# Patient Record
Sex: FEMALE | Race: WHITE | NOT HISPANIC OR LATINO | Employment: OTHER | URBAN - METROPOLITAN AREA
[De-identification: names, ages, dates, MRNs, and addresses within clinical notes are randomized per-mention and may not be internally consistent; named-entity substitution may affect disease eponyms.]

---

## 2017-01-02 ENCOUNTER — HOSPITAL ENCOUNTER (OUTPATIENT)
Dept: PHYSICAL THERAPY | Facility: CLINIC | Age: 72
Setting detail: THERAPIES SERIES
End: 2017-01-02
Attending: ORTHOPAEDIC SURGERY
Payer: MEDICARE

## 2017-01-02 PROCEDURE — 40000718 ZZHC STATISTIC PT DEPARTMENT ORTHO VISIT: Performed by: PHYSICAL THERAPIST

## 2017-01-02 PROCEDURE — 97110 THERAPEUTIC EXERCISES: CPT | Mod: GP | Performed by: PHYSICAL THERAPIST

## 2017-01-06 ENCOUNTER — HOSPITAL ENCOUNTER (OUTPATIENT)
Dept: PHYSICAL THERAPY | Facility: CLINIC | Age: 72
Setting detail: THERAPIES SERIES
End: 2017-01-06
Attending: ORTHOPAEDIC SURGERY
Payer: MEDICARE

## 2017-01-06 PROCEDURE — 40000718 ZZHC STATISTIC PT DEPARTMENT ORTHO VISIT: Performed by: PHYSICAL THERAPIST

## 2017-01-06 PROCEDURE — 97110 THERAPEUTIC EXERCISES: CPT | Mod: GP | Performed by: PHYSICAL THERAPIST

## 2017-01-06 PROCEDURE — 97112 NEUROMUSCULAR REEDUCATION: CPT | Mod: GP | Performed by: PHYSICAL THERAPIST

## 2017-01-10 ENCOUNTER — HOSPITAL ENCOUNTER (OUTPATIENT)
Dept: PHYSICAL THERAPY | Facility: CLINIC | Age: 72
Setting detail: THERAPIES SERIES
End: 2017-01-10
Attending: ORTHOPAEDIC SURGERY
Payer: MEDICARE

## 2017-01-10 PROCEDURE — 97110 THERAPEUTIC EXERCISES: CPT | Mod: GP | Performed by: PHYSICAL THERAPIST

## 2017-01-10 PROCEDURE — 97140 MANUAL THERAPY 1/> REGIONS: CPT | Mod: GP | Performed by: PHYSICAL THERAPIST

## 2017-01-10 PROCEDURE — 40000718 ZZHC STATISTIC PT DEPARTMENT ORTHO VISIT: Performed by: PHYSICAL THERAPIST

## 2017-01-12 ENCOUNTER — TRANSFERRED RECORDS (OUTPATIENT)
Dept: HEALTH INFORMATION MANAGEMENT | Facility: CLINIC | Age: 72
End: 2017-01-12

## 2017-02-01 ENCOUNTER — TELEPHONE (OUTPATIENT)
Dept: FAMILY MEDICINE | Facility: CLINIC | Age: 72
End: 2017-02-01

## 2017-02-01 DIAGNOSIS — I10 HYPERTENSION GOAL BP (BLOOD PRESSURE) < 140/90: Chronic | ICD-10-CM

## 2017-02-01 DIAGNOSIS — E11.21 TYPE 2 DIABETES MELLITUS WITH DIABETIC NEPHROPATHY (H): Chronic | ICD-10-CM

## 2017-02-01 DIAGNOSIS — I10 HYPERTENSION GOAL BP (BLOOD PRESSURE) < 140/90: Primary | Chronic | ICD-10-CM

## 2017-02-01 DIAGNOSIS — N18.30 CKD (CHRONIC KIDNEY DISEASE) STAGE 3, GFR 30-59 ML/MIN (H): ICD-10-CM

## 2017-02-01 LAB
ANION GAP SERPL CALCULATED.3IONS-SCNC: 9 MMOL/L (ref 3–14)
BUN SERPL-MCNC: 22 MG/DL (ref 7–30)
CALCIUM SERPL-MCNC: 8.9 MG/DL (ref 8.5–10.1)
CHLORIDE SERPL-SCNC: 103 MMOL/L (ref 94–109)
CO2 SERPL-SCNC: 24 MMOL/L (ref 20–32)
CREAT SERPL-MCNC: 1.06 MG/DL (ref 0.52–1.04)
GFR SERPL CREATININE-BSD FRML MDRD: 51 ML/MIN/1.7M2
GLUCOSE SERPL-MCNC: 180 MG/DL (ref 70–99)
HBA1C MFR BLD: 7.6 % (ref 4.3–6)
POTASSIUM SERPL-SCNC: 4.3 MMOL/L (ref 3.4–5.3)
SODIUM SERPL-SCNC: 136 MMOL/L (ref 133–144)
TSH SERPL DL<=0.005 MIU/L-ACNC: 1.39 MU/L (ref 0.4–4)

## 2017-02-01 PROCEDURE — 84443 ASSAY THYROID STIM HORMONE: CPT | Performed by: FAMILY MEDICINE

## 2017-02-01 PROCEDURE — 80048 BASIC METABOLIC PNL TOTAL CA: CPT | Performed by: FAMILY MEDICINE

## 2017-02-01 PROCEDURE — 83036 HEMOGLOBIN GLYCOSYLATED A1C: CPT | Performed by: FAMILY MEDICINE

## 2017-02-01 PROCEDURE — 36415 COLL VENOUS BLD VENIPUNCTURE: CPT | Performed by: FAMILY MEDICINE

## 2017-02-06 ENCOUNTER — DOCUMENTATION ONLY (OUTPATIENT)
Dept: OTHER | Facility: CLINIC | Age: 72
End: 2017-02-06

## 2017-02-06 DIAGNOSIS — Z71.89 ACP (ADVANCE CARE PLANNING): Primary | Chronic | ICD-10-CM

## 2017-02-09 ENCOUNTER — TELEPHONE (OUTPATIENT)
Dept: FAMILY MEDICINE | Facility: CLINIC | Age: 72
End: 2017-02-09

## 2017-02-09 ENCOUNTER — OFFICE VISIT (OUTPATIENT)
Dept: FAMILY MEDICINE | Facility: CLINIC | Age: 72
End: 2017-02-09
Payer: MEDICARE

## 2017-02-09 VITALS
HEIGHT: 64 IN | WEIGHT: 184 LBS | TEMPERATURE: 98 F | BODY MASS INDEX: 31.41 KG/M2 | DIASTOLIC BLOOD PRESSURE: 74 MMHG | HEART RATE: 90 BPM | SYSTOLIC BLOOD PRESSURE: 139 MMHG

## 2017-02-09 DIAGNOSIS — E11.21 TYPE 2 DIABETES MELLITUS WITH DIABETIC NEPHROPATHY, WITHOUT LONG-TERM CURRENT USE OF INSULIN (H): Primary | ICD-10-CM

## 2017-02-09 DIAGNOSIS — I48.0 PAROXYSMAL ATRIAL FIBRILLATION (H): Chronic | ICD-10-CM

## 2017-02-09 DIAGNOSIS — N18.30 CKD (CHRONIC KIDNEY DISEASE) STAGE 3, GFR 30-59 ML/MIN (H): ICD-10-CM

## 2017-02-09 DIAGNOSIS — E78.5 HYPERLIPIDEMIA LDL GOAL <100: ICD-10-CM

## 2017-02-09 DIAGNOSIS — Z96.652 STATUS POST TOTAL LEFT KNEE REPLACEMENT: ICD-10-CM

## 2017-02-09 DIAGNOSIS — I10 ESSENTIAL HYPERTENSION: ICD-10-CM

## 2017-02-09 PROCEDURE — 99214 OFFICE O/P EST MOD 30 MIN: CPT | Performed by: FAMILY MEDICINE

## 2017-02-09 RX ORDER — GLYCOPYRROLATE 1 MG/1
1 TABLET ORAL PRN
COMMUNITY
Start: 2017-02-09 | End: 2017-02-20

## 2017-02-09 RX ORDER — ATORVASTATIN CALCIUM 10 MG/1
10 TABLET, FILM COATED ORAL DAILY
Qty: 90 TABLET | Refills: 3 | Status: SHIPPED | OUTPATIENT
Start: 2017-02-09 | End: 2017-02-20

## 2017-02-09 RX ORDER — LISINOPRIL 40 MG/1
40 TABLET ORAL DAILY
Qty: 90 TABLET | Refills: 3 | Status: SHIPPED | OUTPATIENT
Start: 2017-02-09 | End: 2017-02-20

## 2017-02-09 RX ORDER — CHLORTHALIDONE 25 MG/1
12.5 TABLET ORAL DAILY
Qty: 45 TABLET | Refills: 3 | Status: SHIPPED | OUTPATIENT
Start: 2017-02-09 | End: 2017-02-20

## 2017-02-09 NOTE — NURSING NOTE
"Chief Complaint   Patient presents with     Diabetes     recheck       Initial /74 mmHg  Pulse 90  Temp(Src) 98  F (36.7  C) (Tympanic)  Ht 5' 3.5\" (1.613 m)  Wt 184 lb (83.462 kg)  BMI 32.08 kg/m2 Estimated body mass index is 32.08 kg/(m^2) as calculated from the following:    Height as of this encounter: 5' 3.5\" (1.613 m).    Weight as of this encounter: 184 lb (83.462 kg).  Medication Reconciliation: complete   Maya Pineda LPN    "

## 2017-02-09 NOTE — MR AVS SNAPSHOT
"              After Visit Summary   2/9/2017    Suzette Britton    MRN: [id number]           Patient Information     Date Of Birth          1945        Visit Information        Provider Department      2/9/2017 1:30 PM Judy Verde MD [facility]        Today's Diagnoses     Visit for screening mammogram    -  1     Type 2 diabetes mellitus with diabetic nephropathy, without long-term current use of insulin (H)         CKD (chronic kidney disease) stage 3, GFR 30-59 ml/min         Essential hypertension         Hyperlipidemia LDL goal <100         Paroxysmal atrial fibrillation (H)            Follow-ups after your visit        Who to contact     Normal or non-critical lab and imaging results will be communicated to you by Bookmycabhart, letter or phone within 4 business days after the clinic has received the results. If you do not hear from us within 7 days, please contact the clinic through Oracle Youtht or phone. If you have a critical or abnormal lab result, we will notify you by phone as soon as possible.  Submit refill requests through Klutch or call your pharmacy and they will forward the refill request to us. Please allow 3 business days for your refill to be completed.          If you need to speak with a  for additional information , please call: [phone]             Additional Information About Your Visit        MyCharexpressor software Information     Klutch lets you send messages to your doctor, view your test results, renew your prescriptions, schedule appointments and more. To sign up, go to [email or web address] . Click on \"Log in\" on the left side of the screen, which will take you to the Welcome page. Then click on \"Sign up Now\" on the right side of the page.     You will be asked to enter the access code listed below, as well as some personal information. Please follow the directions to create your username and password.     Your access code is: [email or web address]  Expires: 5/10/2017  " "2:12 PM     Your access code will  in 90 days. If you need help or a new code, please call your [place] clinic or [phone].        Care EveryWhere ID     This is your Care EveryWhere ID. This could be used by other organizations to access your [place] medical records  [id number]        Your Vitals Were     Pulse Temperature Height BMI (Body Mass Index)          90 98  F (36.7  C) (Tympanic) 5' 3.5\" (1.613 m) 32.08 kg/m2         Blood Pressure from Last 3 Encounters:   17 139/74   16 146/70   16 126/70    Weight from Last 3 Encounters:   17 184 lb (83.462 kg)   16 193 lb 1.6 oz (87.59 kg)   16 193 lb 1.6 oz (87.59 kg)              Today, you had the following     No orders found for display         Today's Medication Changes          These changes are accurate as of: 17  2:12 PM.  If you have any questions, ask your nurse or doctor.               These medicines have changed or have updated prescriptions.        Dose/Directions    blood glucose monitoring test strip   Commonly known as:  no brand specified   This may have changed:  additional instructions   Used for:  CKD (chronic kidney disease) stage 3, GFR 30-59 ml/min   Changed by:  Judy Verde MD        Use to test blood sugars one time daily or as directed  One touch meter - one touch strips   Quantity:  3 Box   Refills:  3       metFORMIN 500 MG tablet   Commonly known as:  GLUCOPHAGE   This may have changed:    - how much to take  - how to take this  - when to take this  - additional instructions   Used for:  Type 2 diabetes mellitus with diabetic nephropathy, without long-term current use of insulin (H)   Changed by:  Judy Verde MD        Dose:  1000 mg   Take 2 tablets (1,000 mg) by mouth 2 times daily (with meals)   Quantity:  120 tablet   Refills:  3         Stop taking these medicines if you haven't already. Please contact your care team if you have questions.     hydrOXYzine 25 " MG tablet   Commonly known as:  ATARAX   Stopped by:  Judy Verde MD           oxyCODONE 5 MG IR tablet   Commonly known as:  ROXICODONE   Stopped by:  Judy Verde MD           senna-docusate 8.6-50 MG per tablet   Commonly known as:  SENOKOT-S;PERICOLACE   Stopped by:  Judy Verde MD                Where to get your medicines      These medications were sent to [facility]. - [place], [place] - [address]  [address] [address]     Phone:  [phone]    - atorvastatin 10 MG tablet  - blood glucose monitoring test strip  - chlorthalidone 25 MG tablet  - lisinopril 40 MG tablet  - metFORMIN 500 MG tablet             Primary Care Provider Office Phone # Fax #    Judy Verde [phone]       [facility] [address] [facility] [address]        Thank you!     Thank you for choosing [facility]  for your care. Our goal is always to provide you with excellent care. Hearing back from our patients is one way we can continue to improve our services. Please take a few minutes to complete the written survey that you may receive in the mail after your visit with us. Thank you!             Your Updated Medication List - Protect others around you: Learn how to safely use, store and throw away your medicines at www.disposemymeds.org.          This list is accurate as of: 2/9/17  2:12 PM.  Always use your most recent med list.                   Brand Name Dispense Instructions for use    aspirin 325 MG EC tablet     40 tablet    Take 1 tablet (325 mg) by mouth daily       atorvastatin 10 MG tablet    LIPITOR    90 tablet    Take 1 tablet (10 mg) by mouth daily       blood glucose monitoring test strip    no brand specified    3 Box    Use to test blood sugars one time daily or as directed  One touch meter - one touch strips       calcium-vitamin D-vitamin K 500-500-40 MG-UNT-MCG Chew    VIACTIV     Take 1 tablet  by mouth daily       chlorthalidone 25 MG tablet    HYGROTON    45 tablet    Take 0.5 tablets (12.5 mg) by mouth daily       GLUCOMETER KIT     1 Kit    for testing bid, One Touch or please fill with formulary glucometer       lisinopril 40 MG tablet    PRINIVIL/ZESTRIL    90 tablet    Take 1 tablet (40 mg) by mouth daily       metFORMIN 500 MG tablet    GLUCOPHAGE    120 tablet    Take 2 tablets (1,000 mg) by mouth 2 times daily (with meals)       order for DME     1 Units    Equipment being ordered: CPM: Advance as tolerated. Range 0-90 degree flexion Treatment Diagnosis: Left TKA       ROBINUL 1 MG tablet   Generic drug:  glycopyrrolate      Take 1 tablet (1 mg) by mouth as needed       VITAMIN D PO      Take 1,000 Units by mouth

## 2017-02-09 NOTE — PROGRESS NOTES
SUBJECTIVE:                                                    Suzette Britton is a 71 year old female who presents to clinic today for the following health issues:        Diabetes Follow-up    Patient is checking blood sugars: once daily.  Results are as follows:         am - average 200    Diabetic concerns: None     Symptoms of hypoglycemia (low blood sugar): none     Paresthesias (numbness or burning in feet) or sores: Yes redness      Date of last diabetic eye exam: march 2016     Brings in data - running high - always checks before breakfast - 180-220s   Taking metformin 1000 in am and 500 in pm     Hyperlipidemia Follow-Up      Rate your low fat/cholesterol diet?: Eats fairly healthy     Taking statin?  No    Other lipid medications/supplements?:  None    CHOL      142   9/13/2016  HDL       60   9/13/2016  LDL       53   9/13/2016  TRIG      144   9/13/2016  CHOLHDLRATIO      3.4   8/25/2015       Hypertension Follow-up      Outpatient blood pressures once in a while     Low Salt Diet: no added salt     Left Knee surgery 12/1/16 - back on baby asa/day  Feeling good.  Planning to do right knee next    A-fib history - one occurrence - sent to ER and converted there. Hasn't recurred as far as she knows  Did holter and it was negative           Amount of exercise or physical activity: None    Problems taking medications regularly: No    Medication side effects: none    Diet: regular (no restrictions)      Review of systems:  No weight loss/night sweats  Normal appetite  No n/v   No d/c   No foot ulcers/wounds     Problem list and histories reviewed & adjusted, as indicated.  Additional history: as documented    Patient Active Problem List   Diagnosis     Osteoarthrosis, hand     Hyperhidrosis     Hyperlipidemia LDL goal <100     Atrial fibrillation (H)     CKD (chronic kidney disease) stage 3, GFR 30-59 ml/min     Preventative health care     Type 2 diabetes mellitus with diabetic nephropathy (H)      "Hypertension goal BP (blood pressure) < 140/90     Health Care Home     Cataract     Obesity (BMI 30.0-34.9)     S/P total knee arthroplasty     ACP (advance care planning)     Current Outpatient Prescriptions   Medication     aspirin  MG EC tablet     order for DME     calcium-vitamin D-vitamin K (VIACTIV) 500-500-40 MG-UNT-MCG CHEW     blood glucose monitoring (NO BRAND SPECIFIED) test strip     metFORMIN (GLUCOPHAGE) 500 MG tablet     chlorthalidone (HYGROTON) 25 MG tablet     lisinopril (PRINIVIL,ZESTRIL) 40 MG tablet     atorvastatin (LIPITOR) 10 MG tablet     glycopyrrolate (ROBINUL) 1 MG tablet     Cholecalciferol (VITAMIN D PO)     GLUCOMETER KIT     hydrOXYzine (ATARAX) 25 MG tablet     oxyCODONE (ROXICODONE) 5 MG IR tablet     senna-docusate (SENOKOT-S;PERICOLACE) 8.6-50 MG per tablet     No current facility-administered medications for this visit.        Allergies   Allergen Reactions     Nka [No Known Allergies]      /74 mmHg  Pulse 90  Temp(Src) 98  F (36.7  C) (Tympanic)  Ht 5' 3.5\" (1.613 m)  Wt 184 lb (83.462 kg)  BMI 32.08 kg/m2  GENERAL - Pt is alert and oriented in no acute distress.  Affect is appropriate. Good eye contact.  NECK - Neck is supple w/o LA or thyromegaly  RESPIRATORY - Clear to auscultation bilaterally.  No wheezing noted  CV - RRR, no murmurs, rubs, gallops.   EXTREM - No edema.  Feet - no wounds. Normal capillary refill. Warm to touch. Intact microfilament testing. Normal DP and PT pulses      Assessment/Plan -    (E11.21) Type 2 diabetes mellitus with diabetic nephropathy, without long-term current use of insulin (H)  (primary encounter diagnosis)  Comment: A1c is up a bit. BG running high in the a.m..  Increase metformin to 1000 po bid. Check BG at bedtime and at 2am for a few nights. A1c in 3 months. The patient indicates understanding of these issues and agrees with the plan.   Plan: metFORMIN (GLUCOPHAGE) 500 MG tablet,         atorvastatin (LIPITOR) 10 MG " tablet            (N18.3) CKD (chronic kidney disease) stage 3, GFR 30-59 ml/min  Comment:   Plan: blood glucose monitoring (NO BRAND SPECIFIED)         test strip, lisinopril (PRINIVIL/ZESTRIL) 40 MG        tablet            (I10) Essential hypertension  Comment:   Plan: chlorthalidone (HYGROTON) 25 MG tablet            (E78.5) Hyperlipidemia LDL goal <100  Comment:   Plan: atorvastatin (LIPITOR) 10 MG tablet            (I48.0) Paroxysmal atrial fibrillation (H)  Comment: reviewed with her. Continue on asa daily   Plan:     (Z96.652) Status post total left knee replacement  Comment:   Plan:       Declines mammo today     CTara Verde MD

## 2017-02-09 NOTE — TELEPHONE ENCOUNTER
Suzette calling to state that the script for Metformin is wrong and it was send to wrong pharmacy.    Metformin should be for 90 days and it should have 360 tablets.  She wants script to go to [facility] Mail order pharmacy.      Please review and re-order if appropriate.  Thank you..Jeni Echevarria

## 2017-02-20 ENCOUNTER — TELEPHONE (OUTPATIENT)
Dept: FAMILY MEDICINE | Facility: CLINIC | Age: 72
End: 2017-02-20

## 2017-02-20 DIAGNOSIS — E11.21 TYPE 2 DIABETES MELLITUS WITH DIABETIC NEPHROPATHY, WITHOUT LONG-TERM CURRENT USE OF INSULIN (H): ICD-10-CM

## 2017-02-20 DIAGNOSIS — N18.30 CKD (CHRONIC KIDNEY DISEASE) STAGE 3, GFR 30-59 ML/MIN (H): ICD-10-CM

## 2017-02-20 DIAGNOSIS — E78.5 HYPERLIPIDEMIA LDL GOAL <100: ICD-10-CM

## 2017-02-20 DIAGNOSIS — I10 ESSENTIAL HYPERTENSION: ICD-10-CM

## 2017-02-20 RX ORDER — LISINOPRIL 40 MG/1
40 TABLET ORAL DAILY
Qty: 90 TABLET | Refills: 3 | Status: ON HOLD | OUTPATIENT
Start: 2017-02-20 | End: 2017-05-10

## 2017-02-20 RX ORDER — CHLORTHALIDONE 25 MG/1
12.5 TABLET ORAL DAILY
Qty: 45 TABLET | Refills: 3 | Status: SHIPPED | OUTPATIENT
Start: 2017-02-20 | End: 2017-08-25

## 2017-02-20 RX ORDER — GLYCOPYRROLATE 1 MG/1
1 TABLET ORAL PRN
Qty: 90 TABLET | Refills: 3 | Status: SHIPPED | OUTPATIENT
Start: 2017-02-20 | End: 2018-09-05

## 2017-02-20 RX ORDER — ATORVASTATIN CALCIUM 10 MG/1
10 TABLET, FILM COATED ORAL DAILY
Qty: 90 TABLET | Refills: 3 | Status: ON HOLD | OUTPATIENT
Start: 2017-02-20 | End: 2017-05-18

## 2017-02-20 NOTE — TELEPHONE ENCOUNTER
Chlorthalidone, lisinopril atorvastatin , glycopyrrolate ordered to [facility] per request of patient.  Robert Rodgers, RN

## 2017-03-21 ENCOUNTER — TRANSFERRED RECORDS (OUTPATIENT)
Dept: HEALTH INFORMATION MANAGEMENT | Facility: CLINIC | Age: 72
End: 2017-03-21

## 2017-04-13 ENCOUNTER — OFFICE VISIT (OUTPATIENT)
Dept: FAMILY MEDICINE | Facility: CLINIC | Age: 72
End: 2017-04-13
Payer: MEDICARE

## 2017-04-13 VITALS
SYSTOLIC BLOOD PRESSURE: 128 MMHG | HEIGHT: 64 IN | TEMPERATURE: 98.1 F | DIASTOLIC BLOOD PRESSURE: 75 MMHG | WEIGHT: 183.5 LBS | HEART RATE: 89 BPM | BODY MASS INDEX: 31.33 KG/M2

## 2017-04-13 DIAGNOSIS — E11.21 TYPE 2 DIABETES MELLITUS WITH DIABETIC NEPHROPATHY, WITHOUT LONG-TERM CURRENT USE OF INSULIN (H): Chronic | ICD-10-CM

## 2017-04-13 DIAGNOSIS — E78.5 HYPERLIPIDEMIA LDL GOAL <100: Chronic | ICD-10-CM

## 2017-04-13 DIAGNOSIS — Z01.818 PRE-OPERATIVE EXAMINATION: Primary | ICD-10-CM

## 2017-04-13 DIAGNOSIS — N18.30 CKD (CHRONIC KIDNEY DISEASE) STAGE 3, GFR 30-59 ML/MIN (H): Chronic | ICD-10-CM

## 2017-04-13 DIAGNOSIS — I10 ESSENTIAL HYPERTENSION: ICD-10-CM

## 2017-04-13 DIAGNOSIS — E66.811 OBESITY (BMI 30.0-34.9): ICD-10-CM

## 2017-04-13 DIAGNOSIS — M17.11 ARTHRITIS OF RIGHT KNEE: ICD-10-CM

## 2017-04-13 DIAGNOSIS — I48.0 PAROXYSMAL ATRIAL FIBRILLATION (H): Chronic | ICD-10-CM

## 2017-04-13 DIAGNOSIS — Z01.818 PREOP GENERAL PHYSICAL EXAM: ICD-10-CM

## 2017-04-13 LAB
ANION GAP SERPL CALCULATED.3IONS-SCNC: 9 MMOL/L (ref 3–14)
BASOPHILS # BLD AUTO: 0 10E9/L (ref 0–0.2)
BASOPHILS NFR BLD AUTO: 0.6 %
BUN SERPL-MCNC: 20 MG/DL (ref 7–30)
CALCIUM SERPL-MCNC: 9.9 MG/DL (ref 8.5–10.1)
CHLORIDE SERPL-SCNC: 103 MMOL/L (ref 94–109)
CO2 SERPL-SCNC: 26 MMOL/L (ref 20–32)
CREAT SERPL-MCNC: 1.04 MG/DL (ref 0.52–1.04)
DIFFERENTIAL METHOD BLD: NORMAL
EOSINOPHIL # BLD AUTO: 0.2 10E9/L (ref 0–0.7)
EOSINOPHIL NFR BLD AUTO: 3.3 %
ERYTHROCYTE [DISTWIDTH] IN BLOOD BY AUTOMATED COUNT: 13.9 % (ref 10–15)
GFR SERPL CREATININE-BSD FRML MDRD: 52 ML/MIN/1.7M2
GLUCOSE SERPL-MCNC: 129 MG/DL (ref 70–99)
HBA1C MFR BLD: 6.9 % (ref 4.3–6)
HCT VFR BLD AUTO: 42.4 % (ref 35–47)
HGB BLD-MCNC: 13.9 G/DL (ref 11.7–15.7)
LYMPHOCYTES # BLD AUTO: 2.5 10E9/L (ref 0.8–5.3)
LYMPHOCYTES NFR BLD AUTO: 36 %
MCH RBC QN AUTO: 28.8 PG (ref 26.5–33)
MCHC RBC AUTO-ENTMCNC: 32.8 G/DL (ref 31.5–36.5)
MCV RBC AUTO: 88 FL (ref 78–100)
MONOCYTES # BLD AUTO: 0.5 10E9/L (ref 0–1.3)
MONOCYTES NFR BLD AUTO: 7.5 %
NEUTROPHILS # BLD AUTO: 3.7 10E9/L (ref 1.6–8.3)
NEUTROPHILS NFR BLD AUTO: 52.6 %
PLATELET # BLD AUTO: 220 10E9/L (ref 150–450)
POTASSIUM SERPL-SCNC: 4.4 MMOL/L (ref 3.4–5.3)
RBC # BLD AUTO: 4.82 10E12/L (ref 3.8–5.2)
SODIUM SERPL-SCNC: 138 MMOL/L (ref 133–144)
WBC # BLD AUTO: 6.9 10E9/L (ref 4–11)

## 2017-04-13 PROCEDURE — 83036 HEMOGLOBIN GLYCOSYLATED A1C: CPT | Performed by: FAMILY MEDICINE

## 2017-04-13 PROCEDURE — 80048 BASIC METABOLIC PNL TOTAL CA: CPT | Performed by: FAMILY MEDICINE

## 2017-04-13 PROCEDURE — 85025 COMPLETE CBC W/AUTO DIFF WBC: CPT | Performed by: FAMILY MEDICINE

## 2017-04-13 PROCEDURE — 93000 ELECTROCARDIOGRAM COMPLETE: CPT | Performed by: FAMILY MEDICINE

## 2017-04-13 PROCEDURE — 99214 OFFICE O/P EST MOD 30 MIN: CPT | Performed by: FAMILY MEDICINE

## 2017-04-13 PROCEDURE — 36415 COLL VENOUS BLD VENIPUNCTURE: CPT | Performed by: FAMILY MEDICINE

## 2017-04-13 NOTE — NURSING NOTE
"Chief Complaint   Patient presents with     Pre-Op Exam       Initial /75 (BP Location: Right arm, Patient Position: Chair, Cuff Size: Adult Regular)  Pulse 89  Temp 98.1  F (36.7  C) (Tympanic)  Ht 5' 3.5\" (1.613 m)  Wt 183 lb 8 oz (83.2 kg)  BMI 32 kg/m2 Estimated body mass index is 32 kg/(m^2) as calculated from the following:    Height as of this encounter: 5' 3.5\" (1.613 m).    Weight as of this encounter: 183 lb 8 oz (83.2 kg).  Medication Reconciliation: complete   Maya Pineda LPN    "

## 2017-04-13 NOTE — MR AVS SNAPSHOT
After Visit Summary   4/13/2017    Suzette Britton    MRN: [id number]           Patient Information     Date Of Birth          1945        Visit Information        Provider Department      4/13/2017 10:30 AM Judy Verde MD [facility]        Today's Diagnoses     Pre-operative examination    -  1    Arthritis of right knee        Type 2 diabetes mellitus with diabetic nephropathy, without long-term current use of insulin (H)        CKD (chronic kidney disease) stage 3, GFR 30-59 ml/min        Paroxysmal atrial fibrillation (H)        Hyperlipidemia LDL goal <100        Obesity (BMI 30.0-34.9)        Essential hypertension        Preop general physical exam          Care Instructions      Before Your Surgery      Call your surgeon if there is any change in your health. This includes signs of a cold or flu (such as a sore throat, runny nose, cough, rash or fever).    Do not smoke, drink alcohol or take over the counter medicine (unless your surgeon or primary care doctor tells you to) for the 24 hours before and after surgery.    If you take prescribed drugs: Follow your doctor s orders about which medicines to take and which to stop until after surgery.    Eating and drinking prior to surgery: follow the instructions from your surgeon    Take a shower or bath the night before surgery. Use the soap your surgeon gave you to gently clean your skin. If you do not have soap from your surgeon, use your regular soap. Do not shave or scrub the surgery site.  Wear clean pajamas and have clean sheets on your bed.         Follow-ups after your visit        Your next 10 appointments already scheduled     May 04, 2017   Procedure with Zachary Arteaga MD   [facility] (--)    [facility] [facility] [address] [phone]           The medical center is located at [address] (between [address] and [address] in [place], [place] miles north of [place]).     "          Who to contact     Normal or non-critical lab and imaging results will be communicated to you by MyChart, letter or phone within 4 business days after the clinic has received the results. If you do not hear from us within 7 days, please contact the clinic through Indi-e Publishinghart or phone. If you have a critical or abnormal lab result, we will notify you by phone as soon as possible.  Submit refill requests through Sova or call your pharmacy and they will forward the refill request to us. Please allow 3 business days for your refill to be completed.          If you need to speak with a  for additional information , please call: [phone]             Additional Information About Your Visit        Indi-e PublishingharMOGL Information     Sova lets you send messages to your doctor, view your test results, renew your prescriptions, schedule appointments and more. To sign up, go to [email or web address] . Click on \"Log in\" on the left side of the screen, which will take you to the Welcome page. Then click on \"Sign up Now\" on the right side of the page.     You will be asked to enter the access code listed below, as well as some personal information. Please follow the directions to create your username and password.     Your access code is: [email or web address]  Expires: 5/10/2017  3:12 PM     Your access code will  in 90 days. If you need help or a new code, please call your [place] clinic or [phone].        Care EveryWhere ID     This is your Care EveryWhere ID. This could be used by other organizations to access your [place] medical records  [id number]        Your Vitals Were     Pulse Temperature Height BMI (Body Mass Index)          89 98.1  F (36.7  C) (Tympanic) 5' 3.5\" (1.613 m) 32 kg/m2         Blood Pressure from Last 3 Encounters:   17 128/75   17 139/74   16 146/70    Weight from Last 3 Encounters:   17 183 lb 8 oz (83.2 kg)   17 184 lb (83.5 kg)   16 193 " lb 1.6 oz (87.6 kg)              We Performed the Following     Basic metabolic panel     CBC with platelets differential     EKG 12-lead complete w/read - Clinics     Hemoglobin A1c        Primary Care Provider Office Phone # Fax #    Judy Verde [phone]       [facility] [address] [address] [facility] [facility] [address]        Thank you!     Thank you for choosing [facility]  for your care. Our goal is always to provide you with excellent care. Hearing back from our patients is one way we can continue to improve our services. Please take a few minutes to complete the written survey that you may receive in the mail after your visit with us. Thank you!             Your Updated Medication List - Protect others around you: Learn how to safely use, store and throw away your medicines at www.disposemymeds.org.          This list is accurate as of: 4/13/17 11:35 AM.  Always use your most recent med list.                   Brand Name Dispense Instructions for use    aspirin 325 MG EC tablet     40 tablet    Take 1 tablet (325 mg) by mouth daily       atorvastatin 10 MG tablet    LIPITOR    90 tablet    Take 1 tablet (10 mg) by mouth daily       blood glucose monitoring test strip    no brand specified    3 Box    Use to test blood sugars one time daily or as directed  One touch meter - one touch strips       calcium-vitamin D-vitamin K 500-500-40 MG-UNT-MCG Chew    VIACTIV     Take 1 tablet by mouth daily       chlorthalidone 25 MG tablet    HYGROTON    45 tablet    Take 0.5 tablets (12.5 mg) by mouth daily       GLUCOMETER KIT     1 Kit    for testing bid, One Touch or please fill with formulary glucometer       glycopyrrolate 1 MG tablet    ROBINUL    90 tablet    Take 1 tablet (1 mg) by mouth as needed       lisinopril 40 MG tablet    PRINIVIL/ZESTRIL    90 tablet    Take 1 tablet (40 mg) by mouth daily       metFORMIN 500 MG tablet    GLUCOPHAGE    360 tablet    Take 2 tablets  (1,000 mg) by mouth 2 times daily (with meals)       order for DME     1 Units    Equipment being ordered: CPM: Advance as tolerated. Range 0-90 degree flexion Treatment Diagnosis: Left TKA       VITAMIN D PO      Take 1,000 Units by mouth

## 2017-04-13 NOTE — PROGRESS NOTES
[facility]  [address] [address]  [phone]  Dept: [phone]    PRE-OP EVALUATION:  Today's date: 2017    Suzette Britton (: 1945) presents for pre-operative evaluation assessment as requested by Dr. Zachary Arteaga.  She requires evaluation and anesthesia risk assessment prior to undergoing surgery/procedure for treatment of right knee arthritis.  Proposed procedure: right knee replacement     Date of Surgery/ Procedure: 2017  Time of Surgery/ Procedure: To be determined   Hospital/Surgical Facility: [facility] . Surgery done at [facility].   Primary Physician: Judy Verde  Type of Anesthesia Anticipated: Spinal    Patient has a Health Care Directive or Living Will:  YES , Patient said there is a copy in her chart.     1. NO - Do you have a history of heart attack, stroke, stent, bypass or surgery on an artery in the head, neck, heart or legs?  2. NO - Do you ever have any pain or discomfort in your chest?  3. NO - Do you have a history of  Heart Failure?  4. NO - Are you troubled by shortness of breath when: walking on the level, up a slight hill or at night?  5. NO - Do you currently have a cold, bronchitis or other respiratory infection?  6. NO - Do you have a cough, shortness of breath or wheezing?  7. NO - Do you sometimes get pains in the calves of your legs when you walk?  8. NO - Do you or anyone in your family have previous history of blood clots?  9. NO - Do you or does anyone in your family have a serious bleeding problem such as prolonged bleeding following surgeries or cuts?  10. NO - Have you ever had problems with anemia or been told to take iron pills?  11. NO - Have you had any abnormal blood loss such as black, tarry or bloody stools, or abnormal vaginal bleeding?  12. NO - Have you ever had a blood transfusion?  13. NO - Have you or any of your relatives ever had problems with anesthesia?  14. NO - Do you  have sleep apnea, excessive snoring or daytime drowsiness?  15. NO - Do you have any prosthetic heart valves?  16. Yes? - Do you have prosthetic joints? Left TKA 12/2016  17. NO - Is there any chance that you may be pregnant?      HPI:                                                      Brief HPI related to upcoming procedure: pain in the right knee. Severe arthritis       See problem list for active medical problems.  Problems all longstanding and stable, except as noted/documented.  See ROS for pertinent symptoms related to these conditions.                                                                                                  .    MEDICAL HISTORY:                                                      Patient Active Problem List    Diagnosis Date Noted     Type 2 diabetes mellitus with diabetic nephropathy, without long-term current use of insulin (H) 04/13/2017     Priority: Medium     Essential hypertension 02/09/2017     Priority: Medium     ACP (advance care planning) 02/06/2017     Priority: Medium     Advance Care Planning 2/6/2017: Receipt of ACP document:  Received: Health Care Directive which was witnessed or notarized on 10-16-14.  Document previously scanned on 12-5-16.  Validation form completed and sent to be scanned.  Code Status reflects choices in most recent ACP document.  Confirmed/documented designated decision maker(s).  Added by Sasha Cameron RN Advance Care Planning Liaison with [employer]         S/P total knee arthroplasty 12/01/2016     Priority: Medium     Obesity (BMI 30.0-34.9) 10/23/2015     Priority: Medium     Cataract 05/30/2014     Priority: Medium     CKD (chronic kidney disease) stage 3, GFR 30-59 ml/min 03/19/2013     Priority: Medium     Preventative health care 03/19/2013     Priority: Medium     Declines further mammograms 3/19/13       Hyperlipidemia LDL goal <100 10/20/2011     Priority: Medium     Recent Labs   Lab Test 9/28/10 0731 8/10/09 0713     CHOL  196 183     HDL 52 52     LDL 98 83     TRIG 228* 242*     CHOLHDLRATIO 4.0 4.0            Atrial fibrillation (H) 10/20/2011     Priority: Medium     One bout in 2010 - converted in ER with iv diltizem  Negative holter.no recurrence as far as she knows        Hyperhidrosis 06/27/2010     Priority: Medium     Osteoarthrosis, hand 10/06/2005     Priority: Medium     Problem list name updated by automated process. Provider to review       Health Care Home 08/05/2013     Priority: Low     *See Letters for [facility] Care Plan: My Access Plan            Past Medical History:   Diagnosis Date     Osteoarthrosis, unspecified whether generalized or localized, hand      Type II or unspecified type diabetes mellitus without mention of complication, not stated as uncontrolled      Unspecified essential hypertension      Past Surgical History:   Procedure Laterality Date     ARTHROPLASTY KNEE Left 12/1/2016    Procedure: ARTHROPLASTY KNEE;  Surgeon: Zachary Arteaga MD;  Location: [facility] OR     Colonoscopy, normal  09/13/2002     PHACOEMULSIFICATION WITH STANDARD INTRAOCULAR LENS IMPLANT  6/26/2014    Procedure: PHACOEMULSIFICATION WITH STANDARD INTRAOCULAR LENS IMPLANT;  Surgeon: Dario Mcghee MD;  Location: [facility] OR     PHACOEMULSIFICATION WITH STANDARD INTRAOCULAR LENS IMPLANT Left 9/4/2014    Procedure: PHACOEMULSIFICATION WITH STANDARD INTRAOCULAR LENS IMPLANT;  Surgeon: Dario Mcghee MD;  Location: [facility] OR     Current Outpatient Prescriptions   Medication Sig Dispense Refill     chlorthalidone (HYGROTON) 25 MG tablet Take 0.5 tablets (12.5 mg) by mouth daily 45 tablet 3     lisinopril (PRINIVIL/ZESTRIL) 40 MG tablet Take 1 tablet (40 mg) by mouth daily 90 tablet 3     atorvastatin (LIPITOR) 10 MG tablet Take 1 tablet (10 mg) by mouth daily 90 tablet 3     glycopyrrolate (ROBINUL) 1 MG tablet Take 1 tablet (1 mg) by mouth as needed 90 tablet 3     metFORMIN (GLUCOPHAGE) 500 MG tablet Take 2 tablets (1,000 mg) by mouth 2  "times daily (with meals) 360 tablet 1     blood glucose monitoring (NO BRAND SPECIFIED) test strip Use to test blood sugars one time daily or as directed  One touch meter - one touch strips 3 Box 3     aspirin  MG EC tablet Take 1 tablet (325 mg) by mouth daily 40 tablet 0     order for DME Equipment being ordered: CPM: Advance as tolerated. Range 0-90 degree flexion  Treatment Diagnosis: Left TKA 1 Units 1     calcium-vitamin D-vitamin K (VIACTIV) 500-500-40 MG-UNT-MCG CHEW Take 1 tablet by mouth daily       Cholecalciferol (VITAMIN D PO) Take 1,000 Units by mouth        GLUCOMETER KIT for testing bid, One Touch or please fill with formulary glucometer 1 Kit 0     OTC products: None, except as noted above   No use of herbal medications or other supplements    Allergies   Allergen Reactions     Nka [No Known Allergies]       Latex Allergy: NO    Social History   Substance Use Topics     Smoking status: Never Smoker     Smokeless tobacco: Never Used     Alcohol use No     History   Drug Use No       REVIEW OF SYSTEMS:                                                    Constitutional, neuro, ENT, endocrine, pulmonary, cardiac, gastrointestinal, genitourinary, musculoskeletal, integument and psychiatric systems are negative, except as otherwise noted.    EXAM:                                                    /75 (BP Location: Right arm, Patient Position: Chair, Cuff Size: Adult Regular)  Pulse 89  Temp 98.1  F (36.7  C) (Tympanic)  Ht 5' 3.5\" (1.613 m)  Wt 183 lb 8 oz (83.2 kg)  BMI 32 kg/m2    GENERAL APPEARANCE: healthy, alert and no distress     EYES: EOMI, PERRL     HENT: ear canals and TM's normal and nose and mouth without ulcers or lesions     NECK: no adenopathy, no asymmetry, masses, or scars and thyroid normal to palpation     RESP: lungs clear to auscultation - no rales, rhonchi or wheezes     CV: regular rates and rhythm, normal S1 S2, no S3 or S4 and no murmur, click or rub     ABDOMEN:  " soft, nontender, no HSM or masses and bowel sounds normal     MS: extremities normal- no gross deformities noted, no evidence of inflammation in joints, FROM in all extremities.     NEURO: Normal strength and tone, sensory exam grossly normal, mentation intact and speech normal     PSYCH: mentation appears normal. and affect normal/bright    DIAGNOSTICS:                                                    EKG: appears normal, NSR, Normal Sinus Rhythm, normal axis, normal intervals, no acute ST/T changes c/w ischemia, no LVH by voltage criteria, unchanged from previous tracings    Results for orders placed or performed in visit on 04/13/17   Hemoglobin A1c   Result Value Ref Range    Hemoglobin A1C 6.9 (H) 4.3 - 6.0 %   Basic metabolic panel   Result Value Ref Range    Sodium 138 133 - 144 mmol/L    Potassium 4.4 3.4 - 5.3 mmol/L    Chloride 103 94 - 109 mmol/L    Carbon Dioxide 26 20 - 32 mmol/L    Anion Gap 9 3 - 14 mmol/L    Glucose 129 (H) 70 - 99 mg/dL    Urea Nitrogen 20 7 - 30 mg/dL    Creatinine 1.04 0.52 - 1.04 mg/dL    GFR Estimate 52 (L) >60 mL/min/1.7m2    GFR Estimate If Black 63 >60 mL/min/1.7m2    Calcium 9.9 8.5 - 10.1 mg/dL   CBC with platelets differential   Result Value Ref Range    WBC 6.9 4.0 - 11.0 10e9/L    RBC Count 4.82 3.8 - 5.2 10e12/L    Hemoglobin 13.9 11.7 - 15.7 g/dL    Hematocrit 42.4 35.0 - 47.0 %    MCV 88 78 - 100 fl    MCH 28.8 26.5 - 33.0 pg    MCHC 32.8 31.5 - 36.5 g/dL    RDW 13.9 10.0 - 15.0 %    Platelet Count 220 150 - 450 10e9/L    Diff Method Automated Method     % Neutrophils 52.6 %    % Lymphocytes 36.0 %    % Monocytes 7.5 %    % Eosinophils 3.3 %    % Basophils 0.6 %    Absolute Neutrophil 3.7 1.6 - 8.3 10e9/L    Absolute Lymphocytes 2.5 0.8 - 5.3 10e9/L    Absolute Monocytes 0.5 0.0 - 1.3 10e9/L    Absolute Eosinophils 0.2 0.0 - 0.7 10e9/L    Absolute Basophils 0.0 0.0 - 0.2 10e9/L         Recent Labs   Lab Test  02/01/17   0818  12/03/16   0615  12/02/16   0640   11/08/16   1031   07/23/10   1750   HGB   --   11.1*  10.9*  13.1   < >  14.0   PLT   --    --    --   187   --   334   INR   --    --    --    --    --   0.94   NA  136  133  138  138   < >  138   POTASSIUM  4.3  4.0  3.8  4.2   < >  4.7   CR  1.06*  0.97  1.12*  1.12*   < >  2.09*   A1C  7.6*   --    --   6.9*   < >   --     < > = values in this interval not displayed.       IMPRESSION:                                                      Reason for surgery/procedure:  Left total Knee Arthroplasty      Diagnosis/reason for consult:   Pre-op consultation  Paroxsymal afib  Hypertension   Hyperlipidemia  DM 2, med controlled  Rash on abdomen    The proposed surgical procedure is considered INTERMEDIATE risk.     REVISED CARDIAC RISK INDEX  The patient has the following serious cardiovascular risks for perioperative complications such as (MI, PE, VFib and 3  AV Block):  No serious cardiac risks  INTERPRETATION: 0 risks: Class I (very low risk - 0.4% complication rate)     The patient has the following additional risks for perioperative complications:  No identified additional risks        RECOMMENDATIONS:      --Approval given to proceed with proposed procedure, without further diagnostic evaluation     Diabetes Medication Use  -----Hold metformin while NPO.      HYPERTENSION   - continue on usual meds for hypertension      Hold asa and nsaids for 5-7 days prior to surgery.      Signed Electronically by: Judy Verde MD     Copy of this evaluation report is provided to requesting physician.

## 2017-05-01 ENCOUNTER — ANESTHESIA EVENT (OUTPATIENT)
Dept: SURGERY | Facility: CLINIC | Age: 72
DRG: 470 | End: 2017-05-01
Payer: MEDICARE

## 2017-05-04 ENCOUNTER — ANESTHESIA (OUTPATIENT)
Dept: SURGERY | Facility: CLINIC | Age: 72
DRG: 470 | End: 2017-05-04
Payer: MEDICARE

## 2017-05-04 ENCOUNTER — APPOINTMENT (OUTPATIENT)
Dept: GENERAL RADIOLOGY | Facility: CLINIC | Age: 72
DRG: 470 | End: 2017-05-04
Attending: ORTHOPAEDIC SURGERY
Payer: MEDICARE

## 2017-05-04 ENCOUNTER — HOSPITAL ENCOUNTER (INPATIENT)
Facility: CLINIC | Age: 72
LOS: 2 days | Discharge: HOME | DRG: 470 | End: 2017-05-06
Attending: ORTHOPAEDIC SURGERY | Admitting: ORTHOPAEDIC SURGERY
Payer: MEDICARE

## 2017-05-04 DIAGNOSIS — Z96.651 STATUS POST TOTAL RIGHT KNEE REPLACEMENT: Primary | ICD-10-CM

## 2017-05-04 LAB
GLUCOSE BLDC GLUCOMTR-MCNC: 107 MG/DL (ref 70–99)
GLUCOSE BLDC GLUCOMTR-MCNC: 151 MG/DL (ref 70–99)
GLUCOSE BLDC GLUCOMTR-MCNC: 207 MG/DL (ref 70–99)

## 2017-05-04 PROCEDURE — 25000128 H RX IP 250 OP 636: Performed by: NURSE ANESTHETIST, CERTIFIED REGISTERED

## 2017-05-04 PROCEDURE — 25000132 ZZH RX MED GY IP 250 OP 250 PS 637: Mod: GY | Performed by: INTERNAL MEDICINE

## 2017-05-04 PROCEDURE — 40000306 ZZH STATISTIC PRE PROC ASSESS II: Performed by: ORTHOPAEDIC SURGERY

## 2017-05-04 PROCEDURE — 25800025 ZZH RX 258: Performed by: NURSE ANESTHETIST, CERTIFIED REGISTERED

## 2017-05-04 PROCEDURE — 00000146 ZZHCL STATISTIC GLUCOSE BY METER IP

## 2017-05-04 PROCEDURE — 25000128 H RX IP 250 OP 636: Performed by: PHYSICIAN ASSISTANT

## 2017-05-04 PROCEDURE — 25000125 ZZHC RX 250: Performed by: PHYSICIAN ASSISTANT

## 2017-05-04 PROCEDURE — 25000128 H RX IP 250 OP 636: Performed by: ORTHOPAEDIC SURGERY

## 2017-05-04 PROCEDURE — A9270 NON-COVERED ITEM OR SERVICE: HCPCS | Mod: GY | Performed by: PHYSICIAN ASSISTANT

## 2017-05-04 PROCEDURE — 40000986 XR KNEE PORT RT 1/2 VW: Mod: RT

## 2017-05-04 PROCEDURE — 25000125 ZZHC RX 250: Performed by: NURSE ANESTHETIST, CERTIFIED REGISTERED

## 2017-05-04 PROCEDURE — C1776 JOINT DEVICE (IMPLANTABLE): HCPCS | Performed by: ORTHOPAEDIC SURGERY

## 2017-05-04 PROCEDURE — 71000012 ZZH RECOVERY PHASE 1 LEVEL 1 FIRST HR: Performed by: ORTHOPAEDIC SURGERY

## 2017-05-04 PROCEDURE — 0SRC0J9 REPLACEMENT OF RIGHT KNEE JOINT WITH SYNTHETIC SUBSTITUTE, CEMENTED, OPEN APPROACH: ICD-10-PCS | Performed by: ORTHOPAEDIC SURGERY

## 2017-05-04 PROCEDURE — 71000013 ZZH RECOVERY PHASE 1 LEVEL 1 EA ADDTL HR: Performed by: ORTHOPAEDIC SURGERY

## 2017-05-04 PROCEDURE — 37000009 ZZH ANESTHESIA TECHNICAL FEE, EACH ADDTL 15 MIN: Performed by: ORTHOPAEDIC SURGERY

## 2017-05-04 PROCEDURE — 12000000 ZZH R&B MED SURG/OB

## 2017-05-04 PROCEDURE — 27810169 ZZH OR IMPLANT GENERAL: Performed by: ORTHOPAEDIC SURGERY

## 2017-05-04 PROCEDURE — A9270 NON-COVERED ITEM OR SERVICE: HCPCS | Mod: GY | Performed by: INTERNAL MEDICINE

## 2017-05-04 PROCEDURE — 25000132 ZZH RX MED GY IP 250 OP 250 PS 637: Mod: GY | Performed by: ORTHOPAEDIC SURGERY

## 2017-05-04 PROCEDURE — 25000131 ZZH RX MED GY IP 250 OP 636 PS 637: Mod: GY | Performed by: PHYSICIAN ASSISTANT

## 2017-05-04 PROCEDURE — 36000063 ZZH SURGERY LEVEL 4 EA 15 ADDTL MIN: Performed by: ORTHOPAEDIC SURGERY

## 2017-05-04 PROCEDURE — 36000093 ZZH SURGERY LEVEL 4 1ST 30 MIN: Performed by: ORTHOPAEDIC SURGERY

## 2017-05-04 PROCEDURE — 25000132 ZZH RX MED GY IP 250 OP 250 PS 637: Mod: GY | Performed by: PHYSICIAN ASSISTANT

## 2017-05-04 PROCEDURE — 37000008 ZZH ANESTHESIA TECHNICAL FEE, 1ST 30 MIN: Performed by: ORTHOPAEDIC SURGERY

## 2017-05-04 PROCEDURE — A9270 NON-COVERED ITEM OR SERVICE: HCPCS | Mod: GY | Performed by: ORTHOPAEDIC SURGERY

## 2017-05-04 PROCEDURE — 27210794 ZZH OR GENERAL SUPPLY STERILE: Performed by: ORTHOPAEDIC SURGERY

## 2017-05-04 DEVICE — IMPLANTABLE DEVICE: Type: IMPLANTABLE DEVICE | Site: KNEE | Status: FUNCTIONAL

## 2017-05-04 DEVICE — IMP TIB BASE JJ ATTUNE RP SZ5 CEM 150610005: Type: IMPLANTABLE DEVICE | Site: KNEE | Status: FUNCTIONAL

## 2017-05-04 DEVICE — BONE CEMENT PALACOS 00-1112-140-01: Type: IMPLANTABLE DEVICE | Site: KNEE | Status: FUNCTIONAL

## 2017-05-04 RX ORDER — FENTANYL CITRATE 50 UG/ML
INJECTION, SOLUTION INTRAMUSCULAR; INTRAVENOUS PRN
Status: DISCONTINUED | OUTPATIENT
Start: 2017-05-04 | End: 2017-05-04

## 2017-05-04 RX ORDER — CYCLOBENZAPRINE HCL 5 MG
5 TABLET ORAL 3 TIMES DAILY PRN
Status: DISCONTINUED | OUTPATIENT
Start: 2017-05-04 | End: 2017-05-06 | Stop reason: HOSPADM

## 2017-05-04 RX ORDER — CEFAZOLIN SODIUM 2 G/100ML
2 INJECTION, SOLUTION INTRAVENOUS
Status: COMPLETED | OUTPATIENT
Start: 2017-05-04 | End: 2017-05-04

## 2017-05-04 RX ORDER — ROPIVACAINE HYDROCHLORIDE 7.5 MG/ML
INJECTION, SOLUTION EPIDURAL; PERINEURAL PRN
Status: DISCONTINUED | OUTPATIENT
Start: 2017-05-04 | End: 2017-05-04

## 2017-05-04 RX ORDER — ACETAMINOPHEN 325 MG/1
650 TABLET ORAL EVERY 4 HOURS PRN
Status: DISCONTINUED | OUTPATIENT
Start: 2017-05-07 | End: 2017-05-06 | Stop reason: HOSPADM

## 2017-05-04 RX ORDER — CEFAZOLIN SODIUM 1 G/3ML
1 INJECTION, POWDER, FOR SOLUTION INTRAMUSCULAR; INTRAVENOUS SEE ADMIN INSTRUCTIONS
Status: DISCONTINUED | OUTPATIENT
Start: 2017-05-04 | End: 2017-05-04 | Stop reason: HOSPADM

## 2017-05-04 RX ORDER — ONDANSETRON 2 MG/ML
INJECTION INTRAMUSCULAR; INTRAVENOUS PRN
Status: DISCONTINUED | OUTPATIENT
Start: 2017-05-04 | End: 2017-05-04

## 2017-05-04 RX ORDER — LIDOCAINE HYDROCHLORIDE 10 MG/ML
INJECTION, SOLUTION INFILTRATION; PERINEURAL PRN
Status: DISCONTINUED | OUTPATIENT
Start: 2017-05-04 | End: 2017-05-04

## 2017-05-04 RX ORDER — NALOXONE HYDROCHLORIDE 0.4 MG/ML
.1-.4 INJECTION, SOLUTION INTRAMUSCULAR; INTRAVENOUS; SUBCUTANEOUS
Status: DISCONTINUED | OUTPATIENT
Start: 2017-05-04 | End: 2017-05-06 | Stop reason: HOSPADM

## 2017-05-04 RX ORDER — ATORVASTATIN CALCIUM 10 MG/1
10 TABLET, FILM COATED ORAL
Status: DISCONTINUED | OUTPATIENT
Start: 2017-05-04 | End: 2017-05-06 | Stop reason: HOSPADM

## 2017-05-04 RX ORDER — NALOXONE HYDROCHLORIDE 0.4 MG/ML
.1-.4 INJECTION, SOLUTION INTRAMUSCULAR; INTRAVENOUS; SUBCUTANEOUS
Status: DISCONTINUED | OUTPATIENT
Start: 2017-05-04 | End: 2017-05-04 | Stop reason: CLARIF

## 2017-05-04 RX ORDER — HYDROMORPHONE HYDROCHLORIDE 1 MG/ML
.3-.6 INJECTION, SOLUTION INTRAMUSCULAR; INTRAVENOUS; SUBCUTANEOUS
Status: DISCONTINUED | OUTPATIENT
Start: 2017-05-04 | End: 2017-05-06 | Stop reason: HOSPADM

## 2017-05-04 RX ORDER — ALBUTEROL SULFATE 0.83 MG/ML
2.5 SOLUTION RESPIRATORY (INHALATION) EVERY 4 HOURS PRN
Status: DISCONTINUED | OUTPATIENT
Start: 2017-05-04 | End: 2017-05-04 | Stop reason: HOSPADM

## 2017-05-04 RX ORDER — LIDOCAINE 40 MG/G
CREAM TOPICAL
Status: DISCONTINUED | OUTPATIENT
Start: 2017-05-04 | End: 2017-05-04 | Stop reason: HOSPADM

## 2017-05-04 RX ORDER — EPHEDRINE SULFATE 50 MG/ML
INJECTION, SOLUTION INTRAVENOUS PRN
Status: DISCONTINUED | OUTPATIENT
Start: 2017-05-04 | End: 2017-05-04

## 2017-05-04 RX ORDER — LISINOPRIL 40 MG/1
40 TABLET ORAL DAILY
Status: DISCONTINUED | OUTPATIENT
Start: 2017-05-05 | End: 2017-05-06 | Stop reason: HOSPADM

## 2017-05-04 RX ORDER — PROPOFOL 10 MG/ML
INJECTION, EMULSION INTRAVENOUS CONTINUOUS PRN
Status: DISCONTINUED | OUTPATIENT
Start: 2017-05-04 | End: 2017-05-04

## 2017-05-04 RX ORDER — SODIUM CHLORIDE, SODIUM LACTATE, POTASSIUM CHLORIDE, CALCIUM CHLORIDE 600; 310; 30; 20 MG/100ML; MG/100ML; MG/100ML; MG/100ML
INJECTION, SOLUTION INTRAVENOUS CONTINUOUS
Status: DISCONTINUED | OUTPATIENT
Start: 2017-05-04 | End: 2017-05-04 | Stop reason: HOSPADM

## 2017-05-04 RX ORDER — METOPROLOL TARTRATE 1 MG/ML
1-2 INJECTION, SOLUTION INTRAVENOUS EVERY 5 MIN PRN
Status: DISCONTINUED | OUTPATIENT
Start: 2017-05-04 | End: 2017-05-04 | Stop reason: HOSPADM

## 2017-05-04 RX ORDER — FENTANYL CITRATE 50 UG/ML
25-50 INJECTION, SOLUTION INTRAMUSCULAR; INTRAVENOUS
Status: DISCONTINUED | OUTPATIENT
Start: 2017-05-04 | End: 2017-05-04 | Stop reason: HOSPADM

## 2017-05-04 RX ORDER — AMOXICILLIN 250 MG
1-2 CAPSULE ORAL 2 TIMES DAILY
Status: DISCONTINUED | OUTPATIENT
Start: 2017-05-04 | End: 2017-05-06 | Stop reason: HOSPADM

## 2017-05-04 RX ORDER — NICOTINE POLACRILEX 4 MG
15-30 LOZENGE BUCCAL
Status: DISCONTINUED | OUTPATIENT
Start: 2017-05-04 | End: 2017-05-05

## 2017-05-04 RX ORDER — ONDANSETRON 2 MG/ML
4 INJECTION INTRAMUSCULAR; INTRAVENOUS EVERY 6 HOURS PRN
Status: DISCONTINUED | OUTPATIENT
Start: 2017-05-04 | End: 2017-05-06 | Stop reason: HOSPADM

## 2017-05-04 RX ORDER — SODIUM CHLORIDE 9 MG/ML
INJECTION, SOLUTION INTRAVENOUS CONTINUOUS
Status: DISCONTINUED | OUTPATIENT
Start: 2017-05-04 | End: 2017-05-05 | Stop reason: CLARIF

## 2017-05-04 RX ORDER — ONDANSETRON 2 MG/ML
4 INJECTION INTRAMUSCULAR; INTRAVENOUS EVERY 30 MIN PRN
Status: DISCONTINUED | OUTPATIENT
Start: 2017-05-04 | End: 2017-05-04 | Stop reason: HOSPADM

## 2017-05-04 RX ORDER — BUPIVACAINE HYDROCHLORIDE 7.5 MG/ML
INJECTION, SOLUTION INTRASPINAL PRN
Status: DISCONTINUED | OUTPATIENT
Start: 2017-05-04 | End: 2017-05-04

## 2017-05-04 RX ORDER — DEXTROSE MONOHYDRATE 25 G/50ML
25-50 INJECTION, SOLUTION INTRAVENOUS
Status: DISCONTINUED | OUTPATIENT
Start: 2017-05-04 | End: 2017-05-05

## 2017-05-04 RX ORDER — LIDOCAINE HCL/EPINEPHRINE/PF 2%-1:200K
VIAL (ML) INJECTION PRN
Status: DISCONTINUED | OUTPATIENT
Start: 2017-05-04 | End: 2017-05-04

## 2017-05-04 RX ORDER — OXYCODONE HYDROCHLORIDE 5 MG/1
5-10 TABLET ORAL
Status: DISCONTINUED | OUTPATIENT
Start: 2017-05-04 | End: 2017-05-04

## 2017-05-04 RX ORDER — HYDROXYZINE HYDROCHLORIDE 25 MG/1
25 TABLET, FILM COATED ORAL EVERY 6 HOURS PRN
Status: DISCONTINUED | OUTPATIENT
Start: 2017-05-04 | End: 2017-05-04 | Stop reason: HOSPADM

## 2017-05-04 RX ORDER — LIDOCAINE 40 MG/G
CREAM TOPICAL
Status: DISCONTINUED | OUTPATIENT
Start: 2017-05-04 | End: 2017-05-06 | Stop reason: HOSPADM

## 2017-05-04 RX ORDER — HYDROMORPHONE HYDROCHLORIDE 1 MG/ML
.3-.6 INJECTION, SOLUTION INTRAMUSCULAR; INTRAVENOUS; SUBCUTANEOUS
Status: DISCONTINUED | OUTPATIENT
Start: 2017-05-04 | End: 2017-05-04

## 2017-05-04 RX ORDER — ONDANSETRON 4 MG/1
4 TABLET, ORALLY DISINTEGRATING ORAL EVERY 6 HOURS PRN
Status: DISCONTINUED | OUTPATIENT
Start: 2017-05-04 | End: 2017-05-06 | Stop reason: HOSPADM

## 2017-05-04 RX ORDER — CEFAZOLIN SODIUM 2 G/100ML
2 INJECTION, SOLUTION INTRAVENOUS EVERY 8 HOURS
Status: COMPLETED | OUTPATIENT
Start: 2017-05-04 | End: 2017-05-05

## 2017-05-04 RX ORDER — HYDROXYZINE HYDROCHLORIDE 25 MG/1
25 TABLET, FILM COATED ORAL EVERY 6 HOURS PRN
Status: DISCONTINUED | OUTPATIENT
Start: 2017-05-04 | End: 2017-05-06 | Stop reason: HOSPADM

## 2017-05-04 RX ORDER — NICOTINE POLACRILEX 4 MG
15-30 LOZENGE BUCCAL
Status: DISCONTINUED | OUTPATIENT
Start: 2017-05-04 | End: 2017-05-06 | Stop reason: HOSPADM

## 2017-05-04 RX ORDER — ACETAMINOPHEN 325 MG/1
975 TABLET ORAL EVERY 8 HOURS
Status: DISCONTINUED | OUTPATIENT
Start: 2017-05-04 | End: 2017-05-06 | Stop reason: HOSPADM

## 2017-05-04 RX ORDER — HYDROMORPHONE HYDROCHLORIDE 1 MG/ML
.3-.5 INJECTION, SOLUTION INTRAMUSCULAR; INTRAVENOUS; SUBCUTANEOUS EVERY 5 MIN PRN
Status: DISCONTINUED | OUTPATIENT
Start: 2017-05-04 | End: 2017-05-04 | Stop reason: HOSPADM

## 2017-05-04 RX ORDER — OXYCODONE HYDROCHLORIDE 5 MG/1
5-10 TABLET ORAL
Status: DISCONTINUED | OUTPATIENT
Start: 2017-05-04 | End: 2017-05-06 | Stop reason: HOSPADM

## 2017-05-04 RX ORDER — DEXTROSE MONOHYDRATE 25 G/50ML
25-50 INJECTION, SOLUTION INTRAVENOUS
Status: DISCONTINUED | OUTPATIENT
Start: 2017-05-04 | End: 2017-05-06 | Stop reason: HOSPADM

## 2017-05-04 RX ORDER — ONDANSETRON 4 MG/1
4 TABLET, ORALLY DISINTEGRATING ORAL EVERY 30 MIN PRN
Status: DISCONTINUED | OUTPATIENT
Start: 2017-05-04 | End: 2017-05-04 | Stop reason: HOSPADM

## 2017-05-04 RX ADMIN — PROPOFOL 50 MCG/KG/MIN: 10 INJECTION, EMULSION INTRAVENOUS at 14:15

## 2017-05-04 RX ADMIN — MIDAZOLAM HYDROCHLORIDE 2 MG: 1 INJECTION, SOLUTION INTRAMUSCULAR; INTRAVENOUS at 13:53

## 2017-05-04 RX ADMIN — MIDAZOLAM HYDROCHLORIDE 1 MG: 1 INJECTION, SOLUTION INTRAMUSCULAR; INTRAVENOUS at 13:59

## 2017-05-04 RX ADMIN — MIDAZOLAM HYDROCHLORIDE 1 MG: 1 INJECTION, SOLUTION INTRAMUSCULAR; INTRAVENOUS at 13:56

## 2017-05-04 RX ADMIN — EPHEDRINE SULFATE 10 MG: 50 INJECTION, SOLUTION INTRAMUSCULAR; INTRAVENOUS; SUBCUTANEOUS at 14:35

## 2017-05-04 RX ADMIN — ROPIVACAINE HYDROCHLORIDE 20 ML: 7.5 INJECTION, SOLUTION EPIDURAL; PERINEURAL at 16:09

## 2017-05-04 RX ADMIN — INSULIN GLARGINE 5 UNITS: 100 INJECTION, SOLUTION SUBCUTANEOUS at 22:10

## 2017-05-04 RX ADMIN — LIDOCAINE HYDROCHLORIDE 3 ML: 10 INJECTION, SOLUTION INFILTRATION; PERINEURAL at 14:00

## 2017-05-04 RX ADMIN — CEFAZOLIN SODIUM 2 G: 2 INJECTION, SOLUTION INTRAVENOUS at 22:13

## 2017-05-04 RX ADMIN — SENNOSIDES AND DOCUSATE SODIUM 2 TABLET: 8.6; 5 TABLET ORAL at 19:22

## 2017-05-04 RX ADMIN — FENTANYL CITRATE 100 MCG: 50 INJECTION, SOLUTION INTRAMUSCULAR; INTRAVENOUS at 13:56

## 2017-05-04 RX ADMIN — EPHEDRINE SULFATE 10 MG: 50 INJECTION, SOLUTION INTRAMUSCULAR; INTRAVENOUS; SUBCUTANEOUS at 14:50

## 2017-05-04 RX ADMIN — ACETAMINOPHEN 975 MG: 325 TABLET, FILM COATED ORAL at 17:51

## 2017-05-04 RX ADMIN — BUPIVACAINE HYDROCHLORIDE IN DEXTROSE 1.6 ML: 7.5 INJECTION, SOLUTION SUBARACHNOID at 14:02

## 2017-05-04 RX ADMIN — CEFAZOLIN SODIUM 2 G: 2 INJECTION, SOLUTION INTRAVENOUS at 13:53

## 2017-05-04 RX ADMIN — EPINEPHRINE 0.2 MG: 1 INJECTION, SOLUTION INTRAMUSCULAR; SUBCUTANEOUS at 14:02

## 2017-05-04 RX ADMIN — LIDOCAINE HYDROCHLORIDE 1 ML: 10 INJECTION, SOLUTION INFILTRATION; PERINEURAL at 11:32

## 2017-05-04 RX ADMIN — TRANEXAMIC ACID 1 G: 100 INJECTION, SOLUTION INTRAVENOUS at 15:22

## 2017-05-04 RX ADMIN — MIDAZOLAM HYDROCHLORIDE 1 MG: 1 INJECTION, SOLUTION INTRAMUSCULAR; INTRAVENOUS at 15:10

## 2017-05-04 RX ADMIN — SODIUM CHLORIDE: 9 INJECTION, SOLUTION INTRAVENOUS at 17:50

## 2017-05-04 RX ADMIN — LIDOCAINE HYDROCHLORIDE,EPINEPHRINE BITARTRATE 5 ML: 20; .005 INJECTION, SOLUTION EPIDURAL; INFILTRATION; INTRACAUDAL; PERINEURAL at 16:07

## 2017-05-04 RX ADMIN — OXYCODONE HYDROCHLORIDE 10 MG: 5 TABLET ORAL at 22:11

## 2017-05-04 RX ADMIN — SODIUM CHLORIDE, POTASSIUM CHLORIDE, SODIUM LACTATE AND CALCIUM CHLORIDE: 600; 310; 30; 20 INJECTION, SOLUTION INTRAVENOUS at 11:32

## 2017-05-04 RX ADMIN — ATORVASTATIN CALCIUM 10 MG: 10 TABLET, FILM COATED ORAL at 19:22

## 2017-05-04 RX ADMIN — TRANEXAMIC ACID 1 G: 100 INJECTION, SOLUTION INTRAVENOUS at 14:10

## 2017-05-04 RX ADMIN — OXYCODONE HYDROCHLORIDE 10 MG: 5 TABLET ORAL at 17:51

## 2017-05-04 RX ADMIN — ONDANSETRON 4 MG: 2 INJECTION INTRAMUSCULAR; INTRAVENOUS at 15:21

## 2017-05-04 RX ADMIN — SODIUM CHLORIDE, POTASSIUM CHLORIDE, SODIUM LACTATE AND CALCIUM CHLORIDE: 600; 310; 30; 20 INJECTION, SOLUTION INTRAVENOUS at 14:50

## 2017-05-04 RX ADMIN — METFORMIN HYDROCHLORIDE 1000 MG: 500 TABLET, FILM COATED ORAL at 18:42

## 2017-05-04 NOTE — PROGRESS NOTES
Report given to Nidia GRIFFIN on med-surg and pt discharged from PACU in stable condition. Alert and oriented x3, VSS

## 2017-05-04 NOTE — IP AVS SNAPSHOT
[facility]    [address] [address]    Phone:  [phone]    Fax:  [phone]                                       After Visit Summary   5/4/2017    Suzette Britton    MRN: [id number]           After Visit Summary Signature Page     I have received my discharge instructions, and my questions have been answered. I have discussed any challenges I see with this plan with the nurse or doctor.    ..........................................................................................................................................  Patient/Patient Representative Signature      ..........................................................................................................................................  Patient Representative Print Name and Relationship to Patient    ..................................................               ................................................  Date                                            Time    ..........................................................................................................................................  Reviewed by Signature/Title    ...................................................              ..............................................  Date                                                            Time

## 2017-05-04 NOTE — PROGRESS NOTES
"[facility] ADMISSION NOTE    Patient admitted to room 2300 at approximately 1710 via cart from surgery. Patient was accompanied by transport tech.     Verbal SBAR report received from Manju prior to patient arrival.     Patient trasferred to bed via air sandra. Patient alert and oriented X 3. The patient is not having any pain.  . Admission vital signs: Blood pressure 143/73, pulse 79, temperature 97.4  F (36.3  C), temperature source Oral, resp. rate 16, height 1.613 m (5' 3.5\"), weight 83 kg (183 lb), SpO2 98 %. Patient was oriented to plan of care, call light, bed controls, tv, telephone, bathroom and visiting hours.     The following safety risks were identified during admission: none. Yellow risk band applied: MAYRA Quintero RN    "

## 2017-05-04 NOTE — LETTER
Transition Communication Hand-off for Care Transitions to Next Level of Care Provider    Name: Suzette Britton  MRN #: [id number]  Primary Care Provider: Judy Verde  Primary Care MD Name: Judy Verde MD  [facility] [address] [address]  Primary Care Clinic Name: [facility]  Reason for Hospitalization:  right knee arthritis  S/P total knee arthroplasty  Admit Date/Time: 5/4/2017 10:45 AM  Discharge Date: 5/6/17  Payor Source: Payor: MEDICARE / Plan: MEDICARE / Product Type: Medicare /     Readmission Assessment Measure (ASHER) Risk Score/category: Low    Reason for Communication Hand-off Referral: Other Home Care    Discharge Plan:       Concern for non-adherence with plan of care:   Y/N No  Discharge Needs Assessment:  Needs       Most Recent Value    Equipment Currently Used at Home grab bar, cane, quad, walker, rolling, raised toilet    Transportation Available family or friend will provide    # of Referrals Placed by [facility]          Already enrolled in Tele-monitoring program and name of program:  N/A  Follow-up plan:  Future Appointments  Date Time Provider Department Center   5/22/2017 1:30 PM Alva Weaver, PT [facility] JASON Little RN, Care Coordinator    AVS/Discharge Summary is the source of truth; this is a helpful guide for improved communication of patient story

## 2017-05-04 NOTE — IP AVS SNAPSHOT
MRN:[id number]                      After Visit Summary   5/4/2017    Suzette Britton    MRN: [id number]           Thank you!     Thank you for choosing [facility] for your care. Our goal is always to provide you with excellent care. Hearing back from our patients is one way we can continue to improve our services. Please take a few minutes to complete the written survey that you may receive in the mail after you visit with us. Thank you!        Patient Information     Date Of Birth          1945        Designated Caregiver       Most Recent Value    Caregiver    Will someone help with your care after discharge? no      About your hospital stay     You were admitted on:  May 4, 2017 You last received care in the:  [facility]    You were discharged on:  May 6, 2017        Reason for your hospital stay       Right total knee replacement                  Who to Call     For medical emergencies, please call 911.  For non-urgent questions about your medical care, please call your primary care provider or clinic, [phone]  For questions related to your surgery, please call your surgery clinic        Attending Provider     Provider Specialty    Zachary Arteaga MD Orthopedics       Primary Care Provider Office Phone # Fax #    Judy Verde [phone]       [facility] [address] [facility] [address]        After Care Instructions     Activity       Your activity upon discharge: activity as tolerated            Diet       Follow this diet upon discharge: Orders Placed This Encounter      Moderate Consistent CHO Diet            Discharge Instructions                 Follow-up Appointments     Follow-up and recommended labs and tests        Follow up with Dr. Arteaga , at (location with clinic name or city) ortho clinic, within 14  to evaluate after surgery. No follow up labs or test are needed.                  Your next 10  appointments already scheduled     May 22, 2017  1:30 PM CDT   CRIS Extremity with Alva Weaver, RADHA   [facility] ([facility] [place])    [address]  [facility] [address] [phone]              Additional Services     Home Care Referral       Your provider has referred you to: [facility] ([phone] Fax: [phone])      Extended Emergency Contact Information  Primary Emergency Contact: Ramana Britton  Address: [address] [place] [address] [facility]  Home Phone: [phone]  Work Phone: none  Mobile Phone: [phone]  Relation: Spouse  Secondary Emergency Contact: Edyta Anderson   [facility]  Home Phone: [phone]  Work Phone: none  Mobile Phone: [phone]  Relation: Daughter    Patient Anticipated Discharge Date: 5/6/17   RN, PT, HHA to begin 24 - 48 hours after discharge.  PLEASE EVALUATE AND TREAT (Evaluation timeline is 24 - 48 hrs. Please call if there is need for a variance to this timeline).    REASON FOR REFERRAL: Assessment & Treatment: RN, PT    ADDITIONAL SERVICES NEEDED: n/a    OTHER PERTINENT INFORMATION: Patient was last seen by provider on 5/5/17 for S/P total knee arthroplasty  .    No current outpatient prescriptions on file.    Patient Active Problem List:     Osteoarthrosis, hand     Hyperhidrosis     Hyperlipidemia LDL goal <100     Atrial fibrillation (H)     CKD (chronic kidney disease) stage 3, GFR 30-59 ml/min     [facility]     Health Care Home     Cataract     Obesity (BMI 30.0-34.9)     S/P total knee arthroplasty     ACP (advance care planning)     Essential hypertension     Type 2 diabetes mellitus with diabetic nephropathy, without long-term current use of insulin (H)      Documentation of Face to Face and Certification for Home Health Services    I certify that patient, Suzette Britton is under my care and that I, or a Nurse Practitioner or Physician's Assistant working with me, had a  face-to-face encounter that meets the physician face-to-face encounter requirements with this patient on: 5/5/2017.    This encounter with the patient was in whole, or in part, for the following medical condition, which is the primary reason for Home Health Care: S/P total knee arthroplasty  .    I certify that, based on my findings, the following services are medically necessary Home Health Services: Nursing and Physical Therapy    My clinical findings support the need for the above services because: Nurse is needed: To assess S/P total knee arthroplasty   after changes in medications or other medical regimen.. and Physical Therapy Services are needed to assess and treat the following functional impairments: S/P total knee arthroplasty  .    Further, I certify that my clinical findings support that this patient is homebound (i.e. absences from home require considerable and taxing effort and are for medical reasons or [employer] services or infrequently or of short duration when for other reasons) because: Requires assistance of another person or specialized equipment to access medical services because patient: Requires supervision of another for safe transfer..    Based on the above findings, I certify that this patient is confined to the home and needs intermittent skilled nursing care, physical therapy and/or speech therapy.  The patient is under my care, and I have initiated the establishment of the plan of care.  This patient will be followed by a physician who will periodically review the plan of care.    Physician/Provider to provide follow up care: Judy Verde certified Physician at time of discharge: Zachary Arteaga MD    Please be aware that coverage of these services is subject to the terms and limitations of your health insurance plan.  Call member services at your health plan with any benefit or coverage questions.                  Pending Results     No orders found from  "2017 to 2017.            Statement of Approval     Ordered          17 0916  I have reviewed and agree with all the recommendations and orders detailed in this document.  EFFECTIVE NOW     Approved and electronically signed by:  Ishan Villareal MD             Admission Information     Date & Time Provider Department Dept. Phone    2017 Zachary Arteaga MD [facility] [phone]      Your Vitals Were     Blood Pressure Pulse Temperature Respirations Height Weight    119/65 (BP Location: Left arm) 85 98.9  F (37.2  C) (Oral) 18 1.613 m (5' 3.5\") 83 kg (183 lb)    Pulse Oximetry BMI (Body Mass Index)                95% 31.91 kg/m2          MyChart Information     [facility] lets you send messages to your doctor, view your test results, renew your prescriptions, schedule appointments and more. To sign up, go to [email or web address] . Click on \"Log in\" on the left side of the screen, which will take you to the Welcome page. Then click on \"Sign up Now\" on the right side of the page.     You will be asked to enter the access code listed below, as well as some personal information. Please follow the directions to create your username and password.     Your access code is: [email or web address]  Expires: 5/10/2017  3:12 PM     Your access code will  in 90 days. If you need help or a new code, please call your [place] clinic or [phone].        Care EveryWhere ID     This is your Care EveryWhere ID. This could be used by other organizations to access your [place] medical records  [id number]           Review of your medicines      START taking        Dose / Directions    oxyCODONE 5 MG IR tablet   Commonly known as:  ROXICODONE        Dose:  5-10 mg   Take 1-2 tablets (5-10 mg) by mouth every 4 hours as needed for moderate to severe pain   Quantity:  60 tablet   Refills:  0         CONTINUE these medicines which have NOT CHANGED        Dose / Directions    aspirin 325 MG " EC tablet        Dose:  325 mg   Take 1 tablet (325 mg) by mouth daily   Quantity:  40 tablet   Refills:  0       atorvastatin 10 MG tablet   Commonly known as:  LIPITOR   Used for:  Type 2 diabetes mellitus with diabetic nephropathy, without long-term current use of insulin (H), Hyperlipidemia LDL goal <100        Dose:  10 mg   Take 1 tablet (10 mg) by mouth daily   Quantity:  90 tablet   Refills:  3       blood glucose monitoring test strip   Commonly known as:  no brand specified   Used for:  CKD (chronic kidney disease) stage 3, GFR 30-59 ml/min        Use to test blood sugars one time daily or as directed  One touch meter - one touch strips   Quantity:  3 Box   Refills:  3       calcium-vitamin D-vitamin K 500-500-40 MG-UNT-MCG Chew   Commonly known as:  VIACTIV        Dose:  1 tablet   Take 1 tablet by mouth daily   Refills:  0       chlorthalidone 25 MG tablet   Commonly known as:  HYGROTON   Used for:  Essential hypertension        Dose:  12.5 mg   Take 0.5 tablets (12.5 mg) by mouth daily   Quantity:  45 tablet   Refills:  3       GLUCOMETER KIT   Used for:  Type II or unspecified type diabetes mellitus without mention of complication, not stated as uncontrolled        for testing bid, One Touch or please fill with formulary glucometer   Quantity:  1 Kit   Refills:  0       lisinopril 40 MG tablet   Commonly known as:  PRINIVIL/ZESTRIL   Used for:  CKD (chronic kidney disease) stage 3, GFR 30-59 ml/min        Dose:  40 mg   Take 1 tablet (40 mg) by mouth daily   Quantity:  90 tablet   Refills:  3       metFORMIN 500 MG tablet   Commonly known as:  GLUCOPHAGE   Used for:  Type 2 diabetes mellitus with diabetic nephropathy, without long-term current use of insulin (H)        Dose:  1000 mg   Take 2 tablets (1,000 mg) by mouth 2 times daily (with meals)   Quantity:  360 tablet   Refills:  1       VITAMIN D PO        Dose:  1000 Units   Take 1,000 Units by mouth every morning   Refills:  0         STOP taking      order for DME                Where to get your medicines      Some of these will need a paper prescription and others can be bought over the counter. Ask your nurse if you have questions.     Bring a paper prescription for each of these medications     oxyCODONE 5 MG IR tablet                Protect others around you: Learn how to safely use, store and throw away your medicines at www.disposemymeds.org.             Medication List: This is a list of all your medications and when to take them. Check marks below indicate your daily home schedule. Keep this list as a reference.      Medications           Morning Afternoon Evening Bedtime As Needed    aspirin 325 MG EC tablet   Take 1 tablet (325 mg) by mouth daily   Last time this was given:  325 mg on 5/6/2017  8:22 AM                                   atorvastatin 10 MG tablet   Commonly known as:  LIPITOR   Take 1 tablet (10 mg) by mouth daily   Last time this was given:  10 mg on 5/5/2017  9:16 PM                                   blood glucose monitoring test strip   Commonly known as:  no brand specified   Use to test blood sugars one time daily or as directed  One touch meter - one touch strips                                calcium-vitamin D-vitamin K 500-500-40 MG-UNT-MCG Chew   Commonly known as:  VIACTIV   Take 1 tablet by mouth daily                                   chlorthalidone 25 MG tablet   Commonly known as:  HYGROTON   Take 0.5 tablets (12.5 mg) by mouth daily   Last time this was given:  12.5 mg on 5/6/2017  8:22 AM                                   GLUCOMETER KIT   for testing bid, One Touch or please fill with formulary glucometer                                lisinopril 40 MG tablet   Commonly known as:  PRINIVIL/ZESTRIL   Take 1 tablet (40 mg) by mouth daily   Last time this was given:  40 mg on 5/6/2017  8:22 AM                                   metFORMIN 500 MG tablet   Commonly known as:  GLUCOPHAGE   Take 2 tablets (1,000 mg) by mouth 2  times daily (with meals)   Last time this was given:  1,000 mg on 5/6/2017  8:22 AM                                   oxyCODONE 5 MG IR tablet   Commonly known as:  ROXICODONE   Take 1-2 tablets (5-10 mg) by mouth every 4 hours as needed for moderate to severe pain   Last time this was given:  10 mg on 5/6/2017 11:50 AM                                   VITAMIN D PO   Take 1,000 Units by mouth every morning

## 2017-05-04 NOTE — ANESTHESIA PROCEDURE NOTES
Peripheral nerve/Neuraxial procedure note : Adductor canal  Pre-Procedure  Performed by  DORETHA REYES   Location: post-op    Procedure Times:5/4/2017 3:59 PM and 5/4/2017 4:11 PM  Pre-Anesthestic Checklist: patient identified, IV checked, site marked, risks and benefits discussed, informed consent, monitors and equipment checked, pre-op evaluation, at physician/surgeon's request and post-op pain management    Timeout  Correct Patient: Yes   Correct Procedure: Yes   Correct Site: Yes   Correct Laterality: Yes   Correct Position: Yes   Site Marked: Yes   .   Procedure Documentation    Diagnosis:POST OP PAIN CONTROL.    Procedure:    Adductor canal.  Local skin infiltrated with mL of 1% lidocaine.     Ultrasound used to identify targeted nerve, plexus, or vascular marker and placed a needle adjacent to it., Ultrasound was used to visualize the spread of the anesthetic in close proximity to the above stated nerve. A permanent image is entered into the patient's record.  Patient Prep;mask, sterile gloves, chlorhexidine gluconate and isopropyl alcohol, patient draped.  .  Needle: insulated, short bevel (20 G. 4 in). .  Spinal Needle: . . Insertion Method: Single Shot.     Assessment/Narrative  Paresthesias: No.  Injection made incrementally with aspirations every 5 mL..  The placement was negative for: blood aspirated, painful injection and site bleeding.  Bolus given via needle. No blood aspirated via catheter.   Secured via.   Complications: none. Test dose of mL lidocaine 2% w/ 1:200,000 epinephrine at 05:00. Test dose negative for signs of intravascular, subdural or intrathecal injection.

## 2017-05-04 NOTE — PROCEDURES
05/04/17    PREOPERATIVE DIAGNOSES: Primary right knee degenerative joint disease.   POSTOPERATIVE DIAGNOSIS: Primary right knee degenerative joint disease.   PROCEDURE: Right total knee arthroplasty.   SURGEON: Zachary Arteaga MD   ASSISTANT: Lamar Borjas PA-C The presence of the PA was necessary for safe progression of the case.   ANESTHESIA: Spinal with MAC.   ESTIMATED BLOOD LOSS: 50 mL.   TOURNIQUET TIME: 61 minutes at 250 mmHg.   COMPLICATIONS: None apparent.   DISPOSITION: Stable to PACU.    IMPLANTS USED: DePuy Attune size 6 posterior stabilized femoral component with a size 5 tibial RP component, size 10 by 6mm posterior stabilized RP polyethylene and 35 mm patella.     INDICATIONS: Suzette is a 72 year old female with a history of progressive bilateral knee arthritis.  She underwent a left TKA a few months ago and recovered well. Unfortunately, she had ongoing, worsening pain on her right side.  Sh failed conservative management including therapy and injections. After discussing risks, benefits and surgery, she elected to proceed with a right total knee replacement understanding the risks of infection, damage to vessels and nerves, blood clots, stiffness, ongoing pain, need for revision surgery, need for transfusion.     PROCEDURE: The patient was brought to the preoperative holding area where the right knee was marked. Consent was reviewed. She then was transferred to the operating theater. After induction of successful spinal anesthesia, she was placed supine with a bump under the right hip.  A timeout was performed, verifying correct patient, surgery, location. She received preoperative antibiotics as well as transexemic acid. Her right lower extremity was then prepped and draped in standard sterile fashion.     We exsanguinated the leg and inflated the tourniquet. We then made a longitudinal incision over the anterior aspect of the knee. Dissection was carried down through skin and subcutaneous tissue. A  medial parapatellar arthrotomy was made, exposing end stage medial and patellofemoral arthritis.  Synovial tissue from the suprapatellar pouch excised. The anterior horn of the medial meniscus was released and minimal medial release was performed as her MCL was noted to be somewhat lax.  Then, the remainder of the fat pad was excised. We turned our attention to the patella. Using a free hand technique, this was resected down to 12 mm and sized to a 35mm, then punched and a metal protector plate was placed. We then turned our attention to the femur. Preoperatively, there was a 15 degree flexion contracture.  Therefore, using an intramedullary alignment guide, 11 mm of distal femur was resected in 5 degrees of valgus.     We then turned our attention to the tibia.  An extramedullary alignment cut was used to resect 3mm off the low medial side.  We then turned our attention back to the distal femur and sized it to a size 6.  The epicondylar axis was established and we placed our 4-in-1 cutting block perpendicular to it, in 3 degrees of external rotation. With this in place, our anterior, posterior and chamfer distal femur cuts were made.  We then used a laminar  to open the joint in order to remove medial and lateral meniscus remnants as well as posterior osteophytes.  The anteriomedial and anterolateral capsule were injected with a pain cocktail.  The jig was utilized to cut the distal femoral box for the PS component.  A variety of polyethylene were trialed, and we felt a 10mm was best, with range of motion from full extension to 120 of flexion, stability to varus stressand and slight laxity to valgus stress as prior, normal POLO test without anterior instability in flexion, and the patella tracked well.  After this, we drilled and punched our tibial component, lining it with the medial 1/3 of the tibial tubercle. The knee was thoroughly irrigated using pulse lavage. The final components were then cemented in  place. All excess cement was removed and the knee was placed into full extension while the cement was curing. Also, the wound was instilled with dilute Betadine solution. Once the cement had cured, we retrialed our components with a 10mm poly with findings as above. We then placed the final poly.  The tourniquet was deflated with hemostasis achieved.  The capsular layer was closed with #1 Stratafix, at which point there was 115 degrees of flexion with gravity.  Once her capsule was closed, her medial laxity improved and she remained stable with anterior drawer. Subcutaneous tissues with 2-0 Vicryl, skin with a 3-0 Monocryl. A sterile dressing was applied and the patient was awoken from anesthesia and transferred to PACU in stable condition.     POSTOPERATIVE PLAN:   1. Weightbearing as tolerated right lower extremity with PT for mobilization.   2. Deep venous thrombosis prophylaxis: Aspirin 325mg daily x 6 weeks.   3. Perioperative antibiotics.   4. Follow up in 2 weeks for wound check.     JESSICA HUSTON MD

## 2017-05-04 NOTE — ANESTHESIA PROCEDURE NOTES
Peripheral nerve/Neuraxial procedure note : intrathecal  Pre-Procedure  Performed by  ANURAG MOORE   Location: OR    Procedure Times:5/4/2017 1:59 PM and 5/4/2017 2:02 PM  Pre-Anesthestic Checklist: patient identified, IV checked, site marked, risks and benefits discussed, informed consent, monitors and equipment checked, pre-op evaluation, at physician/surgeon's request and post-op pain management    Timeout  Correct Patient: Yes   Correct Procedure: Yes   Correct Site: Yes   Correct Laterality: N/A   Correct Position: Yes   Site Marked: N/A   .   Procedure Documentation  ASA 2  Diagnosis:DJD.    Procedure:    Intrathecal.  Insertion Site:L3-4  (midline approach)      Patient Prep;povidone-iodine 7.5% surgical scrub.  .  Needle: (). # of attempts: 1. # of redirects: 1. Spinal Needle: 27 G. 3.5 in.  Introducer used. . .     Assessment/Narrative  Paresthesias: No.  .  .  clear CSF fluid removed . Time Injected: 14:02

## 2017-05-04 NOTE — ANESTHESIA POSTPROCEDURE EVALUATION
Patient: Suzette Britton    Procedure(s):  Right Total Knee Arthroplasty - Wound Class: I-Clean    Diagnosis:right knee arthritis  Diagnosis Additional Information: No value filed.    Anesthesia Type:  General, Spinal, Periph. Nerve Block for postop pain    Note:  Anesthesia Post Evaluation    Patient location during evaluation: Phase 2  Patient participation: Able to fully participate in evaluation  Level of consciousness: awake and alert  Pain management: adequate  Airway patency: patent  Cardiovascular status: acceptable and hemodynamically stable  Respiratory status: acceptable, spontaneous ventilation and nasal cannula  Hydration status: acceptable  PONV: none     Anesthetic complications: None          Last vitals:  Vitals:    05/04/17 1059 05/04/17 1555   BP: 148/77 112/59   Pulse:  90   Resp: 18 16   Temp: 36.6  C (97.8  F) 36.4  C (97.6  F)   SpO2: 98% 97%         Electronically Signed By: SRAVAN Dunbar CRNA  May 4, 2017  4:22 PM

## 2017-05-05 ENCOUNTER — APPOINTMENT (OUTPATIENT)
Dept: PHYSICAL THERAPY | Facility: CLINIC | Age: 72
DRG: 470 | End: 2017-05-05
Attending: ORTHOPAEDIC SURGERY
Payer: MEDICARE

## 2017-05-05 ENCOUNTER — APPOINTMENT (OUTPATIENT)
Dept: OCCUPATIONAL THERAPY | Facility: CLINIC | Age: 72
DRG: 470 | End: 2017-05-05
Attending: ORTHOPAEDIC SURGERY
Payer: MEDICARE

## 2017-05-05 LAB
ANION GAP SERPL CALCULATED.3IONS-SCNC: 11 MMOL/L (ref 3–14)
BUN SERPL-MCNC: 16 MG/DL (ref 7–30)
CALCIUM SERPL-MCNC: 8.4 MG/DL (ref 8.5–10.1)
CHLORIDE SERPL-SCNC: 101 MMOL/L (ref 94–109)
CO2 SERPL-SCNC: 24 MMOL/L (ref 20–32)
CREAT SERPL-MCNC: 0.89 MG/DL (ref 0.52–1.04)
GFR SERPL CREATININE-BSD FRML MDRD: 62 ML/MIN/1.7M2
GLUCOSE BLDC GLUCOMTR-MCNC: 177 MG/DL (ref 70–99)
GLUCOSE BLDC GLUCOMTR-MCNC: 190 MG/DL (ref 70–99)
GLUCOSE BLDC GLUCOMTR-MCNC: 236 MG/DL (ref 70–99)
GLUCOSE BLDC GLUCOMTR-MCNC: 237 MG/DL (ref 70–99)
GLUCOSE BLDC GLUCOMTR-MCNC: 241 MG/DL (ref 70–99)
GLUCOSE SERPL-MCNC: 217 MG/DL (ref 70–99)
HGB BLD-MCNC: 11.5 G/DL (ref 11.7–15.7)
POTASSIUM SERPL-SCNC: 4.2 MMOL/L (ref 3.4–5.3)
SODIUM SERPL-SCNC: 136 MMOL/L (ref 133–144)

## 2017-05-05 PROCEDURE — 97116 GAIT TRAINING THERAPY: CPT | Mod: GP

## 2017-05-05 PROCEDURE — A9270 NON-COVERED ITEM OR SERVICE: HCPCS | Mod: GY | Performed by: ORTHOPAEDIC SURGERY

## 2017-05-05 PROCEDURE — 00000146 ZZHCL STATISTIC GLUCOSE BY METER IP

## 2017-05-05 PROCEDURE — 40000193 ZZH STATISTIC PT WARD VISIT

## 2017-05-05 PROCEDURE — 25000132 ZZH RX MED GY IP 250 OP 250 PS 637: Mod: GY | Performed by: ORTHOPAEDIC SURGERY

## 2017-05-05 PROCEDURE — 36415 COLL VENOUS BLD VENIPUNCTURE: CPT | Performed by: ORTHOPAEDIC SURGERY

## 2017-05-05 PROCEDURE — 12000000 ZZH R&B MED SURG/OB

## 2017-05-05 PROCEDURE — A9270 NON-COVERED ITEM OR SERVICE: HCPCS | Mod: GY | Performed by: PHYSICIAN ASSISTANT

## 2017-05-05 PROCEDURE — 25000132 ZZH RX MED GY IP 250 OP 250 PS 637: Mod: GY | Performed by: INTERNAL MEDICINE

## 2017-05-05 PROCEDURE — 99232 SBSQ HOSP IP/OBS MODERATE 35: CPT | Performed by: PHYSICIAN ASSISTANT

## 2017-05-05 PROCEDURE — 97110 THERAPEUTIC EXERCISES: CPT | Mod: GP

## 2017-05-05 PROCEDURE — A9270 NON-COVERED ITEM OR SERVICE: HCPCS | Mod: GY | Performed by: INTERNAL MEDICINE

## 2017-05-05 PROCEDURE — 40000133 ZZH STATISTIC OT WARD VISIT

## 2017-05-05 PROCEDURE — 97165 OT EVAL LOW COMPLEX 30 MIN: CPT | Mod: GO

## 2017-05-05 PROCEDURE — 25000131 ZZH RX MED GY IP 250 OP 636 PS 637: Mod: GY | Performed by: PHYSICIAN ASSISTANT

## 2017-05-05 PROCEDURE — 97161 PT EVAL LOW COMPLEX 20 MIN: CPT | Mod: GP

## 2017-05-05 PROCEDURE — 25000128 H RX IP 250 OP 636: Performed by: ORTHOPAEDIC SURGERY

## 2017-05-05 PROCEDURE — 80048 BASIC METABOLIC PNL TOTAL CA: CPT | Performed by: ORTHOPAEDIC SURGERY

## 2017-05-05 PROCEDURE — 25000132 ZZH RX MED GY IP 250 OP 250 PS 637: Mod: GY | Performed by: PHYSICIAN ASSISTANT

## 2017-05-05 PROCEDURE — 85018 HEMOGLOBIN: CPT | Performed by: ORTHOPAEDIC SURGERY

## 2017-05-05 PROCEDURE — 97535 SELF CARE MNGMENT TRAINING: CPT | Mod: GO

## 2017-05-05 RX ADMIN — SENNOSIDES AND DOCUSATE SODIUM 2 TABLET: 8.6; 5 TABLET ORAL at 08:46

## 2017-05-05 RX ADMIN — OXYCODONE HYDROCHLORIDE 10 MG: 5 TABLET ORAL at 11:46

## 2017-05-05 RX ADMIN — ACETAMINOPHEN 975 MG: 325 TABLET, FILM COATED ORAL at 17:22

## 2017-05-05 RX ADMIN — OXYCODONE HYDROCHLORIDE 10 MG: 5 TABLET ORAL at 15:28

## 2017-05-05 RX ADMIN — CEFAZOLIN SODIUM 2 G: 2 INJECTION, SOLUTION INTRAVENOUS at 06:45

## 2017-05-05 RX ADMIN — OXYCODONE HYDROCHLORIDE 10 MG: 5 TABLET ORAL at 08:46

## 2017-05-05 RX ADMIN — INSULIN GLARGINE 5 UNITS: 100 INJECTION, SOLUTION SUBCUTANEOUS at 21:21

## 2017-05-05 RX ADMIN — METFORMIN HYDROCHLORIDE 1000 MG: 500 TABLET, FILM COATED ORAL at 08:45

## 2017-05-05 RX ADMIN — ASPIRIN 325 MG: 325 TABLET, COATED ORAL at 08:45

## 2017-05-05 RX ADMIN — ATORVASTATIN CALCIUM 10 MG: 10 TABLET, FILM COATED ORAL at 21:16

## 2017-05-05 RX ADMIN — SODIUM CHLORIDE: 9 INJECTION, SOLUTION INTRAVENOUS at 03:48

## 2017-05-05 RX ADMIN — ACETAMINOPHEN 975 MG: 325 TABLET, FILM COATED ORAL at 01:25

## 2017-05-05 RX ADMIN — ACETAMINOPHEN 975 MG: 325 TABLET, FILM COATED ORAL at 08:46

## 2017-05-05 RX ADMIN — HYDROXYZINE HYDROCHLORIDE 25 MG: 25 TABLET ORAL at 21:21

## 2017-05-05 RX ADMIN — INSULIN GLARGINE 5 UNITS: 100 INJECTION, SOLUTION SUBCUTANEOUS at 09:01

## 2017-05-05 RX ADMIN — LISINOPRIL 40 MG: 40 TABLET ORAL at 08:45

## 2017-05-05 RX ADMIN — CHLORTHALIDONE 12.5 MG: 25 TABLET ORAL at 08:46

## 2017-05-05 RX ADMIN — OXYCODONE HYDROCHLORIDE 10 MG: 5 TABLET ORAL at 04:53

## 2017-05-05 RX ADMIN — SENNOSIDES AND DOCUSATE SODIUM 1 TABLET: 8.6; 5 TABLET ORAL at 21:16

## 2017-05-05 RX ADMIN — OXYCODONE HYDROCHLORIDE 10 MG: 5 TABLET ORAL at 21:16

## 2017-05-05 RX ADMIN — OXYCODONE HYDROCHLORIDE 10 MG: 5 TABLET ORAL at 01:24

## 2017-05-05 RX ADMIN — ONDANSETRON 4 MG: 2 INJECTION INTRAMUSCULAR; INTRAVENOUS at 20:05

## 2017-05-05 RX ADMIN — METFORMIN HYDROCHLORIDE 1000 MG: 500 TABLET, FILM COATED ORAL at 17:22

## 2017-05-05 ASSESSMENT — ACTIVITIES OF DAILY LIVING (ADL): IADL_COMMENTS: SHARES IADLS WITH SPOUSE

## 2017-05-05 NOTE — PLAN OF CARE
Problem: Knee Replacement, Total (Adult)  Goal: Signs and Symptoms of Listed Potential Problems Will be Absent or Manageable (Knee Replacement, Total)  Signs and symptoms of listed potential problems will be absent or manageable by discharge/transition of care (reference Knee Replacement, Total (Adult) CPG).   Outcome: Improving  Patients pain is comfortably managed with oxycodone 10 mg PRN, scheduled tylenol and ice to knee.     Patient is voiding and passing gas.       Eating and drinking well.  No nausea.

## 2017-05-05 NOTE — PROGRESS NOTES
"[facility] Progress Note      Post-operative Day: 1 Day Post-Op    Procedure(s):  Right Total Knee Arthroplasty - Wound Class: I-Clean      Subjective:    Pain: minimal.  Doing well.  Straight cathed overnight.  Chest pain, SOB:  No      Objective:  Blood pressure 150/75, pulse 91, temperature 98.8  F (37.1  C), temperature source Oral, resp. rate 16, height 1.613 m (5' 3.5\"), weight 83 kg (183 lb), SpO2 93 %.    Patient Vitals for the past 24 hrs:   BP Temp Temp src Pulse Heart Rate Resp SpO2 Height Weight   05/05/17 0338 150/75 98.8  F (37.1  C) Oral 91 - 16 93 % - -   05/04/17 2342 148/72 98.6  F (37  C) Oral 92 - 16 96 % - -   05/04/17 1850 141/65 - - - 91 - - - -   05/04/17 1820 113/62 - - - 96 - - - -   05/04/17 1805 138/60 - - - 88 - - - -   05/04/17 1747 143/73 - - - 86 16 98 % - -   05/04/17 1729 136/66 97.4  F (36.3  C) Oral - 72 16 99 % - -   05/04/17 1645 148/78 - - 79 - 16 100 % - -   05/04/17 1630 120/66 - - 71 - 16 98 % - -   05/04/17 1615 113/67 - - 82 - 16 97 % - -   05/04/17 1555 112/59 97.6  F (36.4  C) Oral 90 - 16 97 % - -   05/04/17 1059 148/77 97.8  F (36.6  C) - - 91 18 98 % 1.613 m (5' 3.5\") 83 kg (183 lb)       Wt Readings from Last 4 Encounters:   05/04/17 83 kg (183 lb)   04/13/17 83.2 kg (183 lb 8 oz)   02/09/17 83.5 kg (184 lb)   12/01/16 87.6 kg (193 lb 1.6 oz)         Motor function, sensation, and circulation intact   Yes  Wound status: incisions are clean dry and intact. Yes  Calf tenderness: Bilateral  No    Pertinent Labs   Lab Results: personally reviewed.     Recent Labs   Lab Test  04/13/17   1142  02/01/17   0818  12/03/16   0615  12/02/16   0640  11/08/16   1031   07/23/10   1750   INR   --    --    --    --    --    --   0.94   HGB  13.9   --   11.1*  10.9*  13.1   < >  14.0   HCT  42.4   --    --    --   39.3   --   42.1   MCV  88   --    --    --   88   --   85   PLT  220   --    --    --   187   --   334   NA  138  136  133  138  138   < >  138    < > = " values in this interval not displayed.       Plan: Anticoagulation protocol:  mg daily  x 42  days            Pain medications:  Oxycodone and tylenol            Weight bearing status:  WBAT            Disposition:  Home in 1-2 days pending progress with PT, pain control             Continue cares and rehabilitation     Report completed by:  Zachary Arteaga MD  Date: 5/5/2017  Time: 7:07 AM

## 2017-05-05 NOTE — PROGRESS NOTES
Occupational Therapy Eval     05/05/17 1200   Quick Adds   Type of Visit Initial Occupational Therapy Evaluation   Living Environment   Lives With spouse   Living Arrangements house   Home Accessibility stairs to enter home;stairs within home   Number of Stairs to Enter Home 2   Number of Stairs Within Home 7   Stair Railings at Home inside, present on right side;outside, present at both sides   Transportation Available family or friend will provide   Living Environment Comment walk in shower in basement, tub/shower upstairs with shower doors   Self-Care   Dominant Hand right   Equipment Currently Used at Home grab bar;cane, quad;walker, rolling;raised toilet   Functional Level Prior   Prior Functional Level Comment Pt reports she was prev ind with ADLs/IADLs   General Information   Onset of Illness/Injury or Date of Surgery - Date 05/04/17   Referring Physician Dr. Arteaga   Patient/Family Goals Statement Pt wanting to dc home when medically ready   Additional Occupational Profile Info/Pertinent History of Current Problem Pt presents with pain and post surgical restrictions impacting her ability to complete ADLs safely and independently. Pt was prev ind with ADLs/IADLs, currently requiring SBA for all mobility and ADLs.    Precautions/Limitations fall precautions   Weight-Bearing Status - RLE weight-bearing as tolerated   General Observations pleasant and cooperative   General Info Comments Pt familiar with rehab/recovery process d/t prev L DANAY   Cognitive Status Examination   Orientation orientation to person, place and time   Level of Consciousness alert   Able to Follow Commands WNL/WFL   Personal Safety (Cognitive) WNL/WFL   Pain Assessment   Patient Currently in Pain Yes, see Vital Sign flowsheet   Range of Motion (ROM)   ROM Comment BUE WFL   Strength   Strength Comments BUE WFL   Mobility   Bed Mobility Comments ind supine to EOB and vice versa, flat bed, no bed rails   Transfer Skills   Transfer Comments  "SBA for all mobility with 2ww   Transfer Skill: Sit to Stand   Level of [place]: Sit/Stand stand-by assist   Physical Assist/Nonphysical Assist: Sit/Stand set-up required   Transfer Skill: Sit to Stand weight-bearing as tolerated   Assistive Device for Transfer: Sit/Stand rolling walker   Transfer Skill: Toilet Transfer   Level of [place]: Toilet stand-by assist   Physical Assist/Nonphysical Assist: Toilet supervision   Weight-Bearing Restrictions: Toilet weight-bearing as tolerated   Assistive Device rolling walker;grab bars   Toilet Transfer Skill Comments SBA, simulated home set up   Tub/Shower Transfer   Tub/Shower Transfer Comments Reviewed walk in shower transfer, Pt reporting she is familiar with transfer technique d/t prior DANAY   Lower Body Dressing   Level of [place]: Dress Lower Body stand-by assist   Instrumental Activities of Daily Living (IADL)   IADL Comments shares IADLs with spouse   Activities of Daily Living Analysis   Impairments Contributing to Impaired Activities of Daily Living pain   Clinical Impression   Criteria for Skilled Therapeutic Interventions Met evaluation only;no problems identified which require skilled intervention   OT Diagnosis decreased functional ind s/p TKA   Assessment of Occupational Performance 1-3 Performance Deficits   Identified Performance Deficits IADLs   Clinical Decision Making (Complexity) Low complexity   Anticipated Discharge Disposition Home   Risks and Benefits of Treatment have been explained. Yes   Patient, Family & other staff in agreement with plan of care Yes   Clinical Impression Comments Pt does not appear to require further skilled intervention, will dc OT   [facility] AM-PAC TM \"6 Clicks\"   2016, Trustees of [facility], under license to [employer].  All rights reserved.   6 Clicks Short Forms Daily Activity Inpatient Short Form   [facility] AM-PAC  \"6 Clicks\" Daily Activity Inpatient Short Form   1. Putting on and " taking off regular lower body clothing? 3 - A Little   2. Bathing (including washing, rinsing, drying)? 4 - None   3. Toileting, which includes using toilet, bedpan or urinal? 4 - None   4. Putting on and taking off regular upper body clothing? 4 - None   5. Taking care of personal grooming such as brushing teeth? 4 - None   6. Eating meals? 4 - None   Daily Activity Raw Score (Score out of 24.Lower scores equate to lower levels of function) 23   Total Evaluation Time   Total Evaluation Time (Minutes) 15     See care plan for goals

## 2017-05-05 NOTE — PLAN OF CARE
"Problem: Goal Outcome Summary  Goal: Goal Outcome Summary  Outcome: Improving  A/Ox4. VSS. Up with standby assist to bathroom.  Spontaneous void of 500 ml.  Stated adequate pain control with PRN Oxycodone and scheduled Tylenol.  CMS intact.  Dressing C/D/I. /75 (BP Location: Right arm)  Pulse 91  Temp 98.8  F (37.1  C) (Oral)  Resp 16  Ht 1.613 m (5' 3.5\")  Wt 83 kg (183 lb)  SpO2 93%  BMI 31.91 kg/m2          "

## 2017-05-05 NOTE — PLAN OF CARE
Problem: Goal Outcome Summary  Goal: Goal Outcome Summary  Occupational Therapy Discharge Summary     Reason for therapy discharge:    All goals and outcomes met, no further needs identified.     Progress towards therapy goal(s). See goals on Care Plan in [facility] electronic health record for goal details.  Goals met      Pt seen for OT eval and AE training.   Pt demonstrates ind with bed mobility. Completes toilet tx with SBA and use of grab bars (simulated home set up).   Educated on use of AE for LE dressing. Pt completes tasks with SBA.   Pt is familiar with rehab/recovery process from prev L TKA.   Pt does not appear to require further intervention in acute setting.      Therapy recommendation(s):    No further therapy is recommended.

## 2017-05-05 NOTE — CONSULTS
Care Transition Initial Assessment - RN  Reason For Consult: discharge planning   Met with: Patient.    DATA  Active Problems:    Hyperlipidemia LDL goal <100    Atrial fibrillation (H)    CKD (chronic kidney disease) stage 3, GFR 30-59 ml/min    S/P total knee arthroplasty    Essential hypertension    Type 2 diabetes mellitus with diabetic nephropathy, without long-term current use of insulin (H)       [facility]  Primary Care MD Name: Judy Verde MD  Contact information and PCP information verified: Yes      ASSESSMENT  Cognitive Status: awake, alert and oriented.             Lives With: spouse  Living Arrangements: house                 Insurance Concerns: No Insurance issues identified          This writer met with pt , introduced self and role.       PLAN    Home with [facility] ([phone] Fax: [phone])  Patient states her  will transport.       Josselin Hogue -[phone]

## 2017-05-05 NOTE — PROGRESS NOTES
Goal Outcome Summary    Pt transfers with SBA. Amb with 260' RW with CGA. Performed LE exs x 10.    REC: Home with OP PT    Physical Therapy Evaluation     05/05/17 1000   Quick Adds   Type of Visit Initial PT Evaluation   Living Environment   Lives With spouse   Living Arrangements house  (split level)   Home Accessibility stairs to enter home;stairs within home   Number of Stairs to Enter Home 2   Number of Stairs Within Home 7   Stair Railings at Home inside, present on right side;outside, present at both sides   Transportation Available family or friend will provide   Living Environment Comment walk in shower in basement, tub/shower upstairs with shower doors   Self-Care   Dominant Hand right   Equipment Currently Used at Home grab bar;cane, quad;walker, rolling;raised toilet  (reacher)   Functional Level Prior   Ambulation 0-->independent   Transferring 0-->independent   Toileting 0-->independent   Bathing 0-->independent   Dressing 0-->independent   Eating 0-->independent   Communication 0-->understands/communicates without difficulty   Swallowing 0-->swallows foods/liquids without difficulty   Cognition 0 - no cognition issues reported   Fall history within last six months no   Which of the above functional risks had a recent onset or change? ambulation;transferring   General Information   Onset of Illness/Injury or Date of Surgery - Date 05/04/17   Referring Physician Dr Arteaga   Patient/Family Goals Statement wants to return home to independent living   Pertinent History of Current Problem (include personal factors and/or comorbidities that impact the POC) Pt admitted for R TKA   Weight-Bearing Status - RLE weight-bearing as tolerated   Cognitive Status Examination   Orientation orientation to person, place and time   Level of Consciousness alert   Follows Commands and Answers Questions 100% of the time   Personal Safety and Judgment intact   Pain Assessment   Patient Currently in Pain Yes, see Vital Sign  "flowsheet  (R knee pain rated at 3/10)   Range of Motion (ROM)   ROM Quick Adds Knee, Right   Right Knee Extension/Flexion ROM   Right Knee Extension/Flexion PROM - degrees -18-95*   Strength   Strength Comments WFL for age except R LE less than antigravity   Bed Mobility   Bed Mobility Comments sit <> supine with SBA and verbal cues   Transfer Skills   Transfer Comments sit <> stand with CGA   Gait   Gait Gait Skill   Gait Skills   Level of [place]: Gait contact guard   Physical Assist/Nonphysical Assist: Gait 1 person assist   Weight-Bearing Restrictions: Gait weight-bearing as tolerated   Assistive Device for Transfer: Gait rolling walker   Gait Distance 200 feet   Balance   Balance Comments good with RW   General Therapy Interventions   Planned Therapy Interventions gait training;ROM;strengthening   Clinical Impression   Criteria for Skilled Therapeutic Intervention yes, treatment indicated   PT Diagnosis R TKA   Influenced by the following impairments pain and weakness   Functional limitations due to impairments mobility and gait   Clinical Presentation Stable/Uncomplicated   Clinical Presentation Rationale clinical judgement   Clinical Decision Making (Complexity) Low complexity   Therapy Frequency` 2 times/day   Predicted Duration of Therapy Intervention (days/wks) 3 days   Anticipated Discharge Disposition Home with Outpatient Therapy   Risk & Benefits of therapy have been explained Yes   Patient, Family & other staff in agreement with plan of care Yes   [facility] [employer] TM \"6 Clicks\"   2016, Trustees of [facility], under license to [employer].  All rights reserved.   6 Clicks Short Forms Basic Mobility Inpatient Short Form   [facility] [employer]  \"6 Clicks\" V.2 Basic Mobility Inpatient Short Form   1. Turning from your back to your side while in a flat bed without using bedrails? 3 - A Little   2. Moving from lying on your back to sitting on the side of a flat bed without using " bedrails? 3 - A Little   3. Moving to and from a bed to a chair (including a wheelchair)? 3 - A Little   4. Standing up from a chair using your arms (e.g., wheelchair, or bedside chair)? 3 - A Little   5. To walk in hospital room? 3 - A Little   6. Climbing 3-5 steps with a railing? 2 - A Lot   Basic Mobility Raw Score (Score out of 24.Lower scores equate to lower levels of function) 17   Total Evaluation Time   Total Evaluation Time (Minutes) 15     See Care Plan for Goals.    Rosetta Cameron PT

## 2017-05-05 NOTE — PROGRESS NOTES
[facility] Medicine Progress Note  Date of Service: 05/05/2017    Assessment & Plan   Suzette Britton is a 72 year old female who presented on 5/4/2017 for scheduled Procedure(s):  ARTHROPLASTY KNEE by Zachayr Arteaga MD and is being followed by the hospital medicine service for co-management of acute and/or chronic perioperative medical problems.    S/p Procedure(s):  ARTHROPLASTY KNEE  - POD #1  - pain control, wound cares, physical therapy, occupational therapy and DVT prophylaxis per orthopedic surgery service    Essential hypertension  - continue PTA chlorthalidone, lisinopril      Type 2 diabetes mellitus with diabetic nephropathy, without long-term current use of insulin (H)  BG reviewed, mildly elevate this admission  - home metformin resumed yesterday evening  - high SSI      Hyperlipidemia LDL goal <100  - continue PTA atorvastatin      Atrial fibrillation (H)  Historical.  Noted 2010 and converted in ER with diltiazem.  No other occurrences  - continue PTA full dose aspirin      CKD (chronic kidney disease) stage 3, GFR 30-59 ml/min  Creatinine reviewed, stable.      DVT Prophylaxis: as per orthopedic surgery service - Defer to primary service  Code Status: Full Code    Lines: PIV, without edema/erythema   Rosenberg catheter: not indicated    Discussion: Medically, the patient appears stable.    Disposition: Anticipate discharge in 1-2 days.  She plans to go home with homecare.     Attestation:  I have reviewed today's vital signs, notes, medications, labs and imaging.    Siri Mcmahan PA-C      Interval History   Pain controlled.  Denies nausea, CP, SOB, cough, abdominal pain, dysuria, lower extremity pain/swelling.  Passing flatus.  Voiding freely.  Tolerating both PT and OT.    Physical Exam   Temp:  [97.4  F (36.3  C)-98.8  F (37.1  C)] 97.8  F (36.6  C)  Pulse:  [71-92] 90  Heart Rate:  [72-96] 81  Resp:  [16-18] 18  BP: (112-150)/(59-78) 136/67  SpO2:  [93  %-100 %] 95 %    Weights:   Vitals:    05/04/17 1059   Weight: 83 kg (183 lb)    Body mass index is 31.91 kg/(m^2).    General: alert and oriented x4, in no acute distress  CV: Regular rate/rhythm, no appreciable murmur, rub, or gallop  Respiratory: CTA bilaterally, equal chest expansion  GI: Soft, nontender, normoactive BS  Skin: Warm and dry  Musculoskeletal: Negative homans bilaterally.  Non-tender to palpation of posterior calves. No edema to lower extremities.  Neuro: equal  strength    Data     Recent Labs  Lab 05/05/17  0720   HGB 11.5*      POTASSIUM 4.2   CHLORIDE 101   CO2 24   BUN 16   CR 0.89   ANIONGAP 11   PROSPER 8.4*   *         Recent Labs  Lab 05/05/17  1142 05/05/17  0720 05/05/17  0631 05/05/17  0203 05/04/17  2206 05/04/17  1721 05/04/17  1113   GLC  --  217*  --   --   --   --   --    *  --  190* 237* 207* 107* 151*        Unresulted Labs Ordered in the Past 30 Days of this Admission     No orders found for last 61 day(s).           Imaging  Recent Results (from the past 24 hour(s))   XR Knee Port Right 1/2 Views    Narrative    PORTABLE TWO VIEWS OF THE RIGHT KNEE  5/4/2017 4:26 PM     HISTORY: Postoperative evaluation of the right knee.    COMPARISON: 10/19/2016    FINDINGS: There are interval surgical changes of a right total knee  arthroplasty. The hardware is intact with no fracture or other  complication seen. No other abnormality is demonstrated.      Impression    IMPRESSION: Unremarkable postoperative appearance of the right knee.    JOSELITO WILKINS MD        I reviewed all new labs and imaging results over the last 24 hours. I personally reviewed no images or EKG's today.    Medications     NaCl Stopped (05/05/17 1140)       atorvastatin  10 mg Oral Daily at 8 pm     chlorthalidone  12.5 mg Oral Daily     lisinopril  40 mg Oral Daily     insulin glargine  5 Units Subcutaneous BID     sodium chloride (PF)  3 mL Intracatheter Q8H     acetaminophen  975 mg Oral  Q8H     senna-docusate  1-2 tablet Oral BID     aspirin EC  325 mg Oral Daily     metFORMIN  1,000 mg Oral BID w/meals     insulin aspart  1-7 Units Subcutaneous TID AC     insulin aspart  1-5 Units Subcutaneous At Bedtime       Siri Mcmahan PA-C

## 2017-05-05 NOTE — PLAN OF CARE
Problem: Knee Replacement, Total (Adult)  Goal: Signs and Symptoms of Listed Potential Problems Will be Absent or Manageable (Knee Replacement, Total)  Signs and symptoms of listed potential problems will be absent or manageable by discharge/transition of care (reference Knee Replacement, Total (Adult) CPG).  Outcome: No Change  Pt up to bedside commode with assist 1 and walker. Pt unable to void. Bladder scan show 916cc. Straight cath done, with 950cc clear yellow urine in return. Pt tolerated well. Pt still declined need to void. Tolerating PO food & fluid well. A & O. CMS +. Dressing CDI. LS clear. Using IS. Pt c/o of tolerable pain. Admin 10 mg PO Oxycodone, will continue to monitor. VSS, Afebrile. Christie Quintero RN

## 2017-05-06 ENCOUNTER — APPOINTMENT (OUTPATIENT)
Dept: PHYSICAL THERAPY | Facility: CLINIC | Age: 72
DRG: 470 | End: 2017-05-06
Attending: ORTHOPAEDIC SURGERY
Payer: MEDICARE

## 2017-05-06 VITALS
HEIGHT: 64 IN | WEIGHT: 183 LBS | SYSTOLIC BLOOD PRESSURE: 119 MMHG | TEMPERATURE: 98.9 F | BODY MASS INDEX: 31.24 KG/M2 | OXYGEN SATURATION: 95 % | HEART RATE: 85 BPM | DIASTOLIC BLOOD PRESSURE: 65 MMHG | RESPIRATION RATE: 18 BRPM

## 2017-05-06 LAB
GLUCOSE BLDC GLUCOMTR-MCNC: 148 MG/DL (ref 70–99)
GLUCOSE BLDC GLUCOMTR-MCNC: 153 MG/DL (ref 70–99)
GLUCOSE BLDC GLUCOMTR-MCNC: 163 MG/DL (ref 70–99)
HGB BLD-MCNC: 11 G/DL (ref 11.7–15.7)

## 2017-05-06 PROCEDURE — 99231 SBSQ HOSP IP/OBS SF/LOW 25: CPT | Performed by: FAMILY MEDICINE

## 2017-05-06 PROCEDURE — 25000132 ZZH RX MED GY IP 250 OP 250 PS 637: Mod: GY | Performed by: ORTHOPAEDIC SURGERY

## 2017-05-06 PROCEDURE — 25000132 ZZH RX MED GY IP 250 OP 250 PS 637: Mod: GY | Performed by: INTERNAL MEDICINE

## 2017-05-06 PROCEDURE — 97110 THERAPEUTIC EXERCISES: CPT | Mod: GP | Performed by: PHYSICAL THERAPIST

## 2017-05-06 PROCEDURE — A9270 NON-COVERED ITEM OR SERVICE: HCPCS | Mod: GY | Performed by: INTERNAL MEDICINE

## 2017-05-06 PROCEDURE — 25000131 ZZH RX MED GY IP 250 OP 636 PS 637: Mod: GY | Performed by: PHYSICIAN ASSISTANT

## 2017-05-06 PROCEDURE — 25000132 ZZH RX MED GY IP 250 OP 250 PS 637: Mod: GY | Performed by: PHYSICIAN ASSISTANT

## 2017-05-06 PROCEDURE — A9270 NON-COVERED ITEM OR SERVICE: HCPCS | Mod: GY | Performed by: ORTHOPAEDIC SURGERY

## 2017-05-06 PROCEDURE — 36415 COLL VENOUS BLD VENIPUNCTURE: CPT | Performed by: ORTHOPAEDIC SURGERY

## 2017-05-06 PROCEDURE — 40000193 ZZH STATISTIC PT WARD VISIT: Performed by: PHYSICAL THERAPIST

## 2017-05-06 PROCEDURE — 85018 HEMOGLOBIN: CPT | Performed by: ORTHOPAEDIC SURGERY

## 2017-05-06 PROCEDURE — 00000146 ZZHCL STATISTIC GLUCOSE BY METER IP

## 2017-05-06 PROCEDURE — A9270 NON-COVERED ITEM OR SERVICE: HCPCS | Mod: GY | Performed by: PHYSICIAN ASSISTANT

## 2017-05-06 PROCEDURE — 97116 GAIT TRAINING THERAPY: CPT | Mod: GP | Performed by: PHYSICAL THERAPIST

## 2017-05-06 RX ORDER — OXYCODONE HYDROCHLORIDE 5 MG/1
5-10 TABLET ORAL EVERY 4 HOURS PRN
Qty: 60 TABLET | Refills: 0 | Status: SHIPPED | OUTPATIENT
Start: 2017-05-06 | End: 2017-05-16

## 2017-05-06 RX ADMIN — INSULIN GLARGINE 5 UNITS: 100 INJECTION, SOLUTION SUBCUTANEOUS at 08:22

## 2017-05-06 RX ADMIN — ACETAMINOPHEN 975 MG: 325 TABLET, FILM COATED ORAL at 08:31

## 2017-05-06 RX ADMIN — OXYCODONE HYDROCHLORIDE 10 MG: 5 TABLET ORAL at 06:58

## 2017-05-06 RX ADMIN — SENNOSIDES AND DOCUSATE SODIUM 1 TABLET: 8.6; 5 TABLET ORAL at 08:22

## 2017-05-06 RX ADMIN — OXYCODONE HYDROCHLORIDE 10 MG: 5 TABLET ORAL at 11:50

## 2017-05-06 RX ADMIN — ASPIRIN 325 MG: 325 TABLET, COATED ORAL at 08:22

## 2017-05-06 RX ADMIN — OXYCODONE HYDROCHLORIDE 10 MG: 5 TABLET ORAL at 00:30

## 2017-05-06 RX ADMIN — CHLORTHALIDONE 12.5 MG: 25 TABLET ORAL at 08:22

## 2017-05-06 RX ADMIN — LISINOPRIL 40 MG: 40 TABLET ORAL at 08:22

## 2017-05-06 RX ADMIN — OXYCODONE HYDROCHLORIDE 10 MG: 5 TABLET ORAL at 03:55

## 2017-05-06 RX ADMIN — METFORMIN HYDROCHLORIDE 1000 MG: 500 TABLET, FILM COATED ORAL at 08:22

## 2017-05-06 NOTE — PLAN OF CARE
Problem: Goal Outcome Summary  Goal: Goal Outcome Summary  Physical Therapy Discharge Summary     Reason for therapy discharge:    Discharged to home with home therapy.     Progress towards therapy goal(s). See goals on Care Plan in [facility] electronic health record for goal details.  Goals partially met.  Barriers to achieving goals:   discharge from facility and R knee flex limited to 90 degs.     Therapy recommendation(s):    Continued therapy is recommended.  Rationale/Recommendations:  To improve strength and ROM.

## 2017-05-06 NOTE — PLAN OF CARE
Problem: Goal Outcome Summary  Goal: Goal Outcome Summary  [place] NSG DISCHARGE NOTE     Patient discharged to home at 1:10 PM via wheel chair. Accompanied by spouse and daughter and staff. Discharge instructions reviewed with patient, spouse and daughter, opportunity offered to ask questions. Prescriptions sent with patient to fill . All belongings sent with patient.     Brittni Mayo

## 2017-05-06 NOTE — CONSULTS
Transition Communication Hand-off for Care Transitions to Next Level of Care Provider    Name: Suzette Britton  MRN #: [id number]  Primary Care Provider: Judy Verde  Primary Care MD Name: Judy Verde MD  Primary Clinic: [facility] [address] [facility] [address]  Primary Care Clinic Name: [facility]  Reason for Hospitalization:  right knee arthritis  S/P total knee arthroplasty  Admit Date/Time: 5/4/2017 10:45 AM  Discharge Date: 5/6/17  Payor Source: Payor: MEDICARE / Plan: MEDICARE / Product Type: Medicare /     Readmission Assessment Measure (ASHER) Risk Score/category: Low    Reason for Communication Hand-off Referral: Other Home Care    Discharge Plan:       Concern for non-adherence with plan of care:   Y/N No  Discharge Needs Assessment:  Needs       Most Recent Value    Equipment Currently Used at Home grab bar, cane, quad, walker, rolling, raised toilet    Transportation Available family or friend will provide    # of Referrals Placed by [facility]          Already enrolled in Tele-monitoring program and name of program:  N/A  Follow-up plan:  Future Appointments  Date Time Provider Department Center   5/22/2017 1:30 PM Alva Weaver, PT [facility] Little RN, Care Coordinator

## 2017-05-06 NOTE — PROGRESS NOTES
[facility] Hospitalist Progress Note           Assessment and Plan:     Suzette Britton is a 72 year old female who presented on 5/4/2017 for scheduled Procedure(s):  ARTHROPLASTY KNEE by Zachary Arteaga MD and is being followed by the hospital medicine service for co-management of acute and/or chronic perioperative medical problems.     S/p Procedure(s):  ARTHROPLASTY KNEE  - POD #2  - pain control, wound cares, physical therapy, occupational therapy and DVT prophylaxis per orthopedic surgery service     Essential hypertension  Stable - continuing PTA chlorthalidone, lisinopril      Type 2 diabetes mellitus with diabetic nephropathy, without long-term current use of insulin (H)  5/5/17 -- BG reviewed, mildly elevate this admission  - home metformin resumed yesterday evening  - high SSI  5/6/2017 -- ok to discharge on prior to admission regimen.      Hyperlipidemia LDL goal <100  - continued PTA atorvastatin      Atrial fibrillation (H)  Historical. Noted 2010 and converted in ER with diltiazem. No other occurrences  - continue full dose aspirin x 42 days, then patient says she takes just a baby aspirin at baseline.        CKD (chronic kidney disease) stage 3, GFR 30-59 ml/min  Creatinine reviewed, stable.        DVT Prophylaxis: as per orthopedic surgery service - Defer to primary service    Code Status: Full Code    Disposition  Home per ortho, stable from medical standpoint.             Interval History:   Doing well, pain well-controlled, breathing comfortably, no fever or chills, eating normally, feels ready for discharge home today.            Review of Systems:    ROS: 10 point ROS neg other than the symptoms noted above in the HPI.             Medications:   Current active medications and PTA medications reviewed, see medication list for details.            Physical Exam:   Vitals were reviewed  Patient Vitals for the past 24 hrs:   BP Temp Temp src Pulse Heart Rate Resp SpO2   05/06/17 0748 119/65  98.9  F (37.2  C) Oral 85 - 18 95 %   17 0141 133/67 99.3  F (37.4  C) Oral - 86 18 98 %   17 1503 140/74 96.6  F (35.9  C) Oral 86 - 16 96 %       Temperatures:  Current - Temp: 98.9  F (37.2  C); Max - Temp  Av.3  F (36.8  C)  Min: 96.6  F (35.9  C)  Max: 99.3  F (37.4  C)  Respiration range: Resp  Av.3  Min: 16  Max: 18  Pulse range: Pulse  Av.5  Min: 85  Max: 86  Blood pressure range: Systolic (24hrs), Av , Min:119 , Max:140   ; Diastolic (24hrs), Av, Min:65, Max:74    Pulse oximetry range: SpO2  Av.3 %  Min: 95 %  Max: 98 %  I/O last 3 completed shifts:  In: 1671 [P.O.:1160; I.V.:511]  Out: 1450 [Urine:1050; Emesis/NG output:400]    Intake/Output Summary (Last 24 hours) at 17 1149  Last data filed at 17 0832   Gross per 24 hour   Intake             1246 ml   Output             1150 ml   Net               96 ml     EXAM:  General: awake and alert, NAD, oriented x 3  Head: normocephalic  Neck: unremarkable, no lymphadenopathy   HEENT: oropharynx pink and moist    Heart: Regular rate and rhythm, no murmurs, rubs, or gallops  Lungs: clear to auscultation bilaterally with good air movement throughout  Abdomen: soft, non-tender, no masses or organomegaly  Extremities: no edema in lower extremities   Skin unremarkable.               Data:     Results for orders placed or performed during the hospital encounter of 17 (from the past 24 hour(s))   Care Transition RN/SW IP Consult    Narrative    Josselin Hogue RN     2017  2:27 PM  Care Transition Initial Assessment - RN  Reason For Consult: discharge planning   Met with: Patient.    DATA  Active Problems:    Hyperlipidemia LDL goal <100    Atrial fibrillation (H)    CKD (chronic kidney disease) stage 3, GFR 30-59 ml/min    S/P total knee arthroplasty    Essential hypertension    Type 2 diabetes mellitus with diabetic nephropathy, without   long-term current use of insulin (H)       Primary Care Clinic Name:  [facility]  Primary Care MD Name: Judy Verde MD  Contact information and PCP information verified: Yes      ASSESSMENT  Cognitive Status: awake, alert and oriented.             Lives With: spouse  Living Arrangements: house                 Insurance Concerns: No Insurance issues identified          This writer met with pt , introduced self and role.       PLAN    Home with [facility] ([phone] Fax:   [phone])  Patient states her  will transport.       Josselin Hogue -[phone]     Glucose by meter   Result Value Ref Range    Glucose 241 (H) 70 - 99 mg/dL   Glucose by meter   Result Value Ref Range    Glucose 177 (H) 70 - 99 mg/dL   Glucose by meter   Result Value Ref Range    Glucose 163 (H) 70 - 99 mg/dL   Glucose by meter   Result Value Ref Range    Glucose 153 (H) 70 - 99 mg/dL   Hemoglobin   Result Value Ref Range    Hemoglobin 11.0 (L) 11.7 - 15.7 g/dL           Attestation:  I have reviewed today's vital signs, notes, medications, labs and imaging.  Amount of time performed on this daily note: 15 minutes.     Ebenezer Simms MD, MD

## 2017-05-06 NOTE — PROGRESS NOTES
"[facility] Progress Note      Post-operative Day: 2 Days Post-Op    Procedure(s):  Right Total Knee Arthroplasty - Wound Class: I-Clean      Subjective:  Pt feeling well and ready for home as planned today.  Prior left TKA and pt is familiar with rehab.    Pain: moderate  Chest pain, SOB:  No      Objective:  Blood pressure 119/65, pulse 85, temperature 98.9  F (37.2  C), temperature source Oral, resp. rate 18, height 1.613 m (5' 3.5\"), weight 83 kg (183 lb), SpO2 95 %.    Patient Vitals for the past 24 hrs:   BP Temp Temp src Pulse Heart Rate Resp SpO2   05/06/17 0748 119/65 98.9  F (37.2  C) Oral 85 - 18 95 %   05/06/17 0141 133/67 99.3  F (37.4  C) Oral - 86 18 98 %   05/05/17 1503 140/74 96.6  F (35.9  C) Oral 86 - 16 96 %   05/05/17 1136 136/67 97.8  F (36.6  C) Oral - 81 18 95 %       Wt Readings from Last 4 Encounters:   05/04/17 83 kg (183 lb)   04/13/17 83.2 kg (183 lb 8 oz)   02/09/17 83.5 kg (184 lb)   12/01/16 87.6 kg (193 lb 1.6 oz)         Motor function, sensation, and circulation intact   Yes  Wound status: incisions are clean dry and intact. Yes  Calf tenderness: Bilateral  No    Pertinent Labs   Lab Results: personally reviewed.     Recent Labs   Lab Test  05/06/17   0653  05/05/17   0720  04/13/17   1142  02/01/17   0818   11/08/16   1031   07/23/10   1750   INR   --    --    --    --    --    --    --   0.94   HGB  11.0*  11.5*  13.9   --    < >  13.1   < >  14.0   HCT   --    --   42.4   --    --   39.3   --   42.1   MCV   --    --   88   --    --   88   --   85   PLT   --    --   220   --    --   187   --   334   NA   --   136  138  136   < >  138   < >  138    < > = values in this interval not displayed.       Plan: Anticoagulation protocol:  mg daily  x 42  days            Pain medications:  oxycodone            Weight bearing status:  FWB            Disposition:  Home today             Continue cares and rehabilitation     Report completed by:  Ishan Villareal, " MD  Date: 5/6/2017  Time: 9:11 AM

## 2017-05-06 NOTE — DISCHARGE SUMMARY
[facility] Discharge Summary                                  [facility]     CHRISTI BROWN [id number]   Age: 72 year old  PCP: Judy Verde, [phone] 1945     Date of Admission:  5/4/2017  Date of Discharge::  5/6/2017  Discharge Physician:  Ishan Villareal    Code status:  Prior    Admission Information:  Admission Diagnosis:  right knee arthritis  S/P total knee arthroplasty    Post-Operative Day: 2 Days Post-Op     Reason for admission:  The patient was admitted for the following:Procedure(s) (LRB):  ARTHROPLASTY KNEE (Right)    Active Problems:    Hyperlipidemia LDL goal <100    Atrial fibrillation (H)    CKD (chronic kidney disease) stage 3, GFR 30-59 ml/min    S/P total knee arthroplasty    Essential hypertension    Type 2 diabetes mellitus with diabetic nephropathy, without long-term current use of insulin (H)      Allergies:  Nka [no known allergies]    Following the procedure noted above the patient was transferred to the post-op floor and started on:    Therapy:  physical therapy and occupational therapy  Anticoagulation Plan:  mg daily  for 42 days  Pain Management: oxycodone  Weight bearing status: Weight bearing as tolerated     The patient was followed and co-managed by the hospitalist service during the inpatient treatment course  Complications:  None  Consultations:  None     Pertinent Labs   Lab Results: personally reviewed.     Recent Labs   Lab Test  05/06/17   0653  05/05/17   0720  04/13/17   1142  02/01/17   0818   11/08/16   1031   07/23/10   1750   INR   --    --    --    --    --    --    --   0.94   HGB  11.0*  11.5*  13.9   --    < >  13.1   < >  14.0   HCT   --    --   42.4   --    --   39.3   --   42.1   MCV   --    --   88   --    --   88   --   85   PLT   --    --   220   --    --   187   --   334   NA   --   136  138  136   < >  138   < >  138    < > = values in this interval not displayed.          Discharge  Information:  Condition at discharge: Stable  Discharge destination:  Discharged to home     Medications at discharge:  Current Discharge Medication List      START taking these medications    Details   oxyCODONE (ROXICODONE) 5 MG IR tablet Take 1-2 tablets (5-10 mg) by mouth every 4 hours as needed for moderate to severe pain  Qty: 60 tablet, Refills: 0    Associated Diagnoses: Status post total right knee replacement         CONTINUE these medications which have NOT CHANGED    Details   chlorthalidone (HYGROTON) 25 MG tablet Take 0.5 tablets (12.5 mg) by mouth daily  Qty: 45 tablet, Refills: 3    Associated Diagnoses: Essential hypertension      lisinopril (PRINIVIL/ZESTRIL) 40 MG tablet Take 1 tablet (40 mg) by mouth daily  Qty: 90 tablet, Refills: 3    Associated Diagnoses: CKD (chronic kidney disease) stage 3, GFR 30-59 ml/min      atorvastatin (LIPITOR) 10 MG tablet Take 1 tablet (10 mg) by mouth daily  Qty: 90 tablet, Refills: 3    Associated Diagnoses: Type 2 diabetes mellitus with diabetic nephropathy, without long-term current use of insulin (H); Hyperlipidemia LDL goal <100      metFORMIN (GLUCOPHAGE) 500 MG tablet Take 2 tablets (1,000 mg) by mouth 2 times daily (with meals)  Qty: 360 tablet, Refills: 1    Associated Diagnoses: Type 2 diabetes mellitus with diabetic nephropathy, without long-term current use of insulin (H)      aspirin  MG EC tablet Take 1 tablet (325 mg) by mouth daily  Qty: 40 tablet, Refills: 0    Associated Diagnoses: Status post total left knee replacement      calcium-vitamin D-vitamin K (VIACTIV) 500-500-40 MG-UNT-MCG CHEW Take 1 tablet by mouth daily      Cholecalciferol (VITAMIN D PO) Take 1,000 Units by mouth       blood glucose monitoring (NO BRAND SPECIFIED) test strip Use to test blood sugars one time daily or as directed  One touch meter - one touch strips  Qty: 3 Box, Refills: 3    Associated Diagnoses: CKD (chronic kidney disease) stage 3, GFR 30-59 ml/min       order for DME Equipment being ordered: CPM: Advance as tolerated. Range 0-90 degree flexion  Treatment Diagnosis: Left TKA  Qty: 1 Units, Refills: 1    Associated Diagnoses: Status post total left knee replacement      GLUCOMETER KIT for testing bid, One Touch or please fill with formulary glucometer  Qty: 1 Kit, Refills: 0    Associated Diagnoses: Type II or unspecified type diabetes mellitus without mention of complication, not stated as uncontrolled                        Follow-Up Care:  Patient should be seen in the office in 14 days by the Orthopedic Surgeon/Physician Assistant.  Call [phone] for appointment or questions.    Ishan Villareal

## 2017-05-06 NOTE — PHARMACY - DISCHARGE MEDICATION RECONCILIATION
Discharge medication review for this patient is complete. Pharmacist assisted with medication reconciliation of discharge medications with prior to admission medications.     The following changes were made to the discharge medication list based on pharmacist review:  Added:  oxycodone  Discontinued: NA  Changed: NA      Patient's Discharge Medication List  - medications as listed on After Visit Summary (AVS)     Review of your medicines      START taking       Dose / Directions    oxyCODONE 5 MG IR tablet   Commonly known as:  ROXICODONE        Dose:  5-10 mg   Take 1-2 tablets (5-10 mg) by mouth every 4 hours as needed for moderate to severe pain   Quantity:  60 tablet   Refills:  0         CONTINUE these medicines which have NOT CHANGED       Dose / Directions    aspirin 325 MG EC tablet        Dose:  325 mg   Take 1 tablet (325 mg) by mouth daily   Quantity:  40 tablet   Refills:  0       atorvastatin 10 MG tablet   Commonly known as:  LIPITOR   Used for:  Type 2 diabetes mellitus with diabetic nephropathy, without long-term current use of insulin (H), Hyperlipidemia LDL goal <100        Dose:  10 mg   Take 1 tablet (10 mg) by mouth daily   Quantity:  90 tablet   Refills:  3       blood glucose monitoring test strip   Commonly known as:  no brand specified   Used for:  CKD (chronic kidney disease) stage 3, GFR 30-59 ml/min        Use to test blood sugars one time daily or as directed  One touch meter - one touch strips   Quantity:  3 Box   Refills:  3       calcium-vitamin D-vitamin K 500-500-40 MG-UNT-MCG Chew   Commonly known as:  VIACTIV        Dose:  1 tablet   Take 1 tablet by mouth daily   Refills:  0       chlorthalidone 25 MG tablet   Commonly known as:  HYGROTON   Used for:  Essential hypertension        Dose:  12.5 mg   Take 0.5 tablets (12.5 mg) by mouth daily   Quantity:  45 tablet   Refills:  3       GLUCOMETER KIT   Used for:  Type II or unspecified type diabetes mellitus without mention of  complication, not stated as uncontrolled        for testing bid, One Touch or please fill with formulary glucometer   Quantity:  1 Kit   Refills:  0       lisinopril 40 MG tablet   Commonly known as:  PRINIVIL/ZESTRIL   Used for:  CKD (chronic kidney disease) stage 3, GFR 30-59 ml/min        Dose:  40 mg   Take 1 tablet (40 mg) by mouth daily   Quantity:  90 tablet   Refills:  3       metFORMIN 500 MG tablet   Commonly known as:  GLUCOPHAGE   Used for:  Type 2 diabetes mellitus with diabetic nephropathy, without long-term current use of insulin (H)        Dose:  1000 mg   Take 2 tablets (1,000 mg) by mouth 2 times daily (with meals)   Quantity:  360 tablet   Refills:  1       VITAMIN D PO        Dose:  1000 Units   Take 1,000 Units by mouth every morning   Refills:  0         STOP taking          order for DME                Where to get your medicines      Some of these will need a paper prescription and others can be bought over the counter. Ask your nurse if you have questions.     Bring a paper prescription for each of these medications      oxyCODONE 5 MG IR tablet

## 2017-05-06 NOTE — PLAN OF CARE
"Problem: Knee Replacement, Total (Adult)  Goal: Signs and Symptoms of Listed Potential Problems Will be Absent or Manageable (Knee Replacement, Total)  Signs and symptoms of listed potential problems will be absent or manageable by discharge/transition of care (reference Knee Replacement, Total (Adult) CPG).   Outcome: Improving  Patient is alert, oriented, assist of 1 with walker. Prn oxycodone given for pain. Prn zofran given for nausea, emesis, effective. Dressing is CDI, CMS positive. Denies SOB. /67 (BP Location: Left arm)  Pulse 86  Temp 99.3  F (37.4  C) (Oral)  Resp 18  Ht 1.613 m (5' 3.5\")  Wt 83 kg (183 lb)  SpO2 98%  BMI 31.91 kg/m2          "

## 2017-05-07 ENCOUNTER — HOSPITAL ENCOUNTER (INPATIENT)
Facility: CLINIC | Age: 72
LOS: 3 days | Discharge: HOME HEALTH | DRG: 308 | End: 2017-05-10
Attending: STUDENT IN AN ORGANIZED HEALTH CARE EDUCATION/TRAINING PROGRAM | Admitting: FAMILY MEDICINE
Payer: MEDICARE

## 2017-05-07 ENCOUNTER — APPOINTMENT (OUTPATIENT)
Dept: CT IMAGING | Facility: CLINIC | Age: 72
DRG: 308 | End: 2017-05-07
Attending: STUDENT IN AN ORGANIZED HEALTH CARE EDUCATION/TRAINING PROGRAM
Payer: MEDICARE

## 2017-05-07 ENCOUNTER — APPOINTMENT (OUTPATIENT)
Dept: GENERAL RADIOLOGY | Facility: CLINIC | Age: 72
DRG: 308 | End: 2017-05-07
Attending: STUDENT IN AN ORGANIZED HEALTH CARE EDUCATION/TRAINING PROGRAM
Payer: MEDICARE

## 2017-05-07 DIAGNOSIS — N17.9 ACUTE RENAL FAILURE, UNSPECIFIED ACUTE RENAL FAILURE TYPE (H): ICD-10-CM

## 2017-05-07 DIAGNOSIS — I48.92 ATRIAL FLUTTER WITH RAPID VENTRICULAR RESPONSE (H): ICD-10-CM

## 2017-05-07 DIAGNOSIS — R79.89 ELEVATED TROPONIN: ICD-10-CM

## 2017-05-07 DIAGNOSIS — Z96.651 STATUS POST TOTAL RIGHT KNEE REPLACEMENT: Primary | ICD-10-CM

## 2017-05-07 PROBLEM — I48.91 ATRIAL FIBRILLATION WITH RVR (H): Status: ACTIVE | Noted: 2017-05-07

## 2017-05-07 LAB
ALBUMIN SERPL-MCNC: 2.9 G/DL (ref 3.4–5)
ALBUMIN UR-MCNC: NEGATIVE MG/DL
ALP SERPL-CCNC: 160 U/L (ref 40–150)
ALT SERPL W P-5'-P-CCNC: 99 U/L (ref 0–50)
ANION GAP SERPL CALCULATED.3IONS-SCNC: 12 MMOL/L (ref 3–14)
APPEARANCE UR: CLEAR
AST SERPL W P-5'-P-CCNC: 113 U/L (ref 0–45)
BASOPHILS # BLD AUTO: 0 10E9/L (ref 0–0.2)
BASOPHILS NFR BLD AUTO: 0.5 %
BILIRUB SERPL-MCNC: 0.5 MG/DL (ref 0.2–1.3)
BILIRUB UR QL STRIP: NEGATIVE
BUN SERPL-MCNC: 39 MG/DL (ref 7–30)
CALCIUM SERPL-MCNC: 9.1 MG/DL (ref 8.5–10.1)
CHLORIDE SERPL-SCNC: 93 MMOL/L (ref 94–109)
CO2 SERPL-SCNC: 28 MMOL/L (ref 20–32)
COLOR UR AUTO: YELLOW
CREAT SERPL-MCNC: 1.56 MG/DL (ref 0.52–1.04)
CRP SERPL-MCNC: 178 MG/L (ref 0–8)
DIFFERENTIAL METHOD BLD: ABNORMAL
EOSINOPHIL # BLD AUTO: 0.3 10E9/L (ref 0–0.7)
EOSINOPHIL NFR BLD AUTO: 3.4 %
ERYTHROCYTE [DISTWIDTH] IN BLOOD BY AUTOMATED COUNT: 13 % (ref 10–15)
ERYTHROCYTE [SEDIMENTATION RATE] IN BLOOD BY WESTERGREN METHOD: 48 MM/H (ref 0–30)
FLUAV+FLUBV AG SPEC QL: NEGATIVE
FLUAV+FLUBV AG SPEC QL: NORMAL
GFR SERPL CREATININE-BSD FRML MDRD: 32 ML/MIN/1.7M2
GLUCOSE BLDC GLUCOMTR-MCNC: 156 MG/DL (ref 70–99)
GLUCOSE SERPL-MCNC: 219 MG/DL (ref 70–99)
GLUCOSE UR STRIP-MCNC: NEGATIVE MG/DL
HCT VFR BLD AUTO: 34.3 % (ref 35–47)
HGB BLD-MCNC: 11.1 G/DL (ref 11.7–15.7)
HGB UR QL STRIP: NEGATIVE
IMM GRANULOCYTES # BLD: 0 10E9/L (ref 0–0.4)
IMM GRANULOCYTES NFR BLD: 0.5 %
KETONES UR STRIP-MCNC: NEGATIVE MG/DL
LACTATE BLD-SCNC: 1.6 MMOL/L (ref 0.7–2.1)
LACTATE BLD-SCNC: 4 MMOL/L (ref 0.7–2.1)
LEUKOCYTE ESTERASE UR QL STRIP: NEGATIVE
LYMPHOCYTES # BLD AUTO: 1.5 10E9/L (ref 0.8–5.3)
LYMPHOCYTES NFR BLD AUTO: 18.7 %
MAGNESIUM SERPL-MCNC: 1.6 MG/DL (ref 1.6–2.3)
MAGNESIUM SERPL-MCNC: 1.7 MG/DL (ref 1.6–2.3)
MCH RBC QN AUTO: 28.5 PG (ref 26.5–33)
MCHC RBC AUTO-ENTMCNC: 32.4 G/DL (ref 31.5–36.5)
MCV RBC AUTO: 88 FL (ref 78–100)
MONOCYTES # BLD AUTO: 0.8 10E9/L (ref 0–1.3)
MONOCYTES NFR BLD AUTO: 9.4 %
NEUTROPHILS # BLD AUTO: 5.4 10E9/L (ref 1.6–8.3)
NEUTROPHILS NFR BLD AUTO: 67.5 %
NITRATE UR QL: NEGATIVE
PH UR STRIP: 5 PH (ref 5–7)
PHOSPHATE SERPL-MCNC: 2.3 MG/DL (ref 2.5–4.5)
PLATELET # BLD AUTO: 206 10E9/L (ref 150–450)
POTASSIUM SERPL-SCNC: 4.1 MMOL/L (ref 3.4–5.3)
PROT SERPL-MCNC: 6.8 G/DL (ref 6.8–8.8)
RBC # BLD AUTO: 3.9 10E12/L (ref 3.8–5.2)
SODIUM SERPL-SCNC: 133 MMOL/L (ref 133–144)
SP GR UR STRIP: 1.01 (ref 1–1.03)
SPECIMEN SOURCE: NORMAL
TROPONIN I SERPL-MCNC: 0.07 UG/L (ref 0–0.04)
TROPONIN I SERPL-MCNC: 0.07 UG/L (ref 0–0.04)
TSH SERPL DL<=0.005 MIU/L-ACNC: 1.62 MU/L (ref 0.4–4)
URN SPEC COLLECT METH UR: NORMAL
UROBILINOGEN UR STRIP-MCNC: NORMAL MG/DL (ref 0–2)
WBC # BLD AUTO: 8 10E9/L (ref 4–11)

## 2017-05-07 PROCEDURE — 36415 COLL VENOUS BLD VENIPUNCTURE: CPT | Performed by: PHYSICIAN ASSISTANT

## 2017-05-07 PROCEDURE — 25000128 H RX IP 250 OP 636: Performed by: STUDENT IN AN ORGANIZED HEALTH CARE EDUCATION/TRAINING PROGRAM

## 2017-05-07 PROCEDURE — 25000128 H RX IP 250 OP 636: Performed by: PHYSICIAN ASSISTANT

## 2017-05-07 PROCEDURE — 84443 ASSAY THYROID STIM HORMONE: CPT | Performed by: STUDENT IN AN ORGANIZED HEALTH CARE EDUCATION/TRAINING PROGRAM

## 2017-05-07 PROCEDURE — 25000132 ZZH RX MED GY IP 250 OP 250 PS 637: Mod: GY | Performed by: PHYSICIAN ASSISTANT

## 2017-05-07 PROCEDURE — 83735 ASSAY OF MAGNESIUM: CPT | Performed by: PHYSICIAN ASSISTANT

## 2017-05-07 PROCEDURE — 96375 TX/PRO/DX INJ NEW DRUG ADDON: CPT

## 2017-05-07 PROCEDURE — 84484 ASSAY OF TROPONIN QUANT: CPT | Performed by: STUDENT IN AN ORGANIZED HEALTH CARE EDUCATION/TRAINING PROGRAM

## 2017-05-07 PROCEDURE — 84484 ASSAY OF TROPONIN QUANT: CPT | Performed by: PHYSICIAN ASSISTANT

## 2017-05-07 PROCEDURE — 93005 ELECTROCARDIOGRAM TRACING: CPT

## 2017-05-07 PROCEDURE — 96376 TX/PRO/DX INJ SAME DRUG ADON: CPT

## 2017-05-07 PROCEDURE — 85652 RBC SED RATE AUTOMATED: CPT | Performed by: STUDENT IN AN ORGANIZED HEALTH CARE EDUCATION/TRAINING PROGRAM

## 2017-05-07 PROCEDURE — 81003 URINALYSIS AUTO W/O SCOPE: CPT | Performed by: STUDENT IN AN ORGANIZED HEALTH CARE EDUCATION/TRAINING PROGRAM

## 2017-05-07 PROCEDURE — 80053 COMPREHEN METABOLIC PANEL: CPT | Performed by: STUDENT IN AN ORGANIZED HEALTH CARE EDUCATION/TRAINING PROGRAM

## 2017-05-07 PROCEDURE — 71020 XR CHEST 2 VW: CPT

## 2017-05-07 PROCEDURE — 96365 THER/PROPH/DIAG IV INF INIT: CPT

## 2017-05-07 PROCEDURE — 87640 STAPH A DNA AMP PROBE: CPT | Performed by: FAMILY MEDICINE

## 2017-05-07 PROCEDURE — 83605 ASSAY OF LACTIC ACID: CPT | Performed by: PHYSICIAN ASSISTANT

## 2017-05-07 PROCEDURE — 25000131 ZZH RX MED GY IP 250 OP 636 PS 637: Mod: GY | Performed by: PHYSICIAN ASSISTANT

## 2017-05-07 PROCEDURE — A9270 NON-COVERED ITEM OR SERVICE: HCPCS | Mod: GY | Performed by: PHYSICIAN ASSISTANT

## 2017-05-07 PROCEDURE — 83735 ASSAY OF MAGNESIUM: CPT | Performed by: STUDENT IN AN ORGANIZED HEALTH CARE EDUCATION/TRAINING PROGRAM

## 2017-05-07 PROCEDURE — 93010 ELECTROCARDIOGRAM REPORT: CPT | Performed by: STUDENT IN AN ORGANIZED HEALTH CARE EDUCATION/TRAINING PROGRAM

## 2017-05-07 PROCEDURE — 00000146 ZZHCL STATISTIC GLUCOSE BY METER IP

## 2017-05-07 PROCEDURE — 25000125 ZZHC RX 250: Performed by: STUDENT IN AN ORGANIZED HEALTH CARE EDUCATION/TRAINING PROGRAM

## 2017-05-07 PROCEDURE — 84100 ASSAY OF PHOSPHORUS: CPT | Performed by: PHYSICIAN ASSISTANT

## 2017-05-07 PROCEDURE — 99223 1ST HOSP IP/OBS HIGH 75: CPT | Mod: AI | Performed by: PHYSICIAN ASSISTANT

## 2017-05-07 PROCEDURE — 87641 MR-STAPH DNA AMP PROBE: CPT | Performed by: FAMILY MEDICINE

## 2017-05-07 PROCEDURE — 87804 INFLUENZA ASSAY W/OPTIC: CPT | Performed by: STUDENT IN AN ORGANIZED HEALTH CARE EDUCATION/TRAINING PROGRAM

## 2017-05-07 PROCEDURE — 25500064 ZZH RX 255 OP 636: Performed by: STUDENT IN AN ORGANIZED HEALTH CARE EDUCATION/TRAINING PROGRAM

## 2017-05-07 PROCEDURE — 85025 COMPLETE CBC W/AUTO DIFF WBC: CPT | Performed by: STUDENT IN AN ORGANIZED HEALTH CARE EDUCATION/TRAINING PROGRAM

## 2017-05-07 PROCEDURE — 87040 BLOOD CULTURE FOR BACTERIA: CPT | Performed by: STUDENT IN AN ORGANIZED HEALTH CARE EDUCATION/TRAINING PROGRAM

## 2017-05-07 PROCEDURE — 86140 C-REACTIVE PROTEIN: CPT | Performed by: STUDENT IN AN ORGANIZED HEALTH CARE EDUCATION/TRAINING PROGRAM

## 2017-05-07 PROCEDURE — 96361 HYDRATE IV INFUSION ADD-ON: CPT

## 2017-05-07 PROCEDURE — 71260 CT THORAX DX C+: CPT

## 2017-05-07 PROCEDURE — 20000003 ZZH R&B ICU

## 2017-05-07 PROCEDURE — 83605 ASSAY OF LACTIC ACID: CPT | Performed by: STUDENT IN AN ORGANIZED HEALTH CARE EDUCATION/TRAINING PROGRAM

## 2017-05-07 PROCEDURE — 99285 EMERGENCY DEPT VISIT HI MDM: CPT | Mod: 25

## 2017-05-07 PROCEDURE — 99285 EMERGENCY DEPT VISIT HI MDM: CPT | Mod: 25 | Performed by: STUDENT IN AN ORGANIZED HEALTH CARE EDUCATION/TRAINING PROGRAM

## 2017-05-07 RX ORDER — ADENOSINE 3 MG/ML
6 INJECTION, SOLUTION INTRAVENOUS ONCE
Status: COMPLETED | OUTPATIENT
Start: 2017-05-07 | End: 2017-05-07

## 2017-05-07 RX ORDER — HYDROMORPHONE HYDROCHLORIDE 1 MG/ML
.3-.5 INJECTION, SOLUTION INTRAMUSCULAR; INTRAVENOUS; SUBCUTANEOUS
Status: CANCELLED | OUTPATIENT
Start: 2017-05-07

## 2017-05-07 RX ORDER — PROCHLORPERAZINE 25 MG
12.5 SUPPOSITORY, RECTAL RECTAL EVERY 12 HOURS PRN
Status: DISCONTINUED | OUTPATIENT
Start: 2017-05-07 | End: 2017-05-10 | Stop reason: HOSPADM

## 2017-05-07 RX ORDER — SENNOSIDES 8.6 MG
1 TABLET ORAL 2 TIMES DAILY
Status: DISCONTINUED | OUTPATIENT
Start: 2017-05-07 | End: 2017-05-10 | Stop reason: HOSPADM

## 2017-05-07 RX ORDER — ONDANSETRON 4 MG/1
4 TABLET, ORALLY DISINTEGRATING ORAL EVERY 6 HOURS PRN
Status: DISCONTINUED | OUTPATIENT
Start: 2017-05-07 | End: 2017-05-10 | Stop reason: HOSPADM

## 2017-05-07 RX ORDER — NICOTINE POLACRILEX 4 MG
15-30 LOZENGE BUCCAL
Status: DISCONTINUED | OUTPATIENT
Start: 2017-05-07 | End: 2017-05-10 | Stop reason: HOSPADM

## 2017-05-07 RX ORDER — NALOXONE HYDROCHLORIDE 0.4 MG/ML
.1-.4 INJECTION, SOLUTION INTRAMUSCULAR; INTRAVENOUS; SUBCUTANEOUS
Status: DISCONTINUED | OUTPATIENT
Start: 2017-05-07 | End: 2017-05-10 | Stop reason: HOSPADM

## 2017-05-07 RX ORDER — LIDOCAINE 40 MG/G
CREAM TOPICAL
Status: DISCONTINUED | OUTPATIENT
Start: 2017-05-07 | End: 2017-05-10 | Stop reason: HOSPADM

## 2017-05-07 RX ORDER — OXYCODONE HYDROCHLORIDE 5 MG/1
5-10 TABLET ORAL EVERY 4 HOURS PRN
Status: DISCONTINUED | OUTPATIENT
Start: 2017-05-07 | End: 2017-05-10 | Stop reason: HOSPADM

## 2017-05-07 RX ORDER — LIDOCAINE 40 MG/G
CREAM TOPICAL
Status: DISCONTINUED | OUTPATIENT
Start: 2017-05-07 | End: 2017-05-07

## 2017-05-07 RX ORDER — NALOXONE HYDROCHLORIDE 0.4 MG/ML
.1-.4 INJECTION, SOLUTION INTRAMUSCULAR; INTRAVENOUS; SUBCUTANEOUS
Status: CANCELLED | OUTPATIENT
Start: 2017-05-07

## 2017-05-07 RX ORDER — DILTIAZEM HYDROCHLORIDE 5 MG/ML
15 INJECTION INTRAVENOUS ONCE
Status: COMPLETED | OUTPATIENT
Start: 2017-05-07 | End: 2017-05-07

## 2017-05-07 RX ORDER — SENNOSIDES 8.6 MG
1 TABLET ORAL 2 TIMES DAILY
COMMUNITY
End: 2017-05-16

## 2017-05-07 RX ORDER — IOPAMIDOL 755 MG/ML
79 INJECTION, SOLUTION INTRAVASCULAR ONCE
Status: COMPLETED | OUTPATIENT
Start: 2017-05-07 | End: 2017-05-07

## 2017-05-07 RX ORDER — HYDROXYZINE HYDROCHLORIDE 25 MG/1
25 TABLET, FILM COATED ORAL EVERY 6 HOURS PRN
Status: DISCONTINUED | OUTPATIENT
Start: 2017-05-07 | End: 2017-05-10 | Stop reason: HOSPADM

## 2017-05-07 RX ORDER — ONDANSETRON 2 MG/ML
4 INJECTION INTRAMUSCULAR; INTRAVENOUS EVERY 6 HOURS PRN
Status: DISCONTINUED | OUTPATIENT
Start: 2017-05-07 | End: 2017-05-10 | Stop reason: HOSPADM

## 2017-05-07 RX ORDER — DILTIAZEM HYDROCHLORIDE 5 MG/ML
INJECTION INTRAVENOUS
Status: DISCONTINUED
Start: 2017-05-07 | End: 2017-05-07 | Stop reason: HOSPADM

## 2017-05-07 RX ORDER — PROCHLORPERAZINE MALEATE 5 MG
5 TABLET ORAL EVERY 6 HOURS PRN
Status: DISCONTINUED | OUTPATIENT
Start: 2017-05-07 | End: 2017-05-10 | Stop reason: HOSPADM

## 2017-05-07 RX ORDER — ASPIRIN 81 MG/1
162 TABLET, CHEWABLE ORAL ONCE
Status: DISCONTINUED | OUTPATIENT
Start: 2017-05-07 | End: 2017-05-07

## 2017-05-07 RX ORDER — DILTIAZEM HYDROCHLORIDE 5 MG/ML
10 INJECTION INTRAVENOUS ONCE
Status: DISCONTINUED | OUTPATIENT
Start: 2017-05-07 | End: 2017-05-08

## 2017-05-07 RX ORDER — SODIUM CHLORIDE 9 MG/ML
INJECTION, SOLUTION INTRAVENOUS CONTINUOUS
Status: ACTIVE | OUTPATIENT
Start: 2017-05-07 | End: 2017-05-08

## 2017-05-07 RX ORDER — OXYCODONE HYDROCHLORIDE 5 MG/1
5 TABLET ORAL
Status: CANCELLED | OUTPATIENT
Start: 2017-05-07

## 2017-05-07 RX ORDER — DEXTROSE MONOHYDRATE 25 G/50ML
25-50 INJECTION, SOLUTION INTRAVENOUS
Status: DISCONTINUED | OUTPATIENT
Start: 2017-05-07 | End: 2017-05-10 | Stop reason: HOSPADM

## 2017-05-07 RX ADMIN — SENNOSIDES 1 TABLET: 8.6 TABLET, FILM COATED ORAL at 21:12

## 2017-05-07 RX ADMIN — ADENOSINE 6 MG: 3 INJECTION, SOLUTION INTRAVENOUS at 18:55

## 2017-05-07 RX ADMIN — SODIUM CHLORIDE 103 ML: 9 INJECTION, SOLUTION INTRAVENOUS at 18:27

## 2017-05-07 RX ADMIN — DILTIAZEM HYDROCHLORIDE 5 MG/HR: 5 INJECTION INTRAVENOUS at 19:24

## 2017-05-07 RX ADMIN — SODIUM CHLORIDE 1000 ML: 9 INJECTION, SOLUTION INTRAVENOUS at 16:28

## 2017-05-07 RX ADMIN — SODIUM CHLORIDE 1000 ML: 9 INJECTION, SOLUTION INTRAVENOUS at 17:03

## 2017-05-07 RX ADMIN — INSULIN GLARGINE 5 UNITS: 100 INJECTION, SOLUTION SUBCUTANEOUS at 21:11

## 2017-05-07 RX ADMIN — ENOXAPARIN SODIUM 30 MG: 30 INJECTION SUBCUTANEOUS at 21:11

## 2017-05-07 RX ADMIN — IOPAMIDOL 79 ML: 755 INJECTION, SOLUTION INTRAVENOUS at 18:27

## 2017-05-07 RX ADMIN — SODIUM CHLORIDE: 9 INJECTION, SOLUTION INTRAVENOUS at 21:12

## 2017-05-07 RX ADMIN — DILTIAZEM HYDROCHLORIDE 15 MG: 5 INJECTION INTRAVENOUS at 18:58

## 2017-05-07 ASSESSMENT — ACTIVITIES OF DAILY LIVING (ADL)
RETIRED_EATING: 0-->INDEPENDENT
SWALLOWING: 0-->SWALLOWS FOODS/LIQUIDS WITHOUT DIFFICULTY
TOILETING: 1-->ASSISTIVE EQUIPMENT
COGNITION: 0 - NO COGNITION ISSUES REPORTED
RETIRED_COMMUNICATION: 0-->UNDERSTANDS/COMMUNICATES WITHOUT DIFFICULTY
BATHING: 1-->ASSISTIVE EQUIPMENT
AMBULATION: 1-->ASSISTIVE EQUIPMENT
DRESS: 1-->ASSISTIVE EQUIPMENT
TRANSFERRING: 1-->ASSISTIVE EQUIPMENT
FALL_HISTORY_WITHIN_LAST_SIX_MONTHS: NO

## 2017-05-07 NOTE — ED NOTES
Pt presents to ED with complaint of tachycardia. Pt denies any sx. Does not have palpitations, headache, dizziness, SOA, urinary sx, abd pain. Post knee replacement on Thursday. Pt had home health nurse visit today and nurse noted HR and advised to have her seen in ED. Surgical site is covered, clean dry and intact. Just reports feeling more tired than usual. MD is at bedside for assessment

## 2017-05-07 NOTE — IP AVS SNAPSHOT
"    [facility] INTENSIVE CARE: [phone]                                              INTERAGENCY TRANSFER FORM - PHYSICIAN ORDERS   2017                    Hospital Admission Date: 2017  CHRISTI BROWN   : 1945  Sex: Female        Attending Provider: (none)    Allergies:  Nka [No Known Allergies]    Infection:  None   Service:  HOSPITALIST    Ht:  5' 3\" (1.6 m)   Wt:  183 lb 13.8 oz (83.4 kg)   Admission Wt:  181 lb 14.1 oz (82.5 kg)    BMI:  32.57 kg/m 2   BSA:  1.93 m 2            Patient PCP Information     Provider PCP Type    Judy Verde MD General      ED Clinical Impression     Diagnosis Description Comment Added By Time Added    Atrial flutter with rapid ventricular response (H) [I48.92] Atrial flutter with rapid ventricular response (H) [I48.92]  Sanford Cox DO 2017  7:29 PM    Elevated troponin [R79.89] Elevated troponin [R79.89]  Sanford Cox DO 2017  7:29 PM    Acute renal failure, unspecified acute renal failure type (H) [N17.9] Acute renal failure, unspecified acute renal failure type (H) [N17.9]  Sanford Cox DO 2017  7:29 PM      Hospital Problems as of 5/10/2017              Priority Class Noted POA    Hyperlipidemia LDL goal <100   10/20/2011 Yes    * (Principal)Atrial fibrillation (H)   10/20/2011 Yes    CKD (chronic kidney disease) stage 3, GFR 30-59 ml/min   3/19/2013 Yes    S/P total knee arthroplasty Medium  2016 Yes    Essential hypertension Medium  2017 Yes    Type 2 diabetes mellitus with diabetic nephropathy, without long-term current use of insulin (H) Medium  2017 Yes    NICK (acute kidney injury) (H) Medium  2017 Yes    Elevated lactic acid level Medium  2017 Yes    Elevated LFTs Medium  2017 Yes    Atrial fibrillation with RVR (H) Medium  2017 Yes      Non-Hospital Problems as of 5/10/2017              Priority Class Noted    Osteoarthrosis, hand   10/6/2005    Hyperhidrosis   2010    " Preventative health care   3/19/2013    Health Care Home Low  8/5/2013    Cataract   5/30/2014    Obesity (BMI 30.0-34.9) Medium  10/23/2015    ACP (advance care planning)   2/6/2017      Code Status History     Date Active Date Inactive Code Status Order ID Comments User Context    5/7/2017  8:58 PM 5/10/2017  1:20 PM Full Code [id number]  Siri Mcmahan PA-C Inpatient    5/6/2017  9:15 AM 5/7/2017  8:58 PM Full Code [id number]  Ishan Villareal MD Outpatient    5/4/2017  5:18 PM 5/6/2017  9:15 AM Full Code [id number]  Zachary Arteaga MD Inpatient    12/3/2016 10:08 AM 5/4/2017  5:18 PM Full Code [id number]  Lizbeth Horne MD Outpatient    12/1/2016  9:03 PM 12/3/2016 10:08 AM Full Code [id number]  Zachary Arteaga MD Inpatient         Medication Review      START taking        Dose / Directions Comments    apixaban ANTICOAGULANT 5 MG tablet   Commonly known as:  ELIQUIS   Used for:  Atrial flutter with rapid ventricular response (H)        Dose:  5 mg   Take 1 tablet (5 mg) by mouth 2 times daily   Quantity:  60 tablet   Refills:  0        diltiazem 180 MG 24 hr capsule   Used for:  Atrial flutter with rapid ventricular response (H)        Dose:  180 mg   Take 1 capsule (180 mg) by mouth daily   Quantity:  30 capsule   Refills:  0        metoprolol 200 MG 24 hr tablet   Commonly known as:  TOPROL-XL   Used for:  Atrial flutter with rapid ventricular response (H)        Dose:  200 mg   Take 1 tablet (200 mg) by mouth daily   Quantity:  30 tablet   Refills:  0          CONTINUE these medications which have NOT CHANGED        Dose / Directions Comments    atorvastatin 10 MG tablet   Commonly known as:  LIPITOR   Used for:  Type 2 diabetes mellitus with diabetic nephropathy, without long-term current use of insulin (H), Hyperlipidemia LDL goal <100        Dose:  10 mg   Take 1 tablet (10 mg) by mouth daily   Quantity:  90 tablet   Refills:  3        blood glucose monitoring test strip    Commonly known as:  no brand specified   Used for:  CKD (chronic kidney disease) stage 3, GFR 30-59 ml/min        Use to test blood sugars one time daily or as directed  One touch meter - one touch strips   Quantity:  3 Box   Refills:  3        calcium-vitamin D-vitamin K 500-500-40 MG-UNT-MCG Chew   Commonly known as:  VIACTIV        Dose:  1 tablet   Take 1 tablet by mouth daily   Refills:  0        chlorthalidone 25 MG tablet   Commonly known as:  HYGROTON   Used for:  Essential hypertension        Dose:  12.5 mg   Take 0.5 tablets (12.5 mg) by mouth daily   Quantity:  45 tablet   Refills:  3        GLUCOMETER KIT   Used for:  Type II or unspecified type diabetes mellitus without mention of complication, not stated as uncontrolled        for testing bid, One Touch or please fill with formulary glucometer   Quantity:  1 Kit   Refills:  0        HYDROXYZINE HCL PO        Dose:  25 mg   Take 25 mg by mouth every 6 hours as needed for itching   Refills:  0        metFORMIN 500 MG tablet   Commonly known as:  GLUCOPHAGE   Used for:  Type 2 diabetes mellitus with diabetic nephropathy, without long-term current use of insulin (H)        Dose:  1000 mg   Take 2 tablets (1,000 mg) by mouth 2 times daily (with meals)   Quantity:  360 tablet   Refills:  1        order for DME        KNEE CPM   Quantity:  1 Device   Refills:  0        oxyCODONE 5 MG IR tablet   Commonly known as:  ROXICODONE   Used for:  Status post total right knee replacement        Dose:  5-10 mg   Take 1-2 tablets (5-10 mg) by mouth every 4 hours as needed for moderate to severe pain   Quantity:  60 tablet   Refills:  0        sennosides 8.6 MG tablet   Commonly known as:  SENOKOT        Dose:  1 tablet   Take 1 tablet by mouth 2 times daily   Refills:  0        TYLENOL PO        Dose:  1000 mg   Take 1,000 mg by mouth every 6 hours as needed for mild pain or fever   Refills:  0        VITAMIN D PO        Dose:  1000 Units   Take 1,000 Units by mouth  every morning   Refills:  0          STOP taking     aspirin 325 MG EC tablet           lisinopril 40 MG tablet   Commonly known as:  PRINIVIL/ZESTRIL                     Further instructions from your care team       See cardiology here in about one week.  Follow up with your primary doctor within one week.  Go to the ER if problems.  Stop lisinopril--the new meds reduce blood pressure--so you pressure may be too low on the lisinopril.  Stop aspirin (now that you are on Eliquis).  You had a questionable thickened area on the lower esophagus- it is unclear if this has any significance. Talk to your primary doctor about it.    Referrals     CARDIOLOGY EVAL ADULT REFERRAL       Your provider has referred you to:  [facility]: [facility] [phone]   [email or web address]    Please be aware that coverage of these services is subject to the terms and limitations of your health insurance plan.  Call member services at your health plan with any benefit or coverage questions.      Type of Referral:  New Cardiology Consult    Timeframe requested:  1 Week    Please bring the following to your appointment:  >>   Any x-rays, CTs or MRIs which have been performed.  Contact the facility where they were done to arrange for  prior to your scheduled appointment.    >>   List of current medications  >>   This referral request   >>   Any documents/labs given to you for this referral       Home Care Referral       Your provider has referred you to: [facility]: [facility] [phone]   [email or web address]    Extended Emergency Contact Information  Primary Emergency Contact: Ramana Britton  Address: [address] [place] [address] [facility]  Home Phone: [phone]  Work Phone: none  Mobile Phone: [phone]  Relation: Spouse  Secondary  Emergency Contact: Edyta Anderson   [facility]  Home Phone: [phone]  Work Phone: none  Mobile Phone: [phone]  Relation: Daughter    Patient Anticipated Discharge Date: 5/10/17   RN, PT, HHA to begin 24 - 48 hours after discharge.  PLEASE EVALUATE AND TREAT (Evaluation timeline is 24 - 48 hrs. Please call if there is need for a variance to this timeline).    REASON FOR REFERRAL: Resume services, Assessment & Treatment: PT and RN    ADDITIONAL SERVICES NEEDED: OT    OTHER PERTINENT INFORMATION: Patient was last seen by provider on 5/8/17 for  Atrial fibrillation with RVR, s/p total knee arthroplasty    No current outpatient prescriptions on file.    Patient Active Problem List:     Osteoarthrosis, hand     Hyperhidrosis     Hyperlipidemia LDL goal <100     Atrial fibrillation (H)     CKD (chronic kidney disease) stage 3, GFR 30-59 ml/min     [facility]     Cataract     Obesity (BMI 30.0-34.9)     S/P total knee arthroplasty     ACP (advance care planning)     Essential hypertension     Type 2 diabetes mellitus with diabetic nephropathy, without long-term current use of insulin (H)     NICK (acute kidney injury) (H)     Elevated lactic acid level     Elevated LFTs     Atrial fibrillation with RVR (H)      Documentation of Face to Face and Certification for Home Health Services    I certify that patient, Suzette Britton is under my care and that I, or a Nurse Practitioner or Physician's Assistant working with me, had a face-to-face encounter that meets the physician face-to-face encounter requirements with this patient on: 5/8/2017.    This encounter with the patient was in whole, or in part, for the following medical condition, which is the primary reason for Home Health Care: Atrial fibrillation with RVR, s/p total knee arthroplasty.    I certify that, based on my findings, the following services are medically necessary Home Health Services: Nursing    My clinical findings  support the need for the above services because: Nurse is needed: To assess Atrial fibrillation with RVR after changes in medications or other medical regimen..    Further, I certify that my clinical findings support that this patient is homebound (i.e. absences from home require considerable and taxing effort and are for medical reasons or [employer] services or infrequently or of short duration when for other reasons) because: Requires assistance of another person or specialized equipment to access medical services because patient: Requires supervision of another for safe transfer..    Based on the above findings, I certify that this patient is confined to the home and needs intermittent skilled nursing care, physical therapy and/or speech therapy.  The patient is under my care, and I have initiated the establishment of the plan of care.  This patient will be followed by a physician who will periodically review the plan of care.    Physician/Provider to provide follow up care: Judy Verde certified Physician at time of discharge: Davian Scott MD    Please be aware that coverage of these services is subject to the terms and limitations of your health insurance plan.  Call member services at your health plan with any benefit or coverage questions.             Your next 10 appointments already scheduled     May 16, 2017 11:00 AM CDT   EP NEW with Jarrett Raya MD   [facility] PHYSIC[facility] AT [facility] ([facility])    [address] [address] [phone]            May 16, 2017  2:00 PM CDT   [facility]    [address] [facility] [id number] [phone]           Bring a current list of meds and any records pertaining to this visit.  For Physicals, please bring immunization records and any forms needing to be filled out.  Please arrive 10 minutes early  to complete paperwork.            May 22, 2017  1:30 PM CDT   CRIS Extremity with Alva Weaver PT   [facility] ([facility])    [address] [facility] [address]   [phone]              Statement of Approval     Ordered          05/10/17 0725  I have reviewed and agree with all the recommendations and orders detailed in this document.  EFFECTIVE NOW     Approved and electronically signed by:  Davian Scott MD

## 2017-05-07 NOTE — IP AVS SNAPSHOT
[facility]ensive [facility]    [address] [facility] [address]    Phone:  [phone]    Fax:  [phone]                                       After Visit Summary   5/7/2017    Suzette Britton    MRN: [id number]           After Visit Summary Signature Page     I have received my discharge instructions, and my questions have been answered. I have discussed any challenges I see with this plan with the nurse or doctor.    ..........................................................................................................................................  Patient/Patient Representative Signature      ..........................................................................................................................................  Patient Representative Print Name and Relationship to Patient    ..................................................               ................................................  Date                                            Time    ..........................................................................................................................................  Reviewed by Signature/Title    ...................................................              ..............................................  Date                                                            Time

## 2017-05-07 NOTE — LETTER
Transition Communication Hand-off for Care Transitions to Next Level of Care Provider    Name: Suzette Britton  MRN #: [id number]  Primary Care Provider: Judy Verde  Primary Care MD Name: Judy Verde  [facility] [address] [facility] [address]  Primary Care Clinic Name: [facility]  Reason for Hospitalization:  Elevated troponin [R79.89]  Atrial flutter with rapid ventricular response (H) [I48.92]  Acute renal failure, unspecified acute renal failure type (H) [N17.9]  Admit Date/Time: 5/7/2017  3:48 PM  Discharge Date: 5/10/17  Payor Source: Payor: MEDICARE / Plan: MEDICARE / Product Type: Medicare /     Readmission Assessment Measure (ASHER) Risk Score/category: Elevatedf    Plan of Care Goals/Milestone Events:          Reason for Communication Hand-off Referral: Fragility    Discharge Plan:[facility] ([phone] Fax: [phone])       Concern for non-adherence with plan of care:   Y/N n  Discharge Needs Assessment:  Needs       Most Recent Value    Transportation Available family or friend will provide    # of Referrals Placed by [facility]          Already enrolled in Tele-monitoring program and name of program:  no  Follow-up specialty is recommended: Yes    Follow-up plan:  Future Appointments  Date Time Provider Department Center   5/16/2017 11:00 AM Jarrett Tate MD [facility] CLIN   5/16/2017 2:00 PM Judy Verde MD [facility]   5/22/2017 1:30 PM Alva Weaver, PT [facility]       Any outstanding tests or procedures:            Key Recommendations:  Pt discharged home with [facility] ([phone] Fax: [phone]) this am.     Emy Chanel MSW, LICSW, [phone]      AVS/Discharge Summary is the source of truth; this is a helpful guide for improved communication of patient story

## 2017-05-07 NOTE — IP AVS SNAPSHOT
MRN:[id number]                      After Visit Summary   5/7/2017    Suzette Britton    MRN: [id number]           Thank you!     Thank you for choosing [facility] for your care. Our goal is always to provide you with excellent care. Hearing back from our patients is one way we can continue to improve our services. Please take a few minutes to complete the written survey that you may receive in the mail after you visit with us. Thank you!        Patient Information     Date Of Birth          1945        About your hospital stay     You were admitted on:  May 7, 2017 You last received care in the:  [facility]    You were discharged on:  May 10, 2017       Who to Call     For medical emergencies, please call 911.  For non-urgent questions about your medical care, please call your primary care provider or clinic, [phone]          Attending Provider     Provider Specialty    Sanford Cox, DO Emergency Medicine    Luciano Obando MD [facility], Davian Moore MD [facility]       Primary Care Provider Office Phone # Fax #    Judy Verde [phone]       [facility] [address] [facility] [address]        Your next 10 appointments already scheduled     May 16, 2017 11:00 AM CDT   New Visit with Jarrett Raya MD   [facility] PHYSICIAN HEART AT [facility] ([facility])    [address] [place] [address] [phone]            May 16, 2017  2:00 PM CDT   Office Visit with Judy Verde MD   [facility] ([facility])    [address] [place] [address] [phone]           Bring a current list of meds and any records pertaining to this visit.  For Physicals, please bring immunization records and any forms needing to be filled out.  Please arrive 10 minutes early to complete paperwork.            May 22, 2017  1:30 PM CDT   CRIS Grimes  with Alva Weaver, PT   [facility] ([facility])    [address] [facility] [address] [phone]              Additional Services     CARDIOLOGY EVAL ADULT REFERRAL       Your provider has referred you to:  [facility]: [facility] [phone]   [email or web address]    Please be aware that coverage of these services is subject to the terms and limitations of your health insurance plan.  Call member services at your health plan with any benefit or coverage questions.      Type of Referral:  New Cardiology Consult    Timeframe requested:  1 Week    Please bring the following to your appointment:  >>   Any x-rays, CTs or MRIs which have been performed.  Contact the facility where they were done to arrange for  prior to your scheduled appointment.    >>   List of current medications  >>   This referral request   >>   Any documents/labs given to you for this referral            Home Care Referral       Your provider has referred you to: [facility]: [facility] [phone]   [email or web address]    Extended Emergency Contact Information  Primary Emergency Contact: RebaRamana  Address: [address] [place] [address] [facility]  Home Phone: [phone]  Work Phone: none  Mobile Phone: [phone]  Relation: Spouse  Secondary Emergency Contact: Edyta Anderson   [facility]  Home Phone: [phone]  Work Phone: none  Mobile Phone: [phone]  Relation: Daughter    Patient Anticipated Discharge Date: 5/10/17   RN, PT, HHA to begin 24 - 48 hours after discharge.  PLEASE EVALUATE AND TREAT (Evaluation timeline is 24 - 48 hrs. Please call if there is need for a variance to this timeline).    REASON FOR REFERRAL: Resume services, Assessment & Treatment: PT and RN    ADDITIONAL SERVICES NEEDED:  OT    OTHER PERTINENT INFORMATION: Patient was last seen by provider on 5/8/17 for  Atrial fibrillation with RVR, s/p total knee arthroplasty    No current outpatient prescriptions on file.    Patient Active Problem List:     Osteoarthrosis, hand     Hyperhidrosis     Hyperlipidemia LDL goal <100     Atrial fibrillation (H)     CKD (chronic kidney disease) stage 3, GFR 30-59 ml/min     [facility] Home     Cataract     Obesity (BMI 30.0-34.9)     S/P total knee arthroplasty     ACP (advance care planning)     Essential hypertension     Type 2 diabetes mellitus with diabetic nephropathy, without long-term current use of insulin (H)     NICK (acute kidney injury) (H)     Elevated lactic acid level     Elevated LFTs     Atrial fibrillation with RVR (H)      Documentation of Face to Face and Certification for Home Health Services    I certify that patient, Suzette Britton is under my care and that I, or a Nurse Practitioner or Physician's Assistant working with me, had a face-to-face encounter that meets the physician face-to-face encounter requirements with this patient on: 5/8/2017.    This encounter with the patient was in whole, or in part, for the following medical condition, which is the primary reason for Home Health Care: Atrial fibrillation with RVR, s/p total knee arthroplasty.    I certify that, based on my findings, the following services are medically necessary Home Health Services: Nursing    My clinical findings support the need for the above services because: Nurse is needed: To assess Atrial fibrillation with RVR after changes in medications or other medical regimen..    Further, I certify that my clinical findings support that this patient is homebound (i.e. absences from home require considerable and taxing effort and are for medical reasons or Mu-ism services or infrequently or of short duration when for other reasons) because: Requires assistance of another person or  specialized equipment to access medical services because patient: Requires supervision of another for safe transfer..    Based on the above findings, I certify that this patient is confined to the home and needs intermittent skilled nursing care, physical therapy and/or speech therapy.  The patient is under my care, and I have initiated the establishment of the plan of care.  This patient will be followed by a physician who will periodically review the plan of care.    Physician/Provider to provide follow up care: Judy Verde    [facility] certified Physician at time of discharge: Davian Scott MD    Please be aware that coverage of these services is subject to the terms and limitations of your health insurance plan.  Call member services at your health plan with any benefit or coverage questions.                  Further instructions from your care team       See cardiology here in about one week.  Follow up with your primary doctor within one week.  Go to the ER if problems.  Stop lisinopril--the new meds reduce blood pressure--so you pressure may be too low on the lisinopril.  Stop aspirin (now that you are on Eliquis).  You had a questionable thickened area on the lower esophagus- it is unclear if this has any significance. Talk to your primary doctor about it.    Pending Results     Date and Time Order Name Status Description    5/7/2017 1618 Blood culture Preliminary     5/7/2017 1618 Blood culture Preliminary             Statement of Approval     Ordered          05/10/17 0725  I have reviewed and agree with all the recommendations and orders detailed in this document.  EFFECTIVE NOW     Approved and electronically signed by:  Davian Scott MD             Admission Information     Date & Time Provider Department Dept. Phone    5/7/2017 Davian Scott MD [facility] [phone]      Your Vitals Were     Blood Pressure Pulse Temperature Respirations Height Weight  "   131/91 (BP Location: Right arm) 89 98.7  F (37.1  C) (Oral) 18 1.6 m (5' 3\") 83.4 kg (183 lb 13.8 oz)    Pulse Oximetry BMI (Body Mass Index)                97% 32.57 kg/m2          [employer] Information     [employer]al lets you send messages to your doctor, view your test results, renew your prescriptions, schedule appointments and more. To sign up, go to [email or web address] . Click on \"Log in\" on the left side of the screen, which will take you to the Welcome page. Then click on \"Sign up Now\" on the right side of the page.     You will be asked to enter the access code listed below, as well as some personal information. Please follow the directions to create your username and password.     Your access code is: [email or web address]  Expires: 5/10/2017  3:12 PM     Your access code will  in 90 days. If you need help or a new code, please call your [place] clinic or [phone].        Care EveryWhere ID     This is your Care EveryWhere ID. This could be used by other organizations to access your [place] medical records  [id number]           Review of your medicines      START taking        Dose / Directions    apixaban ANTICOAGULANT 5 MG tablet   Commonly known as:  ELIQUIS   Used for:  Atrial flutter with rapid ventricular response (H)        Dose:  5 mg   Take 1 tablet (5 mg) by mouth 2 times daily   Quantity:  60 tablet   Refills:  0       diltiazem 180 MG 24 hr capsule   Used for:  Atrial flutter with rapid ventricular response (H)        Dose:  180 mg   Take 1 capsule (180 mg) by mouth daily   Quantity:  30 capsule   Refills:  0       metoprolol 200 MG 24 hr tablet   Commonly known as:  TOPROL-XL   Used for:  Atrial flutter with rapid ventricular response (H)        Dose:  200 mg   Take 1 tablet (200 mg) by mouth daily   Quantity:  30 tablet   Refills:  0         CONTINUE these medicines which have NOT CHANGED        Dose / Directions    atorvastatin 10 MG tablet   Commonly known as:  LIPITOR   Used for:  " Type 2 diabetes mellitus with diabetic nephropathy, without long-term current use of insulin (H), Hyperlipidemia LDL goal <100        Dose:  10 mg   Take 1 tablet (10 mg) by mouth daily   Quantity:  90 tablet   Refills:  3       blood glucose monitoring test strip   Commonly known as:  no brand specified   Used for:  CKD (chronic kidney disease) stage 3, GFR 30-59 ml/min        Use to test blood sugars one time daily or as directed  One touch meter - one touch strips   Quantity:  3 Box   Refills:  3       calcium-vitamin D-vitamin K 500-500-40 MG-UNT-MCG Chew   Commonly known as:  VIACTIV        Dose:  1 tablet   Take 1 tablet by mouth daily   Refills:  0       chlorthalidone 25 MG tablet   Commonly known as:  HYGROTON   Used for:  Essential hypertension        Dose:  12.5 mg   Take 0.5 tablets (12.5 mg) by mouth daily   Quantity:  45 tablet   Refills:  3       GLUCOMETER KIT   Used for:  Type II or unspecified type diabetes mellitus without mention of complication, not stated as uncontrolled        for testing bid, One Touch or please fill with formulary glucometer   Quantity:  1 Kit   Refills:  0       HYDROXYZINE HCL PO        Dose:  25 mg   Take 25 mg by mouth every 6 hours as needed for itching   Refills:  0       metFORMIN 500 MG tablet   Commonly known as:  GLUCOPHAGE   Used for:  Type 2 diabetes mellitus with diabetic nephropathy, without long-term current use of insulin (H)        Dose:  1000 mg   Take 2 tablets (1,000 mg) by mouth 2 times daily (with meals)   Quantity:  360 tablet   Refills:  1       order for DME        KNEE CPM   Quantity:  1 Device   Refills:  0       oxyCODONE 5 MG IR tablet   Commonly known as:  ROXICODONE        Dose:  5-10 mg   Take 1-2 tablets (5-10 mg) by mouth every 4 hours as needed for moderate to severe pain   Quantity:  60 tablet   Refills:  0       sennosides 8.6 MG tablet   Commonly known as:  SENOKOT        Dose:  1 tablet   Take 1 tablet by mouth 2 times daily   Refills:   0       TYLENOL PO        Dose:  1000 mg   Take 1,000 mg by mouth every 6 hours as needed for mild pain or fever   Refills:  0       VITAMIN D PO        Dose:  1000 Units   Take 1,000 Units by mouth every morning   Refills:  0         STOP taking     aspirin 325 MG EC tablet           lisinopril 40 MG tablet   Commonly known as:  PRINIVIL/ZESTRIL                Where to get your medicines      These medications were sent to [facility] [address] [place] [address] - [address]   [address] [facility] [address]     Phone:  [phone]     apixaban ANTICOAGULANT 5 MG tablet    diltiazem 180 MG 24 hr capsule    metoprolol 200 MG 24 hr tablet                Protect others around you: Learn how to safely use, store and throw away your medicines at www.disposemymeds.org.             Medication List: This is a list of all your medications and when to take them. Check marks below indicate your daily home schedule. Keep this list as a reference.      Medications           Morning Afternoon Evening Bedtime As Needed    apixaban ANTICOAGULANT 5 MG tablet   Commonly known as:  ELIQUIS   Take 1 tablet (5 mg) by mouth 2 times daily   Last time this was given:  5 mg on 5/10/2017  7:38 AM                       Next dose due tomorrow               atorvastatin 10 MG tablet   Commonly known as:  LIPITOR   Take 1 tablet (10 mg) by mouth daily            Next dose due tomorrow                       blood glucose monitoring test strip   Commonly known as:  no brand specified   Use to test blood sugars one time daily or as directed  One touch meter - one touch strips                                calcium-vitamin D-vitamin K 500-500-40 MG-UNT-MCG Chew   Commonly known as:  VIACTIV   Take 1 tablet by mouth daily            Next dose due tomorrow                         chlorthalidone 25 MG tablet   Commonly known as:  HYGROTON   Take 0.5 tablets (12.5 mg) by mouth daily            Next dose due tomorrow                        diltiazem 180 MG 24 hr capsule   Take 1 capsule (180 mg) by mouth daily   Last time this was given:  180 mg on 5/10/2017  7:38 AM            Next dose due tomorrow                       GLUCOMETER KIT   for testing bid, One Touch or please fill with formulary glucometer                                HYDROXYZINE HCL PO   Take 25 mg by mouth every 6 hours as needed for itching                                   metFORMIN 500 MG tablet   Commonly known as:  GLUCOPHAGE   Take 2 tablets (1,000 mg) by mouth 2 times daily (with meals)                       Next dose due this evening                 metoprolol 200 MG 24 hr tablet   Commonly known as:  TOPROL-XL   Take 1 tablet (200 mg) by mouth daily   Last time this was given:  200 mg on 5/10/2017  7:38 AM            Next dose due tomorrow                       order for DME   KNEE CPM                                oxyCODONE 5 MG IR tablet   Commonly known as:  ROXICODONE   Take 1-2 tablets (5-10 mg) by mouth every 4 hours as needed for moderate to severe pain   Last time this was given:  10 mg on 5/8/2017  5:18 AM                                   sennosides 8.6 MG tablet   Commonly known as:  SENOKOT   Take 1 tablet by mouth 2 times daily   Last time this was given:  1 tablet on 5/10/2017  7:38 AM                       Next dose due tonight                 TYLENOL PO   Take 1,000 mg by mouth every 6 hours as needed for mild pain or fever                                   VITAMIN D PO   Take 1,000 Units by mouth every morning            Next dose due tomorrow

## 2017-05-07 NOTE — ED NOTES
This RN spoke with Dr. Perez after CT technician sent the patient back without doing the PE study ordered due to the Gfr of 32. The radiololgist approved the PE CT scan. Radiology updated.

## 2017-05-07 NOTE — IP AVS SNAPSHOT
MRN:[id number]                      After Visit Summary   5/7/2017    Suzette Britton    MRN: [id number]           Visit Information        Department      5/7/2017  3:48 PM [facility]          Review of your medicines      START taking        Dose / Directions    apixaban ANTICOAGULANT 5 MG tablet   Commonly known as:  ELIQUIS   Used for:  Atrial flutter with rapid ventricular response (H)        Dose:  5 mg   Take 1 tablet (5 mg) by mouth 2 times daily   Quantity:  60 tablet   Refills:  0       diltiazem 180 MG 24 hr capsule   Used for:  Atrial flutter with rapid ventricular response (H)        Dose:  180 mg   Take 1 capsule (180 mg) by mouth daily   Quantity:  30 capsule   Refills:  0       metoprolol 200 MG 24 hr tablet   Commonly known as:  TOPROL-XL   Used for:  Atrial flutter with rapid ventricular response (H)        Dose:  200 mg   Take 1 tablet (200 mg) by mouth daily   Quantity:  30 tablet   Refills:  0         CONTINUE these medicines which have NOT CHANGED        Dose / Directions    atorvastatin 10 MG tablet   Commonly known as:  LIPITOR   Used for:  Type 2 diabetes mellitus with diabetic nephropathy, without long-term current use of insulin (H), Hyperlipidemia LDL goal <100        Dose:  10 mg   Take 1 tablet (10 mg) by mouth daily   Quantity:  90 tablet   Refills:  3       blood glucose monitoring test strip   Commonly known as:  no brand specified   Used for:  CKD (chronic kidney disease) stage 3, GFR 30-59 ml/min        Use to test blood sugars one time daily or as directed  One touch meter - one touch strips   Quantity:  3 Box   Refills:  3       calcium-vitamin D-vitamin K 500-500-40 MG-UNT-MCG Chew   Commonly known as:  VIACTIV        Dose:  1 tablet   Take 1 tablet by mouth daily   Refills:  0       chlorthalidone 25 MG tablet   Commonly known as:  HYGROTON   Used for:  Essential hypertension        Dose:  12.5 mg   Take 0.5 tablets (12.5 mg) by mouth daily    Quantity:  45 tablet   Refills:  3       GLUCOMETER KIT   Used for:  Type II or unspecified type diabetes mellitus without mention of complication, not stated as uncontrolled        for testing bid, One Touch or please fill with formulary glucometer   Quantity:  1 Kit   Refills:  0       HYDROXYZINE HCL PO        Dose:  25 mg   Take 25 mg by mouth every 6 hours as needed for itching   Refills:  0       metFORMIN 500 MG tablet   Commonly known as:  GLUCOPHAGE   Used for:  Type 2 diabetes mellitus with diabetic nephropathy, without long-term current use of insulin (H)        Dose:  1000 mg   Take 2 tablets (1,000 mg) by mouth 2 times daily (with meals)   Quantity:  360 tablet   Refills:  1       order for DME        KNEE CPM   Quantity:  1 Device   Refills:  0       oxyCODONE 5 MG IR tablet   Commonly known as:  ROXICODONE   Used for:  Status post total right knee replacement        Dose:  5-10 mg   Take 1-2 tablets (5-10 mg) by mouth every 4 hours as needed for moderate to severe pain   Quantity:  60 tablet   Refills:  0       sennosides 8.6 MG tablet   Commonly known as:  SENOKOT        Dose:  1 tablet   Take 1 tablet by mouth 2 times daily   Refills:  0       TYLENOL PO        Dose:  1000 mg   Take 1,000 mg by mouth every 6 hours as needed for mild pain or fever   Refills:  0       VITAMIN D PO        Dose:  1000 Units   Take 1,000 Units by mouth every morning   Refills:  0         STOP taking     aspirin 325 MG EC tablet           lisinopril 40 MG tablet   Commonly known as:  PRINIVIL/ZESTRIL                Where to get your medicines      These medications were sent to [facility] [address] [place] [address] [address] [place] [address]  [address] [facility] [address]     Phone:  [phone]     apixaban ANTICOAGULANT 5 MG tablet    diltiazem 180 MG 24 hr capsule    metoprolol 200 MG 24 hr tablet               Prescriptions were sent or printed at these locations (3 Prescriptions)                   [id number] - [place], [place] - [address]   [address] [facility] [address]    Telephone:  [phone]   Fax:  [phone]   Hours:                  E-Prescribed (3 of 3)         apixaban ANTICOAGULANT (ELIQUIS) 5 MG tablet               diltiazem 180 MG 24 hr capsule               metoprolol (TOPROL-XL) 200 MG 24 hr tablet                 Protect others around you: Learn how to safely use, store and throw away your medicines at www.disposemymeds.org.         Follow-ups after your visit        Additional Services     CARDIOLOGY EVAL ADULT REFERRAL       Your provider has referred you to:  [facility]: [facility] [phone]   [email or web address]    Please be aware that coverage of these services is subject to the terms and limitations of your health insurance plan.  Call member services at your health plan with any benefit or coverage questions.      Type of Referral:  New Cardiology Consult    Timeframe requested:  1 Week    Please bring the following to your appointment:  >>   Any x-rays, CTs or MRIs which have been performed.  Contact the facility where they were done to arrange for  prior to your scheduled appointment.    >>   List of current medications  >>   This referral request   >>   Any documents/labs given to you for this referral            Home Care Referral       Your provider has referred you to: [facility]: [facility] [phone]   [email or web address]    Extended Emergency Contact Information  Primary Emergency Contact: Ramana Britton  Address: [address] [place] [address] [facility]  Home Phone: [phone]  Work Phone: none  Mobile Phone: [phone]  Relation: Spouse  Secondary Emergency Contact: Edyta Anderson   [facility]  Home Phone: [phone]  Work Phone: none  Mobile  Phone: [phone]  Relation: Daughter    Patient Anticipated Discharge Date: 5/10/17   RN, PT, HHA to begin 24 - 48 hours after discharge.  PLEASE EVALUATE AND TREAT (Evaluation timeline is 24 - 48 hrs. Please call if there is need for a variance to this timeline).    REASON FOR REFERRAL: Resume services, Assessment & Treatment: PT and RN    ADDITIONAL SERVICES NEEDED: OT    OTHER PERTINENT INFORMATION: Patient was last seen by provider on 5/8/17 for  Atrial fibrillation with RVR, s/p total knee arthroplasty    No current outpatient prescriptions on file.    Patient Active Problem List:     Osteoarthrosis, hand     Hyperhidrosis     Hyperlipidemia LDL goal <100     Atrial fibrillation (H)     CKD (chronic kidney disease) stage 3, GFR 30-59 ml/min     [facility]     Cataract     Obesity (BMI 30.0-34.9)     S/P total knee arthroplasty     ACP (advance care planning)     Essential hypertension     Type 2 diabetes mellitus with diabetic nephropathy, without long-term current use of insulin (H)     NICK (acute kidney injury) (H)     Elevated lactic acid level     Elevated LFTs     Atrial fibrillation with RVR (H)      Documentation of Face to Face and Certification for Home Health Services    I certify that patient, Suzette Britton is under my care and that I, or a Nurse Practitioner or Physician's Assistant working with me, had a face-to-face encounter that meets the physician face-to-face encounter requirements with this patient on: 5/8/2017.    This encounter with the patient was in whole, or in part, for the following medical condition, which is the primary reason for Home Health Care: Atrial fibrillation with RVR, s/p total knee arthroplasty.    I certify that, based on my findings, the following services are medically necessary Home Health Services: Nursing    My clinical findings support the need for the above services because: Nurse is needed: To assess Atrial fibrillation with  RVR after changes in medications or other medical regimen..    Further, I certify that my clinical findings support that this patient is homebound (i.e. absences from home require considerable and taxing effort and are for medical reasons or [facility] services or infrequently or of short duration when for other reasons) because: Requires assistance of another person or specialized equipment to access medical services because patient: Requires supervision of another for safe transfer..    Based on the above findings, I certify that this patient is confined to the home and needs intermittent skilled nursing care, physical therapy and/or speech therapy.  The patient is under my care, and I have initiated the establishment of the plan of care.  This patient will be followed by a physician who will periodically review the plan of care.    Physician/Provider to provide follow up care: Judy Verde [place] certified Physician at time of discharge: Davian Scott MD    Please be aware that coverage of these services is subject to the terms and limitations of your health insurance plan.  Call member services at your health plan with any benefit or coverage questions.                  Your next 10 appointments already scheduled     May 16, 2017 11:00 AM CDT   EP NEW with Jarrett Raya MD   [facility] ([facility])    [address] [facility] [address] [phone]            May 16, 2017  2:00 PM CDT   Office Visit with Judy Verde MD   [facility]    [address] [facility] [id number] [phone]           Bring a current list of meds and any records pertaining to this visit.  For Physicals, please bring immunization records and any forms needing to be filled out.  Please arrive 10 minutes early to complete paperwork.            May 22, 2017  1:30 PM CDT   CRIS Extremity with Alva Weaver,  "[facility] ([facility])    [address]  [facility] [address] [phone]               Care Instructions        Further instructions from your care team       See cardiology here in about one week.  Follow up with your primary doctor within one week.  Go to the ER if problems.  Stop lisinopril--the new meds reduce blood pressure--so you pressure may be too low on the lisinopril.  Stop aspirin (now that you are on Eliquis).  You had a questionable thickened area on the lower esophagus- it is unclear if this has any significance. Talk to your primary doctor about it.    Statement of Approval     Ordered          05/10/17 0725  I have reviewed and agree with all the recommendations and orders detailed in this document.  EFFECTIVE NOW     Approved and electronically signed by:  Davian Scott MD              Additional Information About Your Visit        MyChart Information     [employer] lets you send messages to your doctor, view your test results, renew your prescriptions, schedule appointments and more. To sign up, go to [email or web address] . Click on \"Log in\" on the left side of the screen, which will take you to the Welcome page. Then click on \"Sign up Now\" on the right side of the page.     You will be asked to enter the access code listed below, as well as some personal information. Please follow the directions to create your username and password.     Your access code is: [email or web address]  Expires: 5/10/2017  3:12 PM     Your access code will  in 90 days. If you need help or a new code, please call your [facility] clinic or [phone].        Care EveryWhere ID     This is your Care EveryWhere ID. This could be used by other organizations to access your [facility] medical records  [id number]        Your Vitals Were     Blood Pressure Pulse Temperature Respirations Height Weight    131/91 (BP Location: Right arm) 89 98.7  F (37.1  C) (Oral) 18 1.6 m (5' 3\") 83.4 kg " (183 lb 13.8 oz)    Pulse Oximetry BMI (Body Mass Index)                97% 32.57 kg/m2           Primary Care Provider Office Phone # Fax #    Judy Verde [phone]      Thank you!     Thank you for choosing [facility] for your care. Our goal is always to provide you with excellent care. Hearing back from our patients is one way we can continue to improve our services. Please take a few minutes to complete the written survey that you may receive in the mail after you visit with us. Thank you!             Medication List: This is a list of all your medications and when to take them. Check marks below indicate your daily home schedule. Keep this list as a reference.      Medications           Morning Afternoon Evening Bedtime As Needed    apixaban ANTICOAGULANT 5 MG tablet   Commonly known as:  ELIQUIS   Take 1 tablet (5 mg) by mouth 2 times daily   Last time this was given:  5 mg on 5/10/2017  7:38 AM                       Next dose due tonight                 atorvastatin 10 MG tablet   Commonly known as:  LIPITOR   Take 1 tablet (10 mg) by mouth daily            Next dose due tomorrow                       blood glucose monitoring test strip   Commonly known as:  no brand specified   Use to test blood sugars one time daily or as directed  One touch meter - one touch strips                                calcium-vitamin D-vitamin K 500-500-40 MG-UNT-MCG Chew   Commonly known as:  VIACTIV   Take 1 tablet by mouth daily            Next dose due tomorrow                         chlorthalidone 25 MG tablet   Commonly known as:  HYGROTON   Take 0.5 tablets (12.5 mg) by mouth daily            Next dose due tomorrow                       diltiazem 180 MG 24 hr capsule   Take 1 capsule (180 mg) by mouth daily   Last time this was given:  180 mg on 5/10/2017  7:38 AM            Next dose due tomorrow                       GLUCOMETER KIT   for testing bid, One Touch or please fill with formulary  glucometer                                HYDROXYZINE HCL PO   Take 25 mg by mouth every 6 hours as needed for itching                                   metFORMIN 500 MG tablet   Commonly known as:  GLUCOPHAGE   Take 2 tablets (1,000 mg) by mouth 2 times daily (with meals)                       Next dose due this evening                 metoprolol 200 MG 24 hr tablet   Commonly known as:  TOPROL-XL   Take 1 tablet (200 mg) by mouth daily   Last time this was given:  200 mg on 5/10/2017  7:38 AM            Next dose due tomorrow                       order for DME   KNEE CPM                                oxyCODONE 5 MG IR tablet   Commonly known as:  ROXICODONE   Take 1-2 tablets (5-10 mg) by mouth every 4 hours as needed for moderate to severe pain   Last time this was given:  10 mg on 5/8/2017  5:18 AM                                   sennosides 8.6 MG tablet   Commonly known as:  SENOKOT   Take 1 tablet by mouth 2 times daily   Last time this was given:  1 tablet on 5/10/2017  7:38 AM                       Next dose due tonight                 TYLENOL PO   Take 1,000 mg by mouth every 6 hours as needed for mild pain or fever                                   VITAMIN D PO   Take 1,000 Units by mouth every morning            Next dose due tomorrow

## 2017-05-07 NOTE — ED PROVIDER NOTES
History     Chief Complaint   Patient presents with     Tachycardia     sent in [facility] [employer] [facility] [employer] home health nurse post TKA.     HPI  Suzette Britton is a 72 year old female with documented past medical history which includes essential hypertension, type II diabetes mellitus, obesity, chronic kidney disease, and recent right knee arthroplasty performed on 5/4/17 presents from home for evaluation of rapid heart rate. Patient explains that she was discharged yesterday and has been feeling relatively well, tolerating by mouth, and no cough, dysuria, or infectious symptoms. She has yet to have a bowel movement but is passing flatus without nausea or vomiting. While resting at home visiting home nurse noticed her elevated heart rate while taking her vital signs. She does have a remote history of a single episode of atrial flutter in 2010 which converted with IV Cardizem, per report. Patient specifically denies fever or chills, chest pain, cough, hemoptysis, abdominal pain, dysuria, or other concerning symptoms. She believes that her right knee arthroplasty is healing as expected when compared to the left knee arthroplasty she had performed 6 months prior.    I have reviewed the Medications, Allergies, Past Medical and Surgical History, and Social History in the Epic system.    Patient Active Problem List   Diagnosis     Osteoarthrosis, hand     Hyperhidrosis     Hyperlipidemia LDL goal <100     Atrial fibrillation (H)     CKD (chronic kidney disease) stage 3, GFR 30-59 ml/min     [facility] health care     Health Care Home     Cataract     Obesity (BMI 30.0-34.9)     S/P total knee arthroplasty     ACP (advance care planning)     Essential hypertension     Type 2 diabetes mellitus with diabetic nephropathy, without long-term current use of insulin (H)     NICK (acute kidney injury) (H)     Elevated lactic acid level     Elevated LFTs       Past Surgical History:   Procedure Laterality Date     ARTHROPLASTY KNEE Left 12/1/2016     Procedure: ARTHROPLASTY KNEE;  Surgeon: Zachary Arteaga MD;  Location: [facility] OR     ARTHROPLASTY KNEE Right 5/4/2017    Procedure: ARTHROPLASTY KNEE;  Right Total Knee Arthroplasty;  Surgeon: Zachary Arteaga MD;  Location: [facility] OR     Colonoscopy, normal  09/13/2002     PHACOEMULSIFICATION WITH STANDARD INTRAOCULAR LENS IMPLANT  6/26/2014    Procedure: PHACOEMULSIFICATION WITH STANDARD INTRAOCULAR LENS IMPLANT;  Surgeon: Dario Mcghee MD;  Location: [facility] OR     PHACOEMULSIFICATION WITH STANDARD INTRAOCULAR LENS IMPLANT Left 9/4/2014    Procedure: PHACOEMULSIFICATION WITH STANDARD INTRAOCULAR LENS IMPLANT;  Surgeon: Dario Mcghee MD;  Location: [facility] OR       Social History     Social History     Marital status:      Spouse name: Ramana Britton     Number of children: 4     Years of education: 14+     Occupational History      Retired     Social History Main Topics     Smoking status: Never Smoker     Smokeless tobacco: Never Used     Alcohol use No     Drug use: No     Sexual activity: Not Currently     Partners: Male     Other Topics Concern     Parent/Sibling W/ Cabg, Mi Or Angioplasty Before 65f 55m? Yes     brother     Social History Narrative       Family History   Problem Relation Age of Onset     Hypertension Mother      DIABETES Mother      CANCER Mother      uterine     Arthritis Father      Genitourinary Problems Father      kidney disease     CANCER Father      bladder cancer     DIABETES Brother      DIABETES Paternal Grandmother      DIABETES Paternal Aunt        Most Recent Immunizations   Administered Date(s) Administered     Influenza (High Dose) 3 valent vaccine 10/17/2016     Influenza (IIV3) 10/15/2014     Influenza Vaccine IM 3yrs+ 4 Valent IIV4 10/15/2015     Pneumococcal (PCV 13) 03/01/2016     Pneumococcal 23 valent 10/20/2011     TD (ADULT, 7+) 06/20/1996     TDAP Vaccine (Adacel) 05/29/2012       Review of Systems  Constitutional: Negative for fever or chills.  Eye:  Negative for visual changes from baseline.  Respiratory: Negative for cough, hemoptysis, or shortness of breath.  Cardiovascular: Negative for chest pain.  Gastrointestinal: Negative for abdominal pain, vomiting, or diarrhea.   Genitourinary: Negative for dysuria.  Musculoskeletal: Positive for postoperative right knee pain, consistent with previous arthroplasty.  Neurological: Negative for headache or dizziness.    All others reviewed and are negative.      Physical Exam   BP: (!) 86/56  Pulse: 147  Temp: 97.6  F (36.4  C)  Resp: 18  Weight: 82.5 kg (181 lb 14.1 oz)  SpO2: 97 %  Physical Exam  Constitutional: Well developed, well nourished. Appears nontoxic and in no acute distress. Resting comfortably on the gurney.  Head: Normocephalic and atraumatic. Symmetric in appearance.  Eyes: Conjunctivae are normal.  Neck: Neck supple.  Cardiovascular: No cyanosis. Regular tachycardic rhythm. No audible murmurs noted. 2/4 bilateral radial pulses and dorsalis pedis pulses. 2+ right lower extremity edema distal to operative site.  Respiratory: Effort normal, no respiratory distress. CTAB without diminished regions. No wheezing, rhonchi, or crackles.  Gastrointestinal: Soft, nondistended abdomen. Nontender and without guarding. No rigidity or rebound tenderness. Negative McBurney's point. Negative for Colin's sign.   Musculoskeletal: Postoperative bandage overlying vertical incision of right anterior knee, moderate swelling and warmth around knee. Patient denies tenderness to palpation of the distal femur or proximal tibia/fibula.  Neuro: Patient is alert.  Skin: Skin is warm and dry, not diaphoretic.      ED Course     ED Course     Procedures               EKG Interpretation:      Interpreted by: Sanford Cox  Time reviewed: Upon arrival     Symptoms at time of EKG: Asymptomatic  Rhythm: Narrow complex regular rhythm  Rate: Tachycardia  Axis: Normal    Conduction: None atypical   ST Segments/ T Waves: No pathologic  ST-elevations  Q Waves: None  Comparison to prior: New onset arrhythmia    Clinical Impression: Possible atrial flutter versus sinus tachycardia        Critical Care time:  none               Results for orders placed or performed during the hospital encounter of 05/07/17 (from the past 24 hour(s))   Comprehensive metabolic panel   Result Value Ref Range    Sodium 133 133 - 144 mmol/L    Potassium 4.1 3.4 - 5.3 mmol/L    Chloride 93 (L) 94 - 109 mmol/L    Carbon Dioxide 28 20 - 32 mmol/L    Anion Gap 12 3 - 14 mmol/L    Glucose 219 (H) 70 - 99 mg/dL    Urea Nitrogen 39 (H) 7 - 30 mg/dL    Creatinine 1.56 (H) 0.52 - 1.04 mg/dL    GFR Estimate 32 (L) >60 mL/min/1.7m2    GFR Estimate If Black 39 (L) >60 mL/min/1.7m2    Calcium 9.1 8.5 - 10.1 mg/dL    Bilirubin Total 0.5 0.2 - 1.3 mg/dL    Albumin 2.9 (L) 3.4 - 5.0 g/dL    Protein Total 6.8 6.8 - 8.8 g/dL    Alkaline Phosphatase 160 (H) 40 - 150 U/L    ALT 99 (H) 0 - 50 U/L     (H) 0 - 45 U/L   CBC with platelets differential   Result Value Ref Range    WBC 8.0 4.0 - 11.0 10e9/L    RBC Count 3.90 3.8 - 5.2 10e12/L    Hemoglobin 11.1 (L) 11.7 - 15.7 g/dL    Hematocrit 34.3 (L) 35.0 - 47.0 %    MCV 88 78 - 100 fl    MCH 28.5 26.5 - 33.0 pg    MCHC 32.4 31.5 - 36.5 g/dL    RDW 13.0 10.0 - 15.0 %    Platelet Count 206 150 - 450 10e9/L    Diff Method Automated Method     % Neutrophils 67.5 %    % Lymphocytes 18.7 %    % Monocytes 9.4 %    % Eosinophils 3.4 %    % Basophils 0.5 %    % Immature Granulocytes 0.5 %    Absolute Neutrophil 5.4 1.6 - 8.3 10e9/L    Absolute Lymphocytes 1.5 0.8 - 5.3 10e9/L    Absolute Monocytes 0.8 0.0 - 1.3 10e9/L    Absolute Eosinophils 0.3 0.0 - 0.7 10e9/L    Absolute Basophils 0.0 0.0 - 0.2 10e9/L    Abs Immature Granulocytes 0.0 0 - 0.4 10e9/L   Lactic acid whole blood   Result Value Ref Range    Lactic Acid 4.0 (HH) 0.7 - 2.1 mmol/L   Magnesium   Result Value Ref Range    Magnesium 1.7 1.6 - 2.3 mg/dL   Troponin I   Result Value Ref  Range    Troponin I ES 0.066 (H) 0.000 - 0.045 ug/L   Erythrocyte sedimentation rate auto   Result Value Ref Range    Sed Rate 48 (H) 0 - 30 mm/h   CRP inflammation   Result Value Ref Range    CRP Inflammation 178.0 (H) 0.0 - 8.0 mg/L   TSH with free T4 reflex   Result Value Ref Range    TSH 1.62 0.40 - 4.00 mU/L   XR Chest 2 Views    Narrative    CHEST TWO VIEWS 5/7/2017 5:00 PM     HISTORY: Tachycardia, postop.    COMPARISON: None.    FINDINGS: There are no acute infiltrates. The cardiac silhouette is  not enlarged. Pulmonary vasculature is unremarkable.      Impression    IMPRESSION: No acute disease.    BK HERNANDEZ MD   UA reflex to Microscopic and Culture   Result Value Ref Range    Color Urine Yellow     Appearance Urine Clear     Glucose Urine Negative NEG mg/dL    Bilirubin Urine Negative NEG    Ketones Urine Negative NEG mg/dL    Specific Gravity Urine 1.008 1.003 - 1.035    Blood Urine Negative NEG    pH Urine 5.0 5.0 - 7.0 pH    Protein Albumin Urine Negative NEG mg/dL    Urobilinogen mg/dL Normal 0.0 - 2.0 mg/dL    Nitrite Urine Negative NEG    Leukocyte Esterase Urine Negative NEG    Source Midstream Urine    Influenza A/B antigen   Result Value Ref Range    Influenza A/B Agn Specimen Nares     Influenza A Negative NEG    Influenza B  NEG     Negative   Test results must be correlated with clinical data. If necessary, results   should be confirmed by a molecular assay or viral culture.     Chest CT - IV contrast only - PE protocol    Narrative    CT CHEST AND PULMONARY EMBOLISM ANGIOGRAM  5/7/2017 6:35 PM     HISTORY: Sinus tachycardia, 3 days postop.    COMPARISON: None.    TECHNIQUE: Volumetric helical acquisition of CT images of the chest  from the lung apices to the kidneys were acquired after the  administration of 79 mL Isovue 370  IV contrast. Radiation dose for  this scan was reduced using automated exposure control, adjustment of  the mA and/or kV according to patient size, or  iterative  reconstruction technique.    FINDINGS: There is no pulmonary embolism. 0.4 cm nodule in the right  middle lobe image 78. Trace peripheral fibrosis and/or atelectasis.  Question a thickened mid to distal esophagus. There are minimal  coronary vascular calcifications consistent with coronary artery  disease. The heart is not enlarged. Thoracic aorta is minimally  atherosclerotic without evidence of dissection or aneurysm. There is  no pleural or pericardial effusion. There is no pneumothorax. Adrenal  glands are normal. Remainder of the visualized upper abdomen is  unremarkable.      Impression    IMPRESSION:   1. No pulmonary embolism demonstrated.  2. No thoracic aortic dissection or aneurysm.   3. Possible esophageal wall thickening in the distal esophagus.  Consider endoscopy as clinically indicated.  4. 0.4 cm right middle lobe nodule.    Recommendations for one or multiple incidental lung nodules < 6mm :    Low risk patients: No routine follow-up.    High risk patients: Optional follow-up CT at 12 months; if  unchanged, no further follow-up.    *Low Risk: Minimal or absent history of smoking or other known risk  factors.  *Nonsolid (ground glass) or partly solid nodules may require longer  follow-up to exclude indolent adenocarcinoma.  *Recommendations based on Guidelines for the Management of Incidental  Pulmonary Nodules Detected at CT: From the Fleischner Society 2017,  Radiology 2017.    BK HERNANDEZ MD         Assessments & Plan (with Medical Decision Making)   Suzette Britton is a 72 year old female who presented to the department for evaluation after visiting home nurse discovered patient had a rapid heart rate without symptoms. She was discharged yesterday after right knee arthroplasty feeling well and without sinusitis symptoms of infection. Heart rate variable on the monitor but EKG with sawtooth pattern, difficult to discern between sinus tachycardia and atrial flutter with variable block.  She was given a fluid bolus and heart rate seemed to improve. Urinalysis unimpressive for infection, chest radiograph without identifiable infiltrate, TSH within reference range, and CT pulmonary angiogram does not identify pulmonary embolism. Although the patient's right knee is warm and swollen, knee is without appearance of postoperative infection and she is afebrile.    Adenosine dose given to patient and repeat EKG reveals atrial flutter. Subsequent IV bolus of 15 mg diltiazem improved heart rate to 115 bpm but did not convert. Diltiazem infusion started for rate control and patient will be admitted to the hospital for further monitoring and management. She maintains she has no abdominal pain and has been eating by mouth well. Suspect her elevated troponin, creatinine, and hepatic enzymes are likely due to mild hypoperfusion from persistent tachycardia. Consulted PATEL Mcmahan regarding patient presentation and workup. She recommends full admission to ICU because patient is on infusion therapy. Patient received 2L NS while in the department. Temporary transition orders were placed per protocol.    The patient and accompanying family members have been informed of her results and the recommendation for admission. They have verbalized an understanding, all questions answered, and they are in agreement with the plan at this time.      Disclaimer: This note consists of symbols derived from keyboarding, dictation, and/or voice recognition software. As a result, there may be errors in the script that have gone undetected.  Please consider this when interpreting information found in the chart.        I have reviewed the nursing notes.    I have reviewed the findings, diagnosis, plan and need for follow up with the patient.    New Prescriptions    No medications on file       Final diagnoses:   Atrial flutter with rapid ventricular response (H)   Elevated troponin   Acute renal failure, unspecified acute renal  failure type (H)       5/7/2017   [facility] EMERGENCY DEPARTMENT     Sanford Cox DO  05/07/17 1942

## 2017-05-07 NOTE — ED NOTES
MD at bedside.    Pt's family requests pt take her pain medication (oxycodone) from home which daughter has with her.  Pt takes this with a bite of pudding with MD approval.

## 2017-05-08 ENCOUNTER — APPOINTMENT (OUTPATIENT)
Dept: PHYSICAL THERAPY | Facility: CLINIC | Age: 72
DRG: 308 | End: 2017-05-08
Payer: MEDICARE

## 2017-05-08 ENCOUNTER — TELEPHONE (OUTPATIENT)
Dept: FAMILY MEDICINE | Facility: CLINIC | Age: 72
End: 2017-05-08

## 2017-05-08 LAB
ALBUMIN SERPL-MCNC: 2.2 G/DL (ref 3.4–5)
ALP SERPL-CCNC: 103 U/L (ref 40–150)
ALT SERPL W P-5'-P-CCNC: 56 U/L (ref 0–50)
ANION GAP SERPL CALCULATED.3IONS-SCNC: 10 MMOL/L (ref 3–14)
AST SERPL W P-5'-P-CCNC: 41 U/L (ref 0–45)
BASOPHILS # BLD AUTO: 0 10E9/L (ref 0–0.2)
BASOPHILS NFR BLD AUTO: 0.5 %
BILIRUB SERPL-MCNC: 0.4 MG/DL (ref 0.2–1.3)
BUN SERPL-MCNC: 28 MG/DL (ref 7–30)
CALCIUM SERPL-MCNC: 7.8 MG/DL (ref 8.5–10.1)
CHLORIDE SERPL-SCNC: 105 MMOL/L (ref 94–109)
CO2 SERPL-SCNC: 23 MMOL/L (ref 20–32)
CREAT SERPL-MCNC: 1.18 MG/DL (ref 0.52–1.04)
CRP SERPL-MCNC: 128 MG/L (ref 0–8)
DIFFERENTIAL METHOD BLD: ABNORMAL
EOSINOPHIL # BLD AUTO: 0.2 10E9/L (ref 0–0.7)
EOSINOPHIL NFR BLD AUTO: 4.3 %
ERYTHROCYTE [DISTWIDTH] IN BLOOD BY AUTOMATED COUNT: 13 % (ref 10–15)
ERYTHROCYTE [SEDIMENTATION RATE] IN BLOOD BY WESTERGREN METHOD: 53 MM/H (ref 0–30)
GFR SERPL CREATININE-BSD FRML MDRD: 45 ML/MIN/1.7M2
GLUCOSE BLDC GLUCOMTR-MCNC: 145 MG/DL (ref 70–99)
GLUCOSE BLDC GLUCOMTR-MCNC: 182 MG/DL (ref 70–99)
GLUCOSE BLDC GLUCOMTR-MCNC: 209 MG/DL (ref 70–99)
GLUCOSE SERPL-MCNC: 177 MG/DL (ref 70–99)
HCT VFR BLD AUTO: 27.9 % (ref 35–47)
HGB BLD-MCNC: 9.1 G/DL (ref 11.7–15.7)
IMM GRANULOCYTES # BLD: 0 10E9/L (ref 0–0.4)
IMM GRANULOCYTES NFR BLD: 0.2 %
LACTATE BLD-SCNC: 1 MMOL/L (ref 0.7–2.1)
LYMPHOCYTES # BLD AUTO: 1.3 10E9/L (ref 0.8–5.3)
LYMPHOCYTES NFR BLD AUTO: 22.7 %
MCH RBC QN AUTO: 29.1 PG (ref 26.5–33)
MCHC RBC AUTO-ENTMCNC: 32.6 G/DL (ref 31.5–36.5)
MCV RBC AUTO: 89 FL (ref 78–100)
MONOCYTES # BLD AUTO: 0.6 10E9/L (ref 0–1.3)
MONOCYTES NFR BLD AUTO: 10.3 %
MRSA DNA SPEC QL NAA+PROBE: NORMAL
NEUTROPHILS # BLD AUTO: 3.4 10E9/L (ref 1.6–8.3)
NEUTROPHILS NFR BLD AUTO: 62 %
PLATELET # BLD AUTO: 162 10E9/L (ref 150–450)
POTASSIUM SERPL-SCNC: 4.2 MMOL/L (ref 3.4–5.3)
PROT SERPL-MCNC: 5.4 G/DL (ref 6.8–8.8)
RBC # BLD AUTO: 3.13 10E12/L (ref 3.8–5.2)
SODIUM SERPL-SCNC: 138 MMOL/L (ref 133–144)
SPECIMEN SOURCE: NORMAL
TROPONIN I SERPL-MCNC: 0.04 UG/L (ref 0–0.04)
TROPONIN I SERPL-MCNC: 0.04 UG/L (ref 0–0.04)
TROPONIN I SERPL-MCNC: 0.06 UG/L (ref 0–0.04)
WBC # BLD AUTO: 5.6 10E9/L (ref 4–11)

## 2017-05-08 PROCEDURE — 25000132 ZZH RX MED GY IP 250 OP 250 PS 637: Mod: GY | Performed by: PHYSICIAN ASSISTANT

## 2017-05-08 PROCEDURE — 40000193 ZZH STATISTIC PT WARD VISIT

## 2017-05-08 PROCEDURE — 20000003 ZZH R&B ICU

## 2017-05-08 PROCEDURE — 25000131 ZZH RX MED GY IP 250 OP 636 PS 637: Mod: GY | Performed by: PHYSICIAN ASSISTANT

## 2017-05-08 PROCEDURE — 86140 C-REACTIVE PROTEIN: CPT | Performed by: PHYSICIAN ASSISTANT

## 2017-05-08 PROCEDURE — 97110 THERAPEUTIC EXERCISES: CPT | Mod: GP

## 2017-05-08 PROCEDURE — 25000128 H RX IP 250 OP 636: Performed by: STUDENT IN AN ORGANIZED HEALTH CARE EDUCATION/TRAINING PROGRAM

## 2017-05-08 PROCEDURE — 84484 ASSAY OF TROPONIN QUANT: CPT | Performed by: PHYSICIAN ASSISTANT

## 2017-05-08 PROCEDURE — 40000894 ZZH STATISTIC OT IP EVAL DEFER

## 2017-05-08 PROCEDURE — 00000146 ZZHCL STATISTIC GLUCOSE BY METER IP

## 2017-05-08 PROCEDURE — A9270 NON-COVERED ITEM OR SERVICE: HCPCS | Mod: GY | Performed by: PHYSICIAN ASSISTANT

## 2017-05-08 PROCEDURE — 80053 COMPREHEN METABOLIC PANEL: CPT | Performed by: PHYSICIAN ASSISTANT

## 2017-05-08 PROCEDURE — 25000131 ZZH RX MED GY IP 250 OP 636 PS 637: Mod: GY | Performed by: FAMILY MEDICINE

## 2017-05-08 PROCEDURE — 25000125 ZZHC RX 250: Performed by: FAMILY MEDICINE

## 2017-05-08 PROCEDURE — 25000132 ZZH RX MED GY IP 250 OP 250 PS 637: Mod: GY | Performed by: FAMILY MEDICINE

## 2017-05-08 PROCEDURE — 36415 COLL VENOUS BLD VENIPUNCTURE: CPT | Performed by: PHYSICIAN ASSISTANT

## 2017-05-08 PROCEDURE — 99233 SBSQ HOSP IP/OBS HIGH 50: CPT | Performed by: FAMILY MEDICINE

## 2017-05-08 PROCEDURE — 85652 RBC SED RATE AUTOMATED: CPT | Performed by: PHYSICIAN ASSISTANT

## 2017-05-08 PROCEDURE — 25000125 ZZHC RX 250: Performed by: PHYSICIAN ASSISTANT

## 2017-05-08 PROCEDURE — 97161 PT EVAL LOW COMPLEX 20 MIN: CPT | Mod: GP

## 2017-05-08 PROCEDURE — 85025 COMPLETE CBC W/AUTO DIFF WBC: CPT | Performed by: PHYSICIAN ASSISTANT

## 2017-05-08 PROCEDURE — A9270 NON-COVERED ITEM OR SERVICE: HCPCS | Mod: GY | Performed by: FAMILY MEDICINE

## 2017-05-08 PROCEDURE — 83605 ASSAY OF LACTIC ACID: CPT | Performed by: PHYSICIAN ASSISTANT

## 2017-05-08 PROCEDURE — 97116 GAIT TRAINING THERAPY: CPT | Mod: GP

## 2017-05-08 RX ORDER — METOPROLOL TARTRATE 1 MG/ML
5 INJECTION, SOLUTION INTRAVENOUS
Status: DISCONTINUED | OUTPATIENT
Start: 2017-05-08 | End: 2017-05-10 | Stop reason: HOSPADM

## 2017-05-08 RX ORDER — METOPROLOL TARTRATE 1 MG/ML
10 INJECTION, SOLUTION INTRAVENOUS ONCE
Status: COMPLETED | OUTPATIENT
Start: 2017-05-08 | End: 2017-05-08

## 2017-05-08 RX ORDER — METOPROLOL TARTRATE 25 MG/1
25 TABLET, FILM COATED ORAL 2 TIMES DAILY
Status: DISCONTINUED | OUTPATIENT
Start: 2017-05-08 | End: 2017-05-08

## 2017-05-08 RX ORDER — METOPROLOL TARTRATE 50 MG
50 TABLET ORAL 2 TIMES DAILY
Status: DISCONTINUED | OUTPATIENT
Start: 2017-05-08 | End: 2017-05-09

## 2017-05-08 RX ORDER — METOPROLOL TARTRATE 1 MG/ML
5 INJECTION, SOLUTION INTRAVENOUS ONCE
Status: COMPLETED | OUTPATIENT
Start: 2017-05-08 | End: 2017-05-08

## 2017-05-08 RX ADMIN — SODIUM CHLORIDE: 9 INJECTION, SOLUTION INTRAVENOUS at 03:53

## 2017-05-08 RX ADMIN — OXYCODONE HYDROCHLORIDE 10 MG: 5 TABLET ORAL at 05:18

## 2017-05-08 RX ADMIN — INSULIN ASPART 2 UNITS: 100 INJECTION, SOLUTION INTRAVENOUS; SUBCUTANEOUS at 08:19

## 2017-05-08 RX ADMIN — METOPROLOL TARTRATE 50 MG: 50 TABLET ORAL at 18:16

## 2017-05-08 RX ADMIN — INSULIN ASPART 1 UNITS: 100 INJECTION, SOLUTION INTRAVENOUS; SUBCUTANEOUS at 17:18

## 2017-05-08 RX ADMIN — METOPROLOL TARTRATE 10 MG: 5 INJECTION INTRAVENOUS at 18:17

## 2017-05-08 RX ADMIN — ASPIRIN 325 MG: 325 TABLET, DELAYED RELEASE ORAL at 08:00

## 2017-05-08 RX ADMIN — SENNOSIDES 1 TABLET: 8.6 TABLET, FILM COATED ORAL at 08:00

## 2017-05-08 RX ADMIN — SENNOSIDES 1 TABLET: 8.6 TABLET, FILM COATED ORAL at 19:54

## 2017-05-08 RX ADMIN — METOPROLOL TARTRATE 5 MG: 5 INJECTION INTRAVENOUS at 15:03

## 2017-05-08 RX ADMIN — INSULIN GLARGINE 8 UNITS: 100 INJECTION, SOLUTION SUBCUTANEOUS at 08:45

## 2017-05-08 RX ADMIN — APIXABAN 5 MG: 5 TABLET, FILM COATED ORAL at 08:03

## 2017-05-08 RX ADMIN — METOPROLOL TARTRATE 10 MG: 5 INJECTION INTRAVENOUS at 19:54

## 2017-05-08 RX ADMIN — INSULIN GLARGINE 8 UNITS: 100 INJECTION, SOLUTION SUBCUTANEOUS at 22:10

## 2017-05-08 RX ADMIN — METOPROLOL TARTRATE 25 MG: 25 TABLET ORAL at 08:00

## 2017-05-08 RX ADMIN — METOPROLOL TARTRATE 5 MG: 5 INJECTION INTRAVENOUS at 12:41

## 2017-05-08 RX ADMIN — APIXABAN 5 MG: 5 TABLET, FILM COATED ORAL at 19:54

## 2017-05-08 RX ADMIN — METOPROLOL TARTRATE 5 MG: 5 INJECTION INTRAVENOUS at 16:47

## 2017-05-08 RX ADMIN — INSULIN ASPART 3 UNITS: 100 INJECTION, SOLUTION INTRAVENOUS; SUBCUTANEOUS at 11:25

## 2017-05-08 RX ADMIN — METOPROLOL TARTRATE 5 MG: 5 INJECTION INTRAVENOUS at 17:40

## 2017-05-08 NOTE — ED NOTES
Pt continues to be a symptomatic of racing heart, palpitations, SOA. R Knee dressing remains intact and clean, some swelling and redness noted. MD is aware. Pt resting comfortably

## 2017-05-08 NOTE — TELEPHONE ENCOUNTER
Thanks for the update that the patient is being followed by home care. When the patient is getting ready for discharge from home care and home care believes the patient would benefit from services by Care Coordination, please place a Care Coordination referral, staff message or call RN CC or PCP.       Please don't hesitate to contact RN CC with any questions or concerns.     Gasper Weir RN  Clinic Care Coordinator  [phone] or [phone]

## 2017-05-08 NOTE — CONSULTS
Care Transition Initial Assessment - RN  Reason For Consult: discharge planning   Met with: Patient.    DATA  Principal Problem:    Atrial fibrillation (H)  Active Problems:    Hyperlipidemia LDL goal <100    CKD (chronic kidney disease) stage 3, GFR 30-59 ml/min    S/P total knee arthroplasty    Essential hypertension    Type 2 diabetes mellitus with diabetic nephropathy, without long-term current use of insulin (H)    NICK (acute kidney injury) (H)    Elevated lactic acid level    Elevated LFTs    Atrial fibrillation with RVR (H)       [facility]  Primary Care MD Name: Judy Verde  Contact information and PCP information verified: Yes      ASSESSMENT  Cognitive Status: awake, alert and oriented.             Lives With: spouse                    Insurance Concerns: No Insurance issues identified          This writer met with pt, introduced self and role.       PLAN    Chart reviewed, discussed at [facility], met with patient. Patient was hospitalized 5/4--5/6 with right TKA, was discharged to home with [facility] ([phone] Fax: [phone]) referral placed. Patient states she lives with her spouse who is supportive. Home care to be resumed upon discharge (RN, PT/OT). Handoff to OP CCC upon discharge.         Josselin Hogue [phone]

## 2017-05-08 NOTE — PROGRESS NOTES
[facility]      Subjective:  Feels ok  No cp  No difficulty breathing  Was unaware of tachy  Had one episode of a fib in past    Review of Systems:  C: NEGATIVE for fever, chills, change in weight  I: NEGATIVE for worrisome rashes, moles or lesions  E: NEGATIVE for vision changes or irritation  E/M: NEGATIVE for ear, mouth and throat problems  R: NEGATIVE for significant cough or SOB  B: NEGATIVE for masses, tenderness or discharge  CV: NEGATIVE for chest pain, palpitations or peripheral edema  GI: NEGATIVE for nausea, abdominal pain, heartburn, or change in bowel habits  : NEGATIVE for frequency, dysuria, or hematuria  MUSCULOSKELETAL:expected discomfort right knee  N: NEGATIVE for weakness, dizziness or paresthesias  E: NEGATIVE for temperature intolerance, skin/hair changes  H: NEGATIVE for bleeding problems  P: NEGATIVE for changes in mood or affect    Physical Exam:  Vitals Were Reviewed    Patient Vitals for the past 16 hrs:   BP Temp Temp src Pulse Heart Rate Resp SpO2 Height Weight   05/08/17 0600 95/59 98.6  F (37  C) Oral - 83 16 96 % - 74.2 kg (163 lb 9.3 oz)   05/08/17 0408 - - - - 94 - - - -   05/08/17 0400 98/58 - - - 88 16 - - -   05/08/17 0100 91/55 - - - 83 16 - - -   05/08/17 0000 (!) 81/51 98.5  F (36.9  C) Oral - 94 16 - - -   05/07/17 2354 - - - - 88 - - - -   05/07/17 2300 (!) 80/49 - - - 101 16 - - -   05/07/17 2205 110/70 - - - 117 16 - - -   05/07/17 2100 110/70 - Oral - 117 20 - - -   05/07/17 2040 133/80 99.2  F (37.3  C) Oral - 130 20 97 % 1.6 m (5' 3\") 74.2 kg (163 lb 9.3 oz)   05/07/17 2015 112/67 - - - 125 - 95 % - -   05/07/17 2000 103/59 - - - 117 - 95 % - -   05/07/17 1945 112/63 - - - 125 21 96 % - -   05/07/17 1930 115/62 - - - 120 19 93 % - -   05/07/17 1915 99/65 - - - 109 11 93 % - -   05/07/17 1900 113/64 - - - 135 12 94 % - -   05/07/17 1845 103/48 - - - 137 11 97 % - -   05/07/17 1815 113/68 - - - 131 14 98 % - -   05/07/17 1800 107/80 - - - 142 10 " 96 % - -   05/07/17 1756 - - - - 129 10 96 % - -   05/07/17 1745 130/73 - - - 135 13 98 % - -   05/07/17 1720 - - - - 112 13 96 % - -   05/07/17 1715 102/64 - - - 128 11 97 % - -   05/07/17 1706 106/66 - - - 135 10 98 % - -   05/07/17 1705 - - - - 122 11 96 % - -   05/07/17 1700 - - - - 131 22 97 % - -   05/07/17 1658 - - - - 140 - 97 % - -   05/07/17 1657 122/71 - - - - - - - -   05/07/17 1645 (!) 89/55 - - - 124 13 - - -   05/07/17 1640 - - - - 126 13 - - -   05/07/17 1633 - - - - 122 12 - - -   05/07/17 1630 99/69 - - - 134 - - - -   05/07/17 1623 - - - - 135 18 - - -   05/07/17 1617 - - - - 135 14 93 % - -   05/07/17 1615 94/62 - - - 142 11 95 % - -   05/07/17 1610 - - - - 146 8 97 % - -   05/07/17 1600 117/56 - - - 138 - 94 % - -   05/07/17 1555 - - - - - - 97 % - -   05/07/17 1546 (!) 86/56 97.6  F (36.4  C) Oral 147 - 18 97 % - 82.5 kg (181 lb 14.1 oz)         Intake/Output Summary (Last 24 hours) at 05/08/17 0641  Last data filed at 05/08/17 0600   Gross per 24 hour   Intake          1079.25 ml   Output                0 ml   Net          1079.25 ml       GENERAL APPEARANCE: healthy, alert and no distress  EYES: conjunctiva clear, eyes grossly normal  HENT: external ears and nose normal   NECK: supple, no masses or adenopathy  RESP: a few crackles left base  CV: reg   ABDOMEN: soft, nontender, no HSM or masses and bowel sounds normal  MS: right knee has a normal post op appearance with some slight blotchy redness anteriorly, incision is clean  SKIN: clear without significant rashes or lesions  NEURO: Normal strength and tone, sensory exam grossly normal, mentation intact and speech normal    Lab:  Recent Labs   Lab Test  05/07/17   1610  05/05/17   0720   NA  133  136   POTASSIUM  4.1  4.2   CHLORIDE  93*  101   CO2  28  24   ANIONGAP  12  11   GLC  219*  217*   BUN  39*  16   CR  1.56*  0.89   PROSPER  9.1  8.4*     CBC RESULTS:   Recent Labs   Lab Test  05/08/17   0600  05/07/17   1610   WBC  5.6  8.0   RBC   3.13*  3.90   HGB  9.1*  11.1*   HCT  27.9*  34.3*   PLT  162  206       Results for orders placed or performed during the hospital encounter of 05/07/17 (from the past 24 hour(s))   Comprehensive metabolic panel   Result Value Ref Range    Sodium 133 133 - 144 mmol/L    Potassium 4.1 3.4 - 5.3 mmol/L    Chloride 93 (L) 94 - 109 mmol/L    Carbon Dioxide 28 20 - 32 mmol/L    Anion Gap 12 3 - 14 mmol/L    Glucose 219 (H) 70 - 99 mg/dL    Urea Nitrogen 39 (H) 7 - 30 mg/dL    Creatinine 1.56 (H) 0.52 - 1.04 mg/dL    GFR Estimate 32 (L) >60 mL/min/1.7m2    GFR Estimate If Black 39 (L) >60 mL/min/1.7m2    Calcium 9.1 8.5 - 10.1 mg/dL    Bilirubin Total 0.5 0.2 - 1.3 mg/dL    Albumin 2.9 (L) 3.4 - 5.0 g/dL    Protein Total 6.8 6.8 - 8.8 g/dL    Alkaline Phosphatase 160 (H) 40 - 150 U/L    ALT 99 (H) 0 - 50 U/L     (H) 0 - 45 U/L   CBC with platelets differential   Result Value Ref Range    WBC 8.0 4.0 - 11.0 10e9/L    RBC Count 3.90 3.8 - 5.2 10e12/L    Hemoglobin 11.1 (L) 11.7 - 15.7 g/dL    Hematocrit 34.3 (L) 35.0 - 47.0 %    MCV 88 78 - 100 fl    MCH 28.5 26.5 - 33.0 pg    MCHC 32.4 31.5 - 36.5 g/dL    RDW 13.0 10.0 - 15.0 %    Platelet Count 206 150 - 450 10e9/L    Diff Method Automated Method     % Neutrophils 67.5 %    % Lymphocytes 18.7 %    % Monocytes 9.4 %    % Eosinophils 3.4 %    % Basophils 0.5 %    % Immature Granulocytes 0.5 %    Absolute Neutrophil 5.4 1.6 - 8.3 10e9/L    Absolute Lymphocytes 1.5 0.8 - 5.3 10e9/L    Absolute Monocytes 0.8 0.0 - 1.3 10e9/L    Absolute Eosinophils 0.3 0.0 - 0.7 10e9/L    Absolute Basophils 0.0 0.0 - 0.2 10e9/L    Abs Immature Granulocytes 0.0 0 - 0.4 10e9/L   Lactic acid whole blood   Result Value Ref Range    Lactic Acid 4.0 (HH) 0.7 - 2.1 mmol/L   Magnesium   Result Value Ref Range    Magnesium 1.7 1.6 - 2.3 mg/dL   Troponin I   Result Value Ref Range    Troponin I ES 0.066 (H) 0.000 - 0.045 ug/L   Erythrocyte sedimentation rate auto   Result Value Ref Range    Sed  Rate 48 (H) 0 - 30 mm/h   CRP inflammation   Result Value Ref Range    CRP Inflammation 178.0 (H) 0.0 - 8.0 mg/L   TSH with free T4 reflex   Result Value Ref Range    TSH 1.62 0.40 - 4.00 mU/L   XR Chest 2 Views    Narrative    CHEST TWO VIEWS 5/7/2017 5:00 PM     HISTORY: Tachycardia, postop.    COMPARISON: None.    FINDINGS: There are no acute infiltrates. The cardiac silhouette is  not enlarged. Pulmonary vasculature is unremarkable.      Impression    IMPRESSION: No acute disease.    BK HERNANDEZ MD   UA reflex to Microscopic and Culture   Result Value Ref Range    Color Urine Yellow     Appearance Urine Clear     Glucose Urine Negative NEG mg/dL    Bilirubin Urine Negative NEG    Ketones Urine Negative NEG mg/dL    Specific Gravity Urine 1.008 1.003 - 1.035    Blood Urine Negative NEG    pH Urine 5.0 5.0 - 7.0 pH    Protein Albumin Urine Negative NEG mg/dL    Urobilinogen mg/dL Normal 0.0 - 2.0 mg/dL    Nitrite Urine Negative NEG    Leukocyte Esterase Urine Negative NEG    Source Midstream Urine    Influenza A/B antigen   Result Value Ref Range    Influenza A/B Agn Specimen Nares     Influenza A Negative NEG    Influenza B  NEG     Negative   Test results must be correlated with clinical data. If necessary, results   should be confirmed by a molecular assay or viral culture.     Chest CT - IV contrast only - PE protocol    Narrative    CT CHEST AND PULMONARY EMBOLISM ANGIOGRAM  5/7/2017 6:35 PM     HISTORY: Sinus tachycardia, 3 days postop.    COMPARISON: None.    TECHNIQUE: Volumetric helical acquisition of CT images of the chest  from the lung apices to the kidneys were acquired after the  administration of 79 mL Isovue 370  IV contrast. Radiation dose for  this scan was reduced using automated exposure control, adjustment of  the mA and/or kV according to patient size, or iterative  reconstruction technique.    FINDINGS: There is no pulmonary embolism. 0.4 cm nodule in the right  middle lobe image 78. Trace  peripheral fibrosis and/or atelectasis.  Question a thickened mid to distal esophagus. There are minimal  coronary vascular calcifications consistent with coronary artery  disease. The heart is not enlarged. Thoracic aorta is minimally  atherosclerotic without evidence of dissection or aneurysm. There is  no pleural or pericardial effusion. There is no pneumothorax. Adrenal  glands are normal. Remainder of the visualized upper abdomen is  unremarkable.      Impression    IMPRESSION:   1. No pulmonary embolism demonstrated.  2. No thoracic aortic dissection or aneurysm.   3. Possible esophageal wall thickening in the distal esophagus.  Consider endoscopy as clinically indicated.  4. 0.4 cm right middle lobe nodule.    Recommendations for one or multiple incidental lung nodules < 6mm :    Low risk patients: No routine follow-up.    High risk patients: Optional follow-up CT at 12 months; if  unchanged, no further follow-up.    *Low Risk: Minimal or absent history of smoking or other known risk  factors.  *Nonsolid (ground glass) or partly solid nodules may require longer  follow-up to exclude indolent adenocarcinoma.  *Recommendations based on Guidelines for the Management of Incidental  Pulmonary Nodules Detected at CT: From the [employer] 2017,  Radiology 2017.    BK HERNANDEZ MD   Magnesium   Result Value Ref Range    Magnesium 1.6 1.6 - 2.3 mg/dL   Phosphorus   Result Value Ref Range    Phosphorus 2.3 (L) 2.5 - 4.5 mg/dL   Lactic acid whole blood   Result Value Ref Range    Lactic Acid 1.6 0.7 - 2.1 mmol/L   Troponin I   Result Value Ref Range    Troponin I ES 0.066 (H) 0.000 - 0.045 ug/L   Glucose by meter   Result Value Ref Range    Glucose 156 (H) 70 - 99 mg/dL   CBC with platelets differential   Result Value Ref Range    WBC 5.6 4.0 - 11.0 10e9/L    RBC Count 3.13 (L) 3.8 - 5.2 10e12/L    Hemoglobin 9.1 (L) 11.7 - 15.7 g/dL    Hematocrit 27.9 (L) 35.0 - 47.0 %    MCV 89 78 - 100 fl    MCH 29.1 26.5 -  "33.0 pg    MCHC 32.6 31.5 - 36.5 g/dL    RDW 13.0 10.0 - 15.0 %    Platelet Count 162 150 - 450 10e9/L    Diff Method Automated Method     % Neutrophils 62.0 %    % Lymphocytes 22.7 %    % Monocytes 10.3 %    % Eosinophils 4.3 %    % Basophils 0.5 %    % Immature Granulocytes 0.2 %    Absolute Neutrophil 3.4 1.6 - 8.3 10e9/L    Absolute Lymphocytes 1.3 0.8 - 5.3 10e9/L    Absolute Monocytes 0.6 0.0 - 1.3 10e9/L    Absolute Eosinophils 0.2 0.0 - 0.7 10e9/L    Absolute Basophils 0.0 0.0 - 0.2 10e9/L    Abs Immature Granulocytes 0.0 0 - 0.4 10e9/L   Lactic acid whole blood   Result Value Ref Range    Lactic Acid 1.0 0.7 - 2.1 mmol/L       Assessment and Plan:    Suzette Britton is a 72 year old female with PMH significant for CKD stage III, T2DM, HTN, HLD, and 1 prior episode of atrial fibrillation in 2010 who now presents with asymptomatic tachycardia.        Acute Atrial Fibrillation  DDx: Cardiac (?ischemia - troponin elevated) versus metabolic (post-op)  CHADS2 of 4 with an annual stroke risk of 6.7% w/o anticoagulation.   CT negative for PE. No new drugs other than opioid for pain s/p knee replacement. S/p adenosine, rhythm was \"saw tooth\". Rates initially persistently hovering around 145, now much more labile. May be atrial flutter, but more likely atrial fibrillation. Normal rate (80s) prior to discharge yesterday; no notation indicating irregularly irregular rhythm. No symptoms. Not cardioverted in ED out of concerns for possible paroxysmal Afib given 1 episode in 2010. TSH within normal limits. Urine clean. Flu negative. CXR negative. Given one, 15mg diltiazem bump in ED without effect on rate  - give additional 10mg IV diltiazem now for further rate control  - continue rate control using diltiazem drip.  - starting SQ Lovenox tonight; patient wants to think more about warfarin/novel agent. Discussion had in ED  May 8, 2017 on low dose lovenox    NICK (acute kidney injury) (H)   CKD (chronic kidney disease) " stage 3, GFR 30-59 ml/min  Baseline creatinine 0.9 - 1.1. 1.56 on arrival. Given 2L fluid bolus in ED  - continue IVF  - hold home chlorthalidone, lisinopril  May 8, 2017 creat 1.18      Elevated lactic acid level  4.0 on arrival. Presumed likely secondary to intravascular volume depletion/poor perfusion secondary to above acute onset atrial fibrillation. Given 2L in ED  May 8, 2017 lactic 1.0      Elevated LFTs  , ALT 99, . Abdominal exam benign. Again, presumed likely secondary to poor perfusion given above atrial fibrillation  May 8, 2017 normalizing     Elevated Troponin  Asymptomatic. 0.066 on arrival. Presumed secondary to above Afib  May 8, 2017 second trop 0.058     S/P total knee arthroplasty  Knee does not appear infected, although both ESR and CRP slightly up. This is consistent with post-op inflammation.  - consider ortho consult should knee become symptomatic      Essential hypertension  Mild, asymptomatic hypotension on arrival  - hold home chlorthalidone, lisinopril     Type 2 diabetes mellitus with diabetic nephropathy, without long-term current use of insulin (H)  - hold PTA metformin given above NICK  - 5u SQ lantus BID  - high SSI   May 8, 2017 am blood [id number], increase lantus to 8 bid     Hyperlipidemia LDL goal <100  - hold PTA atorvastatin given above LFTs    0.4 cm lung nodule  May 8, 2017 no history of smoking-no fu    Possible thickening distal esophagus on ct  Will clinically correlate        F: 100 cc/hr 0.9NS-reduce to 50 cc per hour  N: advance as tolerated  DVT Prophylaxis: Lovenox     Code Status: Full Code     Discussion  reoccurence of a fib/ flutter following knee replacement.  No sign of PE  No overt infectious symptoms  Rate controlled with cardizem gtt- convert to oral beta blocker (given risk factors for CAD it may be a better choice the CCD)--wean drip  Probably needs work up for ischemic heart ds at some point  Discussed coumadin vs DOAC--she chooses  Radhika Craft/ivy while in hospital  Cards consult as out patient --due to flutter, may be a candidate for ablation     12:34 PM  Rate 90's then  Hr suddenly to 130's  Metoprolol iv 5 mg  Increase oral to 50 bid

## 2017-05-08 NOTE — H&P
"[facility]    History and Physical  Hospital Medicine       Date of Admission:  5/7/2017  Date of Service: 5/7/2017     Assessment & Plan   Suzette Britton is a 72 year old female with PMH significant for CKD stage III, T2DM, HTN, HLD, and 1 prior episode of atrial fibrillation in 2010 who now presents with asymptomatic tachycardia.      Acute Atrial Fibrillation  DDx: Cardiac (?ischemia - troponin elevated) versus metabolic (post-op)  CHADS2 of 4 with an annual stroke risk of 6.7% w/o anticoagulation.     CT negative for PE.  No new drugs other than opioid for pain s/p knee replacement.  S/p adenosine, rhythm was \"saw tooth\". Rates initially persistently hovering around 145, now much more labile.  May be atrial flutter, but more likely atrial fibrillation. Normal rate (80s) prior to discharge yesterday; no notation indicating irregularly irregular rhythm.  No symptoms.  Not cardioverted in ED out of concerns for possible paroxysmal Afib given 1 episode in 2010.  TSH within normal limits. Urine clean.  Flu negative.  CXR negative.  Given one, 15mg diltiazem bump in ED without effect on rate  - give additional 10mg IV diltiazem now for further rate control  - continue rate control using diltiazem drip.  - starting SQ Lovenox tonight; patient wants to think more about warfarin/novel agent.  Discussion had in ED  - continue telemetry monitoring  - order magnesium, and potassium  - given       NICK (acute kidney injury) (H)    CKD (chronic kidney disease) stage 3, GFR 30-59 ml/min  Baseline creatinine 0.9 - 1.1.  1.56 on arrival.  Given 2L fluid bolus in ED  - continue IVF  - hold home chlorthalidone, lisinopril      Elevated lactic acid level  4.0 on arrival.  Presumed likely secondary to intravascular volume depletion/poor perfusion secondary to above acute onset atrial fibrillation.  Given 2L in ED  - repeat lactic now  - continue IVF      Elevated LFTs  , ALT 99, .  Abdominal " exam benign.  Again, presumed likely secondary to poor perfusion given above atrial fibrillation  - repeat CMP in AM    Elevated Troponin  Asymptomatic.  0.066 on arrival.  Presumed secondary to above Afib  - trend Q6    S/P total knee arthroplasty  Knee does not appear infected, although both ESR and CRP slightly up.  This is consistent with post-op inflammation.  - consider ortho consult should knee become symptomatic  - continue PT/OT/care transition consultation      Essential hypertension  Mild, asymptomatic hypotension on arrival  - hold home chlorthalidone, lisinopril    Type 2 diabetes mellitus with diabetic nephropathy, without long-term current use of insulin (H)  - hold PTA metformin given above NICK  - 5u SQ lantus BID  - high SSI     Hyperlipidemia LDL goal <100  - hold PTA atorvastatin given above LFTs         F: 100 cc/hr 0.9NS  E: CMP in AM  N: advance as tolerated  DVT Prophylaxis: Lovenox    Code Status: Full Code    Disposition: Anticipate discharge in 2-3 days. Appropriate for inpatient care.    Case discussed with Dr. Luciano Obando.  Assessment and plan as written above.    Siri Mcmahan PA-C  [facility]    History is obtained from the patient and review of the EMR.    Past Medical History    Past Medical History:   Diagnosis Date     Osteoarthrosis, unspecified whether generalized or localized, hand      Type II or unspecified type diabetes mellitus without mention of complication, not stated as uncontrolled      Unspecified essential hypertension        Past Surgical History   Past Surgical History:   Procedure Laterality Date     ARTHROPLASTY KNEE Left 12/1/2016    Procedure: ARTHROPLASTY KNEE;  Surgeon: Zachary Arteaga MD;  Location: [facility] OR     ARTHROPLASTY KNEE Right 5/4/2017    Procedure: ARTHROPLASTY KNEE;  Right Total Knee Arthroplasty;  Surgeon: Zachary Arteaga MD;  Location: [facility] OR     Colonoscopy, normal  09/13/2002     PHACOEMULSIFICATION WITH  "STANDARD INTRAOCULAR LENS IMPLANT  6/26/2014    Procedure: PHACOEMULSIFICATION WITH STANDARD INTRAOCULAR LENS IMPLANT;  Surgeon: Dario Mcghee MD;  Location: [facility] OR     PHACOEMULSIFICATION WITH STANDARD INTRAOCULAR LENS IMPLANT Left 9/4/2014    Procedure: PHACOEMULSIFICATION WITH STANDARD INTRAOCULAR LENS IMPLANT;  Surgeon: Dario Mcghee MD;  Location: [facility] OR       Family History    Family History   Problem Relation Age of Onset     Hypertension Mother      DIABETES Mother      CANCER Mother      uterine     Arthritis Father      Genitourinary Problems Father      kidney disease     CANCER Father      bladder cancer     DIABETES Brother      DIABETES Paternal Grandmother      DIABETES Paternal Aunt        History of Present Illness   Suzette Britton is a 72 year old female with PMH significant for CKD stage III, T2DM, HTN, HLD, and 1 prior episode of atrial fibrillation in 2010 who now presents with asymptomatic tachycardia.    Discharged from Med/Surg yesterday afternoon s/p knee replacement.  Some pain to knee, but nothing new since discharge.  Reports had homecare NA come to house this afternoon and noted a \"fast\" heart rate.  She was instructed to present to the ED. She denies chest pain, palpitation, SOB, diaphoresis, and nausea.  Since discharge, the patient also denies fever, chills, nausea, vomiting, myalgias, cold-like symptoms (congestion, pharyngitis, sinus pressure, rhinorrhea), swollen glands, headaches, lightheadedness, dizziness, cough, wheezes, abdominal pain, diarrhea/constipation, dysuria, hematuria, joint swelling, joint pain, leg swelling, and rashes.      Prior to Admission Medications   Prior to Admission Medications   Prescriptions Last Dose Informant Patient Reported? Taking?   Acetaminophen (TYLENOL PO) 5/7/2017 at 1230 Self Yes Yes   Sig: Take 1,000 mg by mouth every 6 hours as needed for mild pain or fever   Cholecalciferol (VITAMIN D PO) 5/7/2017 at 0730 Self Yes Yes   Sig: Take " 1,000 Units by mouth every morning    GLUCOMETER KIT  Self No No   Sig: for testing bid, One Touch or please fill with formulary glucometer   HYDROXYZINE HCL PO on hand not yet Daughter Yes Yes   Sig: Take 25 mg by mouth every 6 hours as needed for itching   aspirin  MG EC tablet 5/7/2017 at 0730 Self No Yes   Sig: Take 1 tablet (325 mg) by mouth daily   atorvastatin (LIPITOR) 10 MG tablet 5/7/2017 at 0730 Self No Yes   Sig: Take 1 tablet (10 mg) by mouth daily   blood glucose monitoring (NO BRAND SPECIFIED) test strip 5/7/2017 at am#151 Self No Yes   Sig: Use to test blood sugars one time daily or as directed  One touch meter - one touch strips   calcium-vitamin D-vitamin K (VIACTIV) 500-500-40 MG-UNT-MCG CHEW 5/7/2017 at 0730 Self Yes Yes   Sig: Take 1 tablet by mouth daily   chlorthalidone (HYGROTON) 25 MG tablet 5/7/2017 at 0730 Self No Yes   Sig: Take 0.5 tablets (12.5 mg) by mouth daily   lisinopril (PRINIVIL/ZESTRIL) 40 MG tablet 5/6/2017 at pm Self No Yes   Sig: Take 1 tablet (40 mg) by mouth daily   metFORMIN (GLUCOPHAGE) 500 MG tablet 5/7/2017 at 0730 Self No Yes   Sig: Take 2 tablets (1,000 mg) by mouth 2 times daily (with meals)   order for DME  Self No Yes   Sig: KNEE CPM   oxyCODONE (ROXICODONE) 5 MG IR tablet 5/7/2017 at 1230 Self No Yes   Sig: Take 1-2 tablets (5-10 mg) by mouth every 4 hours as needed for moderate to severe pain   sennosides (SENOKOT) 8.6 MG tablet 5/7/2017 at 0730 Self Yes Yes   Sig: Take 1 tablet by mouth 2 times daily      Facility-Administered Medications: None       Allergies   Allergies   Allergen Reactions     Nka [No Known Allergies]        Social History   Social History     Social History     Marital status:      Spouse name: Ramana Britton     Number of children: 4     Years of education: 14+     Occupational History      Retired     Social History Main Topics     Smoking status: Never Smoker     Smokeless tobacco: Never Used     Alcohol use No     Drug use:  No     Sexual activity: Not Currently     Partners: Male     Other Topics Concern     Parent/Sibling W/ Cabg, Mi Or Angioplasty Before 65f 55m? Yes     brother     Social History Narrative   Lives with     ROS: 10 point ROS neg other than the symptoms noted above in the HPI.    Physical Exam     BP 99/65  Pulse 147  Temp 97.6  F (36.4  C) (Oral)  Resp 12  Wt 82.5 kg (181 lb 14.1 oz)  SpO2 93%  BMI 31.71 kg/m2     Weight: 181 lbs 14.07 oz Body mass index is 31.71 kg/(m^2).     Constitutional: Alert and oriented x4.  Cooperative.  Appears older than stated age.  Appears in no acute distress.   HEENT: Oropharynx is clear and moist. No evidence of cranial trauma.  Lymph/Hematologic: No occipital, submental, submandibular, anterior or posterior cervical, or supraclavicular lymphadenopathy is appreciated.  Cardiovascular: Irregularly irregular rate/rhythm.  S1 and S2 grossly normal.  No appreciable murmur, rub, gallop. No lower extremity edema.  Respiratory: Clear to auscultation bilaterally. Equal chest expansion.  GI: Soft, non-tender, normal bowel sounds, no hepatosplenomegaly.  Genitourinary: Deferred  Musculoskeletal: Normal muscle bulk and tone. Incision intact with dressing to right knee.  Mildly edematous.  ROM intact.  No overt tenderness.  Appears normal post operative changes.  Skin: Warm and dry, no rashes.   Neurologic: Neck supple. Cranial nerves 3-12 are grossly intact.  is symmetric.     Data   Data reviewed today:     Recent Labs  Lab 05/07/17  1610 05/06/17  0653 05/05/17  0720   WBC 8.0  --   --    HGB 11.1* 11.0* 11.5*   MCV 88  --   --      --   --      --  136   POTASSIUM 4.1  --  4.2   CHLORIDE 93*  --  101   CO2 28  --  24   BUN 39*  --  16   CR 1.56*  --  0.89   ANIONGAP 12  --  11   PROSPER 9.1  --  8.4*   *  --  217*   ALBUMIN 2.9*  --   --    PROTTOTAL 6.8  --   --    BILITOTAL 0.5  --   --    ALKPHOS 160*  --   --    ALT 99*  --   --    *  --   --     TROPI 0.066*  --   --        Recent Results (from the past 24 hour(s))   XR Chest 2 Views    Narrative    CHEST TWO VIEWS 5/7/2017 5:00 PM     HISTORY: Tachycardia, postop.    COMPARISON: None.    FINDINGS: There are no acute infiltrates. The cardiac silhouette is  not enlarged. Pulmonary vasculature is unremarkable.      Impression    IMPRESSION: No acute disease.   Chest CT - IV contrast only - PE protocol    Narrative    CT CHEST AND PULMONARY EMBOLISM ANGIOGRAM  5/7/2017 6:35 PM     HISTORY: Sinus tachycardia, 3 days postop.    COMPARISON: None.    TECHNIQUE: Volumetric helical acquisition of CT images of the chest  from the lung apices to the kidneys were acquired after the  administration of 79 mL Isovue 370  IV contrast. Radiation dose for  this scan was reduced using automated exposure control, adjustment of  the mA and/or kV according to patient size, or iterative  reconstruction technique.    FINDINGS: There is no pulmonary embolism. 0.4 cm nodule in the right  middle lobe image 78. Trace peripheral fibrosis and/or atelectasis.  Question a thickened mid to distal esophagus. There are minimal  coronary vascular calcifications consistent with coronary artery  disease. The heart is not enlarged. Thoracic aorta is minimally  atherosclerotic without evidence of dissection or aneurysm. There is  no pleural or pericardial effusion. There is no pneumothorax. Adrenal  glands are normal. Remainder of the visualized upper abdomen is  unremarkable.      Impression    IMPRESSION:   1. No pulmonary embolism demonstrated.  2. No thoracic aortic dissection or aneurysm.   3. Possible esophageal wall thickening in the distal esophagus.  Consider endoscopy as clinically indicated.  4. 0.4 cm right middle lobe nodule.    Recommendations for one or multiple incidental lung nodules < 6mm :    Low risk patients: No routine follow-up.    High risk patients: Optional follow-up CT at 12 months; if  unchanged, no further  follow-up.    *Low Risk: Minimal or absent history of smoking or other known risk  factors.  *Nonsolid (ground glass) or partly solid nodules may require longer  follow-up to exclude indolent adenocarcinoma.  *Recommendations based on Guidelines for the Management of Incidental  Pulmonary Nodules Detected at CT: From the Fleischner Society 2017,  Radiology 2017.       I personally reviewed the EKG tracing showing atrial fibrillation with RVR.    Siri Mcmahan [facility]

## 2017-05-08 NOTE — PROGRESS NOTES
Goal Outcome Summary    Pt transfers with SBA. Amb 260' with RW and CGA. Performs LE exs x 10. Instructed pt to perform LE exs independently daily and walk 2 more times/day with nsg. Will cont PT daily while in the hospital.    REC: Home with     Physical Therapy Evaluation       05/08/17 1244   Quick Adds   Type of Visit Initial PT Evaluation   Living Environment   Lives With spouse   Living Arrangements house   Home Accessibility stairs to enter home;stairs within home   Number of Stairs to Enter Home 2   Number of Stairs Within Home 7   Stair Railings at Home inside, present on right side;outside, present at both sides   Transportation Available family or friend will provide   Functional Level Prior   Ambulation 0-->independent   Transferring 0-->independent   Toileting 0-->independent   Bathing 0-->independent   Dressing 0-->independent   Eating 0-->independent   Communication 0-->understands/communicates without difficulty   Swallowing 0-->swallows foods/liquids without difficulty   Cognition 0 - no cognition issues reported   Fall history within last six months no   Which of the above functional risks had a recent onset or change? ambulation;transferring   General Information   Onset of Illness/Injury or Date of Surgery - Date 05/07/17   Referring Physician Carlos Thompson PA-C   Patient/Family Goals Statement wants to return home to independent living   Pertinent History of Current Problem (include personal factors and/or comorbidities that impact the POC) Pt admitted with Afib   Cognitive Status Examination   Orientation orientation to person, place and time   Level of Consciousness alert   Follows Commands and Answers Questions 100% of the time   Personal Safety and Judgment intact   Pain Assessment   Patient Currently in Pain Yes, see Vital Sign flowsheet  (R knee pain rated at 2/10)   Range of Motion (ROM)   ROM Quick Adds Knee, Right   Right Knee Extension/Flexion ROM   Right Knee Extension/Flexion  "PROM - degrees -12-93*   Strength   Strength Comments WFL for age except R LE at least antigravity   Bed Mobility   Bed Mobility Comments sit <> supine with SBA and verbal cues   Transfer Skills   Transfer Comments sit <> stand with SBA   Gait Skills   Level of [place]: Gait contact guard   Physical Assist/Nonphysical Assist: Gait 1 person assist   Weight-Bearing Restrictions: Gait weight-bearing as tolerated   Assistive Device for Transfer: Gait rolling walker   Gait Distance 200 feet   Balance   Balance Comments good with RW   General Therapy Interventions   Planned Therapy Interventions gait training;ROM;strengthening   Clinical Impression   Criteria for Skilled Therapeutic Intervention yes, treatment indicated   PT Diagnosis s/p R TKA   Influenced by the following impairments weakness, pain   Functional limitations due to impairments mobility and gait   Clinical Presentation Stable/Uncomplicated   Clinical Presentation Rationale clinical judgement   Clinical Decision Making (Complexity) Low complexity   Therapy Frequency` daily   Predicted Duration of Therapy Intervention (days/wks) 3 days   Anticipated Discharge Disposition Home with Home Therapy   Risk & Benefits of therapy have been explained Yes   Patient, Family & other staff in agreement with plan of care Yes   [facility] [employer] \"6 Clicks\"   2016, Trustees of [facility], under license to [employer].  All rights reserved.   6 Clicks Short Forms Basic Mobility Inpatient Short Form   [facility] [employer]  \"6 Clicks\" V.2 Basic Mobility Inpatient Short Form   1. Turning from your back to your side while in a flat bed without using bedrails? 4 - None   2. Moving from lying on your back to sitting on the side of a flat bed without using bedrails? 4 - None   3. Moving to and from a bed to a chair (including a wheelchair)? 3 - A Little   4. Standing up from a chair using your arms (e.g., wheelchair, or bedside chair)? 4 - None   5. To " walk in hospital room? 3 - A Little   6. Climbing 3-5 steps with a railing? 3 - A Little   Basic Mobility Raw Score (Score out of 24.Lower scores equate to lower levels of function) 21   Total Evaluation Time   Total Evaluation Time (Minutes) 15     See Care Plan for Goals.    Rosetta Cameron PT

## 2017-05-08 NOTE — PROGRESS NOTES
Pt sitting in the chair eating lunch, HR now increased to the 130s, atrial flutter. MD paged with update.

## 2017-05-08 NOTE — PROGRESS NOTES
"[facility] Orthopaedics Progress Note      Post-operative Day:      * No surgery found *      Subjective: Patient doing well. She feels that she is stable and wishes to continue her PT while hospitalized for a-fib. No complaints of knee problems. Had left TKA in December, understands the protocols. Knee stiff and painful with movement, but she is tolerating.    Pain: minimal  Chest pain, SOB:  No      Objective: Patient laying in bed and taking morning pills. No significant pain complaints. Neurovascularly intact to bilateral lower extremities. ROM right knee 5-80 degrees, feels tight at extremes. Dressing intact with dried blood visible but not current. Minimally tender to palpation around knee. Non tender to calf compression bilaterally.  Blood pressure 101/61, pulse 94, temperature 99  F (37.2  C), temperature source Oral, resp. rate 20, height 1.6 m (5' 3\"), weight 74.2 kg (163 lb 9.3 oz), SpO2 94 %.    Patient Vitals for the past 24 hrs:   BP Temp Temp src Pulse Heart Rate Resp SpO2 Height Weight   05/08/17 0747 101/61 99  F (37.2  C) Oral 94 - 20 94 % - -   05/08/17 0600 95/59 98.6  F (37  C) Oral - 83 16 96 % - 74.2 kg (163 lb 9.3 oz)   05/08/17 0408 - - - - 94 - - - -   05/08/17 0400 98/58 - - - 88 16 - - -   05/08/17 0100 91/55 - - - 83 16 - - -   05/08/17 0000 (!) 81/51 98.5  F (36.9  C) Oral - 94 16 - - -   05/07/17 2354 - - - - 88 - - - -   05/07/17 2300 (!) 80/49 - - - 101 16 - - -   05/07/17 2205 110/70 - - - 117 16 - - -   05/07/17 2100 110/70 - Oral - 117 20 - - -   05/07/17 2040 133/80 99.2  F (37.3  C) Oral - 130 20 97 % 1.6 m (5' 3\") 74.2 kg (163 lb 9.3 oz)   05/07/17 2015 112/67 - - - 125 - 95 % - -   05/07/17 2000 103/59 - - - 117 - 95 % - -   05/07/17 1945 112/63 - - - 125 21 96 % - -   05/07/17 1930 115/62 - - - 120 19 93 % - -   05/07/17 1915 99/65 - - - 109 11 93 % - -   05/07/17 1900 113/64 - - - 135 12 94 % - -   05/07/17 1845 103/48 - - - 137 11 97 % - -   05/07/17 1815 113/68 - - - 131 " 14 98 % - -   05/07/17 1800 107/80 - - - 142 10 96 % - -   05/07/17 1756 - - - - 129 10 96 % - -   05/07/17 1745 130/73 - - - 135 13 98 % - -   05/07/17 1720 - - - - 112 13 96 % - -   05/07/17 1715 102/64 - - - 128 11 97 % - -   05/07/17 1706 106/66 - - - 135 10 98 % - -   05/07/17 1705 - - - - 122 11 96 % - -   05/07/17 1700 - - - - 131 22 97 % - -   05/07/17 1658 - - - - 140 - 97 % - -   05/07/17 1657 122/71 - - - - - - - -   05/07/17 1645 (!) 89/55 - - - 124 13 - - -   05/07/17 1640 - - - - 126 13 - - -   05/07/17 1633 - - - - 122 12 - - -   05/07/17 1630 99/69 - - - 134 - - - -   05/07/17 1623 - - - - 135 18 - - -   05/07/17 1617 - - - - 135 14 93 % - -   05/07/17 1615 94/62 - - - 142 11 95 % - -   05/07/17 1610 - - - - 146 8 97 % - -   05/07/17 1600 117/56 - - - 138 - 94 % - -   05/07/17 1555 - - - - - - 97 % - -   05/07/17 1546 (!) 86/56 97.6  F (36.4  C) Oral 147 - 18 97 % - 82.5 kg (181 lb 14.1 oz)       Wt Readings from Last 4 Encounters:   05/08/17 74.2 kg (163 lb 9.3 oz)   05/04/17 83 kg (183 lb)   04/13/17 83.2 kg (183 lb 8 oz)   02/09/17 83.5 kg (184 lb)         Motor function, sensation, and circulation intact   Yes  Wound status: incisions are clean dry and intact. Yes  Calf tenderness: Bilateral  No    Pertinent Labs   Lab Results: personally reviewed.     Recent Labs   Lab Test  05/08/17   0600  05/07/17   1610  05/06/17   0653  05/05/17   0720  04/13/17   1142   07/23/10   1750   INR   --    --    --    --    --    --   0.94   HGB  9.1*  11.1*  11.0*  11.5*  13.9   < >  14.0   HCT  27.9*  34.3*   --    --   42.4   < >  42.1   MCV  89  88   --    --   88   < >  85   PLT  162  206   --    --   220   < >  334   NA  138  133   --   136  138   < >  138   CRP  128.0*  178.0*   --    --    --    --    --     < > = values in this interval not displayed.       Plan: Anticoagulation protocol: Eliquis  Was chosen after patient discussion as treatment for a fib continues.            Pain medications:   oxycodone            Weight bearing status:  WBAT            Disposition:  Cleared by ortho, a fib being monitored by medicine and D/C per medicine. Will continue PT.             Continue cares and rehabilitation     Report completed by:  Carlos Thompson PA-C  Date: 5/8/2017  Time: 8:39 AM

## 2017-05-08 NOTE — PROGRESS NOTES
Pt started on PO metoprolol this AM, titrated off Cardizem drip, HR 80-90s currently, atrial flutter. Pt remains asymptomatic.

## 2017-05-08 NOTE — PROGRESS NOTES
Pt has had 3 doses of 5 mg each of IV metoprolol for an elevated HR of > 110. Per MD pt is receiving 10 mg IV Metoprolol now as well as po dose of 50 mg 2 hours early. B/p stable.. Pt is awake, alert, oriented. Denies any chest discomfort. Will continue to monitor.

## 2017-05-08 NOTE — PLAN OF CARE
Problem: Goal Outcome Summary  Goal: Goal Outcome Summary  Outcome: Improving  VSS, BP a bit lower due to pt sleeping soundly. Cardizem drip continues at 10 mg/hr. Heart rate steady at 80-90's atrial flutter.Pt offers no complaints at present, wishes to sleep. Will continue to monitor.

## 2017-05-08 NOTE — PROGRESS NOTES
Pt awake, assisted to BR with walker and back to bed again without difficulties. Pt asking for pain meds, Oxycodone 10 mg po given with relief of incisional discomfort. AM labs drawn at this time, await results.

## 2017-05-08 NOTE — TELEPHONE ENCOUNTER
S:  Homecare assessment completed on 5-7-17  B:  72 y.o woman with RTKA done on 5-4-17 and home 5-6-17, in need of home PT until she starts oupt PT 5-22-17.  A:  Client in need of skilled home care services.  R:  Contact [facility] if you do not agree with the following plan:  SN assessment 1x; PT to assess and treat at home. Client declined all other home care services.  Irish Negron LPN

## 2017-05-08 NOTE — PLAN OF CARE
Problem: Goal Outcome Summary  Goal: Goal Outcome Summary  OT: Cancel- Per discussion with patient, No ADL concerns at this time. Was seen on 5/4/17 for 1x therapy treatment and discharged from OT services as all goals were met. Recalls all education provided. Plans to work with physical therapy while in hospital. Will discontinue orders at this time.

## 2017-05-08 NOTE — PLAN OF CARE
"Problem: Goal Outcome Summary  Goal: Goal Outcome Summary  Outcome: Improving  [facility] ADMISSION NOTE     Patient admitted to room 1004 at approximately 2040 via cart from emergency room. Patient was accompanied by spouse, nurse and daughter.      Verbal SBAR report received from Wayne GRIFFIN prior to patient arrival.      Patient trasferred to bed via self. Patient alert and oriented X 3. Pain is controlled with current analgesics.  Medication(s) being used: narcotic analgesics including Norco.  . Admission vital signs: Blood pressure 133/80, pulse 147, temperature 99.2  F (37.3  C), temperature source Oral, resp. rate 20, height 1.6 m (5' 3\"), weight 74.2 kg (163 lb 9.3 oz), SpO2 97 %. Patient was oriented to plan of care, call light, bed controls, tv, telephone, bathroom and visiting hours.      The following safety risks were identified during admission: none. Yellow risk band applied: NO. Cardizem drip increased to 10 mg/hr upon admission.     Alba Gilbert          "

## 2017-05-09 ENCOUNTER — APPOINTMENT (OUTPATIENT)
Dept: PHYSICAL THERAPY | Facility: CLINIC | Age: 72
DRG: 308 | End: 2017-05-09
Payer: MEDICARE

## 2017-05-09 LAB
ALBUMIN SERPL-MCNC: 2.4 G/DL (ref 3.4–5)
ALP SERPL-CCNC: 97 U/L (ref 40–150)
ALT SERPL W P-5'-P-CCNC: 44 U/L (ref 0–50)
ANION GAP SERPL CALCULATED.3IONS-SCNC: 8 MMOL/L (ref 3–14)
AST SERPL W P-5'-P-CCNC: 21 U/L (ref 0–45)
BILIRUB SERPL-MCNC: 0.5 MG/DL (ref 0.2–1.3)
BUN SERPL-MCNC: 18 MG/DL (ref 7–30)
CALCIUM SERPL-MCNC: 8.5 MG/DL (ref 8.5–10.1)
CHLORIDE SERPL-SCNC: 106 MMOL/L (ref 94–109)
CO2 SERPL-SCNC: 26 MMOL/L (ref 20–32)
CREAT SERPL-MCNC: 0.82 MG/DL (ref 0.52–1.04)
ERYTHROCYTE [DISTWIDTH] IN BLOOD BY AUTOMATED COUNT: 13 % (ref 10–15)
GFR SERPL CREATININE-BSD FRML MDRD: 68 ML/MIN/1.7M2
GLUCOSE BLDC GLUCOMTR-MCNC: 122 MG/DL (ref 70–99)
GLUCOSE BLDC GLUCOMTR-MCNC: 151 MG/DL (ref 70–99)
GLUCOSE BLDC GLUCOMTR-MCNC: 157 MG/DL (ref 70–99)
GLUCOSE BLDC GLUCOMTR-MCNC: 204 MG/DL (ref 70–99)
GLUCOSE BLDC GLUCOMTR-MCNC: 232 MG/DL (ref 70–99)
GLUCOSE SERPL-MCNC: 150 MG/DL (ref 70–99)
HCT VFR BLD AUTO: 30.4 % (ref 35–47)
HGB BLD-MCNC: 10 G/DL (ref 11.7–15.7)
LACTATE BLD-SCNC: 0.6 MMOL/L (ref 0.7–2.1)
MCH RBC QN AUTO: 29 PG (ref 26.5–33)
MCHC RBC AUTO-ENTMCNC: 32.9 G/DL (ref 31.5–36.5)
MCV RBC AUTO: 88 FL (ref 78–100)
PLATELET # BLD AUTO: 203 10E9/L (ref 150–450)
POTASSIUM SERPL-SCNC: 4.1 MMOL/L (ref 3.4–5.3)
PROT SERPL-MCNC: 6.1 G/DL (ref 6.8–8.8)
RBC # BLD AUTO: 3.45 10E12/L (ref 3.8–5.2)
SODIUM SERPL-SCNC: 140 MMOL/L (ref 133–144)
WBC # BLD AUTO: 5.6 10E9/L (ref 4–11)

## 2017-05-09 PROCEDURE — 00000146 ZZHCL STATISTIC GLUCOSE BY METER IP

## 2017-05-09 PROCEDURE — 25000131 ZZH RX MED GY IP 250 OP 636 PS 637: Mod: GY | Performed by: FAMILY MEDICINE

## 2017-05-09 PROCEDURE — 25000128 H RX IP 250 OP 636: Performed by: FAMILY MEDICINE

## 2017-05-09 PROCEDURE — 83605 ASSAY OF LACTIC ACID: CPT | Performed by: FAMILY MEDICINE

## 2017-05-09 PROCEDURE — 25000132 ZZH RX MED GY IP 250 OP 250 PS 637: Mod: GY | Performed by: PHYSICIAN ASSISTANT

## 2017-05-09 PROCEDURE — A9270 NON-COVERED ITEM OR SERVICE: HCPCS | Mod: GY | Performed by: PHYSICIAN ASSISTANT

## 2017-05-09 PROCEDURE — 97116 GAIT TRAINING THERAPY: CPT | Mod: GP | Performed by: PHYSICAL THERAPIST

## 2017-05-09 PROCEDURE — 36415 COLL VENOUS BLD VENIPUNCTURE: CPT | Performed by: FAMILY MEDICINE

## 2017-05-09 PROCEDURE — 80053 COMPREHEN METABOLIC PANEL: CPT | Performed by: FAMILY MEDICINE

## 2017-05-09 PROCEDURE — 25000125 ZZHC RX 250: Performed by: FAMILY MEDICINE

## 2017-05-09 PROCEDURE — 40000193 ZZH STATISTIC PT WARD VISIT: Performed by: PHYSICAL THERAPIST

## 2017-05-09 PROCEDURE — 97110 THERAPEUTIC EXERCISES: CPT | Mod: GP | Performed by: PHYSICAL THERAPIST

## 2017-05-09 PROCEDURE — 20000003 ZZH R&B ICU

## 2017-05-09 PROCEDURE — 99233 SBSQ HOSP IP/OBS HIGH 50: CPT | Performed by: FAMILY MEDICINE

## 2017-05-09 PROCEDURE — A9270 NON-COVERED ITEM OR SERVICE: HCPCS | Mod: GY | Performed by: FAMILY MEDICINE

## 2017-05-09 PROCEDURE — 85027 COMPLETE CBC AUTOMATED: CPT | Performed by: FAMILY MEDICINE

## 2017-05-09 PROCEDURE — 25000132 ZZH RX MED GY IP 250 OP 250 PS 637: Mod: GY | Performed by: FAMILY MEDICINE

## 2017-05-09 RX ORDER — DILTIAZEM HYDROCHLORIDE 30 MG/1
30 TABLET, FILM COATED ORAL EVERY 6 HOURS SCHEDULED
Status: DISCONTINUED | OUTPATIENT
Start: 2017-05-09 | End: 2017-05-10

## 2017-05-09 RX ORDER — METOPROLOL TARTRATE 100 MG
100 TABLET ORAL 2 TIMES DAILY
Status: DISCONTINUED | OUTPATIENT
Start: 2017-05-09 | End: 2017-05-10

## 2017-05-09 RX ADMIN — SENNOSIDES 1 TABLET: 8.6 TABLET, FILM COATED ORAL at 07:43

## 2017-05-09 RX ADMIN — INSULIN GLARGINE 8 UNITS: 100 INJECTION, SOLUTION SUBCUTANEOUS at 08:31

## 2017-05-09 RX ADMIN — DILTIAZEM HYDROCHLORIDE 30 MG: 30 TABLET, FILM COATED ORAL at 12:21

## 2017-05-09 RX ADMIN — DILTIAZEM HYDROCHLORIDE 30 MG: 30 TABLET, FILM COATED ORAL at 17:40

## 2017-05-09 RX ADMIN — INSULIN ASPART 4 UNITS: 100 INJECTION, SOLUTION INTRAVENOUS; SUBCUTANEOUS at 11:41

## 2017-05-09 RX ADMIN — DILTIAZEM HYDROCHLORIDE 10 MG/HR: 5 INJECTION INTRAVENOUS at 04:34

## 2017-05-09 RX ADMIN — INSULIN ASPART 1 UNITS: 100 INJECTION, SOLUTION INTRAVENOUS; SUBCUTANEOUS at 08:32

## 2017-05-09 RX ADMIN — METOPROLOL TARTRATE 100 MG: 100 TABLET, FILM COATED ORAL at 20:12

## 2017-05-09 RX ADMIN — DILTIAZEM HYDROCHLORIDE 30 MG: 30 TABLET, FILM COATED ORAL at 07:07

## 2017-05-09 RX ADMIN — APIXABAN 5 MG: 5 TABLET, FILM COATED ORAL at 07:43

## 2017-05-09 RX ADMIN — METOPROLOL TARTRATE 75 MG: 50 TABLET ORAL at 07:43

## 2017-05-09 RX ADMIN — ASPIRIN 325 MG: 325 TABLET, DELAYED RELEASE ORAL at 07:43

## 2017-05-09 RX ADMIN — INSULIN ASPART 1 UNITS: 100 INJECTION, SOLUTION INTRAVENOUS; SUBCUTANEOUS at 16:41

## 2017-05-09 RX ADMIN — INSULIN GLARGINE 8 UNITS: 100 INJECTION, SOLUTION SUBCUTANEOUS at 20:12

## 2017-05-09 RX ADMIN — APIXABAN 5 MG: 5 TABLET, FILM COATED ORAL at 20:12

## 2017-05-09 NOTE — PLAN OF CARE
Problem: Cardiac Output Decreased (Adult)  Goal: Identify Related Risk Factors and Signs and Symptoms  Related risk factors and signs and symptoms are identified upon initiation of Human Response Clinical Practice Guideline (CPG)   Outcome: Improving  Since start of Cardizem drip has been at about mid to high 90's with HR, rest of VS have remained stable.  Plan of care unchanged.  In god spirits, states slept well.

## 2017-05-09 NOTE — PROGRESS NOTES
AS per order 15 mg bolus given followed by drip at 10 mg/hr.  VS stable continue to monitor closely.

## 2017-05-09 NOTE — PROGRESS NOTES
[facility]      Subjective:  She feels fine  No fever , chills  'normal' post op pain    Review of Systems:  C: NEGATIVE for fever, chills, change in weight  I: NEGATIVE for worrisome rashes, moles or lesions  E: NEGATIVE for vision changes or irritation  E/M: NEGATIVE for ear, mouth and throat problems  R: NEGATIVE for significant cough or SOB  B: NEGATIVE for masses, tenderness or discharge  CV: NEGATIVE for chest pain, palpitations or peripheral edema  GI: NEGATIVE for nausea, abdominal pain, heartburn, or change in bowel habits  : NEGATIVE for frequency, dysuria, or hematuria  MUSCULOSKELETAL:post op pain  N: NEGATIVE for weakness, dizziness or paresthesias  E: NEGATIVE for temperature intolerance, skin/hair changes  H: NEGATIVE for bleeding problems  P: NEGATIVE for changes in mood or affect    Physical Exam:  Vitals Were Reviewed    Patient Vitals for the past 16 hrs:   BP Temp Temp src Pulse Heart Rate Resp SpO2   05/09/17 0600 129/83 - - - 99 16 -   05/09/17 0500 139/75 - - - 92 - -   05/09/17 0445 136/77 - - - 99 - -   05/09/17 0430 136/89 99.1  F (37.3  C) Oral - 126 15 97 %   05/09/17 0345 - - - - 120 - -   05/09/17 0330 - - - - 115 - -   05/09/17 0255 - - - - 118 - -   05/09/17 0230 - - - - 144 - -   05/09/17 0200 - - - - 98 - -   05/09/17 0100 - - - - 113 - -   05/09/17 0000 - - - - 101 - -   05/08/17 2330 - - - - - - 98 %   05/08/17 2329 (!) 159/97 99.5  F (37.5  C) Oral - 115 18 -   05/08/17 2300 - - - - 93 - -   05/08/17 2200 - - - - 97 - -   05/08/17 2100 - - - - 105 - -   05/08/17 2000 - - - - 92 - 94 %   05/08/17 1955 140/77 98.8  F (37.1  C) Oral 110 115 16 96 %   05/08/17 1745 118/89 - - 120 - 16 -   05/08/17 1740 118/89 - - 120 - 16 -   05/08/17 1720 - - - 118 - - -   05/08/17 1710 - - - 114 - - -   05/08/17 1700 - - - 110 - - -   05/08/17 1645 128/85 - - - - 15 98 %   05/08/17 1615 - - - - - - 98 %   05/08/17 1600 134/70 - - 107 - 16 95 %   05/08/17 1530 - - - 99 - -  -         Intake/Output Summary (Last 24 hours) at 05/09/17 0630  Last data filed at 05/09/17 0500   Gross per 24 hour   Intake           799.83 ml   Output                0 ml   Net           799.83 ml       GENERAL APPEARANCE: healthy, alert and no distress  EYES: conjunctiva clear, eyes grossly normal  RESP: lungs clear to auscultation - no rales, rhonchi or wheezes  CV: regular rate and rhythm, normal S1 S2, no S3 or S4 and no murmur, click or rub   ABDOMEN: soft, nontender, no HSM or masses and bowel sounds normal  MS: no clubbing, cyanosis; no edema  SKIN: clear without significant rashes or lesions    Lab:  Recent Labs   Lab Test  05/08/17   0600  05/07/17   1610   NA  138  133   POTASSIUM  4.2  4.1   CHLORIDE  105  93*   CO2  23  28   ANIONGAP  10  12   GLC  177*  219*   BUN  28  39*   CR  1.18*  1.56*   PROSPER  7.8*  9.1     CBC RESULTS:   Recent Labs   Lab Test  05/08/17   0600  05/07/17   1610   WBC  5.6  8.0   RBC  3.13*  3.90   HGB  9.1*  11.1*   HCT  27.9*  34.3*   PLT  162  206       Results for orders placed or performed during the hospital encounter of 05/07/17 (from the past 24 hour(s))   Glucose by meter   Result Value Ref Range    Glucose 209 (H) 70 - 99 mg/dL   Troponin I   Result Value Ref Range    Troponin I ES 0.039 0.000 - 0.045 ug/L   Glucose by meter   Result Value Ref Range    Glucose 145 (H) 70 - 99 mg/dL   Troponin I   Result Value Ref Range    Troponin I ES 0.038 0.000 - 0.045 ug/L   Glucose by meter   Result Value Ref Range    Glucose 182 (H) 70 - 99 mg/dL   Glucose by meter   Result Value Ref Range    Glucose 122 (H) 70 - 99 mg/dL       Assessment and Plan:    Suzette Britton is a 72 year old female with PMH significant for CKD stage III, T2DM, HTN, HLD, and 1 prior episode of atrial fibrillation in 2010 who now presents with asymptomatic tachycardia.          Acute Atrial Fibrillation  DDx: Cardiac (?ischemia - troponin elevated) versus metabolic (post-op)  CHADS2 of 4 with an annual  "stroke risk of 6.7% w/o anticoagulation.   CT negative for PE. No new drugs other than opioid for pain s/p knee replacement. S/p adenosine, rhythm was \"saw tooth\". Rates initially persistently hovering around 145, now much more labile. May be atrial flutter, but more likely atrial fibrillation. Normal rate (80s) prior to discharge yesterday; no notation indicating irregularly irregular rhythm. No symptoms. Not cardioverted in ED out of concerns for possible paroxysmal Afib given 1 episode in 2010. TSH within normal limits. Urine clean. Flu negative. CXR negative. Given one, 15mg diltiazem bump in ED without effect on rate  - give additional 10mg IV diltiazem now for further rate control  - continue rate control using diltiazem drip.  - starting SQ Lovenox tonight; patient wants to think more about warfarin/novel agent. Discussion had in ED  May 8, 2017 on low dose lovenox  May 9, 2017 on Eliquis, problems with rate control on the oral metoprolol, cardizem bolus and gtt started overnight     NICK (acute kidney injury) (H)   CKD (chronic kidney disease) stage 3, GFR 30-59 ml/min  Baseline creatinine 0.9 - 1.1. 1.56 on arrival. Given 2L fluid bolus in ED  - continue IVF  - hold home chlorthalidone, lisinopril  May 8, 2017 creat 1.18      Elevated lactic acid level  4.0 on arrival. Presumed likely secondary to intravascular volume depletion/poor perfusion secondary to above acute onset atrial fibrillation. Given 2L in ED  May 8, 2017 lactic 1.0      Elevated LFTs  , ALT 99, . Abdominal exam benign. Again, presumed likely secondary to poor perfusion given above atrial fibrillation  May 8, 2017 normalizing      Elevated Troponin  Asymptomatic. 0.066 on arrival. Presumed secondary to above Afib  May 8, 2017 second trop 0.058      S/P total knee arthroplasty  Knee does not appear infected, although both ESR and CRP slightly up. This is consistent with post-op inflammation.  - consider ortho consult should knee " become symptomatic      Essential hypertension  Mild, asymptomatic hypotension on arrival  - hold home chlorthalidone, lisinopril      Type 2 diabetes mellitus with diabetic nephropathy, without long-term current use of insulin (H)  - hold PTA metformin given above NICK  - 5u SQ lantus BID  - high SSI   May 8, 2017 am blood [id number], increase lantus to 8 bid      Hyperlipidemia LDL goal <100  - hold PTA atorvastatin given above LFTs     0.4 cm lung nodule  May 8, 2017 no history of smoking-no fu     Possible thickening distal esophagus on ct  Will clinically correlate          F: lock  N: advance as tolerated  DVT Prophylaxis: Lovenox      Code Status: Full Code      Discussion  reoccurence of a fib/ flutter following knee replacement.  No sign of PE  No overt infectious symptoms  Probably needs work up for ischemic heart ds at some point  Radhika started  Lantus/ss while in hospital  Cards consult as out patient --due to flutter, may be a candidate for ablation   Trouble with rate control overnight, cardizem gtt and bolus, will add oral cardizem and up titrate metoprolol

## 2017-05-09 NOTE — PROGRESS NOTES
[facility]    Hospital Medicine   Care Update  Date of Service: 5/8/2017     I was called due to persistently elevated HR.  Was doing well throughout the day, but now rates sustained in the 120s.  Was given 5mg IV metoprolol for rate control at 1241, 1503, 1647, and 1740.  Given 10 mg IV at 1817 and up titrated metoprolol tartrate dose of 50mg PO.  One hour later, rates remain in 120s. Patient remains asymptomatic.    O: BP 110s/70s.    Sating well on RA.  Physical exam unchanged    A: Asymptomatic tachycardia secondary to known atrial flutter.    P: given addition 10mg IV metoprolol now.  Instruct nursing to inform on-call provider if rates remain elevated.  Should that be the case, will consider given IV diltiazem bolus to continue to work to achieve rate control.  Will need to be care given atrial flutter and potential to block the patient down too far, causing hypotension.  Should diltiazem be neccessary, will plan to give 10mg IV.  May required oral dosing in the morning if rates remain elevated on monotherapy of beta-blocker.      Siri Mcmahan PA-C

## 2017-05-09 NOTE — PROGRESS NOTES
Web page to Dr. Scott about sustained HR's 100 -120 at rest after being up to BR @ 8903.  New orders received for Cardizem drip

## 2017-05-09 NOTE — PLAN OF CARE
Problem: Goal Outcome Summary  Goal: Goal Outcome Summary  PT:  Supine to sit and sit to stand w/ SBA.  Pt amb 250' w/ FWW w/ SBA.  Pt able to do SLR independently.  Pt instructed to continue w/ her exercises independently 1 more time today.   Pt able to d/c to home when medically ready w/ daughters assistance.   Recommend either home PT or outpatient PT for TKA rehab.

## 2017-05-09 NOTE — PROGRESS NOTES
Medicated with Lopressor 10 mg dose over 2 minutes with immediate response of rate down to 90 for short period stil Atrial fibrillation.  Very concerned about going backwards in regard to knee replacement of few days ago so requesting a walk also.  Did take 10 minutes and discussed the importance of her HR stabilizing and knee would need to wait but family still wanting to maintain knee mobility.  Very anxious about such.   Family also demanding that she get her walks in also.  Did get patient to delay for 15 minutes so medications could circulate.

## 2017-05-09 NOTE — PROGRESS NOTES
Pt is  awake, alert, oriented. Up to the shower with minimal assist. Eating and drinking well. Two bowel movements today. Lungs are clear, on room air. SS insulin for glucose coverage. RLE edematous with bruising now around right ankle. Dressing on knee is intact. Pulses are palpable and skin is warm to touch, equal to left leg. Remains in afib, rate controlled with Cardizem and Metoprolol. Cardizem gtt off at 0900. B/P stable. Afebrile.Continue to monitor.

## 2017-05-10 VITALS
HEART RATE: 89 BPM | OXYGEN SATURATION: 97 % | DIASTOLIC BLOOD PRESSURE: 91 MMHG | RESPIRATION RATE: 18 BRPM | TEMPERATURE: 98.7 F | HEIGHT: 63 IN | WEIGHT: 183.86 LBS | SYSTOLIC BLOOD PRESSURE: 131 MMHG | BODY MASS INDEX: 32.58 KG/M2

## 2017-05-10 LAB
ALBUMIN SERPL-MCNC: 2.5 G/DL (ref 3.4–5)
ALP SERPL-CCNC: 94 U/L (ref 40–150)
ALT SERPL W P-5'-P-CCNC: 38 U/L (ref 0–50)
ANION GAP SERPL CALCULATED.3IONS-SCNC: 10 MMOL/L (ref 3–14)
AST SERPL W P-5'-P-CCNC: 16 U/L (ref 0–45)
BILIRUB SERPL-MCNC: 0.6 MG/DL (ref 0.2–1.3)
BUN SERPL-MCNC: 16 MG/DL (ref 7–30)
CALCIUM SERPL-MCNC: 8.8 MG/DL (ref 8.5–10.1)
CHLORIDE SERPL-SCNC: 104 MMOL/L (ref 94–109)
CO2 SERPL-SCNC: 25 MMOL/L (ref 20–32)
CREAT SERPL-MCNC: 0.82 MG/DL (ref 0.52–1.04)
ERYTHROCYTE [DISTWIDTH] IN BLOOD BY AUTOMATED COUNT: 13.1 % (ref 10–15)
GFR SERPL CREATININE-BSD FRML MDRD: 68 ML/MIN/1.7M2
GLUCOSE BLDC GLUCOMTR-MCNC: 169 MG/DL (ref 70–99)
GLUCOSE SERPL-MCNC: 190 MG/DL (ref 70–99)
HCT VFR BLD AUTO: 32.9 % (ref 35–47)
HGB BLD-MCNC: 10.6 G/DL (ref 11.7–15.7)
MCH RBC QN AUTO: 28.4 PG (ref 26.5–33)
MCHC RBC AUTO-ENTMCNC: 32.2 G/DL (ref 31.5–36.5)
MCV RBC AUTO: 88 FL (ref 78–100)
PLATELET # BLD AUTO: 232 10E9/L (ref 150–450)
POTASSIUM SERPL-SCNC: 4.1 MMOL/L (ref 3.4–5.3)
PROT SERPL-MCNC: 6.4 G/DL (ref 6.8–8.8)
RBC # BLD AUTO: 3.73 10E12/L (ref 3.8–5.2)
SODIUM SERPL-SCNC: 139 MMOL/L (ref 133–144)
WBC # BLD AUTO: 6.5 10E9/L (ref 4–11)

## 2017-05-10 PROCEDURE — 99239 HOSP IP/OBS DSCHRG MGMT >30: CPT | Performed by: FAMILY MEDICINE

## 2017-05-10 PROCEDURE — A9270 NON-COVERED ITEM OR SERVICE: HCPCS | Mod: GY | Performed by: FAMILY MEDICINE

## 2017-05-10 PROCEDURE — 97116 GAIT TRAINING THERAPY: CPT | Mod: GP | Performed by: PHYSICAL THERAPIST

## 2017-05-10 PROCEDURE — 36415 COLL VENOUS BLD VENIPUNCTURE: CPT | Performed by: FAMILY MEDICINE

## 2017-05-10 PROCEDURE — 85027 COMPLETE CBC AUTOMATED: CPT | Performed by: FAMILY MEDICINE

## 2017-05-10 PROCEDURE — 25000132 ZZH RX MED GY IP 250 OP 250 PS 637: Mod: GY | Performed by: PHYSICIAN ASSISTANT

## 2017-05-10 PROCEDURE — 97110 THERAPEUTIC EXERCISES: CPT | Mod: GP | Performed by: PHYSICAL THERAPIST

## 2017-05-10 PROCEDURE — 25000131 ZZH RX MED GY IP 250 OP 636 PS 637: Mod: GY | Performed by: FAMILY MEDICINE

## 2017-05-10 PROCEDURE — 80053 COMPREHEN METABOLIC PANEL: CPT | Performed by: FAMILY MEDICINE

## 2017-05-10 PROCEDURE — 40000193 ZZH STATISTIC PT WARD VISIT: Performed by: PHYSICAL THERAPIST

## 2017-05-10 PROCEDURE — A9270 NON-COVERED ITEM OR SERVICE: HCPCS | Mod: GY | Performed by: PHYSICIAN ASSISTANT

## 2017-05-10 PROCEDURE — 00000146 ZZHCL STATISTIC GLUCOSE BY METER IP

## 2017-05-10 PROCEDURE — 25000132 ZZH RX MED GY IP 250 OP 250 PS 637: Mod: GY | Performed by: FAMILY MEDICINE

## 2017-05-10 RX ORDER — METOPROLOL SUCCINATE 100 MG/1
200 TABLET, EXTENDED RELEASE ORAL DAILY
Status: DISCONTINUED | OUTPATIENT
Start: 2017-05-10 | End: 2017-05-10 | Stop reason: HOSPADM

## 2017-05-10 RX ORDER — METOPROLOL SUCCINATE 200 MG/1
200 TABLET, EXTENDED RELEASE ORAL DAILY
Qty: 30 TABLET | Refills: 0 | Status: ON HOLD | OUTPATIENT
Start: 2017-05-10 | End: 2017-05-18

## 2017-05-10 RX ORDER — DILTIAZEM HYDROCHLORIDE 180 MG/1
180 CAPSULE, COATED, EXTENDED RELEASE ORAL DAILY
Qty: 30 CAPSULE | Refills: 0 | Status: ON HOLD | OUTPATIENT
Start: 2017-05-10 | End: 2017-05-18

## 2017-05-10 RX ORDER — DILTIAZEM HYDROCHLORIDE 180 MG/1
180 CAPSULE, COATED, EXTENDED RELEASE ORAL DAILY
Status: DISCONTINUED | OUTPATIENT
Start: 2017-05-10 | End: 2017-05-10 | Stop reason: HOSPADM

## 2017-05-10 RX ADMIN — DILTIAZEM HYDROCHLORIDE 30 MG: 30 TABLET, FILM COATED ORAL at 06:27

## 2017-05-10 RX ADMIN — APIXABAN 5 MG: 5 TABLET, FILM COATED ORAL at 07:38

## 2017-05-10 RX ADMIN — INSULIN ASPART 3 UNITS: 100 INJECTION, SOLUTION INTRAVENOUS; SUBCUTANEOUS at 07:45

## 2017-05-10 RX ADMIN — DILTIAZEM HYDROCHLORIDE 180 MG: 180 CAPSULE, EXTENDED RELEASE ORAL at 07:38

## 2017-05-10 RX ADMIN — DILTIAZEM HYDROCHLORIDE 30 MG: 30 TABLET, FILM COATED ORAL at 01:04

## 2017-05-10 RX ADMIN — METOPROLOL SUCCINATE 200 MG: 100 TABLET, EXTENDED RELEASE ORAL at 07:38

## 2017-05-10 RX ADMIN — SENNOSIDES 1 TABLET: 8.6 TABLET, FILM COATED ORAL at 07:38

## 2017-05-10 RX ADMIN — INSULIN GLARGINE 8 UNITS: 100 INJECTION, SOLUTION SUBCUTANEOUS at 08:19

## 2017-05-10 NOTE — PROGRESS NOTES
Name: Suzette Britton    MRN#: [id number]    Reason for Hospitalization: Elevated troponin [R79.89]  Atrial flutter with rapid ventricular response (H) [I48.92]  Acute renal failure, unspecified acute renal failure type (H) [N17.9]    Discharge Date: 5/10/2017    Patient / Family response to discharge plan: Pt discharged home this am with family and [facility] ([phone] Fax: [phone]).     Follow-Up Appt: Future Appointments  Date Time Provider Department Center   4/19/2017 2:00 PM Irish Us PT [facility]       Other Providers (Care Coordinator, County Services, PCA services etc): No    Discharge Disposition: home    Emy RAYA, [facility], [facility] [phone]

## 2017-05-10 NOTE — PROGRESS NOTES
[facility]      Subjective:  Feels good  No cp  No sob    Review of Systems:  C: NEGATIVE for fever, chills, change in weight  E/M: NEGATIVE for ear, mouth and throat problems  R: NEGATIVE for significant cough or SOB  CV: NEGATIVE for chest pain, palpitations or peripheral edema    Physical Exam:  Vitals Were Reviewed    Patient Vitals for the past 16 hrs:   BP Temp Temp src Pulse Heart Rate Resp SpO2 Weight   05/10/17 0600 132/76 - - - - - - -   05/10/17 0451 - - - 89 99 16 - 83.4 kg (183 lb 13.8 oz)   05/10/17 0400 134/80 - - - 96 - 94 % -   05/10/17 0200 122/75 - - - 98 - - -   05/10/17 0000 131/84 98.4  F (36.9  C) Oral - - 16 95 % -   05/09/17 2200 133/86 - - - - 14 - -   05/09/17 1900 141/82 98.8  F (37.1  C) Oral 92 - 16 96 % -   05/09/17 1800 132/88 - - 94 - - 96 % -   05/09/17 1700 (!) 124/98 - - 90 - - 97 % -   05/09/17 1645 - - - - - - 98 % -   05/09/17 1630 150/86 - - 122 - 16 98 % -   05/09/17 1615 - - - - - - 97 % -   05/09/17 1600 142/84 - - 95 - - 96 % -   05/09/17 1545 - - - - - - 96 % -   05/09/17 1530 124/81 - - 92 - - 97 % -   05/09/17 1515 - - - - - - 95 % -   05/09/17 1500 120/64 - - 100 - - 95 % -   05/09/17 1445 - - - - - - 97 % -         Intake/Output Summary (Last 24 hours) at 05/10/17 0638  Last data filed at 05/09/17 1800   Gross per 24 hour   Intake           1083.5 ml   Output                0 ml   Net           1083.5 ml       GENERAL APPEARANCE: healthy, alert and no distress  EYES: conjunctiva clear, eyes grossly normal  RESP: lungs clear to auscultation - no rales, rhonchi or wheezes  CV: regular rate and rhythm, normal S1 S2, no S3 or S4 and no murmur, click or rub   ABDOMEN: soft, nontender, no HSM or masses and bowel sounds normal  MS: no change  SKIN: clear without significant rashes or lesions    Lab:  Recent Labs   Lab Test  05/09/17   0630  05/08/17   0600   NA  140  138   POTASSIUM  4.1  4.2   CHLORIDE  106  105   CO2  26  23   ANIONGAP  8  10   GLC   "150*  177*   BUN  18  28   CR  0.82  1.18*   PROSPER  8.5  7.8*     CBC RESULTS:   Recent Labs   Lab Test  05/09/17   0630  05/08/17   0600   WBC  5.6  5.6   RBC  3.45*  3.13*   HGB  10.0*  9.1*   HCT  30.4*  27.9*   PLT  203  162       Results for orders placed or performed during the hospital encounter of 05/07/17 (from the past 24 hour(s))   Glucose by meter   Result Value Ref Range    Glucose 157 (H) 70 - 99 mg/dL   Glucose by meter   Result Value Ref Range    Glucose 232 (H) 70 - 99 mg/dL   Glucose by meter   Result Value Ref Range    Glucose 151 (H) 70 - 99 mg/dL   Glucose by meter   Result Value Ref Range    Glucose 204 (H) 70 - 99 mg/dL   Glucose by meter   Result Value Ref Range    Glucose 169 (H) 70 - 99 mg/dL       Assessment and Plan:    Suzette Britton is a 72 year old female with PMH significant for CKD stage III, T2DM, HTN, HLD, and 1 prior episode of atrial fibrillation in 2010 who now presents with asymptomatic tachycardia.          Acute Atrial Fibrillation  DDx: Cardiac (?ischemia - troponin elevated) versus metabolic (post-op)  CHADS2 of 4 with an annual stroke risk of 6.7% w/o anticoagulation.   CT negative for PE. No new drugs other than opioid for pain s/p knee replacement. S/p adenosine, rhythm was \"saw tooth\". Rates initially persistently hovering around 145, now much more labile. May be atrial flutter, but more likely atrial fibrillation. Normal rate (80s) prior to discharge yesterday; no notation indicating irregularly irregular rhythm. No symptoms. Not cardioverted in ED out of concerns for possible paroxysmal Afib given 1 episode in 2010. TSH within normal limits. Urine clean. Flu negative. CXR negative. Given one, 15mg diltiazem bump in ED without effect on rate  - give additional 10mg IV diltiazem now for further rate control  - continue rate control using diltiazem drip.  - starting SQ Lovenox tonight; patient wants to think more about warfarin/novel agent. Discussion had in ED  May 8, " 2017 on low dose lovenox  May 9, 2017 on Eliquis, problems with rate control on the oral metoprolol, cardizem bolus and gtt started overnight  May 10, 2017 on eliquis, consolidating oral metoprolol and cardizem to long acting, rate controlled      NICK (acute kidney injury) (H)   CKD (chronic kidney disease) stage 3, GFR 30-59 ml/min  Baseline creatinine 0.9 - 1.1. 1.56 on arrival. Given 2L fluid bolus in ED  - continue IVF  - hold home chlorthalidone, lisinopril  May 8, 2017 creat 1.18  May 10, 2017 creat 0.82      Elevated lactic acid level  4.0 on arrival. Presumed likely secondary to intravascular volume depletion/poor perfusion secondary to above acute onset atrial fibrillation. Given 2L in ED  May 8, 2017 lactic 1.0      Elevated LFTs  , ALT 99, . Abdominal exam benign. Again, presumed likely secondary to poor perfusion given above atrial fibrillation  May 8, 2017 normalizing  May 10, 2017 normalized      Elevated Troponin  Asymptomatic. 0.066 on arrival. Presumed secondary to above Afib  May 8, 2017 second trop 0.058      S/P total knee arthroplasty  Knee does not appear infected, although both ESR and CRP slightly up. This is consistent with post-op inflammation.  - consider ortho consult should knee become symptomatic      Essential hypertension  Mild, asymptomatic hypotension on arrival  - hold home chlorthalidone, lisinopril      Type 2 diabetes mellitus with diabetic nephropathy, without long-term current use of insulin (H)  - hold PTA metformin given above NICK  - 5u SQ lantus BID  - high SSI   May 8, 2017 am blood [id number], increase lantus to 8 bid      Hyperlipidemia LDL goal <100  - hold PTA atorvastatin given above LFTs      0.4 cm lung nodule  May 8, 2017 no history of smoking-no fu      Possible thickening distal esophagus on ct  No clinical correlation          F: lock  N: advance as tolerated  DVT Prophylaxis: Lovenox      Code Status: Full Code      Discussion  reoccurence of a fib/  flutter following knee replacement.  No sign of PE  No overt infectious symptoms  Might  work up for ischemic heart ds at some point  Eliminh started  Lantus/ss while in hospital  Cards consult as out patient --due to flutter, may be a candidate for ablation   Dc on oral rate agents and Eliquis

## 2017-05-10 NOTE — PROGRESS NOTES
Patients heart rate has remained 90's in atrial flutter. She has denied any pain or discomfort.  Her weight is noticeably different, but bed was re-zeroed and current weight is accurate but believe 2 prior weights were about 11 kg off.  Patient pleasant with no complaints

## 2017-05-10 NOTE — PLAN OF CARE
Problem: Goal Outcome Summary  Goal: Goal Outcome Summary  Physical Therapy Discharge Summary     Reason for therapy discharge:    Discharged to home with home therapy.     Progress towards therapy goal(s). See goals on Care Plan [facility] record for goal details.  Goals partially met.  Barriers to achieving goals:   discharge from facility.     Therapy recommendation(s):    After home therapy patient will greatly benefit from Outpatient PT to further address R-knee ROM, strength and for additional gait training to assist pt in returning to PLOF

## 2017-05-10 NOTE — PROGRESS NOTES
[facility] DISCHARGE NOTE    Patient discharged to home with home care at 11:17 AM via wheel chair. Accompanied by spouse and daughter and staff. Discharge instructions reviewed with patient, spouse and daughter, opportunity offered to ask questions. Prescriptions sent to patients preferred pharmacy. All belongings sent with patient.    Nichole C. Bushweiler

## 2017-05-10 NOTE — PLAN OF CARE
Problem: Goal Outcome Summary  Goal: Goal Outcome Summary  PT: Patient ambulated 275 feet using FWW at SBA, cues for proper gait pattern post-TKA as well as completed series of TKA ROM and strengthening exercises. Tolerated all therapy well. Eager to go home today.     Recommendation: Home with assist from family as needed, initial Home therapy progressing to OP PT

## 2017-05-10 NOTE — DISCHARGE INSTRUCTIONS
See cardiology here in about one week.  Follow up with your primary doctor within one week.  Go to the ER if problems.  Stop lisinopril--the new meds reduce blood pressure--so you pressure may be too low on the lisinopril.  Stop aspirin (now that you are on Eliquis).  You had a questionable thickened area on the lower esophagus- it is unclear if this has any significance. Talk to your primary doctor about it.

## 2017-05-11 ENCOUNTER — CARE COORDINATION (OUTPATIENT)
Dept: CARE COORDINATION | Facility: CLINIC | Age: 72
End: 2017-05-11

## 2017-05-11 NOTE — LETTER
[facility]  [address] [address]  [phone]    May 11, 2017      Suzette Murdock Reba  [address] [facility] [address]    Dear Suzette,  I am the Clinic Care Coordinator that works with your primary care provider's clinic. I wanted to thank you for spending the time to talk with me.  Below is a description of what Clinic Care Coordination is and how I can further assist you.     The Clinic Care Coordinator role is a Registered Nurse and/or  who understands the health care system. The goal of Clinic Care Coordination is to help you manage your health and improve access to the [facility] in the most efficient manner.  The Registered Nurse can assist you in meeting your health care goals by providing education, coordinating services, and strengthening the communication among your providers. The  can assist you with financial, behavioral, psychosocial, and chemical dependency and counseling/psychiatric resources.    Please feel free to keep this letter and contact information to contact me at [phone], [phone] with any further questions or concerns that may arise. We at [facility] are focused on providing you with the highest-quality healthcare experience possible and that all starts with you.       Sincerely,     Gasper Weir RN  Clinic Care Coordinator    Enclosed: I have enclosed a copy of a 24 Hour Access Plan. This has helpful phone numbers for you to call when needed. Please keep this in an easy to access place to use as needed.

## 2017-05-11 NOTE — PROGRESS NOTES
Clinic Care Coordination Contact  OUTREACH    Referral Information:  Referral Source: IP/TCU Report  Reason for Contact: Post hospital outreach     Clinical Concerns:  Current Medical Concerns: Patient reports she is doing well today. Has a follow up scheduled with cardiology next week. No reports of further cardiac symptoms. Patient has home care services resuming today. PT  Will be also resumed. No other concerns reported at this time.     Current Behavioral Concerns: none      Medication Management:  Patient is knowledgeable on medications and is adherent. No financial concerns at this time.        Functional Status:  Mobility Status: Independent  Equipment Currently Used at Home: grab bar, cane, quad, walker, rolling, raised toilet        Plan: 1) Patient will continue to follow treatment plan as directed and follow up with PCP with concerns ongoing.   2) Care Coordination to remain available for future needs. Follow up planned for next week    Gasper Weir RN  Clinic Care Coordinator  [phone] or [phone]

## 2017-05-11 NOTE — DISCHARGE SUMMARY
HISTORY OF PRESENT ILLNESS AND HOSPITAL COURSE:  Suzette Britton is a 72-year-old female with CKD stage III, type 2 diabetes mellitus, hypertension, hyperlipidemia and 1 prior episode of atrial fibrillation in 2010.  She had been discharged from [facility] the day prior to admission following a right knee replacement.  The next day she was seen by homecare.  She was asymptomatic but they noted an elevated heart rate.  She was brought to the hospital and found to be in persistent atrial flutter.      She has a CHADS2 score of 2.  She opted to start Eliquis.  She remained in atrial flutter.  Ultimately, her rate was controlled, but required both metoprolol and Cardizem.  These doses were consolidated to long-acting forms on the day of discharge.  She remained asymptomatic in the hospital despite tachycardia.      She also had acute kidney injury upon arrival with a creatinine of 1.56.  With control of her rate and fluids this resolved.  It was thought to be related to volume depletion and perfusion issues related to her A-fib.      She also had elevated lactic acid of 4.0 upon arrival and this resolved rapidly.  Once again this did not look like sepsis but looked like perfusion issues and volume depletion.      She also had some mildly elevated liver function tests that returned to normal.  Once again it was thought to be related to perfusion problems.      She did have mildly elevated troponin upon admission of 0.066.      At the time of discharge, she was symptom-free, her heart rate was in the 90s with atrial flutter.  I am going to hold her lisinopril upon discharge.  I think she can go without that since her pressure was controlled on the metoprolol and Cardizem.  She will resume her other medications at the time of discharge.      ASSESSMENT:   1.  Acute atrial fibrillation/flutter in a patient with 1 previous episode of this.   2.  Eliquis was initiated for CHADS2 score.   3.  Rapid ventricular response,  treated with diltiazem and metoprolol.   4.  Acute kidney injury related to perfusion issues and volume depletion.   5.  Elevated lactic acid related to perfusion issues and vascular depletion.   6.  Mildly elevated liver function tests that returned to normal/shock liver.   7.  Mild troponin elevation.   8.  Recent right total knee arthroplasty.   9.  History of hypertension.   10.  Diabetes mellitus type 2.   11.  Hyperlipidemia.      PLAN:  She is going to discharge on Cardizem and metoprolol in the sustained release forms.  I am going to hold her lisinopril so that she does not get hypotensive.  I am going to set her up to see Cardiology in about a week to talk to them about the possibility of atrial flutter ablation.  She should follow up with her primary doctor within a week.  She should go to the ER if she has problems in the interim.     Discharge Medication List as of 5/10/2017  5:08 PM      START taking these medications    Details   apixaban ANTICOAGULANT (ELIQUIS) 5 MG tablet Take 1 tablet (5 mg) by mouth 2 times daily, Disp-60 tablet, R-0, E-Prescribe      diltiazem 180 MG 24 hr capsule Take 1 capsule (180 mg) by mouth daily, Disp-30 capsule, R-0, E-Prescribe      metoprolol (TOPROL-XL) 200 MG 24 hr tablet Take 1 tablet (200 mg) by mouth daily, Disp-30 tablet, R-0, E-Prescribe         CONTINUE these medications which have NOT CHANGED    Details   Acetaminophen (TYLENOL PO) Take 1,000 mg by mouth every 6 hours as needed for mild pain or fever, Historical      sennosides (SENOKOT) 8.6 MG tablet Take 1 tablet by mouth 2 times daily, Historical      HYDROXYZINE HCL PO Take 25 mg by mouth every 6 hours as needed for itching, Historical      oxyCODONE (ROXICODONE) 5 MG IR tablet Take 1-2 tablets (5-10 mg) by mouth every 4 hours as needed for moderate to severe pain, Disp-60 tablet, R-0, Local Print      order for DME KNEE CPMDisp-1 Device, R-0, Local Print      chlorthalidone (HYGROTON) 25 MG tablet Take 0.5  tablets (12.5 mg) by mouth daily, Disp-45 tablet, R-3, E-Prescribe      atorvastatin (LIPITOR) 10 MG tablet Take 1 tablet (10 mg) by mouth daily, Disp-90 tablet, R-3, E-Prescribe      metFORMIN (GLUCOPHAGE) 500 MG tablet Take 2 tablets (1,000 mg) by mouth 2 times daily (with meals), Disp-360 tablet, R-1, E-Prescribe      blood glucose monitoring (NO BRAND SPECIFIED) test strip Use to test blood sugars one time daily or as directed  One touch meter - one touch strips, Disp-3 Box, R-3, E-Prescribe      calcium-vitamin D-vitamin K (VIACTIV) 500-500-40 MG-UNT-MCG CHEW Take 1 tablet by mouth daily, Historical      Cholecalciferol (VITAMIN D PO) Take 1,000 Units by mouth every morning , Historical      GLUCOMETER KIT for testing bid, One Touch or please fill with formulary glucometer, Disp-1 Kit, R-0, E-Prescribe         STOP taking these medications       lisinopril (PRINIVIL/ZESTRIL) 40 MG tablet Comments:   Reason for Stopping:         aspirin  MG EC tablet Comments:   Reason for Stopping:             Unresulted Labs Ordered in the Past 30 Days of this Admission     Date and Time Order Name Status Description    2017 1618 Blood culture Preliminary     2017 1618 Blood culture Preliminary                  BK MANNING MD             D: 05/10/2017 07:37   T: 2017 07:24   MT: [id number]      Name:     CHRISTI BROWN   MRN:      [id number]        Account:        [id number]   :      1945           Admit Date:                                       Discharge Date:       Document: [id number]

## 2017-05-11 NOTE — LETTER
Health Care Home - Access Care Plan    About Me  Patient Name:  Suzette Britton    YOB: 1945  Age:                            72 year old   Noé MRN:         [id number] Telephone Information:     Home Phone [phone]   Mobile [phone]       Address:    [address] [address] Email address:  No e-mail address on record      Emergency Contact(s)  Name Relationship Lgl Grd Work Phone Home Phone Mobile Phone   1. STEPHANIE,VICTORINO Spouse  none [phone]   2. NIDIA THOMPSON Daughter  none [phone]             Health Maintenance:      My Access Plan  Medical Emergency 911   Questions or concerns during clinic hours Primary Clinic Line, I will call the clinic directly: Primary Clinic: [facility] [phone]   24 Hour Appointment Line [phone] or  [phone])  (toll free)   24 Hour Nurse Line [phone] (toll free)   Questions or concerns outside clinic hours 24 Hour Appointment Line, I will call the after-hours on-call line:   [facility] [phone] or [phone]) (toll-free)   Preferred Urgent Care Preferred Urgent Care: [facility], [phone]   Preferred Hospital Preferred Hospital: [place], [place]  [phone]   Preferred Pharmacy [facility] Crisis Line [facility], [phone] or [phone]     My Care Team Members  Patient Care Team       Relationship Specialty Notifications Start End    Judy Verde MD PCP - General Family Practice  6/29/12     Phone: [phone] Fax: [phone]         [facility] [address] [address] [address]        My Medical and Care Information  Problem List   Patient Active Problem List   Diagnosis     Osteoarthrosis, hand     Hyperhidrosis     Hyperlipidemia LDL goal <100     Atrial fibrillation (H)     CKD (chronic kidney disease) stage 3, GFR 30-59 ml/min      Preventative health care     Health Care Home     Cataract     Obesity (BMI 30.0-34.9)     S/P total knee arthroplasty     ACP (advance care planning)     Essential hypertension     Type 2 diabetes mellitus with diabetic nephropathy, without long-term current use of insulin (H)     NICK (acute kidney injury) (H)     Elevated lactic acid level     Elevated LFTs     Atrial fibrillation with RVR (H)      Current Medications and Allergies:  See printed Medication Report

## 2017-05-13 LAB
BACTERIA SPEC CULT: NORMAL
BACTERIA SPEC CULT: NORMAL
Lab: NORMAL
Lab: NORMAL
MICRO REPORT STATUS: NORMAL
MICRO REPORT STATUS: NORMAL
SPECIMEN SOURCE: NORMAL
SPECIMEN SOURCE: NORMAL

## 2017-05-16 ENCOUNTER — OFFICE VISIT (OUTPATIENT)
Dept: CARDIOLOGY | Facility: CLINIC | Age: 72
End: 2017-05-16
Attending: FAMILY MEDICINE
Payer: MEDICARE

## 2017-05-16 ENCOUNTER — OFFICE VISIT (OUTPATIENT)
Dept: FAMILY MEDICINE | Facility: CLINIC | Age: 72
End: 2017-05-16
Payer: MEDICARE

## 2017-05-16 ENCOUNTER — HOSPITAL ENCOUNTER (OUTPATIENT)
Dept: CARDIOLOGY | Facility: CLINIC | Age: 72
Discharge: HOME | End: 2017-05-16
Attending: INTERNAL MEDICINE | Admitting: INTERNAL MEDICINE
Payer: MEDICARE

## 2017-05-16 VITALS
TEMPERATURE: 97.8 F | HEIGHT: 64 IN | WEIGHT: 179.8 LBS | HEART RATE: 67 BPM | BODY MASS INDEX: 30.7 KG/M2 | SYSTOLIC BLOOD PRESSURE: 120 MMHG | DIASTOLIC BLOOD PRESSURE: 62 MMHG

## 2017-05-16 VITALS
WEIGHT: 178 LBS | BODY MASS INDEX: 31.53 KG/M2 | DIASTOLIC BLOOD PRESSURE: 74 MMHG | SYSTOLIC BLOOD PRESSURE: 133 MMHG | OXYGEN SATURATION: 96 % | HEART RATE: 64 BPM

## 2017-05-16 DIAGNOSIS — E11.21 TYPE 2 DIABETES MELLITUS WITH DIABETIC NEPHROPATHY, WITHOUT LONG-TERM CURRENT USE OF INSULIN (H): Chronic | ICD-10-CM

## 2017-05-16 DIAGNOSIS — I48.91 ATRIAL FIBRILLATION WITH RVR (H): ICD-10-CM

## 2017-05-16 DIAGNOSIS — I48.3 TYPICAL ATRIAL FLUTTER (H): ICD-10-CM

## 2017-05-16 DIAGNOSIS — I48.3 TYPICAL ATRIAL FLUTTER (H): Primary | ICD-10-CM

## 2017-05-16 DIAGNOSIS — Z96.651 STATUS POST TOTAL RIGHT KNEE REPLACEMENT: ICD-10-CM

## 2017-05-16 DIAGNOSIS — N17.9 AKI (ACUTE KIDNEY INJURY) (H): ICD-10-CM

## 2017-05-16 DIAGNOSIS — D62 ANEMIA DUE TO BLOOD LOSS, ACUTE: ICD-10-CM

## 2017-05-16 DIAGNOSIS — I48.92 ATRIAL FLUTTER, UNSPECIFIED TYPE (H): Primary | ICD-10-CM

## 2017-05-16 DIAGNOSIS — R79.89 ELEVATED LFTS: ICD-10-CM

## 2017-05-16 LAB
ANION GAP SERPL CALCULATED.3IONS-SCNC: 12 MMOL/L (ref 3–14)
BUN SERPL-MCNC: 21 MG/DL (ref 7–30)
CALCIUM SERPL-MCNC: 9.7 MG/DL (ref 8.5–10.1)
CHLORIDE SERPL-SCNC: 100 MMOL/L (ref 94–109)
CO2 SERPL-SCNC: 26 MMOL/L (ref 20–32)
CREAT SERPL-MCNC: 1.09 MG/DL (ref 0.52–1.04)
GFR SERPL CREATININE-BSD FRML MDRD: 49 ML/MIN/1.7M2
GLUCOSE SERPL-MCNC: 146 MG/DL (ref 70–99)
POTASSIUM SERPL-SCNC: 3.9 MMOL/L (ref 3.4–5.3)
SODIUM SERPL-SCNC: 138 MMOL/L (ref 133–144)

## 2017-05-16 PROCEDURE — 99205 OFFICE O/P NEW HI 60 MIN: CPT | Mod: 25 | Performed by: INTERNAL MEDICINE

## 2017-05-16 PROCEDURE — 99214 OFFICE O/P EST MOD 30 MIN: CPT | Performed by: FAMILY MEDICINE

## 2017-05-16 PROCEDURE — 80048 BASIC METABOLIC PNL TOTAL CA: CPT | Performed by: FAMILY MEDICINE

## 2017-05-16 PROCEDURE — 93010 ELECTROCARDIOGRAM REPORT: CPT | Performed by: INTERNAL MEDICINE

## 2017-05-16 PROCEDURE — 93005 ELECTROCARDIOGRAM TRACING: CPT

## 2017-05-16 PROCEDURE — 36415 COLL VENOUS BLD VENIPUNCTURE: CPT | Performed by: FAMILY MEDICINE

## 2017-05-16 NOTE — MR AVS SNAPSHOT
After Visit Summary   5/16/2017    Suzette Britton    MRN: [id number]           Patient Information     Date Of Birth          1945        Visit Information        Provider Department      5/16/2017 11:00 AM Jarrett Tate MD [facility] PHYSICIAN HEART AT [facility]        Today's Diagnoses     Typical atrial flutter (H)    -  1       Follow-ups after your visit        Your next 10 appointments already scheduled     May 16, 2017  2:00 PM CDT   Office Visit with Judy Verde MD   [facility]    [address] [address] [phone]           Bring a current list of meds and any records pertaining to this visit.  For Physicals, please bring immunization records and any forms needing to be filled out.  Please arrive 10 minutes early to complete paperwork.            May 18, 2017  1:30 PM CDT   Ech Ernrique with SHECHHANNY   [facility] ([facility])    [address] [place] [id number] [phone]           1.  Please bring or wear a comfortable two-piece outfit. 2.  Arrival time: -   [facility]:  arrive 75 minutes prior to examination time. -   [facility]:  arrive 90 minutes prior to examination time. -   [facility]:   arrive 15 minutes prior to examination time. 3.  Plan to have someone here to drive you home after the test. -   Someone should stay with you for 6 hours after your test. 4.  No food or drink: -   6 hours before the test 5.  If you take antacids or water pills (diuretics): Do not take them until after your test. You may take blood pressure medicine with a few sips of water. 6.  If you have diabetes: -   Morning slots preferred -   If you take insulin, call your diabetes care team. Ask if you should take a   dose the morning of your test. -   If you take diabetes medicine by mouth, don't take it on the morning of your test. Bring it with you to take after the test. (If  you have questions, call your diabetes care team.) 7.  Bring a list of any medicines you are taking. 8.  Do not drive for 24 hours after the test. 9.   A responsible adult must stay with you for 24 hours after the test.  10.  For any questions that cannot be answered, please contact the ordering physician            May 18, 2017  2:30 PM CDT   Ep 90 Minute with SHCVR4   [facility] Cardiac Catheterization Lab ([facility])    [address]  [place] [address] [phone]            May 22, 2017  1:30 PM CDT   CRIS Extremity with Alva Weaver, PT   [facility] ([facility])    [address] [facility] [address] [phone]              Future tests that were ordered for you today     Open Future Orders        Priority Expected Expires Ordered    EP Ablation/ EP Studies Routine 5/16/2017 5/16/2018 5/16/2017    Transesophageal Echocardiogram Routine 5/23/2017 5/16/2018 5/16/2017    EKG 12-LEAD CLINIC READ ([facility])- to be scheduled Routine 5/16/2017 5/16/2018 5/16/2017            Who to contact     If you have questions or need follow up information about today's clinic visit or your schedule please contact [facility] PHYSICIAN HEART AT [facility] directly at [phone].  Normal or non-critical lab and imaging results will be communicated to you by [employer], letter or phone within 4 business days after the clinic has received the results. If you do not hear from us within 7 days, please contact the clinic through [employer] or phone. If you have a critical or abnormal lab result, we will notify you by phone as soon as possible.  Submit refill requests through [employer] or call your pharmacy and they will forward the refill request to us. Please allow 3 business days for your refill to be completed.          Additional Information About Your Visit        [employer] Information     [employer] gives you secure access to your electronic health  record. If you see a primary care provider, you can also send messages to your care team and make appointments. If you have questions, please call your primary care clinic.  If you do not have a primary care provider, please call [phone] and they will assist you.        Care EveryWhere ID     This is your Care EveryWhere ID. This could be used by other organizations to access your [place] medical records  [id number]        Your Vitals Were     Pulse Pulse Oximetry BMI (Body Mass Index)             64 96% 31.53 kg/m2          Blood Pressure from Last 3 Encounters:   05/16/17 133/74   05/10/17 (!) 131/91   05/06/17 119/65    Weight from Last 3 Encounters:   05/16/17 80.7 kg (178 lb)   05/10/17 83.4 kg (183 lb 13.8 oz)   05/04/17 83 kg (183 lb)               Primary Care Provider Office Phone # Fax #    Judy Verde [phone]       [facility] [address] [address] [address]        Thank you!     Thank you for choosing [facility] PHYSICIAN HEART AT [facility]  for your care. Our goal is always to provide you with excellent care. Hearing back from our patients is one way we can continue to improve our services. Please take a few minutes to complete the written survey that you may receive in the mail after your visit with us. Thank you!             Your Updated Medication List - Protect others around you: Learn how to safely use, store and throw away your medicines at www.disposemymeds.org.          This list is accurate as of: 5/16/17 12:32 PM.  Always use your most recent med list.                   Brand Name Dispense Instructions for use    apixaban ANTICOAGULANT 5 MG tablet    ELIQUIS    60 tablet    Take 1 tablet (5 mg) by mouth 2 times daily       atorvastatin 10 MG tablet    LIPITOR    90 tablet    Take 1 tablet (10 mg) by mouth daily       blood glucose monitoring test strip    no brand specified    3 Box    Use to test blood sugars one time  daily or as directed  One touch meter - one touch strips       calcium-vitamin D-vitamin K 500-500-40 MG-UNT-MCG Chew    VIACTIV     Take 1 tablet by mouth daily       chlorthalidone 25 MG tablet    HYGROTON    45 tablet    Take 0.5 tablets (12.5 mg) by mouth daily       diltiazem 180 MG 24 hr capsule     30 capsule    Take 1 capsule (180 mg) by mouth daily       GLUCOMETER KIT     1 Kit    for testing bid, One Touch or please fill with formulary glucometer       HYDROXYZINE HCL PO      Take 25 mg by mouth every 6 hours as needed for itching       metFORMIN 500 MG tablet    GLUCOPHAGE    360 tablet    Take 2 tablets (1,000 mg) by mouth 2 times daily (with meals)       metoprolol 200 MG 24 hr tablet    TOPROL-XL    30 tablet    Take 1 tablet (200 mg) by mouth daily       order for DME     1 Device    KNEE CPM       oxyCODONE 5 MG IR tablet    ROXICODONE    60 tablet    Take 1-2 tablets (5-10 mg) by mouth every 4 hours as needed for moderate to severe pain       sennosides 8.6 MG tablet    SENOKOT     Take 1 tablet by mouth 2 times daily       TYLENOL PO      Take 1,000 mg by mouth every 6 hours as needed for mild pain or fever       VITAMIN D PO      Take 1,000 Units by mouth every morning

## 2017-05-16 NOTE — LETTER
5/16/2017    Davian Scott MD  [facility]  [address] [place] [address]    RE: Suzette Britton       Dear Colleague,    I had the pleasure of seeing Suzette Britton in the [facility].    Thank you for allowing me to participate in the care of your delightful patient.  As you know, Mrs. Britton is a 72-year-old female with a history of asymptomatic atrial flutter back in 2010 which converted spontaneously, and since then has been doing well for the most part.  Recently she underwent a knee replacement on the right and was discharged to a TCU.  The next day she was noted to have a tachycardia at a rate of about 120 beats per minute.  She went to [facility] emergency and was hospitalized.  The patient was noted to be in atrial flutter.  Again, the patient denies having symptoms such as palpitation or shortness of breath.  Rate control strategy was chosen along with anticoagulation.  She was asked to see me for further evaluation.      The patient otherwise is doing well.  She denies orthopnea, PND and palpitations.  I did review the EKG today from the recent hospitalization as well as today, and both confirm atrial flutter with 2:1 AV conduction.  This is very similar to what she had back in 2010.      We discussed at length about the potential cause of atrial flutter.  In her case, it is probably related to her age and history of hypertension.        We discussed potential complications including tachycardia-induced cardiomyopathy, CHF and CVA.  Finally, we discussed treatment options including rate control, which is not so effective for her given the rate is still fast despite being on a very high dose of both beta blocker and calcium blocker, versus a rhythm control strategy which I would favor for, as this appeared to be typical right-sided atrial flutter.  I would recommend a CARLOS-guided ablation given the very high curative rate rather than wait for the next 2  weeks to complete a 3-week course of anticoagulation before ablation given the rapid ventricular response refractory to medical therapy.  I went over the procedure in detail with risks including but not limited to vascular injury, cardiac perforation and CVA.  It will not be surprised if the patient has an element of tachycardia-induced cardiomyopathy already, but hopefully after restoring and maintaining sinus rhythm, her LV function will recover.  Afterward she does need to be on Eliquis for at least a month and then reassess with probably a 2-week Zio Patch monitor looking for subclinical evidence of atrial fibrillation, as there is a high risk of it in this patient population.      Again, thank you for allowing me to participate in the care of your patient.      Sincerely,    Jarrett Raya MD     [facility]    cc:   Judy Verde MD  [facility]   [address] [address] [facility] [address]

## 2017-05-16 NOTE — PROGRESS NOTES
HPI and Plan:   See dictation  [id number]  Orders Placed This Encounter   Procedures     EKG 12-LEAD CLINIC READ ([facility])- to be scheduled     EP Ablation/ EP Studies     Transesophageal Echocardiogram       No orders of the defined types were placed in this encounter.      There are no discontinued medications.      Encounter Diagnosis   Name Primary?     Typical atrial flutter (H) Yes       CURRENT MEDICATIONS:  Current Outpatient Prescriptions   Medication Sig Dispense Refill     apixaban ANTICOAGULANT (ELIQUIS) 5 MG tablet Take 1 tablet (5 mg) by mouth 2 times daily 60 tablet 0     diltiazem 180 MG 24 hr capsule Take 1 capsule (180 mg) by mouth daily 30 capsule 0     metoprolol (TOPROL-XL) 200 MG 24 hr tablet Take 1 tablet (200 mg) by mouth daily 30 tablet 0     Acetaminophen (TYLENOL PO) Take 1,000 mg by mouth every 6 hours as needed for mild pain or fever       sennosides (SENOKOT) 8.6 MG tablet Take 1 tablet by mouth 2 times daily       HYDROXYZINE HCL PO Take 25 mg by mouth every 6 hours as needed for itching       order for DME KNEE CPM 1 Device 0     chlorthalidone (HYGROTON) 25 MG tablet Take 0.5 tablets (12.5 mg) by mouth daily 45 tablet 3     atorvastatin (LIPITOR) 10 MG tablet Take 1 tablet (10 mg) by mouth daily 90 tablet 3     metFORMIN (GLUCOPHAGE) 500 MG tablet Take 2 tablets (1,000 mg) by mouth 2 times daily (with meals) 360 tablet 1     blood glucose monitoring (NO BRAND SPECIFIED) test strip Use to test blood sugars one time daily or as directed  One touch meter - one touch strips 3 Box 3     calcium-vitamin D-vitamin K (VIACTIV) 500-500-40 MG-UNT-MCG CHEW Take 1 tablet by mouth daily       Cholecalciferol (VITAMIN D PO) Take 1,000 Units by mouth every morning        GLUCOMETER KIT for testing bid, One Touch or please fill with formulary glucometer 1 Kit 0     oxyCODONE (ROXICODONE) 5 MG IR tablet Take 1-2 tablets (5-10 mg) by mouth every 4 hours as needed for moderate to severe pain  (Patient not taking: Reported on 5/16/2017) 60 tablet 0       ALLERGIES     Allergies   Allergen Reactions     Nka [No Known Allergies]        PAST MEDICAL HISTORY:  Past Medical History:   Diagnosis Date     Osteoarthrosis, unspecified whether generalized or localized, hand      Type II or unspecified type diabetes mellitus without mention of complication, not stated as uncontrolled      Unspecified essential hypertension        PAST SURGICAL HISTORY:  Past Surgical History:   Procedure Laterality Date     ARTHROPLASTY KNEE Left 12/1/2016    Procedure: ARTHROPLASTY KNEE;  Surgeon: Zachary Arteaga MD;  Location: [facility] OR     ARTHROPLASTY KNEE Right 5/4/2017    Procedure: ARTHROPLASTY KNEE;  Right Total Knee Arthroplasty;  Surgeon: Zachary Arteaga MD;  Location: [facility] OR     Colonoscopy, normal  09/13/2002     PHACOEMULSIFICATION WITH STANDARD INTRAOCULAR LENS IMPLANT  6/26/2014    Procedure: PHACOEMULSIFICATION WITH STANDARD INTRAOCULAR LENS IMPLANT;  Surgeon: Dario Mcghee MD;  Location: [facility] OR     PHACOEMULSIFICATION WITH STANDARD INTRAOCULAR LENS IMPLANT Left 9/4/2014    Procedure: PHACOEMULSIFICATION WITH STANDARD INTRAOCULAR LENS IMPLANT;  Surgeon: Dario Mcghee MD;  Location: [facility] OR       FAMILY HISTORY:  Family History   Problem Relation Age of Onset     Hypertension Mother      DIABETES Mother      CANCER Mother      uterine     Arthritis Father      Genitourinary Problems Father      kidney disease     CANCER Father      bladder cancer     DIABETES Brother      DIABETES Paternal Grandmother      DIABETES Paternal Aunt        SOCIAL HISTORY:  Social History     Social History     Marital status:      Spouse name: Ramana Britton     Number of children: 4     Years of education: 14+     Occupational History      Retired     Social History Main Topics     Smoking status: Never Smoker     Smokeless tobacco: Never Used     Alcohol use No     Drug use: No     Sexual activity: Not Currently      Partners: Male     Other Topics Concern     Parent/Sibling W/ Cabg, Mi Or Angioplasty Before 65f 55m? Yes     brother     Social History Narrative       Review of Systems:  Skin:  Negative       Eyes:  Positive for glasses    ENT:  Negative      Respiratory:  Negative for shortness of breath     Cardiovascular:  Negative for;palpitations;chest pain;syncope or near-syncope;fatigue;lightheadedness;dizziness edema;Positive for    Gastroenterology: Negative for vomiting;heartburn;nausea    Genitourinary:  Positive for   CKD- patient does not see nephrology  Musculoskeletal:  not assessed      Neurologic:  Negative for memory problems;headaches;stroke;seizures;numbness or tingling of hands;numbness or tingling of feet    Psychiatric:  Negative for anxiety;depression    Heme/Lymph/Imm:  Negative for bleeding disorder    Endocrine:  Positive for diabetes      Physical Exam:  Vitals: /74  Pulse 64  Wt 80.7 kg (178 lb)  SpO2 96%  BMI 31.53 kg/m2    Constitutional:  cooperative, alert and oriented, well developed, well nourished, in no acute distress        Skin:  warm and dry to the touch, no apparent skin lesions or masses noted        Head:  normocephalic, no masses or lesions        Eyes:  pupils equal and round, conjunctivae and lids unremarkable, sclera white, no xanthalasma, EOMS intact, no nystagmus        ENT:  no pallor or cyanosis, dentition good        Neck:  carotid pulses are full and equal bilaterally, JVP normal, no carotid bruit, no thyromegaly        Chest:  normal breath sounds, clear to auscultation, normal A-P diameter, normal symmetry, normal respiratory excursion, no use of accessory muscles          Cardiac:   irregularly irregular rhythm;tachycardic   no presence of murmur            Abdomen:  abdomen soft, non-tender, BS normoactive, no mass, no HSM, no bruits        Vascular: pulses full and equal, no bruits auscultated                                        Extremities and Back:  no  deformities, clubbing, cyanosis, erythema observed              Neurological:  affect appropriate, oriented to time, person and place              CC  Davian Scott MD  [facility]  [address] [place] [address]

## 2017-05-16 NOTE — NURSING NOTE
"Chief Complaint   Patient presents with     Hospital F/U       Initial There were no vitals taken for this visit. Estimated body mass index is 31.53 kg/(m^2) as calculated from the following:    Height as of 5/7/17: 5' 3\" (1.6 m).    Weight as of an earlier encounter on 5/16/17: 178 lb (80.7 kg).  Medication Reconciliation: complete   Justina Viveros CMA    "

## 2017-05-16 NOTE — PROGRESS NOTES
SUBJECTIVE:                                                    Suzette Britton is a 72 year old female who presents to clinic today for the following health issues:          Hospital Follow-up Visit:    Hospital/Nursing Home/IP Rehab Facility: [facility]  Date of Admission: On 05/04/2017 and 05/07/2017  Date of Discharge: 05/06/2017 and 05/10/2017  Reason(s) for Admission: Had surgery, and then was readmitted due to Afib.            Problems taking medications regularly:  None       Medication changes since discharge: None       Problems adhering to non-medication therapy:  None    Summary of hospitalization:  [facility] discharge summary reviewed  Diagnostic Tests/Treatments reviewed.  Follow up needed: cards appointment, upcoming ablation   Other Healthcare Providers Involved in Patient s Care:         Specialist appointment - today - cards   Update since discharge: improved.     Post Discharge Medication Reconciliation: discharge medications reconciled and changed, per note/orders (see AVS).  Plan of care communicated with patient     Coding guidelines for this visit:  Type of Medical   Decision Making Face-to-Face Visit       within 7 Days of discharge Face-to-Face Visit        within 14 days of discharge   Moderate Complexity 95040 [id number]   High Complexity 52518 77300            Right TKA - home on Saturday.    homecare came to her house on Sunday and sent her into the ER with fast heart rate   Suzette noted that she felt  - sluggish, tired. No cp/sob/dizziness   Was found to be in a-flutter with RVR  Started on eliquis  On metoprolol and cardizem   Lisinopril was stopped     NICK  - cre 1.56 in hospital   Dehydrated   Liver functions were elevated too     Saw cards today - is scheduled for a flutter ablation Thursday  Wondering if she should get a second opinion  Nervous about doing this         Component      Latest Ref Rng & Units 4/13/2017 5/8/2017 5/9/2017 5/10/2017   WBC      4.0 - 11.0  10e9/L  5.6 5.6 6.5   RBC Count      3.8 - 5.2 10e12/L  3.13 (L) 3.45 (L) 3.73 (L)   Hemoglobin      11.7 - 15.7 g/dL  9.1 (L) 10.0 (L) 10.6 (L)   Hematocrit      35.0 - 47.0 %  27.9 (L) 30.4 (L) 32.9 (L)   MCV      78 - 100 fl  89 88 88   MCH      26.5 - 33.0 pg  29.1 29.0 28.4   MCHC      31.5 - 36.5 g/dL  32.6 32.9 32.2   RDW      10.0 - 15.0 %  13.0 13.0 13.1   Platelet Count      150 - 450 10e9/L  162 203 232   Diff Method        Automated Method     % Neutrophils      %  62.0     % Lymphocytes      %  22.7     % Monocytes      %  10.3     % Eosinophils      %  4.3     % Basophils      %  0.5     % Immature Granulocytes      %  0.2     Absolute Neutrophil      1.6 - 8.3 10e9/L  3.4     Absolute Lymphocytes      0.8 - 5.3 10e9/L  1.3     Absolute Monocytes      0.0 - 1.3 10e9/L  0.6     Absolute Eosinophils      0.0 - 0.7 10e9/L  0.2     Absolute Basophils      0.0 - 0.2 10e9/L  0.0     Abs Immature Granulocytes      0 - 0.4 10e9/L  0.0     Sodium      133 - 144 mmol/L 138 138 140 139   Potassium      3.4 - 5.3 mmol/L 4.4 4.2 4.1 4.1   Chloride      94 - 109 mmol/L 103 105 106 104   Carbon Dioxide      20 - 32 mmol/L 26 23 26 25   Anion Gap      3 - 14 mmol/L 9 10 8 10   Glucose      70 - 99 mg/dL 129 (H) 177 (H) 150 (H) 190 (H)   Urea Nitrogen      7 - 30 mg/dL 20 28 18 16   Creatinine      0.52 - 1.04 mg/dL 1.04 1.18 (H) 0.82 0.82   GFR Estimate      >60 mL/min/1.7m2 52 (L) 45 (L) 68 68   GFR Estimate If Black      >60 mL/min/1.7m2 63 54 (L) 83 82   Calcium      8.5 - 10.1 mg/dL 9.9 7.8 (L) 8.5 8.8   Bilirubin Total      0.2 - 1.3 mg/dL  0.4 0.5 0.6   Albumin      3.4 - 5.0 g/dL  2.2 (L) 2.4 (L) 2.5 (L)   Protein Total      6.8 - 8.8 g/dL  5.4 (L) 6.1 (L) 6.4 (L)   Alkaline Phosphatase      40 - 150 U/L  103 97 94   ALT      0 - 50 U/L  56 (H) 44 38   AST      0 - 45 U/L  41 21 16     Review of systems:  Fatigue has improved  Feeling normal now  No cp/sob/cough  No n/v   No d/c     Problem list and histories  "reviewed & adjusted, as indicated.  Additional history: as documented      Patient Active Problem List   Diagnosis     Osteoarthrosis, hand     Hyperhidrosis     Hyperlipidemia LDL goal <100     Atrial fibrillation (H)     CKD (chronic kidney disease) stage 3, GFR 30-59 ml/min     [facility]     Cataract     Obesity (BMI 30.0-34.9)     S/P total knee arthroplasty     ACP (advance care planning)     Essential hypertension     Type 2 diabetes mellitus with diabetic nephropathy, without long-term current use of insulin (H)     NICK (acute kidney injury) (H)     Elevated lactic acid level     Elevated LFTs     Atrial fibrillation with RVR (H)     Current Outpatient Prescriptions   Medication     apixaban ANTICOAGULANT (ELIQUIS) 5 MG tablet     diltiazem 180 MG 24 hr capsule     metoprolol (TOPROL-XL) 200 MG 24 hr tablet     Acetaminophen (TYLENOL PO)     order for DME     chlorthalidone (HYGROTON) 25 MG tablet     atorvastatin (LIPITOR) 10 MG tablet     metFORMIN (GLUCOPHAGE) 500 MG tablet     blood glucose monitoring (NO BRAND SPECIFIED) test strip     calcium-vitamin D-vitamin K (VIACTIV) 500-500-40 MG-UNT-MCG CHEW     Cholecalciferol (VITAMIN D PO)     GLUCOMETER KIT     No current facility-administered medications for this visit.         Allergies   Allergen Reactions     Nka [No Known Allergies]        /62 (BP Location: Right arm, Patient Position: Chair, Cuff Size: Adult Regular)  Pulse 67  Temp 97.8  F (36.6  C) (Tympanic)  Ht 5' 3.5\" (1.613 m)  Wt 179 lb 12.8 oz (81.6 kg)  BMI 31.35 kg/m2  GENERAL - Pt is alert and oriented in no acute distress.  Affect is appropriate. Good eye contact.  HEET - Head is normocephalic, atraumatic.    PERRLA,EEMI. Conjunctiva are free of icterus or erythema.     NECK - Neck is supple w/o LA or thyromegaly  RESPIRATORY - Clear to auscultation bilaterally.  No wheezing noted  CV - irreg irreg no murmurs, rubs, gallops.   EXTREM - right " lower leg - mild edema  Scar on right knee is healing well         Assessment/Plan -  (I48.92) Atrial flutter, unspecified type (H)  (primary encounter diagnosis)  Comment: saw cards this a.m. And planned ablation in two days. She is reassured that this is a good approach and common treatment.   Plan:     (I48.91) Atrial fibrillation with RVR (H)  Comment:   Plan:     (D62) Anemia due to blood loss, acute  Comment: hgb dropped 3 points after surgery. Stable while inpt. Recheck in a month. The patient indicates understanding of these issues and agrees with the plan.   Plan: **CBC with platelets FUTURE 2mo            (N17.9) NICK (acute kidney injury) (H)  Comment: improved while inpt. Recheck today. The patient indicates understanding of these issues and agrees with the plan.   Plan: Basic metabolic panel            (Z96.651) Status post total right knee replacement  Comment: doing well - sees ortho next week.   Plan:     (R79.89) Elevated LFTs  Comment: resolved while inpt   Plan:     (E11.21) Type 2 diabetes mellitus with diabetic nephropathy, without long-term current use of insulin (H)  Comment: stable   Plan:         JADEN Verde MD

## 2017-05-16 NOTE — MR AVS SNAPSHOT
After Visit Summary   5/16/2017    Suzette Britton    MRN: [id number]           Patient Information     Date Of Birth          1945        Visit Information        Provider Department      5/16/2017 2:00 PM Judy Verde MD [facility]        Today's Diagnoses     Atrial flutter, unspecified type (H)    -  1    Atrial fibrillation with RVR (H)        Anemia due to blood loss, acute        NICK (acute kidney injury) (H)        Status post total right knee replacement        Elevated LFTs        Type 2 diabetes mellitus with diabetic nephropathy, without long-term current use of insulin (H)           Follow-ups after your visit        Your next 10 appointments already scheduled     May 18, 2017  1:30 PM CDT   Ech Enrrique with [id number]   [facility] ([facility])    [address] [place] [id number] [phone]           1.  Please bring or wear a comfortable two-piece outfit. 2.  Arrival time: -   [facility]:  arrive 75 minutes prior to examination time. -   [facility]:  arrive 90 minutes prior to examination time. -   [facility]:   arrive 15 minutes prior to examination time. 3.  Plan to have someone here to drive you home after the test. -   Someone should stay with you for 6 hours after your test. 4.  No food or drink: -   6 hours before the test 5.  If you take antacids or water pills (diuretics): Do not take them until after your test. You may take blood pressure medicine with a few sips of water. 6.  If you have diabetes: -   Morning slots preferred -   If you take insulin, call your diabetes care team. Ask if you should take a   dose the morning of your test. -   If you take diabetes medicine by mouth, don't take it on the morning of your test. Bring it with you to take after the test. (If you have questions, call your diabetes care team.) 7.  Bring a list of any medicines you are taking. 8.  Do not drive for 24 hours after the test.  9.   A responsible adult must stay with you for 24 hours after the test.  10.  For any questions that cannot be answered, please contact the ordering physician            May 18, 2017  2:30 PM CDT   Ep 90 Minute with SHCVR4   [facility] ([facility])    [address]  [place] [address]   [phone]            May 22, 2017  1:30 PM CDT   CRIS Extremity with Alva Weaver, PT   [facility] ([facility])    [address] [facility] [address] [phone]              Future tests that were ordered for you today     Open Future Orders        Priority Expected Expires Ordered    **CBC with platelets FUTURE 2mo Routine 7/15/2017 9/13/2017 5/16/2017    EP Ablation/ EP Studies Routine 5/16/2017 5/16/2018 5/16/2017    Transesophageal Echocardiogram Routine 5/23/2017 5/16/2018 5/16/2017    EKG 12-LEAD CLINIC [facility])- to be scheduled Routine 5/16/2017 5/16/2018 5/16/2017            Who to contact     Normal or non-critical lab and imaging results will be communicated to you by [employer], letter or phone within 4 business days after the clinic has received the results. If you do not hear from us within 7 days, please contact the clinic through [employer] or phone. If you have a critical or abnormal lab result, we will notify you by phone as soon as possible.  Submit refill requests through [employer] or call your pharmacy and they will forward the refill request to us. Please allow 3 business days for your refill to be completed.          If you need to speak with a  for additional information , please call: [phone]             Additional Information About Your Visit        [employer] Information     [employer] gives you secure access to your electronic health record. If you see a primary care provider, you can also send messages to your care team and make appointments. If you have questions, please call your primary  "care clinic.  If you do not have a primary care provider, please call [phone] and they will assist you.        Care EveryWhere ID     This is your Care EveryWhere ID. This could be used by other organizations to access your [facility] medical records  [id number]        Your Vitals Were     Pulse Temperature Height BMI (Body Mass Index)          67 97.8  F (36.6  C) (Tympanic) 5' 3.5\" (1.613 m) 31.35 kg/m2         Blood Pressure from Last 3 Encounters:   05/16/17 120/62   05/16/17 133/74   05/10/17 (!) 131/91    Weight from Last 3 Encounters:   05/16/17 179 lb 12.8 oz (81.6 kg)   05/16/17 178 lb (80.7 kg)   05/10/17 183 lb 13.8 oz (83.4 kg)              We Performed the Following     Basic metabolic panel        Primary Care Provider Office Phone # Fax #    Judy Verde [phone]       [facility] [address] [facility] [address]        Thank you!     Thank you for choosing [facility]  for your care. Our goal is always to provide you with excellent care. Hearing back from our patients is one way we can continue to improve our services. Please take a few minutes to complete the written survey that you may receive in the mail after your visit with us. Thank you!             Your Updated Medication List - Protect others around you: Learn how to safely use, store and throw away your medicines at www.disposemymeds.org.          This list is accurate as of: 5/16/17  3:16 PM.  Always use your most recent med list.                   Brand Name Dispense Instructions for use    apixaban ANTICOAGULANT 5 MG tablet    ELIQUIS    60 tablet    Take 1 tablet (5 mg) by mouth 2 times daily       atorvastatin 10 MG tablet    LIPITOR    90 tablet    Take 1 tablet (10 mg) by mouth daily       blood glucose monitoring test strip    no brand specified    3 Box    Use to test blood sugars one time daily or as directed  One touch meter - one touch strips       calcium-vitamin D-vitamin " K 500-500-40 MG-UNT-MCG Chew    VIACTIV     Take 1 tablet by mouth daily       chlorthalidone 25 MG tablet    HYGROTON    45 tablet    Take 0.5 tablets (12.5 mg) by mouth daily       diltiazem 180 MG 24 hr capsule     30 capsule    Take 1 capsule (180 mg) by mouth daily       GLUCOMETER KIT     1 Kit    for testing bid, One Touch or please fill with formulary glucometer       metFORMIN 500 MG tablet    GLUCOPHAGE    360 tablet    Take 2 tablets (1,000 mg) by mouth 2 times daily (with meals)       metoprolol 200 MG 24 hr tablet    TOPROL-XL    30 tablet    Take 1 tablet (200 mg) by mouth daily       order for DME     1 Device    KNEE CPM       TYLENOL PO      Take 1,000 mg by mouth every 6 hours as needed for mild pain or fever       VITAMIN D PO      Take 1,000 Units by mouth every morning

## 2017-05-17 ENCOUNTER — TELEPHONE (OUTPATIENT)
Dept: CARDIOLOGY | Facility: CLINIC | Age: 72
End: 2017-05-17

## 2017-05-17 DIAGNOSIS — I48.92 ATRIAL FLUTTER (H): Primary | ICD-10-CM

## 2017-05-17 RX ORDER — SODIUM CHLORIDE 450 MG/100ML
INJECTION, SOLUTION INTRAVENOUS CONTINUOUS
Status: CANCELLED | OUTPATIENT
Start: 2017-05-17

## 2017-05-17 RX ORDER — LIDOCAINE 40 MG/G
CREAM TOPICAL
Status: CANCELLED | OUTPATIENT
Start: 2017-05-17

## 2017-05-17 NOTE — TELEPHONE ENCOUNTER
I called Suzette to go over any last minute instructions and questions regarding her aflutter ablation on 5/18/2017. I discussed the following with the patient: arrival time, check in location, NPO at midnight (Suzette said she was told by Dr. Tate she could eat a small breakfast if it was before 7AM), any medications that need to be held (metformin), and that there will need to be a  to take them home at time of discharge. Patient verbalized understanding. All questions answered. ELIAS Oneil

## 2017-05-17 NOTE — PROGRESS NOTES
HISTORY OF PRESENT ILLNESS:  Thank you for allowing me to participate in the care of your delightful patient.  As you know, Mrs. Britton is a 72-year-old female with a history of asymptomatic atrial flutter back in 2010 which converted spontaneously, and since then has been doing well for the most part.  Recently she underwent a knee replacement on the right and was discharged to a TCU.  The next day she was noted to have a tachycardia at a rate of about 120 beats per minute.  She went to [facility] and was hospitalized.  The patient was noted to be in atrial flutter.  Again, the patient denies having symptoms such as palpitation or shortness of breath.  Rate control strategy was chosen along with anticoagulation.  She was asked to see me for further evaluation.      The patient otherwise is doing well.  She denies orthopnea, PND and palpitations.  I did review the EKG today from the recent hospitalization as well as today, and both confirm atrial flutter with 2:1 AV conduction.  This is very similar to what she had back in 2010.      We discussed at length about the potential cause of atrial flutter.  In her case, it is probably related to her age and history of hypertension.        We discussed potential complications including tachycardia-induced cardiomyopathy, CHF and CVA.  Finally, we discussed treatment options including rate control, which is not so effective for her given the rate is still fast despite being on a very high dose of both beta blocker and calcium blocker, versus a rhythm control strategy which I would favor for, as this appeared to be typical right-sided atrial flutter.  I would recommend a CARLOS-guided ablation given the very high curative rate rather than wait for the next 2 weeks to complete a 3-week course of anticoagulation before ablation given the rapid ventricular response refractory to medical therapy.  I went over the procedure in detail with risks including but not limited to  vascular injury, cardiac perforation and CVA.  It will not be surprised if the patient has an element of tachycardia-induced cardiomyopathy already, but hopefully after restoring and maintaining sinus rhythm, her LV function will recover.  Afterward she does need to be on Eliquis for at least a month and then reassess with probably a 2-week Zio Patch monitor looking for subclinical evidence of atrial fibrillation, as there is a high risk of it in this patient population.      cc:   Judy Verde MD    [facility]    [address] [place]  [address]         CHANDNI OWENS MD             D: 2017 12:31   T: 2017 04:39   MT: OLIVIA      Name:     CHRISTI BROWN   MRN:      [id number]        Account:      [id number]   :      1945           Service Date: 2017      Document: [id number]

## 2017-05-18 ENCOUNTER — APPOINTMENT (OUTPATIENT)
Dept: CARDIOLOGY | Facility: CLINIC | Age: 72
End: 2017-05-18
Attending: INTERNAL MEDICINE
Payer: MEDICARE

## 2017-05-18 ENCOUNTER — HOSPITAL ENCOUNTER (OUTPATIENT)
Dept: CARDIOLOGY | Facility: CLINIC | Age: 72
End: 2017-05-18
Attending: INTERNAL MEDICINE | Admitting: INTERNAL MEDICINE
Payer: MEDICARE

## 2017-05-18 ENCOUNTER — HOSPITAL ENCOUNTER (OUTPATIENT)
Facility: CLINIC | Age: 72
Discharge: HOME | End: 2017-05-18
Attending: INTERNAL MEDICINE | Admitting: INTERNAL MEDICINE
Payer: MEDICARE

## 2017-05-18 VITALS
TEMPERATURE: 98.1 F | HEART RATE: 77 BPM | SYSTOLIC BLOOD PRESSURE: 119 MMHG | OXYGEN SATURATION: 95 % | BODY MASS INDEX: 31.71 KG/M2 | WEIGHT: 179 LBS | HEIGHT: 63 IN | DIASTOLIC BLOOD PRESSURE: 66 MMHG | RESPIRATION RATE: 16 BRPM

## 2017-05-18 DIAGNOSIS — I48.92 ATRIAL FLUTTER WITH RAPID VENTRICULAR RESPONSE (H): ICD-10-CM

## 2017-05-18 DIAGNOSIS — I48.3 TYPICAL ATRIAL FLUTTER (H): ICD-10-CM

## 2017-05-18 LAB
ANION GAP SERPL CALCULATED.3IONS-SCNC: 8 MMOL/L (ref 3–14)
BUN SERPL-MCNC: 20 MG/DL (ref 7–30)
CALCIUM SERPL-MCNC: 9 MG/DL (ref 8.5–10.1)
CHLORIDE SERPL-SCNC: 100 MMOL/L (ref 94–109)
CO2 SERPL-SCNC: 27 MMOL/L (ref 20–32)
CREAT SERPL-MCNC: 0.89 MG/DL (ref 0.52–1.04)
ERYTHROCYTE [DISTWIDTH] IN BLOOD BY AUTOMATED COUNT: 13.5 % (ref 10–15)
GFR SERPL CREATININE-BSD FRML MDRD: 62 ML/MIN/1.7M2
GLUCOSE SERPL-MCNC: 221 MG/DL (ref 70–99)
HCT VFR BLD AUTO: 33.2 % (ref 35–47)
HGB BLD-MCNC: 11.3 G/DL (ref 11.7–15.7)
MCH RBC QN AUTO: 29.3 PG (ref 26.5–33)
MCHC RBC AUTO-ENTMCNC: 34 G/DL (ref 31.5–36.5)
MCV RBC AUTO: 86 FL (ref 78–100)
PLATELET # BLD AUTO: 381 10E9/L (ref 150–450)
POTASSIUM SERPL-SCNC: 3.8 MMOL/L (ref 3.4–5.3)
RBC # BLD AUTO: 3.86 10E12/L (ref 3.8–5.2)
SODIUM SERPL-SCNC: 135 MMOL/L (ref 133–144)
WBC # BLD AUTO: 9.5 10E9/L (ref 4–11)

## 2017-05-18 PROCEDURE — C1732 CATH, EP, DIAG/ABL, 3D/VECT: HCPCS

## 2017-05-18 PROCEDURE — 02563ZZ DESTRUCTION OF RIGHT ATRIUM, PERCUTANEOUS APPROACH: ICD-10-PCS | Performed by: INTERNAL MEDICINE

## 2017-05-18 PROCEDURE — 25000125 ZZHC RX 250: Performed by: INTERNAL MEDICINE

## 2017-05-18 PROCEDURE — 4A023FZ MEASUREMENT OF CARDIAC RHYTHM, PERCUTANEOUS APPROACH: ICD-10-PCS | Performed by: INTERNAL MEDICINE

## 2017-05-18 PROCEDURE — 02K83ZZ MAP CONDUCTION MECHANISM, PERCUTANEOUS APPROACH: ICD-10-PCS | Performed by: INTERNAL MEDICINE

## 2017-05-18 PROCEDURE — 99152 MOD SED SAME PHYS/QHP 5/>YRS: CPT

## 2017-05-18 PROCEDURE — 27210796 ZZH PAD EXTRNAL REFRENCE CARDIAC MAPPING CR14

## 2017-05-18 PROCEDURE — 25000128 H RX IP 250 OP 636: Performed by: INTERNAL MEDICINE

## 2017-05-18 PROCEDURE — 93621 COMP EP EVL L PAC&REC C SINS: CPT | Mod: 26 | Performed by: INTERNAL MEDICINE

## 2017-05-18 PROCEDURE — 27210795 ZZH PAD DEFIB QUICK CR4

## 2017-05-18 PROCEDURE — 40000065 ZZH STATISTIC EKG NON-CHARGEABLE

## 2017-05-18 PROCEDURE — 80048 BASIC METABOLIC PNL TOTAL CA: CPT | Performed by: INTERNAL MEDICINE

## 2017-05-18 PROCEDURE — 93653 COMPRE EP EVAL TX SVT: CPT

## 2017-05-18 PROCEDURE — 85027 COMPLETE CBC AUTOMATED: CPT | Performed by: INTERNAL MEDICINE

## 2017-05-18 PROCEDURE — 99153 MOD SED SAME PHYS/QHP EA: CPT

## 2017-05-18 PROCEDURE — 99153 MOD SED SAME PHYS/QHP EA: CPT | Performed by: INTERNAL MEDICINE

## 2017-05-18 PROCEDURE — 25000132 ZZH RX MED GY IP 250 OP 250 PS 637: Mod: GY | Performed by: INTERNAL MEDICINE

## 2017-05-18 PROCEDURE — 93320 DOPPLER ECHO COMPLETE: CPT | Mod: 26 | Performed by: INTERNAL MEDICINE

## 2017-05-18 PROCEDURE — 4A0234Z MEASUREMENT OF CARDIAC ELECTRICAL ACTIVITY, PERCUTANEOUS APPROACH: ICD-10-PCS | Performed by: INTERNAL MEDICINE

## 2017-05-18 PROCEDURE — 93010 ELECTROCARDIOGRAM REPORT: CPT | Performed by: INTERNAL MEDICINE

## 2017-05-18 PROCEDURE — 99152 MOD SED SAME PHYS/QHP 5/>YRS: CPT | Performed by: INTERNAL MEDICINE

## 2017-05-18 PROCEDURE — 40000857 ZZH STATISTIC TEE INCLUDES SEDATION

## 2017-05-18 PROCEDURE — 93005 ELECTROCARDIOGRAM TRACING: CPT

## 2017-05-18 PROCEDURE — 93653 COMPRE EP EVAL TX SVT: CPT | Performed by: INTERNAL MEDICINE

## 2017-05-18 PROCEDURE — 27210782 ZZH KIT EP TOTES DISP CR7

## 2017-05-18 PROCEDURE — 93621 COMP EP EVL L PAC&REC C SINS: CPT

## 2017-05-18 PROCEDURE — 40000852 ZZH STATISTIC HEART CATH LAB OR EP LAB

## 2017-05-18 PROCEDURE — 93613 INTRACARDIAC EPHYS 3D MAPG: CPT

## 2017-05-18 PROCEDURE — 93325 DOPPLER ECHO COLOR FLOW MAPG: CPT | Mod: 26 | Performed by: INTERNAL MEDICINE

## 2017-05-18 PROCEDURE — 93613 INTRACARDIAC EPHYS 3D MAPG: CPT | Performed by: INTERNAL MEDICINE

## 2017-05-18 PROCEDURE — 93320 DOPPLER ECHO COMPLETE: CPT

## 2017-05-18 PROCEDURE — 93312 ECHO TRANSESOPHAGEAL: CPT | Mod: 26 | Performed by: INTERNAL MEDICINE

## 2017-05-18 RX ORDER — FLUMAZENIL 0.1 MG/ML
0.2 INJECTION, SOLUTION INTRAVENOUS
Status: DISCONTINUED | OUTPATIENT
Start: 2017-05-18 | End: 2017-05-18 | Stop reason: HOSPADM

## 2017-05-18 RX ORDER — NALOXONE HYDROCHLORIDE 0.4 MG/ML
0.4 INJECTION, SOLUTION INTRAMUSCULAR; INTRAVENOUS; SUBCUTANEOUS EVERY 5 MIN PRN
Status: DISCONTINUED | OUTPATIENT
Start: 2017-05-18 | End: 2017-05-18 | Stop reason: HOSPADM

## 2017-05-18 RX ORDER — FUROSEMIDE 10 MG/ML
20-100 INJECTION INTRAMUSCULAR; INTRAVENOUS
Status: DISCONTINUED | OUTPATIENT
Start: 2017-05-18 | End: 2017-05-18 | Stop reason: HOSPADM

## 2017-05-18 RX ORDER — PROMETHAZINE HYDROCHLORIDE 25 MG/ML
6.25-25 INJECTION, SOLUTION INTRAMUSCULAR; INTRAVENOUS EVERY 4 HOURS PRN
Status: DISCONTINUED | OUTPATIENT
Start: 2017-05-18 | End: 2017-05-18 | Stop reason: HOSPADM

## 2017-05-18 RX ORDER — NALOXONE HYDROCHLORIDE 0.4 MG/ML
.1-.4 INJECTION, SOLUTION INTRAMUSCULAR; INTRAVENOUS; SUBCUTANEOUS
Status: DISCONTINUED | OUTPATIENT
Start: 2017-05-18 | End: 2017-05-18 | Stop reason: HOSPADM

## 2017-05-18 RX ORDER — IBUTILIDE FUMARATE 1 MG/10ML
1 INJECTION, SOLUTION INTRAVENOUS
Status: DISCONTINUED | OUTPATIENT
Start: 2017-05-18 | End: 2017-05-18 | Stop reason: HOSPADM

## 2017-05-18 RX ORDER — FENTANYL CITRATE 50 UG/ML
25-50 INJECTION, SOLUTION INTRAMUSCULAR; INTRAVENOUS
Status: DISCONTINUED | OUTPATIENT
Start: 2017-05-18 | End: 2017-05-18 | Stop reason: HOSPADM

## 2017-05-18 RX ORDER — METOPROLOL SUCCINATE 200 MG/1
100 TABLET, EXTENDED RELEASE ORAL DAILY
Qty: 30 TABLET | Refills: 0
Start: 2017-05-18 | End: 2017-06-15

## 2017-05-18 RX ORDER — LORAZEPAM 2 MG/ML
.5-2 INJECTION INTRAMUSCULAR EVERY 10 MIN PRN
Status: DISCONTINUED | OUTPATIENT
Start: 2017-05-18 | End: 2017-05-18 | Stop reason: HOSPADM

## 2017-05-18 RX ORDER — SODIUM CHLORIDE 9 MG/ML
INJECTION, SOLUTION INTRAVENOUS CONTINUOUS PRN
Status: DISCONTINUED | OUTPATIENT
Start: 2017-05-18 | End: 2017-05-18 | Stop reason: HOSPADM

## 2017-05-18 RX ORDER — BUPIVACAINE HYDROCHLORIDE 2.5 MG/ML
10-30 INJECTION, SOLUTION EPIDURAL; INFILTRATION; INTRACAUDAL
Status: DISCONTINUED | OUTPATIENT
Start: 2017-05-18 | End: 2017-05-18 | Stop reason: HOSPADM

## 2017-05-18 RX ORDER — LIDOCAINE HYDROCHLORIDE 40 MG/ML
1.5 SOLUTION TOPICAL ONCE
Status: COMPLETED | OUTPATIENT
Start: 2017-05-18 | End: 2017-05-18

## 2017-05-18 RX ORDER — ONDANSETRON 2 MG/ML
4 INJECTION INTRAMUSCULAR; INTRAVENOUS EVERY 4 HOURS PRN
Status: DISCONTINUED | OUTPATIENT
Start: 2017-05-18 | End: 2017-05-18 | Stop reason: HOSPADM

## 2017-05-18 RX ORDER — FENTANYL CITRATE 50 UG/ML
25 INJECTION, SOLUTION INTRAMUSCULAR; INTRAVENOUS
Status: DISCONTINUED | OUTPATIENT
Start: 2017-05-18 | End: 2017-05-18 | Stop reason: HOSPADM

## 2017-05-18 RX ORDER — LISINOPRIL 10 MG/1
10 TABLET ORAL DAILY
Qty: 90 TABLET | Refills: 3 | Status: SHIPPED | OUTPATIENT
Start: 2017-05-18 | End: 2017-08-25

## 2017-05-18 RX ORDER — KETOROLAC TROMETHAMINE 30 MG/ML
15 INJECTION, SOLUTION INTRAMUSCULAR; INTRAVENOUS
Status: DISCONTINUED | OUTPATIENT
Start: 2017-05-18 | End: 2017-05-18 | Stop reason: HOSPADM

## 2017-05-18 RX ORDER — SODIUM CHLORIDE 450 MG/100ML
INJECTION, SOLUTION INTRAVENOUS CONTINUOUS
Status: DISCONTINUED | OUTPATIENT
Start: 2017-05-18 | End: 2017-05-18 | Stop reason: HOSPADM

## 2017-05-18 RX ORDER — LIDOCAINE HYDROCHLORIDE AND EPINEPHRINE 10; 10 MG/ML; UG/ML
10-30 INJECTION, SOLUTION INFILTRATION; PERINEURAL
Status: DISCONTINUED | OUTPATIENT
Start: 2017-05-18 | End: 2017-05-18 | Stop reason: HOSPADM

## 2017-05-18 RX ORDER — GLYCOPYRROLATE 0.2 MG/ML
0.1 INJECTION, SOLUTION INTRAMUSCULAR; INTRAVENOUS ONCE
Status: COMPLETED | OUTPATIENT
Start: 2017-05-18 | End: 2017-05-18

## 2017-05-18 RX ORDER — ATROPINE SULFATE 0.1 MG/ML
.5-1 INJECTION INTRAVENOUS
Status: DISCONTINUED | OUTPATIENT
Start: 2017-05-18 | End: 2017-05-18 | Stop reason: HOSPADM

## 2017-05-18 RX ORDER — FENTANYL CITRATE 50 UG/ML
25-50 INJECTION, SOLUTION INTRAMUSCULAR; INTRAVENOUS
Status: COMPLETED | OUTPATIENT
Start: 2017-05-18 | End: 2017-05-18

## 2017-05-18 RX ORDER — ADENOSINE 3 MG/ML
6-12 INJECTION, SOLUTION INTRAVENOUS EVERY 5 MIN PRN
Status: DISCONTINUED | OUTPATIENT
Start: 2017-05-18 | End: 2017-05-18 | Stop reason: HOSPADM

## 2017-05-18 RX ORDER — MORPHINE SULFATE 2 MG/ML
1-2 INJECTION, SOLUTION INTRAMUSCULAR; INTRAVENOUS EVERY 5 MIN PRN
Status: DISCONTINUED | OUTPATIENT
Start: 2017-05-18 | End: 2017-05-18 | Stop reason: HOSPADM

## 2017-05-18 RX ORDER — LIDOCAINE 40 MG/G
CREAM TOPICAL
Status: DISCONTINUED | OUTPATIENT
Start: 2017-05-18 | End: 2017-05-18 | Stop reason: HOSPADM

## 2017-05-18 RX ORDER — LIDOCAINE HYDROCHLORIDE 10 MG/ML
10-30 INJECTION, SOLUTION EPIDURAL; INFILTRATION; INTRACAUDAL; PERINEURAL
Status: DISCONTINUED | OUTPATIENT
Start: 2017-05-18 | End: 2017-05-18 | Stop reason: HOSPADM

## 2017-05-18 RX ORDER — PROTAMINE SULFATE 10 MG/ML
5-40 INJECTION, SOLUTION INTRAVENOUS EVERY 10 MIN PRN
Status: DISCONTINUED | OUTPATIENT
Start: 2017-05-18 | End: 2017-05-18 | Stop reason: HOSPADM

## 2017-05-18 RX ORDER — HEPARIN SODIUM 1000 [USP'U]/ML
1000-10000 INJECTION, SOLUTION INTRAVENOUS; SUBCUTANEOUS EVERY 5 MIN PRN
Status: DISCONTINUED | OUTPATIENT
Start: 2017-05-18 | End: 2017-05-18 | Stop reason: HOSPADM

## 2017-05-18 RX ORDER — IBUTILIDE FUMARATE 1 MG/10ML
0.01 INJECTION, SOLUTION INTRAVENOUS
Status: DISCONTINUED | OUTPATIENT
Start: 2017-05-18 | End: 2017-05-18 | Stop reason: HOSPADM

## 2017-05-18 RX ORDER — LIDOCAINE HYDROCHLORIDE 10 MG/ML
10-30 INJECTION, SOLUTION EPIDURAL; INFILTRATION; INTRACAUDAL; PERINEURAL
Status: COMPLETED | OUTPATIENT
Start: 2017-05-18 | End: 2017-05-18

## 2017-05-18 RX ORDER — DOBUTAMINE HYDROCHLORIDE 200 MG/100ML
5-40 INJECTION INTRAVENOUS CONTINUOUS PRN
Status: DISCONTINUED | OUTPATIENT
Start: 2017-05-18 | End: 2017-05-18 | Stop reason: HOSPADM

## 2017-05-18 RX ORDER — PROTAMINE SULFATE 10 MG/ML
1-5 INJECTION, SOLUTION INTRAVENOUS
Status: DISCONTINUED | OUTPATIENT
Start: 2017-05-18 | End: 2017-05-18 | Stop reason: HOSPADM

## 2017-05-18 RX ORDER — DIPHENHYDRAMINE HYDROCHLORIDE 50 MG/ML
25-50 INJECTION INTRAMUSCULAR; INTRAVENOUS
Status: DISCONTINUED | OUTPATIENT
Start: 2017-05-18 | End: 2017-05-18 | Stop reason: HOSPADM

## 2017-05-18 RX ADMIN — MIDAZOLAM HYDROCHLORIDE 2 MG: 1 INJECTION, SOLUTION INTRAMUSCULAR; INTRAVENOUS at 15:05

## 2017-05-18 RX ADMIN — BENZOCAINE 1 ML: 220 SPRAY, METERED PERIODONTAL at 13:42

## 2017-05-18 RX ADMIN — FENTANYL CITRATE 50 MCG: 50 INJECTION, SOLUTION INTRAMUSCULAR; INTRAVENOUS at 15:05

## 2017-05-18 RX ADMIN — FENTANYL CITRATE 25 MCG: 50 INJECTION, SOLUTION INTRAMUSCULAR; INTRAVENOUS at 13:45

## 2017-05-18 RX ADMIN — MIDAZOLAM 1 MG: 1 INJECTION INTRAMUSCULAR; INTRAVENOUS at 13:50

## 2017-05-18 RX ADMIN — LIDOCAINE HYDROCHLORIDE 100 MG: 10 INJECTION, SOLUTION EPIDURAL; INFILTRATION; INTRACAUDAL; PERINEURAL at 15:04

## 2017-05-18 RX ADMIN — MIDAZOLAM 1 MG: 1 INJECTION INTRAMUSCULAR; INTRAVENOUS at 13:45

## 2017-05-18 RX ADMIN — FENTANYL CITRATE 50 MCG: 50 INJECTION, SOLUTION INTRAMUSCULAR; INTRAVENOUS at 15:09

## 2017-05-18 RX ADMIN — FENTANYL CITRATE 50 MCG: 50 INJECTION, SOLUTION INTRAMUSCULAR; INTRAVENOUS at 14:54

## 2017-05-18 RX ADMIN — GLYCOPYRROLATE 0.1 MG: 0.2 INJECTION, SOLUTION INTRAMUSCULAR; INTRAVENOUS at 13:18

## 2017-05-18 RX ADMIN — FENTANYL CITRATE 25 MCG: 50 INJECTION, SOLUTION INTRAMUSCULAR; INTRAVENOUS at 13:41

## 2017-05-18 RX ADMIN — SODIUM CHLORIDE: 9 INJECTION, SOLUTION INTRAVENOUS at 13:09

## 2017-05-18 RX ADMIN — LIDOCAINE HYDROCHLORIDE 1.5 ML: 40 SOLUTION TOPICAL at 13:22

## 2017-05-18 RX ADMIN — FENTANYL CITRATE 25 MCG: 50 INJECTION, SOLUTION INTRAMUSCULAR; INTRAVENOUS at 13:50

## 2017-05-18 RX ADMIN — MIDAZOLAM 1 MG: 1 INJECTION INTRAMUSCULAR; INTRAVENOUS at 13:44

## 2017-05-18 NOTE — PROCEDURES
Successful ablation of typical right sided atrial flutter. Reduce Toprol from 200 mg daily to 100 mg daily and stop Diltiazem. Start Lisinopril 10  Mg daily. Continue with Eliquis 5 mg bid for 30 days.

## 2017-05-18 NOTE — PROGRESS NOTES
Pre procedure plan of care reviewed with pt and family prior to sedation. All questions answered and pt appear to accept and understand.

## 2017-05-18 NOTE — IP AVS SNAPSHOT
MRN:[id number]                      After Visit Summary   5/18/2017    Suzette Britton    MRN: [id number]           Visit Information        Department      5/18/2017 11:05 AM [facility]          Review of your medicines      START taking        Dose / Directions    lisinopril 10 MG tablet   Commonly known as:  PRINIVIL/ZESTRIL   Used for:  Typical atrial flutter (H)        Dose:  10 mg   Take 1 tablet (10 mg) by mouth daily   Quantity:  90 tablet   Refills:  3         CONTINUE these medicines which may have CHANGED, or have new prescriptions. If we are uncertain of the size of tablets/capsules you have at home, strength may be listed as something that might have changed.        Dose / Directions    metoprolol 200 MG 24 hr tablet   Commonly known as:  TOPROL-XL   This may have changed:  how much to take   Used for:  Atrial flutter with rapid ventricular response (H)        Dose:  100 mg   Take 0.5 tablets (100 mg) by mouth daily   Quantity:  30 tablet   Refills:  0         CONTINUE these medicines which have NOT CHANGED        Dose / Directions    apixaban ANTICOAGULANT 5 MG tablet   Commonly known as:  ELIQUIS   Used for:  Atrial flutter with rapid ventricular response (H)        Dose:  5 mg   Take 1 tablet (5 mg) by mouth 2 times daily   Quantity:  60 tablet   Refills:  0       blood glucose monitoring test strip   Commonly known as:  no brand specified   Used for:  CKD (chronic kidney disease) stage 3, GFR 30-59 ml/min        Use to test blood sugars one time daily or as directed  One touch meter - one touch strips   Quantity:  3 Box   Refills:  3       calcium-vitamin D-vitamin K 500-500-40 MG-UNT-MCG Chew   Commonly known as:  VIACTIV        Dose:  1 tablet   Take 1 tablet by mouth daily   Refills:  0       chlorthalidone 25 MG tablet   Commonly known as:  HYGROTON   Used for:  Essential hypertension        Dose:  12.5 mg   Take 0.5 tablets (12.5 mg) by mouth daily    Quantity:  45 tablet   Refills:  3       GLUCOMETER KIT   Used for:  Type II or unspecified type diabetes mellitus without mention of complication, not stated as uncontrolled        for testing bid, One Touch or please fill with formulary glucometer   Quantity:  1 Kit   Refills:  0       LIPITOR PO        Dose:  10 mg   Take 10 mg by mouth daily   Refills:  0       metFORMIN 500 MG tablet   Commonly known as:  GLUCOPHAGE   Used for:  Type 2 diabetes mellitus with diabetic nephropathy, without long-term current use of insulin (H)        Dose:  1000 mg   Take 2 tablets (1,000 mg) by mouth 2 times daily (with meals)   Quantity:  360 tablet   Refills:  1       order for DME        KNEE CPM   Quantity:  1 Device   Refills:  0       TYLENOL PO        Dose:  1000 mg   Take 1,000 mg by mouth every 6 hours as needed for mild pain or fever   Refills:  0       VITAMIN D PO        Dose:  1000 Units   Take 1,000 Units by mouth every morning   Refills:  0         STOP taking     diltiazem 180 MG 24 hr capsule                Where to get your medicines      These medications were sent to [facility] [address] [place] [address] [address]   [address] [place], [place] [address]     Phone:  [phone]     lisinopril 10 MG tablet         Some of these will need a paper prescription and others can be bought over the counter. Ask your nurse if you have questions.     You don't need a prescription for these medications     metoprolol 200 MG 24 hr tablet               Prescriptions were sent or printed at these locations (2 Prescriptions)                   [facility] [address] [place] [address] [address]    [address] [place] [address]    Telephone:  [phone]   Fax:  [phone]   Hours:                  E-Prescribed (1 of 2)         lisinopril (PRINIVIL/ZESTRIL) 10 MG tablet                 Not Printed or Sent (1 of 2)         metoprolol (TOPROL-XL) 200 MG 24 hr tablet                 Protect others  around you: Learn how to safely use, store and throw away your medicines at www.disposemymeds.org.         Follow-ups after your visit        Your next 10 appointments already scheduled     May 22, 2017  1:30 PM CDT   [facility] Extremity with Alva Weaver, PT   [facility] ([facility])    [address] [place] [address]   [phone]            Gurjit 15, 2017  1:10 PM CDT   [facility] EP RETURN with Brittni Rivera PA-C   [facility] [place] ([facility])    [address]  [place] [id number] [phone]               Care Instructions        Further instructions from your care team       Atrial Flutter Ablation Discharge Instructions - Femoral     After you go home:      Have an adult stay with you until tomorrow.    You may resume your normal diet.       For 24 hours - due to the sedation you received:    Relax and take it easy.    Do NOT make any important or legal decisions.    Do NOT drive or operate machines at home or at work.    Do NOT drink alcohol.    Care of Groin Puncture Site:      For the first 24 hrs - check the puncture site every 1-2 hours while awake.    For 2 days, when you cough, sneeze, laugh or move your bowels, hold your hand over the puncture site and press firmly.    Remove the bandaid after 24 hours. If there is minor oozing, apply another bandaid and remove it after 12 hours.    It is normal to have a small bruise or pea size lump at the site.    You may shower tomorrow.  Do NOT take a bath, or use a hot tub or pool for at least 3 days. Do NOT scrub the site. Do not use lotion or powder near the puncture site.    Activity:            For 2 days:    No stooping or squatting    Do NOT do any heavy activity such as exercise, lifting, or straining.     No housework, yard work or any activity that make you sweat    Do NOT lift more than 10 pounds    Bleeding:      If you start bleeding from the site in your  groin, lie down flat and press firmly on the site for 10 minutes.     Once bleeding stops, lay flat for 2 hours.    Call [facility] as soon as you can.       Call 911 right away if you have heavy bleeding or bleeding that does not stop.      Medicines:      Take your medications, including blood thinners, unless your provider tells you not to.    If you have stopped any other medicines, check with your provider about when to restart them.    If you have pain or shortness of breath, you may take Advil (ibuprofen) or Tylenol (acetaminophen).    Follow Up Appointments:      An appointment has been set up for you for follow-up care    You will receive a phone call tomorrow morning from Renetta GRIFFIN or Roxie GRIFFIN.    Call the clinic if:      You have increased pain or a large or growing hard lump around the site.    The site is red, swollen, hot or tender.    Blood or fluid is draining from the site.    You have chills or a fever greater than 101 F (38 C).    Your leg feels numb, cool or changes color.    Increased pain in the chest and/or groin.    Increased shortness of breath    Chest pain not relieved by Tylenol or Advil    New pain in the back or belly that you cannot control with Tylenol.    Recurrent irregular or fast heart rate (AFlutter) lasting over 2 hours.    Any questions or concerns.    Heart rhythms:    You may have some irregular heartbeats. These feel very strong. They may make you feel that the fast heart rhythm is going to start again.  Give it time. The irregular beats should occur less often.       [facility]:    [phone] ( 8am-5pm M-F)  ELIAS Jackson or ELIAS Donohue    [phone] [facility] (7 days a week)    ------------    CARLOS  (Transesophageal Echocardiogram)  Discharge Instructions    After you go home:      Have an adult stay with you for 6 hours.       For 24 hours - due to the sedation you received:    Relax and take it easy.    Do NOT make any important or legal  decisions.    Do NOT drive or operate machines at home or at work.    Do NOT drink alcohol.    Diet:    You may resume your normal diet, but no scratchy foods for two days.    If your throat is sore, eat cold, bland or soft foods.    You may have heartburn if the tube used in the exam entered your stomach.  If so:   - Do not eat acidic and spicy foods.   - Do not eat three hours before bedtime. Clear liquids are okay.   - When lying down, use two pillows to raise your head.    Medicines:      Take your medications, including blood thinners, unless your provider tells you not to.    If you have stopped any other medicines, check with your provider about when to restart them.    You may take Tylenol (Acetaminophen) if your throat is sore.    You may take antacids if you have heartburn.      Follow Up Appointments:      Follow up with your cardiologist at [facility] of patient preference as instructed.    Follow up with your primary care provider as needed.    Call the clinic if:      You have heartburn that is severe or lasts more than 72 hours.    You have a sore throat that feels worse after 72 hours.    You have shortness of breath, neck pain, chest pain, fever, chills, coughing up blood, or other unusual signs.    Questions or concerns      [facility] [place]:    [phone] [facility] (7 days a week)         Additional Information About Your Visit        MyChart Information     [employer] gives you secure access to your electronic health record. If you see a primary care provider, you can also send messages to your care team and make appointments. If you have questions, please call your primary care clinic.  If you do not have a primary care provider, please call [phone] and they will assist you.        Care EveryWhere ID     This is your Care EveryWhere ID. This could be used by other organizations to access your [place] medical records  [id number]        Your Vitals Were   "   Blood Pressure Pulse Temperature Respirations Height Weight    117/72 61 98.1  F (36.7  C) (Oral) 16 1.6 m (5' 3\") 81.2 kg (179 lb)    Pulse Oximetry BMI (Body Mass Index)                99% 31.71 kg/m2           Primary Care Provider Office Phone # Fax #    Judy Franci Verde [phone]      Thank you!     Thank you for choosing [facility] for your care. Our goal is always to provide you with excellent care. Hearing back from our patients is one way we can continue to improve our services. Please take a few minutes to complete the written survey that you may receive in the mail after you visit with us. Thank you!             Medication List: This is a list of all your medications and when to take them. Check marks below indicate your daily home schedule. Keep this list as a reference.      Medications           Morning Afternoon Evening Bedtime As Needed    apixaban ANTICOAGULANT 5 MG tablet   Commonly known as:  ELIQUIS   Take 1 tablet (5 mg) by mouth 2 times daily                                blood glucose monitoring test strip   Commonly known as:  no brand specified   Use to test blood sugars one time daily or as directed  One touch meter - one touch strips                                calcium-vitamin D-vitamin K 500-500-40 MG-UNT-MCG Chew   Commonly known as:  VIACTIV   Take 1 tablet by mouth daily                                chlorthalidone 25 MG tablet   Commonly known as:  HYGROTON   Take 0.5 tablets (12.5 mg) by mouth daily                                GLUCOMETER KIT   for testing bid, One Touch or please fill with formulary glucometer                                LIPITOR PO   Take 10 mg by mouth daily                                lisinopril 10 MG tablet   Commonly known as:  PRINIVIL/ZESTRIL   Take 1 tablet (10 mg) by mouth daily                                metFORMIN 500 MG tablet   Commonly known as:  GLUCOPHAGE   Take 2 tablets (1,000 mg) by mouth 2 times daily (with " meals)                                metoprolol 200 MG 24 hr tablet   Commonly known as:  TOPROL-XL   Take 0.5 tablets (100 mg) by mouth daily                                order for DME   KNEE CPM                                TYLENOL PO   Take 1,000 mg by mouth every 6 hours as needed for mild pain or fever                                VITAMIN D PO   Take 1,000 Units by mouth every morning

## 2017-05-18 NOTE — PROGRESS NOTES
1545 Pt returned from EP Lab. Gauze drsg CDI to right groin puncture sites. No oozing or hematoma noted. Area soft & flat. Pt denies pain. Pt instructed on activity restrictions while on bedrest. Verbal understanding received from pt.  1555 Pt's family at bedside. Detailed update given.   1730 Pt taking diet & flds well. No complaints.  1930 Discharge teaching & instructions given to both pt & family w/ verbal understanding received. All questions & concerns addressed.  1940 OOB - steady on feet. Ambulated in halls to bathroom with good kitty. No change in puncture site assessment with activity.  2020 Pt discharged per w/c to private vehicle. All personal belongings sent w/ pt.

## 2017-05-18 NOTE — PROGRESS NOTES
Pt tolerated procedure well with Dr. Sargent.  Reported CARLOS result with no clot. OK to proceed with ablation.

## 2017-05-18 NOTE — IP AVS SNAPSHOT
[place] [address]    [place] [address]    Phone:  [phone]                                       After Visit Summary   5/18/2017    Suzette Britton    MRN: [id number]           After Visit Summary Signature Page     I have received my discharge instructions, and my questions have been answered. I have discussed any challenges I see with this plan with the nurse or doctor.    ..........................................................................................................................................  Patient/Patient Representative Signature      ..........................................................................................................................................  Patient Representative Print Name and Relationship to Patient    ..................................................               ................................................  Date                                            Time    ..........................................................................................................................................  Reviewed by Signature/Title    ...................................................              ..............................................  Date                                                            Time

## 2017-05-18 NOTE — DISCHARGE INSTRUCTIONS
Atrial Flutter Ablation Discharge Instructions - Femoral     After you go home:      Have an adult stay with you until tomorrow.    You may resume your normal diet.       For 24 hours - due to the sedation you received:    Relax and take it easy.    Do NOT make any important or legal decisions.    Do NOT drive or operate machines at home or at work.    Do NOT drink alcohol.    Care of Groin Puncture Site:      For the first 24 hrs - check the puncture site every 1-2 hours while awake.    For 2 days, when you cough, sneeze, laugh or move your bowels, hold your hand over the puncture site and press firmly.    Remove the bandaid after 24 hours. If there is minor oozing, apply another bandaid and remove it after 12 hours.    It is normal to have a small bruise or pea size lump at the site.    You may shower tomorrow.  Do NOT take a bath, or use a hot tub or pool for at least 3 days. Do NOT scrub the site. Do not use lotion or powder near the puncture site.    Activity:            For 2 days:    No stooping or squatting    Do NOT do any heavy activity such as exercise, lifting, or straining.     No housework, yard work or any activity that make you sweat    Do NOT lift more than 10 pounds    Bleeding:      If you start bleeding from the site in your groin, lie down flat and press firmly on the site for 10 minutes.     Once bleeding stops, lay flat for 2 hours.    Call [facility] as soon as you can.       Call 911 right away if you have heavy bleeding or bleeding that does not stop.      Medicines:      Take your medications, including blood thinners, unless your provider tells you not to.    If you have stopped any other medicines, check with your provider about when to restart them.    If you have pain or shortness of breath, you may take Advil (ibuprofen) or Tylenol (acetaminophen).    Follow Up Appointments:      An appointment has been set up for you for follow-up care    You will receive a phone call tomorrow  morning from Renetta GRIFFIN or Roxie GRIFFIN.    Call the clinic if:      You have increased pain or a large or growing hard lump around the site.    The site is red, swollen, hot or tender.    Blood or fluid is draining from the site.    You have chills or a fever greater than 101 F (38 C).    Your leg feels numb, cool or changes color.    Increased pain in the chest and/or groin.    Increased shortness of breath    Chest pain not relieved by Tylenol or Advil    New pain in the back or belly that you cannot control with Tylenol.    Recurrent irregular or fast heart rate (AFlutter) lasting over 2 hours.    Any questions or concerns.    Heart rhythms:    You may have some irregular heartbeats. These feel very strong. They may make you feel that the fast heart rhythm is going to start again.  Give it time. The irregular beats should occur less often.       [facility]:    [phone] ( 8am-5pm M-F)  ELIAS Jackson or ELIAS Donohue    [phone] [facility] (7 days a week)    ------------    CARLOS  (Transesophageal Echocardiogram)  Discharge Instructions    After you go home:      Have an adult stay with you for 6 hours.       For 24 hours - due to the sedation you received:    Relax and take it easy.    Do NOT make any important or legal decisions.    Do NOT drive or operate machines at home or at work.    Do NOT drink alcohol.    Diet:    You may resume your normal diet, but no scratchy foods for two days.    If your throat is sore, eat cold, bland or soft foods.    You may have heartburn if the tube used in the exam entered your stomach.  If so:   - Do not eat acidic and spicy foods.   - Do not eat three hours before bedtime. Clear liquids are okay.   - When lying down, use two pillows to raise your head.    Medicines:      Take your medications, including blood thinners, unless your provider tells you not to.    If you have stopped any other medicines, check with your provider about when to restart them.    You  may take Tylenol (Acetaminophen) if your throat is sore.    You may take antacids if you have heartburn.      Follow Up Appointments:      Follow up with your cardiologist at [facility] of patient preference as instructed.    Follow up with your primary care provider as needed.    Call the clinic if:      You have heartburn that is severe or lasts more than 72 hours.    You have a sore throat that feels worse after 72 hours.    You have shortness of breath, neck pain, chest pain, fever, chills, coughing up blood, or other unusual signs.    Questions or concerns      [facility] [place]:    [phone] [facility] (7 days a week)

## 2017-05-21 LAB — INTERPRETATION ECG - MUSE: NORMAL

## 2017-06-09 DIAGNOSIS — N17.9 AKI (ACUTE KIDNEY INJURY) (H): ICD-10-CM

## 2017-06-09 DIAGNOSIS — D62 ANEMIA DUE TO BLOOD LOSS, ACUTE: ICD-10-CM

## 2017-06-09 LAB
ANION GAP SERPL CALCULATED.3IONS-SCNC: 11 MMOL/L (ref 3–14)
BUN SERPL-MCNC: 26 MG/DL (ref 7–30)
CALCIUM SERPL-MCNC: 9.8 MG/DL (ref 8.5–10.1)
CHLORIDE SERPL-SCNC: 102 MMOL/L (ref 94–109)
CO2 SERPL-SCNC: 25 MMOL/L (ref 20–32)
CREAT SERPL-MCNC: 1.26 MG/DL (ref 0.52–1.04)
ERYTHROCYTE [DISTWIDTH] IN BLOOD BY AUTOMATED COUNT: 13.3 % (ref 10–15)
GFR SERPL CREATININE-BSD FRML MDRD: 42 ML/MIN/1.7M2
GLUCOSE SERPL-MCNC: 205 MG/DL (ref 70–99)
HCT VFR BLD AUTO: 40.3 % (ref 35–47)
HGB BLD-MCNC: 13.4 G/DL (ref 11.7–15.7)
MCH RBC QN AUTO: 28.8 PG (ref 26.5–33)
MCHC RBC AUTO-ENTMCNC: 33.3 G/DL (ref 31.5–36.5)
MCV RBC AUTO: 87 FL (ref 78–100)
PLATELET # BLD AUTO: 249 10E9/L (ref 150–450)
POTASSIUM SERPL-SCNC: 4.3 MMOL/L (ref 3.4–5.3)
RBC # BLD AUTO: 4.65 10E12/L (ref 3.8–5.2)
SODIUM SERPL-SCNC: 138 MMOL/L (ref 133–144)
WBC # BLD AUTO: 9.1 10E9/L (ref 4–11)

## 2017-06-09 PROCEDURE — 85027 COMPLETE CBC AUTOMATED: CPT | Performed by: FAMILY MEDICINE

## 2017-06-09 PROCEDURE — 80048 BASIC METABOLIC PNL TOTAL CA: CPT | Performed by: FAMILY MEDICINE

## 2017-06-09 PROCEDURE — 36415 COLL VENOUS BLD VENIPUNCTURE: CPT | Performed by: FAMILY MEDICINE

## 2017-06-09 NOTE — LETTER
[facility]  [address] [address]  Phone: [phone]    July 3, 2017    Suzette Britton  [address] [address]          Dear Ms. Britton,    Your metabolic panel (electrolytes, blood sugar, kidney function) is normal but the kidney function was slightly elevated.  Your CBC (checks for infection, anemia, etc) is normal.  Recheck in well hydrated state in a month. It was normal one month ago.     Results for orders placed or performed in visit on 06/09/17   **CBC with platelets FUTURE 2mo   Result Value Ref Range    WBC 9.1 4.0 - 11.0 10e9/L    RBC Count 4.65 3.8 - 5.2 10e12/L    Hemoglobin 13.4 11.7 - 15.7 g/dL    Hematocrit 40.3 35.0 - 47.0 %    MCV 87 78 - 100 fl    MCH 28.8 26.5 - 33.0 pg    MCHC 33.3 31.5 - 36.5 g/dL    RDW 13.3 10.0 - 15.0 %    Platelet Count 249 150 - 450 10e9/L   Basic metabolic panel   Result Value Ref Range    Sodium 138 133 - 144 mmol/L    Potassium 4.3 3.4 - 5.3 mmol/L    Chloride 102 94 - 109 mmol/L    Carbon Dioxide 25 20 - 32 mmol/L    Anion Gap 11 3 - 14 mmol/L    Glucose 205 (H) 70 - 99 mg/dL    Urea Nitrogen 26 7 - 30 mg/dL    Creatinine 1.26 (H) 0.52 - 1.04 mg/dL    GFR Estimate 42 (L) >60 mL/min/1.7m2    GFR Estimate If Black 50 (L) >60 mL/min/1.7m2    Calcium 9.8 8.5 - 10.1 mg/dL     Sincerely,    Dr. Judy Verde/ sedrick

## 2017-06-15 ENCOUNTER — OFFICE VISIT (OUTPATIENT)
Dept: CARDIOLOGY | Facility: CLINIC | Age: 72
End: 2017-06-15
Payer: MEDICARE

## 2017-06-15 VITALS
DIASTOLIC BLOOD PRESSURE: 68 MMHG | BODY MASS INDEX: 30.97 KG/M2 | HEART RATE: 100 BPM | HEIGHT: 63 IN | WEIGHT: 174.8 LBS | SYSTOLIC BLOOD PRESSURE: 118 MMHG

## 2017-06-15 DIAGNOSIS — I48.3 TYPICAL ATRIAL FLUTTER (H): Primary | ICD-10-CM

## 2017-06-15 DIAGNOSIS — I48.92 ATRIAL FLUTTER WITH RAPID VENTRICULAR RESPONSE (H): ICD-10-CM

## 2017-06-15 PROCEDURE — 93000 ELECTROCARDIOGRAM COMPLETE: CPT | Performed by: PHYSICIAN ASSISTANT

## 2017-06-15 PROCEDURE — 99213 OFFICE O/P EST LOW 20 MIN: CPT | Mod: 25 | Performed by: PHYSICIAN ASSISTANT

## 2017-06-15 RX ORDER — ATORVASTATIN CALCIUM 10 MG/1
10 TABLET, FILM COATED ORAL DAILY
COMMUNITY
End: 2017-08-25

## 2017-06-15 RX ORDER — METOPROLOL SUCCINATE 100 MG/1
100 TABLET, EXTENDED RELEASE ORAL DAILY
Qty: 90 TABLET | Refills: 3 | Status: SHIPPED | OUTPATIENT
Start: 2017-06-15 | End: 2017-08-25

## 2017-06-15 NOTE — PATIENT INSTRUCTIONS
1. EKG today shows normal rhythm - hooray!    2. Continue current doses of Toprol XL (metoprolol) 100 mg daily - new Rx sent for 90 days for a full tablet (don't cut them in 1/2!)    3. Per Dr. Tate, OK to stop Eliquis after 1 month (5/17).  Keep the rest and have them on hand just in case you need it again!  Restart low dose (baby) aspirin after you stop the Eliquis.     4. In ~1-2 weeks, wear a 2 week ZioPatch monitor (OK to get up at [facility]). This will monitor for any other abnormal rhythms.  If you get this done here in [place], call [phone]. If you want it up at [facility], call [phone]     5. Our AFlutter nurses are Renetta/Jayde: [phone].  See Dr. Tate in about 2-3 months but call if you need to be seen sooner!

## 2017-06-15 NOTE — PROGRESS NOTES
HPI and Plan:   See dictation #[id number]    Orders Placed This Encounter   Procedures     Follow-Up with Electrophysiologist     Darius Patch Monitor     EKG 12-lead complete w/read - Clinics (performed today)       Orders Placed This Encounter   Medications     atorvastatin (LIPITOR) 10 MG tablet     Sig: Take 10 mg by mouth daily     metoprolol (TOPROL-XL) 100 MG 24 hr tablet     Sig: Take 1 tablet (100 mg) by mouth daily     Dispense:  90 tablet     Refill:  3     aspirin EC 81 MG EC tablet     Sig: Take 1 tablet (81 mg) by mouth daily Once finished with Eliquis       Medications Discontinued During This Encounter   Medication Reason     order for DME Therapy completed     Atorvastatin Calcium (LIPITOR PO) Medication Reconciliation Clean Up     metoprolol (TOPROL-XL) 200 MG 24 hr tablet Reorder     apixaban ANTICOAGULANT (ELIQUIS) 5 MG tablet          Encounter Diagnoses   Name Primary?     Typical atrial flutter (H) Yes     Atrial flutter with rapid ventricular response (H)        CURRENT MEDICATIONS:  Current Outpatient Prescriptions   Medication Sig Dispense Refill     atorvastatin (LIPITOR) 10 MG tablet Take 10 mg by mouth daily       metoprolol (TOPROL-XL) 100 MG 24 hr tablet Take 1 tablet (100 mg) by mouth daily 90 tablet 3     aspirin EC 81 MG EC tablet Take 1 tablet (81 mg) by mouth daily Once finished with Eliquis       lisinopril (PRINIVIL/ZESTRIL) 10 MG tablet Take 1 tablet (10 mg) by mouth daily 90 tablet 3     Acetaminophen (TYLENOL PO) Take 1,000 mg by mouth every 6 hours as needed for mild pain or fever       chlorthalidone (HYGROTON) 25 MG tablet Take 0.5 tablets (12.5 mg) by mouth daily 45 tablet 3     metFORMIN (GLUCOPHAGE) 500 MG tablet Take 2 tablets (1,000 mg) by mouth 2 times daily (with meals) 360 tablet 1     blood glucose monitoring (NO BRAND SPECIFIED) test strip Use to test blood sugars one time daily or as directed  One touch meter - one touch strips 3 Box 3     calcium-vitamin D-vitamin K  (VIACTIV) 500-500-40 MG-UNT-MCG CHEW Take 1 tablet by mouth daily       Cholecalciferol (VITAMIN D PO) Take 1,000 Units by mouth every morning        GLUCOMETER KIT for testing bid, One Touch or please fill with formulary glucometer 1 Kit 0     [DISCONTINUED] metoprolol (TOPROL-XL) 200 MG 24 hr tablet Take 0.5 tablets (100 mg) by mouth daily 30 tablet 0       ALLERGIES     Allergies   Allergen Reactions     Nka [No Known Allergies]        PAST MEDICAL HISTORY:  Past Medical History:   Diagnosis Date     Osteoarthrosis, unspecified whether generalized or localized, hand      S/P ablation of atrial flutter 05/18/2017    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)     Type II or unspecified type diabetes mellitus without mention of complication, not stated as uncontrolled      Unspecified essential hypertension        PAST SURGICAL HISTORY:  Past Surgical History:   Procedure Laterality Date     ARTHROPLASTY KNEE Left 12/1/2016    Procedure: ARTHROPLASTY KNEE;  Surgeon: Zachary Arteaga MD;  Location: [facility]     ARTHROPLASTY KNEE Right 5/4/2017    Procedure: ARTHROPLASTY KNEE;  Right Total Knee Arthroplasty;  Surgeon: Zachary Arteaga MD;  Location: [facility]     Colonoscopy, normal  09/13/2002     PHACOEMULSIFICATION WITH STANDARD INTRAOCULAR LENS IMPLANT  6/26/2014    Procedure: PHACOEMULSIFICATION WITH STANDARD INTRAOCULAR LENS IMPLANT;  Surgeon: Dario Mcghee MD;  Location: [facility]     PHACOEMULSIFICATION WITH STANDARD INTRAOCULAR LENS IMPLANT Left 9/4/2014    Procedure: PHACOEMULSIFICATION WITH STANDARD INTRAOCULAR LENS IMPLANT;  Surgeon: Dario Mcghee MD;  Location: [facility] OR       FAMILY HISTORY:  Family History   Problem Relation Age of Onset     Hypertension Mother      DIABETES Mother      CANCER Mother      uterine     Arthritis Father      Genitourinary Problems Father      kidney disease     CANCER Father      bladder cancer     DIABETES Brother      DIABETES Paternal  "Grandmother      DIABETES Paternal Aunt        SOCIAL HISTORY:  Social History     Social History     Marital status:      Spouse name: Ramana Britton     Number of children: 4     Years of education: 14+     Occupational History      Retired     Social History Main Topics     Smoking status: Never Smoker     Smokeless tobacco: Never Used     Alcohol use No     Drug use: No     Sexual activity: Not Currently     Partners: Male     Other Topics Concern     Parent/Sibling W/ Cabg, Mi Or Angioplasty Before 65f 55m? Yes     brother     Social History Narrative       Review of Systems:  Skin:  Negative       Eyes:  Positive for glasses    ENT:  Negative      Respiratory:  Negative for shortness of breath;dyspnea on exertion     Cardiovascular:  Negative for;palpitations;chest pain;syncope or near-syncope;fatigue;lightheadedness;dizziness;edema      Gastroenterology: Negative for vomiting;heartburn;nausea;melena;hematochezia    Genitourinary:  Negative      Musculoskeletal:  Negative      Neurologic:  Negative for memory problems;headaches;stroke;seizures;numbness or tingling of hands;numbness or tingling of feet    Psychiatric:  Negative for anxiety;depression    Heme/Lymph/Imm:  Negative for bleeding disorder    Endocrine:  Positive for diabetes      Physical Exam:  Vitals: /68 (BP Location: Left arm, Cuff Size: Adult Large)  Pulse 100  Ht 1.6 m (5' 3\")  Wt 79.3 kg (174 lb 12.8 oz)  BMI 30.96 kg/m2    Constitutional:  cooperative, alert and oriented, well developed, well nourished, in no acute distress        Skin:  warm and dry to the touch, no apparent skin lesions or masses noted        Head:  normocephalic, no masses or lesions        Eyes:  sclera white;conjunctivae and lids unremarkable;pupils equal and round;no xanthalasma        ENT:  no pallor or cyanosis, dentition good        Neck:  JVP normal;carotid pulses are full and equal bilaterally        Chest:  normal breath sounds, clear to " auscultation, normal A-P diameter, normal symmetry, normal respiratory excursion, no use of accessory muscles          Cardiac: regular rhythm;no murmurs, gallops or rubs detected                  Abdomen:  abdomen soft        Vascular: pulses full and equal, no bruits auscultated                               right femoral bruit (-)        Extremities and Back:  no edema;no deformities, clubbing, cyanosis, erythema observed              Neurological:  affect appropriate, oriented to time, person and place

## 2017-06-15 NOTE — MR AVS SNAPSHOT
After Visit Summary   6/15/2017    Suzette Britton    MRN: [id number]           Patient Information     Date Of Birth          1945        Visit Information        Provider Department      6/15/2017 1:10 PM Brittni Rivera PA-C [facility]        Today's Diagnoses     Typical atrial flutter (H)    -  1    Atrial flutter with rapid ventricular response (H)          Care Instructions    1. EKG today shows normal rhythm - hooray!    2. Continue current doses of Toprol XL (metoprolol) 100 mg daily - new Rx sent for 90 days for a full tablet (don't cut them in 1/2!)    3. Per Dr. Tate, OK to stop Eliquis after 1 month (5/17).  Keep the rest and have them on hand just in case you need it again!  Restart low dose (baby) aspirin after you stop the Eliquis.     4. In ~1-2 weeks, wear a 2 week ZioPatch monitor (OK to get up at [facility]). This will monitor for any other abnormal rhythms.  If you get this done here in [place], call [phone]. If you want it up at [facility], call [phone]     5. Our AFlutter nurses are Renetta/Jayde: [phone].  See Dr. Tate in about 2-3 months but call if you need to be seen sooner!          Follow-ups after your visit        Additional Services     Follow-Up with Electrophysiologist                 Future tests that were ordered for you today     Open Future Orders        Priority Expected Expires Ordered    Follow-Up with Electrophysiologist Routine 8/15/2017 6/15/2018 6/15/2017    Zio Patch Monitor Routine 7/15/2017 6/15/2018 6/15/2017            Who to contact     If you have questions or need follow up information about today's clinic visit or your schedule please contact [facility] directly at [phone].  Normal or non-critical lab and imaging results will be communicated to you by MyChart, letter or phone within 4 business days after the clinic has received the results. If  "you do not hear from us within 7 days, please contact the clinic through [employer] or phone. If you have a critical or abnormal lab result, we will notify you by phone as soon as possible.  Submit refill requests through [employer] or call your pharmacy and they will forward the refill request to us. Please allow 3 business days for your refill to be completed.          Additional Information About Your Visit        [employer] Information     [employer] gives you secure access to your electronic health record. If you see a primary care provider, you can also send messages to your care team and make appointments. If you have questions, please call your primary care clinic.  If you do not have a primary care provider, please call [phone] and they will assist you.        Care EveryWhere ID     This is your Care EveryWhere ID. This could be used by other organizations to access your [facility] medical records  [id number]        Your Vitals Were     Pulse Height BMI (Body Mass Index)             100 1.6 m (5' 3\") 30.96 kg/m2          Blood Pressure from Last 3 Encounters:   06/15/17 118/68   05/18/17 119/66   05/16/17 120/62    Weight from Last 3 Encounters:   06/15/17 79.3 kg (174 lb 12.8 oz)   05/18/17 81.2 kg (179 lb)   05/16/17 81.6 kg (179 lb 12.8 oz)              We Performed the Following     EKG 12-lead complete w/read - Clinics (performed today)          Today's Medication Changes          These changes are accurate as of: 6/15/17  1:51 PM.  If you have any questions, ask your nurse or doctor.               These medicines have changed or have updated prescriptions.        Dose/Directions    metoprolol 100 MG 24 hr tablet   Commonly known as:  TOPROL-XL   This may have changed:  medication strength   Used for:  Atrial flutter with rapid ventricular response (H)   Changed by:  Brittni Rivera PA-C        Dose:  100 mg   Take 1 tablet (100 mg) by mouth daily   Quantity:  90 tablet   Refills:  3          "   Where to get your medicines      [facility] [place] [place] - [address] AT Portal to Registered [facility] Sites  [address], [place] [address]     Phone:  [phone]     metoprolol 100 MG 24 hr tablet                Primary Care Provider Office Phone # Fax #    Judy Franci Verde [phone]       [facility] [address]  [facility] [address]        Thank you!     Thank you for choosing [facility] [place]  for your care. Our goal is always to provide you with excellent care. Hearing back from our patients is one way we can continue to improve our services. Please take a few minutes to complete the written survey that you may receive in the mail after your visit with us. Thank you!             Your Updated Medication List - Protect others around you: Learn how to safely use, store and throw away your medicines at www.disposemymeds.org.          This list is accurate as of: 6/15/17  1:51 PM.  Always use your most recent med list.                   Brand Name Dispense Instructions for use    apixaban ANTICOAGULANT 5 MG tablet    ELIQUIS    60 tablet    Take 1 tablet (5 mg) by mouth 2 times daily       atorvastatin 10 MG tablet    LIPITOR     Take 10 mg by mouth daily       blood glucose monitoring test strip    no brand specified    3 Box    Use to test blood sugars one time daily or as directed  One touch meter - one touch strips       calcium-vitamin D-vitamin K 500-500-40 MG-UNT-MCG Chew    VIACTIV     Take 1 tablet by mouth daily       chlorthalidone 25 MG tablet    HYGROTON    45 tablet    Take 0.5 tablets (12.5 mg) by mouth daily       GLUCOMETER KIT     1 Kit    for testing bid, One Touch or please fill with formulary glucometer       lisinopril 10 MG tablet    PRINIVIL/ZESTRIL    90 tablet    Take 1 tablet (10 mg) by mouth daily       metFORMIN 500 MG tablet    GLUCOPHAGE     360 tablet    Take 2 tablets (1,000 mg) by mouth 2 times daily (with meals)       metoprolol 100 MG 24 hr tablet    TOPROL-XL    90 tablet    Take 1 tablet (100 mg) by mouth daily       TYLENOL PO      Take 1,000 mg by mouth every 6 hours as needed for mild pain or fever       VITAMIN D PO      Take 1,000 Units by mouth every morning

## 2017-06-15 NOTE — LETTER
"6/15/2017    Judy Verde MD  [facility]   [address] [facility] [address]    RE: Suzette Murdock Reba       Dear Colleague,    I had the pleasure of meeting Suzette today when she came accompanied by her , Maurisio, for followup of her recent atrial flutter ablation.  She is a very pleasant 72-year-old who has a history of hypertension and was diagnosed with atrial flutter (asymptomatic) back in 2010.  She had spontaneously converted at that time and really had been doing well.      Up at [facility], she underwent a total knee replacement and was discharged to the TCU.  She was noted to have a heart rate of 120 beats per minute.  She was hospitalized up at [facility] and again noted to be in atrial flutter.  She really was asymptomatic with no palpitations and shortness of breath but she and her  state that when she was in atrial flutter, she was \"mean\" and did not behave like herself.  She was started on diltiazem, metoprolol and Eliquis.  She met with Dr. Tate following that and it was recommended that she attempt atrial flutter ablation given the fact that her EKG showed what looked like typical atrial flutter.      She underwent atrial flutter ablation after CARLOS on 05/18.  CARLOS showed an EF of 55%-60% with a bicuspid aortic valve, but no significant other valvular abnormalities.  She then underwent cavotricuspid isthmus ablation for her typical atrial flutter without any complications.  This was via the right femoral vein.      Upon discharge, her diltiazem was discontinued.  Toprol-XL was decreased from 200 mg to 100 mg daily and it was recommended she continue Eliquis 5 mg twice a day for 30 days (last dose 05/17).      Suzette tells me she really feels quite well, again she was not terribly symptomatic with this before but her  confirms that her mood has improved.  She specifically denies palpitations, lightheadedness or syncope.  Denies edema, orthopnea or PND, chest pain, pressure or " tightness.  She is back to all of her normal activities without issues.  She denies any problems with the right groin access site or with medications.  She denies melena or hematochezia.      EKG today, which I overread, confirms sinus rhythm with 1 PVC.  Heart rate was 93 beats per minute.  She has normal intervals.      Outpatient Encounter Prescriptions as of 6/15/2017   Medication Sig Dispense Refill     atorvastatin (LIPITOR) 10 MG tablet Take 10 mg by mouth daily       metoprolol (TOPROL-XL) 100 MG 24 hr tablet Take 1 tablet (100 mg) by mouth daily 90 tablet 3     aspirin EC 81 MG EC tablet Take 1 tablet (81 mg) by mouth daily Once finished with Eliquis       lisinopril (PRINIVIL/ZESTRIL) 10 MG tablet Take 1 tablet (10 mg) by mouth daily 90 tablet 3     Acetaminophen (TYLENOL PO) Take 1,000 mg by mouth every 6 hours as needed for mild pain or fever       chlorthalidone (HYGROTON) 25 MG tablet Take 0.5 tablets (12.5 mg) by mouth daily 45 tablet 3     metFORMIN (GLUCOPHAGE) 500 MG tablet Take 2 tablets (1,000 mg) by mouth 2 times daily (with meals) 360 tablet 1     blood glucose monitoring (NO BRAND SPECIFIED) test strip Use to test blood sugars one time daily or as directed  One touch meter - one touch strips 3 Box 3     calcium-vitamin D-vitamin K (VIACTIV) 500-500-40 MG-UNT-MCG CHEW Take 1 tablet by mouth daily       Cholecalciferol (VITAMIN D PO) Take 1,000 Units by mouth every morning        GLUCOMETER KIT for testing bid, One Touch or please fill with formulary glucometer 1 Kit 0     [DISCONTINUED] Atorvastatin Calcium (LIPITOR PO) Take 10 mg by mouth daily       [DISCONTINUED] metoprolol (TOPROL-XL) 200 MG 24 hr tablet Take 0.5 tablets (100 mg) by mouth daily 30 tablet 0     [DISCONTINUED] apixaban ANTICOAGULANT (ELIQUIS) 5 MG tablet Take 1 tablet (5 mg) by mouth 2 times daily 60 tablet 0     [DISCONTINUED] order for DME KNEE CPM 1 Device 0     No facility-administered encounter medications on file as of  6/15/2017.      ASSESSMENT AND PLAN:      1. Atrial flutter.  As above, she is status post typical atrial flutter ablation by Dr. Tate on 05/18.  Her ejection fraction on CARLOS was normal.      She is due to continue Eliquis until 05/17 and I have asked that she get back to a low-dose aspirin.  Dr. Tate also recommended the 2-week ZIO Patch to look for silent atrial fibrillation.  She will get this up at [facility] and we can contact her with the results.  She was also instructed to see Dr. Tate in roughly 2-3 months, again up at [facility].  I have given her the phone number for up there to contact them at her convenience.          She will call me if she runs into any issues in the interim, but for now I think we will keep her metoprolol succinate 100 mg daily.  Discontinue the Eliquis after 30 days and have her contact us if she runs into any issues.     Again, thank you for allowing me to participate in the care of your patient.      Sincerely,    Brittni Rivera PA-C     [facility]

## 2017-06-16 NOTE — PROGRESS NOTES
"HISTORY OF PRESENT ILLNESS:  I had the pleasure of meeting Suzette today when she came accompanied by her , Maurisio, for followup of her recent atrial flutter ablation.  She is a very pleasant 72-year-old who has a history of hypertension and was diagnosed with atrial flutter (asymptomatic) back in 2010.  She had spontaneously converted at that time and really had been doing well.      Up at [facility], she underwent a total knee replacement and was discharged to the TCU.  She was noted to have a heart rate of 120 beats per minute.  She was hospitalized up at [facility] and again noted to be in atrial flutter.  She really was asymptomatic with no palpitations and shortness of breath but she and her  state that when she was in atrial flutter, she was \"mean\" and did not behave like herself.  She was started on diltiazem, metoprolol and Eliquis.  She met with Dr. Tate following that and it was recommended that she attempt atrial flutter ablation given the fact that her EKG showed what looked like typical atrial flutter.      She underwent atrial flutter ablation after CARLOS on 05/18.  CARLOS showed an EF of 55%-60% with a bicuspid aortic valve, but no significant other valvular abnormalities.  She then underwent cavotricuspid isthmus ablation for her typical atrial flutter without any complications.  This was via the right femoral vein.      Upon discharge, her diltiazem was discontinued.  Toprol-XL was decreased from 200 mg to 100 mg daily and it was recommended she continue Eliquis 5 mg twice a day for 30 days (last dose 05/17).      Suzette tells me she really feels quite well, again she was not terribly symptomatic with this before but her  confirms that her mood has improved.  She specifically denies palpitations, lightheadedness or syncope.  Denies edema, orthopnea or PND, chest pain, pressure or tightness.  She is back to all of her normal activities without issues.  She denies any problems with the right groin " access site or with medications.  She denies melena or hematochezia.      EKG today, which I overread, confirms sinus rhythm with 1 PVC.  Heart rate was 93 beats per minute.  She has normal intervals.        ASSESSMENT AND PLAN:      1. Atrial flutter.  As above, she is status post typical atrial flutter ablation by Dr. Tate on .  Her ejection fraction on CARLOS was normal.      She is due to continue Eliquis until  and I have asked that she get back to a low-dose aspirin.  Dr. Tate also recommended the 2-week ZIO Patch to look for silent atrial fibrillation.  She will get this up at [facility] and we can contact her with the results.  She was also instructed to see Dr. Tate in roughly 2-3 months, again up at [facility].  I have given her the phone number for up there to contact them at her convenience.      She will call me if she runs into any issues in the interim, but for now I think we will keep her metoprolol succinate 100 mg daily.  Discontinue the Eliquis after 30 days and have her contact us if she runs into any issues.         ISMAEL GUILLEN PA-C             D: 06/15/2017 14:20   T: 2017 11:01   MT: LITA      Name:     CHRISIT BROWN   MRN:      [id number]        Account:      [id number]   :      1945           Service Date: 06/15/2017      Document: [id number]

## 2017-06-29 ENCOUNTER — HOSPITAL ENCOUNTER (OUTPATIENT)
Dept: CARDIOLOGY | Facility: CLINIC | Age: 72
Discharge: HOME | End: 2017-06-29
Attending: PHYSICIAN ASSISTANT | Admitting: PHYSICIAN ASSISTANT
Payer: MEDICARE

## 2017-06-29 DIAGNOSIS — I48.3 TYPICAL ATRIAL FLUTTER (H): ICD-10-CM

## 2017-06-29 PROCEDURE — 0296T ZIO PATCH HOLTER: CPT

## 2017-06-29 PROCEDURE — 0298T ZZC EXT ECG > 48HR TO 21 DAY REVIEW AND INTERPRETATN: CPT | Performed by: INTERNAL MEDICINE

## 2017-08-08 DIAGNOSIS — N17.9 AKI (ACUTE KIDNEY INJURY) (H): ICD-10-CM

## 2017-08-08 LAB
ANION GAP SERPL CALCULATED.3IONS-SCNC: 6 MMOL/L (ref 3–14)
BUN SERPL-MCNC: 15 MG/DL (ref 7–30)
CALCIUM SERPL-MCNC: 9.7 MG/DL (ref 8.5–10.1)
CHLORIDE SERPL-SCNC: 102 MMOL/L (ref 94–109)
CO2 SERPL-SCNC: 28 MMOL/L (ref 20–32)
CREAT SERPL-MCNC: 0.85 MG/DL (ref 0.52–1.04)
GFR SERPL CREATININE-BSD FRML MDRD: 65 ML/MIN/1.7M2
GLUCOSE SERPL-MCNC: 169 MG/DL (ref 70–99)
POTASSIUM SERPL-SCNC: 3.8 MMOL/L (ref 3.4–5.3)
SODIUM SERPL-SCNC: 136 MMOL/L (ref 133–144)

## 2017-08-08 PROCEDURE — 80048 BASIC METABOLIC PNL TOTAL CA: CPT | Performed by: FAMILY MEDICINE

## 2017-08-08 PROCEDURE — 36415 COLL VENOUS BLD VENIPUNCTURE: CPT | Performed by: FAMILY MEDICINE

## 2017-08-15 ENCOUNTER — OFFICE VISIT (OUTPATIENT)
Dept: CARDIOLOGY | Facility: CLINIC | Age: 72
End: 2017-08-15
Attending: PHYSICIAN ASSISTANT
Payer: MEDICARE

## 2017-08-15 VITALS
WEIGHT: 176.4 LBS | SYSTOLIC BLOOD PRESSURE: 124 MMHG | DIASTOLIC BLOOD PRESSURE: 76 MMHG | HEART RATE: 75 BPM | BODY MASS INDEX: 31.25 KG/M2 | OXYGEN SATURATION: 96 %

## 2017-08-15 DIAGNOSIS — I48.3 TYPICAL ATRIAL FLUTTER (H): ICD-10-CM

## 2017-08-15 PROCEDURE — 99213 OFFICE O/P EST LOW 20 MIN: CPT | Performed by: INTERNAL MEDICINE

## 2017-08-15 NOTE — PROGRESS NOTES
HPI and Plan:   See dictation  [id number]  No orders of the defined types were placed in this encounter.      No orders of the defined types were placed in this encounter.      There are no discontinued medications.      Encounter Diagnosis   Name Primary?     Typical atrial flutter (H)        CURRENT MEDICATIONS:  Current Outpatient Prescriptions   Medication Sig Dispense Refill     atorvastatin (LIPITOR) 10 MG tablet Take 10 mg by mouth daily       metoprolol (TOPROL-XL) 100 MG 24 hr tablet Take 1 tablet (100 mg) by mouth daily 90 tablet 3     aspirin EC 81 MG EC tablet Take 1 tablet (81 mg) by mouth daily Once finished with Eliquis       lisinopril (PRINIVIL/ZESTRIL) 10 MG tablet Take 1 tablet (10 mg) by mouth daily 90 tablet 3     Acetaminophen (TYLENOL PO) Take 1,000 mg by mouth every 6 hours as needed for mild pain or fever       chlorthalidone (HYGROTON) 25 MG tablet Take 0.5 tablets (12.5 mg) by mouth daily 45 tablet 3     metFORMIN (GLUCOPHAGE) 500 MG tablet Take 2 tablets (1,000 mg) by mouth 2 times daily (with meals) 360 tablet 1     calcium-vitamin D-vitamin K (VIACTIV) 500-500-40 MG-UNT-MCG CHEW Take 1 tablet by mouth daily       Cholecalciferol (VITAMIN D PO) Take 1,000 Units by mouth every morning        blood glucose monitoring (NO BRAND SPECIFIED) test strip Use to test blood sugars one time daily or as directed  One touch meter - one touch strips (Patient not taking: Reported on 8/15/2017) 3 Box 3     GLUCOMETER KIT for testing bid, One Touch or please fill with formulary glucometer (Patient not taking: No sig reported) 1 Kit 0       ALLERGIES     Allergies   Allergen Reactions     Nka [No Known Allergies]        PAST MEDICAL HISTORY:  Past Medical History:   Diagnosis Date     Osteoarthrosis, unspecified whether generalized or localized, hand      S/P ablation of atrial flutter 05/18/2017    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)     Type II or unspecified type diabetes mellitus  without mention of complication, not stated as uncontrolled      Unspecified essential hypertension        PAST SURGICAL HISTORY:  Past Surgical History:   Procedure Laterality Date     ARTHROPLASTY KNEE Left 12/1/2016    Procedure: ARTHROPLASTY KNEE;  Surgeon: Zachary Arteaga MD;  Location: [facility]     ARTHROPLASTY KNEE Right 5/4/2017    Procedure: ARTHROPLASTY KNEE;  Right Total Knee Arthroplasty;  Surgeon: Zachary Arteaga MD;  Location: [facility]     Colonoscopy, normal  09/13/2002     PHACOEMULSIFICATION WITH STANDARD INTRAOCULAR LENS IMPLANT  6/26/2014    Procedure: PHACOEMULSIFICATION WITH STANDARD INTRAOCULAR LENS IMPLANT;  Surgeon: Dario Mcghee MD;  Location: [facility]     PHACOEMULSIFICATION WITH STANDARD INTRAOCULAR LENS IMPLANT Left 9/4/2014    Procedure: PHACOEMULSIFICATION WITH STANDARD INTRAOCULAR LENS IMPLANT;  Surgeon: Dario Mcghee MD;  Location: [facility]       FAMILY HISTORY:  Family History   Problem Relation Age of Onset     Hypertension Mother      DIABETES Mother      CANCER Mother      uterine     Arthritis Father      Genitourinary Problems Father      kidney disease     CANCER Father      bladder cancer     DIABETES Brother      DIABETES Paternal Grandmother      DIABETES Paternal Aunt        SOCIAL HISTORY:  Social History     Social History     Marital status:      Spouse name: Ramana Britton     Number of children: 4     Years of education: 14+     Occupational History      Retired     Social History Main Topics     Smoking status: Never Smoker     Smokeless tobacco: Never Used     Alcohol use No     Drug use: No     Sexual activity: Not Currently     Partners: Male     Other Topics Concern     Parent/Sibling W/ Cabg, Mi Or Angioplasty Before 65f 55m? Yes     brother     Social History Narrative       Review of Systems:  Skin:  Negative       Eyes:  Positive for glasses    ENT:  Negative      Respiratory:  Positive for cough     Cardiovascular:  Negative       Gastroenterology: Negative      Genitourinary:  Negative      Musculoskeletal:  Negative      Neurologic:  Negative      Psychiatric:  Negative      Heme/Lymph/Imm:  Negative      Endocrine:  Positive for diabetes      Physical Exam:  Vitals: /76 (BP Location: Right arm, Patient Position: Chair, Cuff Size: Adult Regular)  Pulse 75  Wt 80 kg (176 lb 6.4 oz)  SpO2 96%  BMI 31.25 kg/m2    Constitutional:  cooperative, alert and oriented, well developed, well nourished, in no acute distress        Skin:  warm and dry to the touch, no apparent skin lesions or masses noted        Head:  normocephalic, no masses or lesions        Eyes:  pupils equal and round, conjunctivae and lids unremarkable, sclera white, no xanthalasma, EOMS intact, no nystagmus        ENT:  no pallor or cyanosis, dentition good        Neck:  carotid pulses are full and equal bilaterally, JVP normal, no carotid bruit, no thyromegaly        Chest:  normal breath sounds, clear to auscultation, normal A-P diameter, normal symmetry, normal respiratory excursion, no use of accessory muscles          Cardiac: regular rhythm, normal S1/S2, no S3 or S4, apical impulse not displaced, no murmurs, gallops or rubs                  Abdomen:  abdomen soft, non-tender, BS normoactive, no mass, no HSM, no bruits        Vascular: pulses full and equal, no bruits auscultated                                        Extremities and Back:  no deformities, clubbing, cyanosis, erythema observed              Neurological:  affect appropriate, oriented to time, person and place              MARCO A Rivera PA-C  [address]  [place] [id number]

## 2017-08-15 NOTE — LETTER
8/15/2017    Judy Verde MD  [address]  [facility] [address]    RE: Suzette Britton       Dear Colleague,    I had the pleasure of seeing Suzette Britton in the [facility].    Thank you for allowing me to participate in the care of this very delightful patient.  As you know, Radha is a 72-year-old female with a history of paroxysmal atrial flutter that was initially diagnosed back in 2000 and then recurred again this past May when she was noted to have it during the postoperative recovery from her orthopedic surgery.  The atrial flutter appeared to be quite typical, and to spare her the long need of anticoagulation and moreover the rate was somewhat fast despite being on a beta blocker, I recommended a catheter-based ablation.  This was done successfully this past May.  Afterward we had her wear a Zio Patch monitor for 2 weeks long to look for evidence of subclinical atrial fibrillation.  For those 2 weeks, the patient did not have any atrial fibrillation nor atrial flutter.  She has occasional PAC lasting for 3-5 beats only.     Outpatient Encounter Prescriptions as of 8/15/2017   Medication Sig Dispense Refill     atorvastatin (LIPITOR) 10 MG tablet Take 10 mg by mouth daily       metoprolol (TOPROL-XL) 100 MG 24 hr tablet Take 1 tablet (100 mg) by mouth daily 90 tablet 3     aspirin EC 81 MG EC tablet Take 1 tablet (81 mg) by mouth daily Once finished with Eliquis       lisinopril (PRINIVIL/ZESTRIL) 10 MG tablet Take 1 tablet (10 mg) by mouth daily 90 tablet 3     Acetaminophen (TYLENOL PO) Take 1,000 mg by mouth every 6 hours as needed for mild pain or fever       chlorthalidone (HYGROTON) 25 MG tablet Take 0.5 tablets (12.5 mg) by mouth daily 45 tablet 3     metFORMIN (GLUCOPHAGE) 500 MG tablet Take 2 tablets (1,000 mg) by mouth 2 times daily (with meals) 360 tablet 1     calcium-vitamin D-vitamin K (VIACTIV) 500-500-40 MG-UNT-MCG CHEW Take 1 tablet by mouth daily        Cholecalciferol (VITAMIN D PO) Take 1,000 Units by mouth every morning        blood glucose monitoring (NO BRAND SPECIFIED) test strip Use to test blood sugars one time daily or as directed  One touch meter - one touch strips (Patient not taking: Reported on 8/15/2017) 3 Box 3     GLUCOMETER KIT for testing bid, One Touch or please fill with formulary glucometer (Patient not taking: No sig reported) 1 Kit 0     No facility-administered encounter medications on file as of 8/15/2017.       ASSESSMENT AND PLAN:  I am pleased with the results, but I asked her to monitor her heart rate on a regular basis, as in patients who had atrial flutter that was successfully ablated, there is a high risk for developing atrial fibrillation down the road.  I also asked her to continue with her metoprolol and lisinopril for hypertension rather than go back to her high-dose lisinopril in the past, and discontinue the metoprolol because of these premature atrial contractions.  The patient can come back to see us on a p.r.n. basis.  The patient did complete Eliquis for 30 days post-ablation and does not need to be on it any more except for baby aspirin.     Again, thank you for allowing me to participate in the care of your patient.      Sincerely,    Jarrett Raya MD     [facility]

## 2017-08-15 NOTE — MR AVS SNAPSHOT
After Visit Summary   8/15/2017    Suzette Britton    MRN: [id number]           Patient Information     Date Of Birth          1945        Visit Information        Provider Department      8/15/2017 2:30 PM Jarrett Tate MD [facility]        Today's Diagnoses     Typical atrial flutter (H)           Follow-ups after your visit        Who to contact     If you have questions or need follow up information about today's clinic visit or your schedule please contact [facility] directly at [phone].  Normal or non-critical lab and imaging results will be communicated to you by [employer], letter or phone within 4 business days after the clinic has received the results. If you do not hear from us within 7 days, please contact the clinic through [employer] or phone. If you have a critical or abnormal lab result, we will notify you by phone as soon as possible.  Submit refill requests through [employer] or call your pharmacy and they will forward the refill request to us. Please allow 3 business days for your refill to be completed.          Additional Information About Your Visit        MyChart Information     [employer] gives you secure access to your electronic health record. If you see a primary care provider, you can also send messages to your care team and make appointments. If you have questions, please call your primary care clinic.  If you do not have a primary care provider, please call [phone] and they will assist you.        Care EveryWhere ID     This is your Care EveryWhere ID. This could be used by other organizations to access your [place] medical records  [id number]        Your Vitals Were     Pulse Pulse Oximetry BMI (Body Mass Index)             75 96% 31.25 kg/m2          Blood Pressure from Last 3 Encounters:   08/15/17 124/76   06/15/17 118/68   05/18/17 119/66    Weight from Last 3  Encounters:   08/15/17 80 kg (176 lb 6.4 oz)   06/15/17 79.3 kg (174 lb 12.8 oz)   05/18/17 81.2 kg (179 lb)              We Performed the Following     Follow-Up with Electrophysiologist        Primary Care Provider Office Phone # Fax #    Judy Verde [phone] [address] [facility] [address]        Equal Access to Services     BRITTNEE BROWN : Hadii aad ku hadasho Soomaali, waaxda luqadaha, qaybta kaalmada adeegyada, waxay idiin hayaan adeeg kharash la'aan ah. So [facility] [phone].    ATENCIÓN: Si yusra osorio, tiene a boyce disposición servicios gratuitos de asistencia lingüística. [facility] [phone].    We comply with applicable federal civil rights laws and [place] laws. We do not discriminate on the basis of race, color, national origin, age, disability sex, sexual orientation or gender identity.            Thank you!     Thank you for choosing [facility] AT [facility]  for your care. Our goal is always to provide you with excellent care. Hearing back from our patients is one way we can continue to improve our services. Please take a few minutes to complete the written survey that you may receive in the mail after your visit with us. Thank you!             Your Updated Medication List - Protect others around you: Learn how to safely use, store and throw away your medicines at www.disposemymeds.org.          This list is accurate as of: 8/15/17  2:47 PM.  Always use your most recent med list.                   Brand Name Dispense Instructions for use Diagnosis    aspirin 81 MG EC tablet      Take 1 tablet (81 mg) by mouth daily Once finished with Eliquis    Typical atrial flutter (H)       atorvastatin 10 MG tablet    LIPITOR     Take 10 mg by mouth daily        blood glucose monitoring test strip    no brand specified    3 Box    Use to test blood sugars one time daily or as directed  One touch meter - one touch strips    CKD (chronic  kidney disease) stage 3, GFR 30-59 ml/min       calcium-vitamin D-vitamin K 500-500-40 MG-UNT-MCG Chew    VIACTIV     Take 1 tablet by mouth daily        chlorthalidone 25 MG tablet    HYGROTON    45 tablet    Take 0.5 tablets (12.5 mg) by mouth daily    Essential hypertension       GLUCOMETER KIT     1 Kit    for testing bid, One Touch or please fill with formulary glucometer    Type II or unspecified type diabetes mellitus without mention of complication, not stated as uncontrolled       lisinopril 10 MG tablet    PRINIVIL/ZESTRIL    90 tablet    Take 1 tablet (10 mg) by mouth daily    Typical atrial flutter (H)       metFORMIN 500 MG tablet    GLUCOPHAGE    360 tablet    Take 2 tablets (1,000 mg) by mouth 2 times daily (with meals)    Type 2 diabetes mellitus with diabetic nephropathy, without long-term current use of insulin (H)       metoprolol 100 MG 24 hr tablet    TOPROL-XL    90 tablet    Take 1 tablet (100 mg) by mouth daily    Atrial flutter with rapid ventricular response (H)       TYLENOL PO      Take 1,000 mg by mouth every 6 hours as needed for mild pain or fever        VITAMIN D PO      Take 1,000 Units by mouth every morning

## 2017-08-16 NOTE — PROGRESS NOTES
HISTORY OF PRESENT ILLNESS:  Thank you for allowing me to participate in the care of this very delightful patient.  As you know, Radha is a 72-year-old female with a history of paroxysmal atrial flutter that was initially diagnosed back in  and then recurred again this past May when she was noted to have it during the postoperative recovery from her orthopedic surgery.  The atrial flutter appeared to be quite typical, and to spare her the long need of anticoagulation and moreover the rate was somewhat fast despite being on a beta blocker, I recommended a catheter-based ablation.  This was done successfully this past May.  Afterward we had her wear a Zio Patch monitor for 2 weeks long to look for evidence of subclinical atrial fibrillation.  For those 2 weeks, the patient did not have any atrial fibrillation nor atrial flutter.  She has occasional PAC lasting for 3-5 beats only.      ASSESSMENT AND PLAN:  I am pleased with the results, but I asked her to monitor her heart rate on a regular basis, as in patients who had atrial flutter that was successfully ablated, there is a high risk for developing atrial fibrillation down the road.  I also asked her to continue with her metoprolol and lisinopril for hypertension rather than go back to her high-dose lisinopril in the past, and discontinue the metoprolol because of these premature atrial contractions.  The patient can come back to see us on a p.r.n. basis.  The patient did complete Eliquis for 30 days post-ablation and does not need to be on it any more except for baby aspirin.      cc:   Judy Verde MD    [facility]    [address] [place]  [address]         CHANDNI OWENS MD             D: 08/15/2017 14:46   T: 08/15/2017 20:40   MT: OLIVIA      Name:     CHRISTI BROWN   MRN:      [id number]        Account:      [id number]   :      1945           Service Date: 08/15/2017      Document: [id number]

## 2017-08-24 ENCOUNTER — TRANSFERRED RECORDS (OUTPATIENT)
Dept: HEALTH INFORMATION MANAGEMENT | Facility: CLINIC | Age: 72
End: 2017-08-24

## 2017-08-25 DIAGNOSIS — I48.3 TYPICAL ATRIAL FLUTTER (H): ICD-10-CM

## 2017-08-25 DIAGNOSIS — I48.92 ATRIAL FLUTTER WITH RAPID VENTRICULAR RESPONSE (H): ICD-10-CM

## 2017-08-25 DIAGNOSIS — I10 ESSENTIAL HYPERTENSION: ICD-10-CM

## 2017-08-25 DIAGNOSIS — E11.21 TYPE 2 DIABETES MELLITUS WITH DIABETIC NEPHROPATHY, WITHOUT LONG-TERM CURRENT USE OF INSULIN (H): ICD-10-CM

## 2017-08-25 RX ORDER — METOPROLOL SUCCINATE 100 MG/1
100 TABLET, EXTENDED RELEASE ORAL DAILY
Qty: 90 TABLET | Refills: 0 | Status: SHIPPED | OUTPATIENT
Start: 2017-08-25 | End: 2017-12-13

## 2017-08-25 RX ORDER — CHLORTHALIDONE 25 MG/1
12.5 TABLET ORAL DAILY
Qty: 45 TABLET | Refills: 0 | Status: SHIPPED | OUTPATIENT
Start: 2017-08-25 | End: 2017-09-05

## 2017-08-25 RX ORDER — LISINOPRIL 10 MG/1
10 TABLET ORAL DAILY
Qty: 90 TABLET | Refills: 0 | Status: SHIPPED | OUTPATIENT
Start: 2017-08-25 | End: 2017-09-05

## 2017-08-25 NOTE — TELEPHONE ENCOUNTER
Has future appointment on 9/5/17    Atorvastatin 10mg         Last Written Prescription Date: reported by patient  Last Fill Quantity: reported by patient, # refills: reported by patient    Last Office Visit with [facility], [facility] or [facility] prescribing provider:  5/16/17   Future Office Visit:    Next 5 appointments (look out 90 days)     Sep 05, 2017 10:00 AM CDT   SHORT with Judy Verde MD   [facility] ([facility])    [address] [facility] [address]   [phone]                  BP Readings from Last 3 Encounters:   08/15/17 124/76   06/15/17 118/68   05/18/17 119/66     Lab Results   Component Value Date    ALT 38 05/10/2017     Lab Results   Component Value Date    CHOL 142 09/13/2016     Lab Results   Component Value Date    HDL 60 09/13/2016     Lab Results   Component Value Date    LDL 53 09/13/2016     Lab Results   Component Value Date    TRIG 144 09/13/2016     Lab Results   Component Value Date    CHOLHDLRATIO 3.4 08/25/2015     Metoprolol 100mg      Last Written Prescription Date: 6/15/17  Last Fill Quantity: 90, # refills: 3    Last Office Visit with [facility], [facility] or [facility] prescribing provider:  5/16/17   Future Office Visit:    Next 5 appointments (look out 90 days)     Sep 05, 2017 10:00 AM CDT   SHORT with Judy Verde MD   [facility] ([facility])    [address] [facility] [address]   [phone]                    BP Readings from Last 3 Encounters:   08/15/17 124/76   06/15/17 118/68   05/18/17 119/66     Lisinopril 10mg      Last Written Prescription Date: 5/18/17  Last Fill Quantity: 90, # refills: 3  Last Office Visit with [facility], [facility] or [facility] prescribing provider: 5/16/17    Next 5 appointments (look out 90 days)     Sep 05, 2017 10:00 AM CDT   SHORT with Judy Verde MD   [facility] ([facility])    [address] [facility] [address]   [phone]                   Potassium   Date  Value Ref Range Status   08/08/2017 3.8 3.4 - 5.3 mmol/L Final     Creatinine   Date Value Ref Range Status   08/08/2017 0.85 0.52 - 1.04 mg/dL Final     BP Readings from Last 3 Encounters:   08/15/17 124/76   06/15/17 118/68   05/18/17 119/66     Chlorthalidone 25mg      Last Written Prescription Date: 2/20/17  Last Fill Quantity: 45, # refills: 3  Last Office Visit with [facility], [facility] or  Health prescribing provider: 5/16/17    Next 5 appointments (look out 90 days)     Sep 05, 2017 10:00 AM CDT   SHORT with Judy Verde MD   [facility] ([facility]    [address] [facility] [address]   [phone]                   Potassium   Date Value Ref Range Status   08/08/2017 3.8 3.4 - 5.3 mmol/L Final     Creatinine   Date Value Ref Range Status   08/08/2017 0.85 0.52 - 1.04 mg/dL Final     BP Readings from Last 3 Encounters:   08/15/17 124/76   06/15/17 118/68   05/18/17 119/66     metformin         Last Written Prescription Date: 2/10/17  Last Fill Quantity: 360, # refills: 1  Last Office Visit with [facility], [facility] or  Health prescribing provider:  5/16/17     Next 5 appointments (look out 90 days)     Sep 05, 2017 10:00 AM CDT   SHORT with Judy Verde MD   [facility] ([facility])    [address] [facility] [address] [phone]                   BP Readings from Last 3 Encounters:   08/15/17 124/76   06/15/17 118/68   05/18/17 119/66     Lab Results   Component Value Date    MICROL 22 09/13/2016     Lab Results   Component Value Date    UMALCR 19.82 09/13/2016     Creatinine   Date Value Ref Range Status   08/08/2017 0.85 0.52 - 1.04 mg/dL Final   ]  GFR Estimate   Date Value Ref Range Status   08/08/2017 65 >60 mL/min/1.7m2 Final     Comment:     Non  GFR Calc   06/09/2017 42 (L) >60 mL/min/1.7m2 Final     Comment:     Non  GFR Calc   05/18/2017 62 >60 mL/min/1.7m2 Final     Comment:     Non  GFR  Calc     GFR Estimate If Black   Date Value Ref Range Status   08/08/2017 79 >60 mL/min/1.7m2 Final     Comment:      GFR Calc   06/09/2017 50 (L) >60 mL/min/1.7m2 Final     Comment:      GFR Calc   05/18/2017 75 >60 mL/min/1.7m2 Final     Comment:      GFR Calc     Lab Results   Component Value Date    CHOL 142 09/13/2016     Lab Results   Component Value Date    HDL 60 09/13/2016     Lab Results   Component Value Date    LDL 53 09/13/2016     Lab Results   Component Value Date    TRIG 144 09/13/2016     Lab Results   Component Value Date    CHOLHDLRATIO 3.4 08/25/2015     Lab Results   Component Value Date    AST 16 05/10/2017     Lab Results   Component Value Date    ALT 38 05/10/2017     Lab Results   Component Value Date    A1C 6.9 04/13/2017    A1C 7.6 02/01/2017    A1C 6.9 11/08/2016    A1C 6.7 09/13/2016    A1C 6.6 02/26/2016     Potassium   Date Value Ref Range Status   08/08/2017 3.8 3.4 - 5.3 mmol/L Final

## 2017-08-25 NOTE — TELEPHONE ENCOUNTER
Please advise for refill on atorvastatin. This was not filled by a [facility] provider before.  Eladia Sheehan RN    Prescription for approved metoprolol, lisinopril, metformin, and chlorithalidone per [facility] Refill Protocol.

## 2017-08-28 RX ORDER — ATORVASTATIN CALCIUM 10 MG/1
10 TABLET, FILM COATED ORAL DAILY
Qty: 90 TABLET | Refills: 1 | Status: SHIPPED | OUTPATIENT
Start: 2017-08-28 | End: 2017-09-05

## 2017-09-05 ENCOUNTER — OFFICE VISIT (OUTPATIENT)
Dept: FAMILY MEDICINE | Facility: CLINIC | Age: 72
End: 2017-09-05
Payer: MEDICARE

## 2017-09-05 VITALS
TEMPERATURE: 97.3 F | DIASTOLIC BLOOD PRESSURE: 61 MMHG | HEART RATE: 77 BPM | BODY MASS INDEX: 31.24 KG/M2 | HEIGHT: 63 IN | WEIGHT: 176.3 LBS | SYSTOLIC BLOOD PRESSURE: 109 MMHG

## 2017-09-05 DIAGNOSIS — I48.0 PAROXYSMAL ATRIAL FIBRILLATION (H): Chronic | ICD-10-CM

## 2017-09-05 DIAGNOSIS — I48.3 TYPICAL ATRIAL FLUTTER (H): ICD-10-CM

## 2017-09-05 DIAGNOSIS — I10 ESSENTIAL HYPERTENSION: ICD-10-CM

## 2017-09-05 DIAGNOSIS — E78.5 HYPERLIPIDEMIA LDL GOAL <100: Chronic | ICD-10-CM

## 2017-09-05 DIAGNOSIS — Z12.31 ENCOUNTER FOR SCREENING MAMMOGRAM FOR BREAST CANCER: ICD-10-CM

## 2017-09-05 DIAGNOSIS — E11.21 TYPE 2 DIABETES MELLITUS WITH DIABETIC NEPHROPATHY, WITHOUT LONG-TERM CURRENT USE OF INSULIN (H): Primary | ICD-10-CM

## 2017-09-05 DIAGNOSIS — J30.0 CHRONIC VASOMOTOR RHINITIS: ICD-10-CM

## 2017-09-05 DIAGNOSIS — N18.30 CKD (CHRONIC KIDNEY DISEASE) STAGE 3, GFR 30-59 ML/MIN (H): Chronic | ICD-10-CM

## 2017-09-05 LAB
CREAT UR-MCNC: 74 MG/DL
MICROALBUMIN UR-MCNC: 11 MG/L
MICROALBUMIN/CREAT UR: 14.81 MG/G CR (ref 0–25)

## 2017-09-05 PROCEDURE — 99214 OFFICE O/P EST MOD 30 MIN: CPT | Performed by: FAMILY MEDICINE

## 2017-09-05 PROCEDURE — 82043 UR ALBUMIN QUANTITATIVE: CPT | Performed by: FAMILY MEDICINE

## 2017-09-05 RX ORDER — ATORVASTATIN CALCIUM 10 MG/1
10 TABLET, FILM COATED ORAL DAILY
Qty: 90 TABLET | Refills: 3 | Status: SHIPPED | OUTPATIENT
Start: 2017-09-05 | End: 2018-09-05

## 2017-09-05 RX ORDER — CHLORTHALIDONE 25 MG/1
12.5 TABLET ORAL DAILY
Qty: 45 TABLET | Refills: 3 | Status: SHIPPED | OUTPATIENT
Start: 2017-09-05 | End: 2018-09-05

## 2017-09-05 RX ORDER — LISINOPRIL 10 MG/1
10 TABLET ORAL DAILY
Qty: 90 TABLET | Refills: 3 | Status: SHIPPED | OUTPATIENT
Start: 2017-09-05 | End: 2018-09-05

## 2017-09-05 NOTE — PROGRESS NOTES
SUBJECTIVE:   Suzette Britton is a 72 year old female who presents to clinic today for the following health issues:      Diabetes Follow-up      Patient is checking blood sugars:  Once in the morning  Average about 139 ?     Diabetic concerns: None     Symptoms of hypoglycemia (low blood sugar): none     Paresthesias (numbness or burning in feet) or sores: No     Date of last diabetic eye exam: 08/24/2017    On metformin 1000 bid     Lab Results   Component Value Date    A1C 6.9 04/13/2017    A1C 7.6 02/01/2017    A1C 6.9 11/08/2016    A1C 6.7 09/13/2016    A1C 6.6 02/26/2016       Hyperlipidemia Follow-Up      Rate your low fat/cholesterol diet?: good    Taking statin?  No    Other lipid medications/supplements?:  None    Due for lipids   Didn't fast today     Hypertension Follow-up      Outpatient blood pressures are not being checked.    Low Salt Diet: no added salt    Amount of exercise or physical activity: None    Problems taking medications regularly: No    Medication side effects:  Slight cough and drippy nose   Diet: regular (no restrictions)      Drippy nose this summer- mild, intermittent. Ongoing for a few months. Notices it when she leans over   Dry tickle cough   No history of allergies  No shortness of breath/cp  No hemoptysis    Review of systems:  No blurry vision  No cp/sob  No tingling/numbness in feet   No n/v   No d/c       Problem list and histories reviewed & adjusted, as indicated.  Additional history: as documented    Patient Active Problem List   Diagnosis     Osteoarthrosis, hand     Hyperhidrosis     Hyperlipidemia LDL goal <100     Atrial fibrillation (H)     CKD (chronic kidney disease) stage 3, GFR 30-59 ml/min     [facility] care     Health Care Home     Cataract     Obesity (BMI 30.0-34.9)     S/P total knee arthroplasty     ACP (advance care planning)     Essential hypertension     Type 2 diabetes mellitus with diabetic nephropathy, without long-term current use of insulin  "(H)     NICK (acute kidney injury) (H)     Elevated lactic acid level     Elevated LFTs     Atrial fibrillation with RVR (H)     Chronic vasomotor rhinitis     Current Outpatient Prescriptions   Medication     metFORMIN (GLUCOPHAGE) 500 MG tablet     lisinopril (PRINIVIL/ZESTRIL) 10 MG tablet     chlorthalidone (HYGROTON) 25 MG tablet     atorvastatin (LIPITOR) 10 MG tablet     metoprolol (TOPROL-XL) 100 MG 24 hr tablet     aspirin EC 81 MG EC tablet     Acetaminophen (TYLENOL PO)     blood glucose monitoring (NO BRAND SPECIFIED) test strip     calcium-vitamin D-vitamin K (VIACTIV) 500-500-40 MG-UNT-MCG CHEW     Cholecalciferol (VITAMIN D PO)     GLUCOMETER KIT     [DISCONTINUED] atorvastatin (LIPITOR) 10 MG tablet     [DISCONTINUED] chlorthalidone (HYGROTON) 25 MG tablet     [DISCONTINUED] lisinopril (PRINIVIL/ZESTRIL) 10 MG tablet     [DISCONTINUED] metFORMIN (GLUCOPHAGE) 500 MG tablet     No current facility-administered medications for this visit.         Allergies   Allergen Reactions     Nka [No Known Allergies]        /61 (BP Location: Right arm, Patient Position: Sitting, Cuff Size: Adult Regular)  Pulse 77  Temp 97.3  F (36.3  C) (Tympanic)  Ht 5' 3\" (1.6 m)  Wt 176 lb 4.8 oz (80 kg)  BMI 31.23 kg/m2  GENERAL - Pt is alert and oriented in no acute distress.  Affect is appropriate. Good eye contact.  NECK - Neck is supple w/o LA or thyromegaly  RESPIRATORY - Clear to auscultation bilaterally.  No wheezing noted  CV - RRR, no murmurs, rubs, gallops.  EXTREM - No edema.  SKIN - no obvious rashes or lesions  Feet - no wounds. Normal capillary refill. Warm to touch. Intact microfilament testing. Normal DP and PT pulses    Assessment/Plan -  (E11.21) Type 2 diabetes mellitus with diabetic nephropathy, without long-term current use of insulin (H)  (primary encounter diagnosis)  Comment: meds refilled. Eye exam done last week and sent for abstraction. Labs next week. Urine today. Work on adding in daily " exercise. The patient indicates understanding of these issues and agrees with the plan.   Plan: metFORMIN (GLUCOPHAGE) 500 MG tablet,         atorvastatin (LIPITOR) 10 MG tablet, Lipid         panel reflex to direct LDL, Albumin Random         Urine Quantitative with Creat Ratio, **A1C         FUTURE 1yr, CANCELED: Hemoglobin A1c            (I48.3) Typical atrial flutter (H)  Comment: \  Plan: lisinopril (PRINIVIL/ZESTRIL) 10 MG tablet            (I10) Essential hypertension  Comment: stable. Lytes done last month - normal   Plan: chlorthalidone (HYGROTON) 25 MG tablet            (N18.3) CKD (chronic kidney disease) stage 3, GFR 30-59 ml/min  Comment:   Plan:     (E78.5) Hyperlipidemia LDL goal <100  Comment: she will come for lipids next week   Plan: Lipid panel reflex to direct LDL            (I48.0) Paroxysmal atrial fibrillation (H)  Comment:   Plan:     (J30.0) Chronic vasomotor rhinitis  Comment: discussed diagnosis. I offered prescription of nasal steroid and discussed use. She wants to wait a bit and see if the symptom continues. The patient indicates understanding of these issues and agrees with the plan.   Plan:     (Z12.31) Encounter for screening mammogram for breast cancer  Comment:   Plan: MA Screen Bilateral w/Rigoberto              TEENA. Franci Verde MD

## 2017-09-05 NOTE — NURSING NOTE
"Chief Complaint   Patient presents with     Diabetes     recheck       Initial /61 (BP Location: Right arm, Patient Position: Sitting, Cuff Size: Adult Regular)  Pulse 77  Temp 97.3  F (36.3  C) (Tympanic)  Ht 5' 3\" (1.6 m)  Wt 176 lb 4.8 oz (80 kg)  BMI 31.23 kg/m2 Estimated body mass index is 31.23 kg/(m^2) as calculated from the following:    Height as of this encounter: 5' 3\" (1.6 m).    Weight as of this encounter: 176 lb 4.8 oz (80 kg).  Medication Reconciliation: complete   Maya Pineda LPN    "

## 2017-09-05 NOTE — MR AVS SNAPSHOT
After Visit Summary   9/5/2017    Suzette Britton    MRN: [id number]           Patient Information     Date Of Birth          1945        Visit Information        Provider Department      9/5/2017 10:00 AM Judy Verde MD [facility]        Today's Diagnoses     Type 2 diabetes mellitus with diabetic nephropathy, without long-term current use of insulin (H)    -  1    Typical atrial flutter (H)        Essential hypertension        CKD (chronic kidney disease) stage 3, GFR 30-59 ml/min        Hyperlipidemia LDL goal <100        Paroxysmal atrial fibrillation (H)        Chronic vasomotor rhinitis        Encounter for screening mammogram for breast cancer           Follow-ups after your visit        Your next 10 appointments already scheduled     Sep 12, 2017  8:00 AM CDT   LAB with [facility] ([facility])    [address] [id number] [phone]           Patient must bring picture ID. Patient should be prepared to give a urine specimen  Please do not eat 10-12 hours before your appointment if you are coming in fasting for labs on lipids, cholesterol, or glucose (sugar). Pregnant women should follow their Care Team instructions. Water with medications is okay. Do not drink coffee or other fluids. If you have concerns about taking  your medications, please ask at office or if scheduling via "[employer] send a message by clicking on Secure Messaging, Message Your Care Team.              Future tests that were ordered for you today     Open Future Orders        Priority Expected Expires Ordered    MA Screen Bilateral w/Rigoberto Routine  9/5/2018 9/5/2017    **A1C FUTURE 1yr Routine 8/6/2018 9/5/2018 9/5/2017    Lipid panel reflex to direct LDL Routine 3/4/2018 9/5/2018 9/5/2017            Who to contact     Normal or non-critical lab and imaging results will be communicated to you by MyChart, letter or phone within 4 business days after the  "clinic has received the results. If you do not hear from us within 7 days, please contact the clinic through [employer] or phone. If you have a critical or abnormal lab result, we will notify you by phone as soon as possible.  Submit refill requests through [employer] or call your pharmacy and they will forward the refill request to us. Please allow 3 business days for your refill to be completed.          If you need to speak with a  for additional information , please call: [phone]             Additional Information About Your Visit        [employer] Information     [employer] gives you secure access to your electronic health record. If you see a primary care provider, you can also send messages to your care team and make appointments. If you have questions, please call your primary care clinic.  If you do not have a primary care provider, please call [phone] and they will assist you.        Care EveryWhere ID     This is your Care EveryWhere ID. This could be used by other organizations to access your [place] medical records  [id number]        Your Vitals Were     Pulse Temperature Height BMI (Body Mass Index)          77 97.3  F (36.3  C) (Tympanic) 5' 3\" (1.6 m) 31.23 kg/m2         Blood Pressure from Last 3 Encounters:   09/05/17 109/61   08/15/17 124/76   06/15/17 118/68    Weight from Last 3 Encounters:   09/05/17 176 lb 4.8 oz (80 kg)   08/15/17 176 lb 6.4 oz (80 kg)   06/15/17 174 lb 12.8 oz (79.3 kg)              We Performed the Following     Albumin Random Urine Quantitative with Creat Ratio          Where to get your medicines      [facility] [place], [place] - [address] AT Portal to Registered [facility] Sites  [address], [facility] [address]     Phone:  [phone]     atorvastatin 10 MG tablet    chlorthalidone 25 MG tablet    lisinopril 10 MG tablet    metFORMIN 500 MG tablet          Primary Care Provider Office " Phone # Fax #    Judy Verde [phone] [address] [facility] [address]        Equal Access to Services     ZACHARYRATNA STEPHANIE : Thony rachna cespedes jenn Riddle, wanitoda luqangelica, qaybta kaalmada saroj, home cleaningsera julia. So [facility] [phone].    ATENCIÓN: Si habla español, tiene a boyce disposición servicios gratuitos de asistencia lingüística. Llame al [phone].    We comply with applicable federal civil rights laws and [place] laws. We do not discriminate on the basis of race, color, national origin, age, disability sex, sexual orientation or gender identity.            Thank you!     Thank you for choosing [facility]  for your care. Our goal is always to provide you with excellent care. Hearing back from our patients is one way we can continue to improve our services. Please take a few minutes to complete the written survey that you may receive in the mail after your visit with us. Thank you!             Your Updated Medication List - Protect others around you: Learn how to safely use, store and throw away your medicines at www.disposemymeds.org.          This list is accurate as of: 9/5/17 11:59 PM.  Always use your most recent med list.                   Brand Name Dispense Instructions for use Diagnosis    aspirin 81 MG EC tablet      Take 1 tablet (81 mg) by mouth daily Once finished with Eliquis    Typical atrial flutter (H)       atorvastatin 10 MG tablet    LIPITOR    90 tablet    Take 1 tablet (10 mg) by mouth daily    Type 2 diabetes mellitus with diabetic nephropathy, without long-term current use of insulin (H)       blood glucose monitoring test strip    no brand specified    3 Box    Use to test blood sugars one time daily or as directed  One touch meter - one touch strips    CKD (chronic kidney disease) stage 3, GFR 30-59 ml/min       calcium-vitamin D-vitamin K 500-500-40 MG-UNT-MCG Chew    VIACTIV     Take 1 tablet by mouth  daily        chlorthalidone 25 MG tablet    HYGROTON    45 tablet    Take 0.5 tablets (12.5 mg) by mouth daily    Essential hypertension       GLUCOMETER KIT     1 Kit    for testing bid, One Touch or please fill with formulary glucometer    Type II or unspecified type diabetes mellitus without mention of complication, not stated as uncontrolled       lisinopril 10 MG tablet    PRINIVIL/ZESTRIL    90 tablet    Take 1 tablet (10 mg) by mouth daily    Typical atrial flutter (H)       metFORMIN 500 MG tablet    GLUCOPHAGE    360 tablet    Take 2 tablets (1,000 mg) by mouth 2 times daily (with meals)    Type 2 diabetes mellitus with diabetic nephropathy, without long-term current use of insulin (H)       metoprolol 100 MG 24 hr tablet    TOPROL-XL    90 tablet    Take 1 tablet (100 mg) by mouth daily    Atrial flutter with rapid ventricular response (H)       TYLENOL PO      Take 1,000 mg by mouth every 6 hours as needed for mild pain or fever        VITAMIN D PO      Take 1,000 Units by mouth every morning

## 2017-09-06 ENCOUNTER — TELEPHONE (OUTPATIENT)
Dept: FAMILY MEDICINE | Facility: CLINIC | Age: 72
End: 2017-09-06

## 2017-09-06 NOTE — TELEPHONE ENCOUNTER
Spoke with patient and discussed the names of her diagnoses and what they mean.  Robert Rodgers RN

## 2017-09-06 NOTE — TELEPHONE ENCOUNTER
Reason for Call:  Other call back    Detailed comments: Suzette was seen by Dr. Verde on 9/5/17 and she states that she received her AVS and she has some questions about what is on there.  Please call and assess.  Thank you..Jeni Echevarria    Phone Number Patient can be reached at: Home number on file [phone] (home)    Best Time: any time    Can we leave a detailed message on this number? YES    Call taken on 9/6/2017 at 10:42 AM by Jeni Echevarria

## 2017-09-12 DIAGNOSIS — E78.5 HYPERLIPIDEMIA LDL GOAL <100: Chronic | ICD-10-CM

## 2017-09-12 DIAGNOSIS — E11.21 TYPE 2 DIABETES MELLITUS WITH DIABETIC NEPHROPATHY, WITHOUT LONG-TERM CURRENT USE OF INSULIN (H): ICD-10-CM

## 2017-09-12 LAB
CHOLEST SERPL-MCNC: 132 MG/DL
HBA1C MFR BLD: 6.6 % (ref 4.3–6)
HDLC SERPL-MCNC: 59 MG/DL
LDLC SERPL CALC-MCNC: 46 MG/DL
NONHDLC SERPL-MCNC: 73 MG/DL
TRIGL SERPL-MCNC: 135 MG/DL

## 2017-09-12 PROCEDURE — 83036 HEMOGLOBIN GLYCOSYLATED A1C: CPT | Performed by: FAMILY MEDICINE

## 2017-09-12 PROCEDURE — 80061 LIPID PANEL: CPT | Performed by: FAMILY MEDICINE

## 2017-09-12 PROCEDURE — 36415 COLL VENOUS BLD VENIPUNCTURE: CPT | Performed by: FAMILY MEDICINE

## 2017-12-05 ENCOUNTER — TRANSFERRED RECORDS (OUTPATIENT)
Dept: HEALTH INFORMATION MANAGEMENT | Facility: CLINIC | Age: 72
End: 2017-12-05

## 2017-12-13 DIAGNOSIS — I48.92 ATRIAL FLUTTER WITH RAPID VENTRICULAR RESPONSE (H): ICD-10-CM

## 2017-12-13 RX ORDER — METOPROLOL SUCCINATE 100 MG/1
100 TABLET, EXTENDED RELEASE ORAL DAILY
Qty: 90 TABLET | Refills: 2 | Status: SHIPPED | OUTPATIENT
Start: 2017-12-13 | End: 2018-09-05

## 2017-12-13 NOTE — TELEPHONE ENCOUNTER
Reason for Call:  Medication or medication refill:    Do you use a [facility]?  Name of the pharmacy and phone number for the current request:  [facility]    Name of the medication requested: Metoprolol    Other request: none    Can we leave a detailed message on this number? YES    Phone number patient can be reached at: Home number on file [phone] (home)    Best Time: any time    Call taken on 12/13/2017 at 1:48 PM by Jeni Echevarria

## 2017-12-13 NOTE — TELEPHONE ENCOUNTER
Prescription approved per [facility] Refill Protocol.  Robert Rodgers RN

## 2018-02-12 DIAGNOSIS — I48.3 TYPICAL ATRIAL FLUTTER (H): ICD-10-CM

## 2018-02-12 RX ORDER — LISINOPRIL 10 MG/1
10 TABLET ORAL DAILY
Qty: 90 TABLET | Refills: 3 | Status: CANCELLED | OUTPATIENT
Start: 2018-02-12

## 2018-02-12 NOTE — TELEPHONE ENCOUNTER
Reason for Call:  Medication or medication refill:    Do you use a [facility]?  Name of the pharmacy and phone number for the current request:  [facility]    Name of the medication requested: lisinopril      Can we leave a detailed message on this number? Not Applicable    Phone number patient can be reached at: Home number on file [phone] (home)    Best Time: did not specify in message    Call taken on 2/12/2018 at 1:58 PM by Jeni Echevarria

## 2018-02-16 ENCOUNTER — TELEPHONE (OUTPATIENT)
Dept: FAMILY MEDICINE | Facility: CLINIC | Age: 73
End: 2018-02-16

## 2018-02-16 NOTE — LETTER
February 16, 2018      Suzette Britton  [address] [facility] [address]        Dear Suzette,     As part of [place]'s commitment to health and wellness we have recently reviewed your chart and your medical record indicates that you are due for one or more of the following:    -- Just as a reminder, you are due for a diabetes follow up office visit in early March 2018. Your last visit for diabetes was 9/5/17. We like to see you every 6 months for this. We did your cholesterol screening at your last appointment so you will not need to be fasting for this upcoming visit.     -- Mammogram. The last mammogram that we have on file for you was from 10/17/2014. If you have had one outside of [facility] please call to let us know so that we can update your chart.  Please call one of the following numbers to schedule:  [facility] [phone]  [facility] [phone]  [facility] [phone]  [facility] [phone]  [facility] [phone]  [facility] [phone]    -- Colon screen. Colonoscopy or FIT test. One of these tests is recommended at age 50 to screen for colon cancer. The last colonoscopy/FIT that we have on file for you was from 2005. At this visit it was recommended that you have another screening colonoscopy in 10 years. If you have had a colon screen outside of [place] please call us to let us know so we can update your chart. If not, please call one of the following numbers to schedule a colonoscopy:  [facility] [phone]  [facility] [phone]  [facility] [phone]  [facility] [phone] (multiple sites, call for locations)    A colonoscopy is the gold standard but if you prefer to do a screening that is less invasive and less expensive there is a test for you! It is called the FIT (fecal immunochemical testing) test. It is a screening option that is performed on a yearly basis. This is a test to check for blood  in the stool. This test can be done at home and mailed in (you don't even have to pay for postage). If you are willing to do this test, we can order the kit for you to  at our lab or we can mail it to you. Remember, it is always better to do something rather than nothing.   Please call us at [phone] if you need an order for a colonoscopy or FIT testing.    Please try to schedule and/or complete the tests above within the next 2-4 weeks.   The number to call to schedule an appointment at [facility] is [phone].    While we work hard to maintain accurate records on all our patients, it is always possible that this notice does not accurately reflect tests that you may have had. To ensure that we do not continue to send you notices please verify, at your next visit or by a [email or web address] message, that we have accurate dates of your tests, even if these were done many years ago.     Working together for your health is our goal.    Thank you for choosing [facility] for your healthcare needs.      Sincerely,     Judy Verde MD / rock

## 2018-02-16 NOTE — TELEPHONE ENCOUNTER
Please abstract the following data from this visit with this patient into the appropriate field in Epic:    Eye exam with ophthalmology on this date: 08/24/2017    Judith AUSTIN

## 2018-02-16 NOTE — TELEPHONE ENCOUNTER
Panel Management Review      Patient has the following on her problem list:     Diabetes    ASA: Passed    Last A1C  Lab Results   Component Value Date    A1C 6.6 09/12/2017    A1C 6.9 04/13/2017    A1C 7.6 02/01/2017    A1C 6.9 11/08/2016    A1C 6.7 09/13/2016     A1C tested: Passed    Last LDL:    Lab Results   Component Value Date    CHOL 132 09/12/2017     Lab Results   Component Value Date    HDL 59 09/12/2017     Lab Results   Component Value Date    LDL 46 09/12/2017     Lab Results   Component Value Date    TRIG 135 09/12/2017     Lab Results   Component Value Date    CHOLHDLRATIO 3.4 08/25/2015     Lab Results   Component Value Date    NHDL 73 09/12/2017       Is the patient on a Statin? YES             Is the patient on Aspirin? YES    Medications     HMG CoA Reductase Inhibitors    atorvastatin (LIPITOR) 10 MG tablet    Salicylates    aspirin EC 81 MG EC tablet          Last three blood pressure readings:  BP Readings from Last 3 Encounters:   09/05/17 109/61   08/15/17 124/76   06/15/17 118/68       Date of last diabetes office visit: 9/5/2017 - due for follow up 3/2018     Tobacco History:     History   Smoking Status     Never Smoker   Smokeless Tobacco     Never Used         Hypertension   Last three blood pressure readings:  BP Readings from Last 3 Encounters:   09/05/17 109/61   08/15/17 124/76   06/15/17 118/68     Blood pressure: Passed    HTN Guidelines:  Age 18-59 BP range:  Less than 140/90  Age 60-85 with Diabetes:  Less than 140/90  Age 60-85 without Diabetes:  less than 150/90      Composite cancer screening  Chart review shows that this patient is due/due soon for the following Mammogram, colonoscopy  Summary:    Patient is due/failing the following:   mammo   Colonoscopy  A1c in March 2018    Action needed:   Patient needs office visit for DM f/u in 3/2018  Referral for mammo and colonoscopy.    Type of outreach:    Sent letter. Doesn't look like she uses her MyChart    Questions for  provider review:    None                                                                                                                                    Judith Hearn DARIÁN       Chart routed to none .

## 2018-03-06 ENCOUNTER — DOCUMENTATION ONLY (OUTPATIENT)
Dept: LAB | Facility: CLINIC | Age: 73
End: 2018-03-06

## 2018-03-06 DIAGNOSIS — E11.21 TYPE 2 DIABETES MELLITUS WITH DIABETIC NEPHROPATHY, WITHOUT LONG-TERM CURRENT USE OF INSULIN (H): Primary | Chronic | ICD-10-CM

## 2018-03-07 DIAGNOSIS — E11.21 TYPE 2 DIABETES MELLITUS WITH DIABETIC NEPHROPATHY, WITHOUT LONG-TERM CURRENT USE OF INSULIN (H): Chronic | ICD-10-CM

## 2018-03-07 LAB — HBA1C MFR BLD: 6.3 % (ref 4.3–6)

## 2018-03-07 PROCEDURE — 83036 HEMOGLOBIN GLYCOSYLATED A1C: CPT | Performed by: FAMILY MEDICINE

## 2018-03-07 PROCEDURE — 36415 COLL VENOUS BLD VENIPUNCTURE: CPT | Performed by: FAMILY MEDICINE

## 2018-03-15 ENCOUNTER — OFFICE VISIT (OUTPATIENT)
Dept: FAMILY MEDICINE | Facility: CLINIC | Age: 73
End: 2018-03-15
Payer: MEDICARE

## 2018-03-15 VITALS
HEIGHT: 64 IN | SYSTOLIC BLOOD PRESSURE: 121 MMHG | HEART RATE: 65 BPM | DIASTOLIC BLOOD PRESSURE: 73 MMHG | WEIGHT: 180.5 LBS | TEMPERATURE: 97.6 F | BODY MASS INDEX: 30.81 KG/M2

## 2018-03-15 DIAGNOSIS — N18.30 CKD (CHRONIC KIDNEY DISEASE) STAGE 3, GFR 30-59 ML/MIN (H): Chronic | ICD-10-CM

## 2018-03-15 DIAGNOSIS — E11.21 TYPE 2 DIABETES MELLITUS WITH DIABETIC NEPHROPATHY, WITHOUT LONG-TERM CURRENT USE OF INSULIN (H): Primary | Chronic | ICD-10-CM

## 2018-03-15 DIAGNOSIS — Z98.890 S/P ABLATION OF ATRIAL FIBRILLATION: ICD-10-CM

## 2018-03-15 DIAGNOSIS — Z86.79 S/P ABLATION OF ATRIAL FIBRILLATION: ICD-10-CM

## 2018-03-15 DIAGNOSIS — E78.5 HYPERLIPIDEMIA LDL GOAL <100: Chronic | ICD-10-CM

## 2018-03-15 DIAGNOSIS — I10 ESSENTIAL HYPERTENSION: Chronic | ICD-10-CM

## 2018-03-15 DIAGNOSIS — E66.811 OBESITY (BMI 30.0-34.9): ICD-10-CM

## 2018-03-15 PROCEDURE — 99214 OFFICE O/P EST MOD 30 MIN: CPT | Performed by: FAMILY MEDICINE

## 2018-03-15 NOTE — NURSING NOTE
"Chief Complaint   Patient presents with     Diabetes       Initial /73 (BP Location: Right arm, Patient Position: Sitting, Cuff Size: Adult Large)  Pulse 65  Temp 97.6  F (36.4  C) (Tympanic)  Ht 5' 4\" (1.626 m)  Wt 180 lb 8 oz (81.9 kg)  BMI 30.98 kg/m2 Estimated body mass index is 30.98 kg/(m^2) as calculated from the following:    Height as of this encounter: 5' 4\" (1.626 m).    Weight as of this encounter: 180 lb 8 oz (81.9 kg).  Medication Reconciliation: complete   Maya Pineda LPN    "

## 2018-03-15 NOTE — PROGRESS NOTES
SUBJECTIVE:   Suzette Britton is a 72 year old female who presents to clinic today for the following health issues:      Diabetes Follow-up      Patient is checking blood sugars: once in the morning - 140-160     Diabetic concerns: None     Symptoms of hypoglycemia (low blood sugar): none     Paresthesias (numbness or burning in feet) or sores: No     Date of last diabetic eye exam: 08/24/2017    BP Readings from Last 2 Encounters:   09/05/17 109/61   08/15/17 124/76     Hemoglobin A1C (%)   Date Value   03/07/2018 6.3 (H)   09/12/2017 6.6 (H)     LDL Cholesterol Calculated (mg/dL)   Date Value   09/12/2017 46   09/13/2016 53     Component      Latest Ref Rng & Units 4/13/2017 9/12/2017 3/7/2018   Hemoglobin A1C      4.3 - 6.0 % 6.9 (H) 6.6 (H) 6.3 (H)       Wt Readings from Last 4 Encounters:   03/15/18 180 lb 8 oz (81.9 kg)   09/05/17 176 lb 4.8 oz (80 kg)   08/15/17 176 lb 6.4 oz (80 kg)   06/15/17 174 lb 12.8 oz (79.3 kg)         Hypertension Follow-up      Outpatient blood pressures are being checked once in awhile     Low Salt Diet: Monitors salt intake       Amount of exercise or physical activity: None    Problems taking medications regularly: No    Medication side effects: none    Diet: regular (no restrictions)- monitors food     Review of systems:  No cp/sob/cough  No n/v   No d/c       doesn 't want mammo/colonoscopy - Discussed risks/benefits/side effects with the patient.     Problem list and histories reviewed & adjusted, as indicated.  Additional history: as documented    Patient Active Problem List   Diagnosis     Osteoarthrosis, hand     Hyperhidrosis     Hyperlipidemia LDL goal <100     Atrial fibrillation (H)     CKD (chronic kidney disease) stage 3, GFR 30-59 ml/min     Preventative health care     Health Care Home     Cataract     Obesity (BMI 30.0-34.9)     S/P total knee arthroplasty     ACP (advance care planning)     Essential hypertension     Type 2 diabetes mellitus with diabetic  "nephropathy, without long-term current use of insulin (H)     NICK (acute kidney injury) (H)     Elevated lactic acid level     Elevated LFTs     Atrial fibrillation with RVR (H)     Chronic vasomotor rhinitis     Current Outpatient Prescriptions   Medication     metoprolol (TOPROL-XL) 100 MG 24 hr tablet     metFORMIN (GLUCOPHAGE) 500 MG tablet     lisinopril (PRINIVIL/ZESTRIL) 10 MG tablet     chlorthalidone (HYGROTON) 25 MG tablet     atorvastatin (LIPITOR) 10 MG tablet     aspirin EC 81 MG EC tablet     calcium-vitamin D-vitamin K (VIACTIV) 500-500-40 MG-UNT-MCG CHEW     Cholecalciferol (VITAMIN D PO)     Acetaminophen (TYLENOL PO)     blood glucose monitoring (NO BRAND SPECIFIED) test strip     GLUCOMETER KIT     No current facility-administered medications for this visit.         Allergies   Allergen Reactions     Nka [No Known Allergies]        /73 (BP Location: Right arm, Patient Position: Sitting, Cuff Size: Adult Large)  Pulse 65  Temp 97.6  F (36.4  C) (Tympanic)  Ht 5' 4\" (1.626 m)  Wt 180 lb 8 oz (81.9 kg)  BMI 30.98 kg/m2  GENERAL - Pt is alert and oriented in no acute distress.  Affect is appropriate. Good eye contact.  NECK - Neck is supple w/o LA or thyromegaly  RESPIRATORY - Clear to auscultation bilaterally.  No wheezing noted  CV - RRR, no murmurs, rubs, gallops.   Feet - no wounds. Normal capillary refill. Warm to touch. Intact microfilament testing. Normal DP and PT pulses      Assessment/Plan -    (E11.21) Type 2 diabetes mellitus with diabetic nephropathy, without long-term current use of insulin (H)  (primary encounter diagnosis)  Comment: doing well. Work on daily exercise and weight loss. Fasting lab and f/u appointment in early sept   Plan:     (I10) Essential hypertension  Comment:   Plan:     (N18.3) CKD (chronic kidney disease) stage 3, GFR 30-59 ml/min  Comment:   Plan:     (Z98.890,  Z86.79) S/P ablation of atrial fibrillation  Comment: spring 2017   Plan:     (E66.9) " Obesity (BMI 30.0-34.9)  Comment: work on weight loss - discussed   Plan:     (E78.5) Hyperlipidemia LDL goal <100  Comment:   Plan:       JADEN Verde MD

## 2018-03-15 NOTE — MR AVS SNAPSHOT
After Visit Summary   3/15/2018    Suzette Britton    MRN: [id number]           Patient Information     Date Of Birth          1945        Visit Information        Provider Department      3/15/2018 10:30 AM Judy Verde MD [facility]        Today's Diagnoses     Type 2 diabetes mellitus with diabetic nephropathy, without long-term current use of insulin (H)    -  1    Essential hypertension        CKD (chronic kidney disease) stage 3, GFR 30-59 ml/min        S/P ablation of atrial fibrillation        Obesity (BMI 30.0-34.9)        Hyperlipidemia LDL goal <100           Follow-ups after your visit        Who to contact     Normal or non-critical lab and imaging results will be communicated to you by Nuevolutionhart, letter or phone within 4 business days after the clinic has received the results. If you do not hear from us within 7 days, please contact the clinic through Nuevolutionhart or phone. If you have a critical or abnormal lab result, we will notify you by phone as soon as possible.  Submit refill requests through JayCut or call your pharmacy and they will forward the refill request to us. Please allow 3 business days for your refill to be completed.          If you need to speak with a  for additional information , please call: [phone]             Additional Information About Your Visit        JayCut Information     JayCut gives you secure access to your electronic health record. If you see a primary care provider, you can also send messages to your care team and make appointments. If you have questions, please call your primary care clinic.  If you do not have a primary care provider, please call [phone] and they will assist you.        Care EveryWhere ID     This is your Care EveryWhere ID. This could be used by other organizations to access your [facility] medical records  [id number]        Your Vitals Were     Pulse Temperature Height BMI (Body Mass  "Index)          65 97.6  F (36.4  C) (Tympanic) 5' 4\" (1.626 m) 30.98 kg/m2         Blood Pressure from Last 3 Encounters:   03/15/18 121/73   09/05/17 109/61   08/15/17 124/76    Weight from Last 3 Encounters:   03/15/18 180 lb 8 oz (81.9 kg)   09/05/17 176 lb 4.8 oz (80 kg)   08/15/17 176 lb 6.4 oz (80 kg)              Today, you had the following     No orders found for display       Primary Care Provider Office Phone # Fax #    Judy Verde [phone] [id number] [facility] [address]        Equal Access to Services     RATNA BROWN : Hadii aad ku hadasho Soomaali, waaxda luqadaha, qaybta kaalmada adeegyada, home melgar . So [facility] [phone].    ATENCIÓN: Si habla español, tiene a boyce disposición servicios gratuitos de asistencia lingüística. Ll[facility] [phone].    We comply with applicable federal civil rights laws and [place] laws. We do not discriminate on the basis of race, color, national origin, age, disability, sex, sexual orientation, or gender identity.            Thank you!     Thank you for choosing [facility]  for your care. Our goal is always to provide you with excellent care. Hearing back from our patients is one way we can continue to improve our services. Please take a few minutes to complete the written survey that you may receive in the mail after your visit with us. Thank you!             Your Updated Medication List - Protect others around you: Learn how to safely use, store and throw away your medicines at www.disposemymeds.org.          This list is accurate as of 3/15/18 12:15 PM.  Always use your most recent med list.                   Brand Name Dispense Instructions for use Diagnosis    aspirin 81 MG EC tablet      Take 1 tablet (81 mg) by mouth daily Once finished with Eliquis    Typical atrial flutter (H)       atorvastatin 10 MG tablet    LIPITOR    90 tablet    Take 1 tablet (10 mg) by mouth daily    " Type 2 diabetes mellitus with diabetic nephropathy, without long-term current use of insulin (H)       blood glucose monitoring test strip    no brand specified    3 Box    Use to test blood sugars one time daily or as directed  One touch meter - one touch strips    CKD (chronic kidney disease) stage 3, GFR 30-59 ml/min       calcium-vitamin D-vitamin K 500-500-40 MG-UNT-MCG Chew    VIACTIV     Take 1 tablet by mouth daily        chlorthalidone 25 MG tablet    HYGROTON    45 tablet    Take 0.5 tablets (12.5 mg) by mouth daily    Essential hypertension       GLUCOMETER KIT     1 Kit    for testing bid, One Touch or please fill with formulary glucometer    Type II or unspecified type diabetes mellitus without mention of complication, not stated as uncontrolled       lisinopril 10 MG tablet    PRINIVIL/ZESTRIL    90 tablet    Take 1 tablet (10 mg) by mouth daily    Typical atrial flutter (H)       metFORMIN 500 MG tablet    GLUCOPHAGE    360 tablet    Take 2 tablets (1,000 mg) by mouth 2 times daily (with meals)    Type 2 diabetes mellitus with diabetic nephropathy, without long-term current use of insulin (H)       metoprolol succinate 100 MG 24 hr tablet    TOPROL-XL    90 tablet    Take 1 tablet (100 mg) by mouth daily    Atrial flutter with rapid ventricular response (H)       TYLENOL PO      Take 1,000 mg by mouth every 6 hours as needed for mild pain or fever        VITAMIN D PO      Take 1,000 Units by mouth every morning

## 2018-08-31 ENCOUNTER — MEDICAL CORRESPONDENCE (OUTPATIENT)
Dept: HEALTH INFORMATION MANAGEMENT | Facility: CLINIC | Age: 73
End: 2018-08-31

## 2018-09-05 ENCOUNTER — OFFICE VISIT (OUTPATIENT)
Dept: FAMILY MEDICINE | Facility: CLINIC | Age: 73
End: 2018-09-05
Payer: MEDICARE

## 2018-09-05 VITALS
SYSTOLIC BLOOD PRESSURE: 130 MMHG | DIASTOLIC BLOOD PRESSURE: 72 MMHG | HEART RATE: 76 BPM | HEIGHT: 63 IN | TEMPERATURE: 96.8 F | WEIGHT: 189.6 LBS | BODY MASS INDEX: 33.59 KG/M2

## 2018-09-05 DIAGNOSIS — I48.3 TYPICAL ATRIAL FLUTTER (H): ICD-10-CM

## 2018-09-05 DIAGNOSIS — R61 GENERALIZED HYPERHIDROSIS: ICD-10-CM

## 2018-09-05 DIAGNOSIS — Z13.89 SCREENING FOR DIABETIC PERIPHERAL NEUROPATHY: ICD-10-CM

## 2018-09-05 DIAGNOSIS — E11.21 TYPE 2 DIABETES MELLITUS WITH DIABETIC NEPHROPATHY, WITHOUT LONG-TERM CURRENT USE OF INSULIN (H): Primary | Chronic | ICD-10-CM

## 2018-09-05 DIAGNOSIS — Z78.0 ASYMPTOMATIC POSTMENOPAUSAL STATUS: ICD-10-CM

## 2018-09-05 DIAGNOSIS — I48.92 ATRIAL FLUTTER WITH RAPID VENTRICULAR RESPONSE (H): ICD-10-CM

## 2018-09-05 DIAGNOSIS — I10 ESSENTIAL HYPERTENSION: Chronic | ICD-10-CM

## 2018-09-05 LAB
ANION GAP SERPL CALCULATED.3IONS-SCNC: 5 MMOL/L (ref 3–14)
BUN SERPL-MCNC: 23 MG/DL (ref 7–30)
CALCIUM SERPL-MCNC: 9 MG/DL (ref 8.5–10.1)
CHLORIDE SERPL-SCNC: 104 MMOL/L (ref 94–109)
CHOLEST SERPL-MCNC: 147 MG/DL
CO2 SERPL-SCNC: 28 MMOL/L (ref 20–32)
CREAT SERPL-MCNC: 1.06 MG/DL (ref 0.52–1.04)
CREAT UR-MCNC: 63 MG/DL
GFR SERPL CREATININE-BSD FRML MDRD: 50 ML/MIN/1.7M2
GLUCOSE SERPL-MCNC: 145 MG/DL (ref 70–99)
HBA1C MFR BLD: 6.7 % (ref 0–5.6)
HDLC SERPL-MCNC: 54 MG/DL
LDLC SERPL CALC-MCNC: 55 MG/DL
MICROALBUMIN UR-MCNC: 12 MG/L
MICROALBUMIN/CREAT UR: 18.51 MG/G CR (ref 0–25)
NONHDLC SERPL-MCNC: 93 MG/DL
POTASSIUM SERPL-SCNC: 4.5 MMOL/L (ref 3.4–5.3)
SODIUM SERPL-SCNC: 137 MMOL/L (ref 133–144)
TRIGL SERPL-MCNC: 190 MG/DL

## 2018-09-05 PROCEDURE — 80061 LIPID PANEL: CPT | Performed by: FAMILY MEDICINE

## 2018-09-05 PROCEDURE — 99207 C FOOT EXAM  NO CHARGE: CPT | Performed by: FAMILY MEDICINE

## 2018-09-05 PROCEDURE — 80048 BASIC METABOLIC PNL TOTAL CA: CPT | Performed by: FAMILY MEDICINE

## 2018-09-05 PROCEDURE — 82043 UR ALBUMIN QUANTITATIVE: CPT | Performed by: FAMILY MEDICINE

## 2018-09-05 PROCEDURE — 99214 OFFICE O/P EST MOD 30 MIN: CPT | Performed by: FAMILY MEDICINE

## 2018-09-05 PROCEDURE — 83036 HEMOGLOBIN GLYCOSYLATED A1C: CPT | Performed by: FAMILY MEDICINE

## 2018-09-05 PROCEDURE — 36415 COLL VENOUS BLD VENIPUNCTURE: CPT | Performed by: FAMILY MEDICINE

## 2018-09-05 RX ORDER — GLYCOPYRROLATE 1 MG/1
1 TABLET ORAL 2 TIMES DAILY
Qty: 180 TABLET | Refills: 3 | Status: SHIPPED | OUTPATIENT
Start: 2018-09-05 | End: 2019-02-19

## 2018-09-05 RX ORDER — LISINOPRIL 10 MG/1
10 TABLET ORAL DAILY
Qty: 90 TABLET | Refills: 3 | Status: SHIPPED | OUTPATIENT
Start: 2018-09-05 | End: 2019-09-17

## 2018-09-05 RX ORDER — METOPROLOL SUCCINATE 100 MG/1
100 TABLET, EXTENDED RELEASE ORAL DAILY
Qty: 90 TABLET | Refills: 2 | Status: SHIPPED | OUTPATIENT
Start: 2018-09-05 | End: 2019-05-13

## 2018-09-05 RX ORDER — ATORVASTATIN CALCIUM 10 MG/1
10 TABLET, FILM COATED ORAL DAILY
Qty: 90 TABLET | Refills: 3 | Status: SHIPPED | OUTPATIENT
Start: 2018-09-05 | End: 2019-09-17

## 2018-09-05 RX ORDER — CHLORTHALIDONE 25 MG/1
12.5 TABLET ORAL DAILY
Qty: 45 TABLET | Refills: 3 | Status: SHIPPED | OUTPATIENT
Start: 2018-09-05 | End: 2019-09-17

## 2018-09-05 NOTE — PROGRESS NOTES
SUBJECTIVE:                                                    Suzette Britton is a 73 year old female who presents to clinic today for the following health issues:    Requesting refill for Glycopyrrolate for 3 times per day.     Diabetes Follow-up    Patient is checking blood sugars: 150-170 fasting in the am    Diabetic concerns: higher than normal      Symptoms of hypoglycemia (low blood sugar): none     Paresthesias (numbness or burning in feet) or sores: No     Date of last diabetic eye exam: 8/24/17    Diabetes Management Resources    Hyperlipidemia Follow-Up    Rate your low fat/cholesterol diet?: good    Taking statin?  Yes, no muscle aches from statin    Other lipid medications/supplements?:  none    Hypertension Follow-up    Outpatient blood pressures are no being checked    Low Salt Diet: Monitors salt intake    BP Readings from Last 2 Encounters:   09/05/18 130/72   03/15/18 121/73     Hemoglobin A1C (%)   Date Value   09/05/2018 6.7 (H)   03/07/2018 6.3 (H)     LDL Cholesterol Calculated (mg/dL)   Date Value   09/12/2017 46   09/13/2016 53       Amount of exercise or physical activity: None    Problems taking medications regularly: No    Medication side effects: none    Diet: regular (no restrictions) and low salt        Problem list and histories reviewed & adjusted, as indicated.  Additional history: she eats first thing in the morning and then she takes the metformin about 1-2 hours later she is doing better with this   She has been taking the robinul 2 times per day and this has been working better for her   She       Patient Active Problem List   Diagnosis     Osteoarthrosis, hand     Hyperlipidemia LDL goal <100     Atrial fibrillation (H)     CKD (chronic kidney disease) stage 3, GFR 30-59 ml/min     Health Care Home     Cataract     Obesity (BMI 30.0-34.9)     S/P total knee arthroplasty     ACP (advance care planning)     Essential hypertension     Type 2 diabetes mellitus with diabetic  "nephropathy, without long-term current use of insulin (H)     NICK (acute kidney injury) (H)     Elevated lactic acid level     Elevated LFTs     Atrial fibrillation with RVR (H)     Chronic vasomotor rhinitis     S/P ablation of atrial fibrillation     Past Surgical History:   Procedure Laterality Date     ARTHROPLASTY KNEE Left 12/1/2016    Procedure: ARTHROPLASTY KNEE;  Surgeon: Zachary Arteaga MD;  Location: [facility] OR     ARTHROPLASTY KNEE Right 5/4/2017    Procedure: ARTHROPLASTY KNEE;  Right Total Knee Arthroplasty;  Surgeon: Zachary Arteaga MD;  Location: [facility] OR     Colonoscopy, normal  09/13/2002     PHACOEMULSIFICATION WITH STANDARD INTRAOCULAR LENS IMPLANT  6/26/2014    Procedure: PHACOEMULSIFICATION WITH STANDARD INTRAOCULAR LENS IMPLANT;  Surgeon: Dario Mcghee MD;  Location: [facility] OR     PHACOEMULSIFICATION WITH STANDARD INTRAOCULAR LENS IMPLANT Left 9/4/2014    Procedure: PHACOEMULSIFICATION WITH STANDARD INTRAOCULAR LENS IMPLANT;  Surgeon: Dario Mcghee MD;  Location: [facility] OR       Social History   Substance Use Topics     Smoking status: Never Smoker     Smokeless tobacco: Never Used     Alcohol use No     Family History   Problem Relation Age of Onset     Hypertension Mother      Diabetes Mother      Cancer Mother      uterine     Arthritis Father      Genitourinary Problems Father      kidney disease     Cancer Father      bladder cancer     Diabetes Brother      Diabetes Paternal Grandmother      Diabetes Paternal Aunt          No shingles shot   Flu shot in oct   ROS:  Constitutional, HEENT, cardiovascular, pulmonary, gi and gu systems are negative, except as otherwise noted.    OBJECTIVE:                                                    /72  Pulse 76  Temp 96.8  F (36  C) (Tympanic)  Ht 5' 3\" (1.6 m)  Wt 189 lb 9.6 oz (86 kg)  BMI 33.59 kg/m2 Body mass index is 33.59 kg/(m^2).   GENERAL: healthy, alert, well nourished, well hydrated, no distress  HENT: ear canals- " normal; TMs- normal; Nose- normal; Mouth- no ulcers, no lesions  NECK: no tenderness, no adenopathy, no asymmetry, no masses, no stiffness; thyroid- normal to palpation  RESP: lungs clear to auscultation - no rales, no rhonchi, no wheezes  CV: regular rates and rhythm, normal S1 S2, no S3 or S4 and no murmur, no click or rub -  ABDOMEN: soft, no tenderness, no  hepatosplenomegaly, no masses, normal bowel sounds  Diabetic foot exam: normal DP and PT pulses, no trophic changes or ulcerative lesions and normal sensory exam       ASSESSMENT/PLAN:                                                      1. Asymptomatic postmenopausal status    - DEXA HIP/PELVIS/SPINE - Future; Future    2. Screening for diabetic peripheral neuropathy    - FOOT EXAM  NO CHARGE [55614.048]    3. Type 2 diabetes mellitus with diabetic nephropathy, without long-term current use of insulin (H)  Good control   - HEMOGLOBIN A1C  - Lipid panel reflex to direct LDL Fasting  - Albumin Random Urine Quantitative with Creat Ratio  - metFORMIN (GLUCOPHAGE) 500 MG tablet; Take 2 tablets (1,000 mg) by mouth 2 times daily (with meals)  Dispense: 360 tablet; Refill: 3  - atorvastatin (LIPITOR) 10 MG tablet; Take 1 tablet (10 mg) by mouth daily  Dispense: 90 tablet; Refill: 3    4. Essential hypertension  Well controlled   - BASIC METABOLIC PANEL  - chlorthalidone (HYGROTON) 25 MG tablet; Take 0.5 tablets (12.5 mg) by mouth daily  Dispense: 45 tablet; Refill: 3    5. Atrial flutter with rapid ventricular response (H)  Rate controlled   - metoprolol succinate (TOPROL-XL) 100 MG 24 hr tablet; Take 1 tablet (100 mg) by mouth daily  Dispense: 90 tablet; Refill: 2    6. Typical atrial flutter (H)    - lisinopril (PRINIVIL/ZESTRIL) 10 MG tablet; Take 1 tablet (10 mg) by mouth daily  Dispense: 90 tablet; Refill: 3    7. Generalized hyperhidrosis  May increase to 2 times per day this is the max recommended for this off label use discussed potential side effects for  her and with her a fib would like to limit changes in heart rated for her   - glycopyrrolate (ROBINUL) 1 MG tablet; Take 1 tablet (1 mg) by mouth 2 times daily  Dispense: 180 tablet; Refill: 3     reports that she has never smoked. She has never used smokeless tobacco.          Kirsten Marshall M.D.  [facility]

## 2018-09-05 NOTE — MR AVS SNAPSHOT
After Visit Summary   9/5/2018    Suzette Britton    MRN: [id number]           Patient Information     Date Of Birth          1945        Visit Information        Provider Department      9/5/2018 10:30 AM Kirsten Marshall MD [facility]        Today's Diagnoses     Type 2 diabetes mellitus with diabetic nephropathy, without long-term current use of insulin (H)    -  1    Asymptomatic postmenopausal status        Screening for diabetic peripheral neuropathy        Essential hypertension        Atrial flutter with rapid ventricular response (H)        Typical atrial flutter (H)        Generalized hyperhidrosis          Care Instructions    Take the blood sugar a few times per week but at different times of the day       Take the robinul 2 times per day for now               Follow-ups after your visit        Future tests that were ordered for you today     Open Future Orders        Priority Expected Expires Ordered    DEXA HIP/PELVIS/SPINE - Future Routine  9/5/2019 9/5/2018            Who to contact     Normal or non-critical lab and imaging results will be communicated to you by go2 mediahart, letter or phone within 4 business days after the clinic has received the results. If you do not hear from us within 7 days, please contact the clinic through PLYmediat or phone. If you have a critical or abnormal lab result, we will notify you by phone as soon as possible.  Submit refill requests through Buzzoo or call your pharmacy and they will forward the refill request to us. Please allow 3 business days for your refill to be completed.          If you need to speak with a  for additional information , please call: [phone]             Additional Information About Your Visit        Buzzoo Information     Buzzoo gives you secure access to your electronic health record. If you see a primary care provider, you can also send messages to your care team and make appointments. If you  "have questions, please call your primary care clinic.  If you do not have a primary care provider, please call [phone] and they will assist you.        Care EveryWhere ID     This is your Care EveryWhere ID. This could be used by other organizations to access your [facility] medical records  [id number]        Your Vitals Were     Pulse Temperature Height BMI (Body Mass Index)          76 96.8  F (36  C) (Tympanic) 5' 3\" (1.6 m) 33.59 kg/m2         Blood Pressure from Last 3 Encounters:   09/05/18 130/72   03/15/18 121/73   09/05/17 109/61    Weight from Last 3 Encounters:   09/05/18 189 lb 9.6 oz (86 kg)   03/15/18 180 lb 8 oz (81.9 kg)   09/05/17 176 lb 4.8 oz (80 kg)              We Performed the Following     Albumin Random Urine Quantitative with Creat Ratio     BASIC METABOLIC PANEL     FOOT EXAM  NO CHARGE [84922.114]     HEMOGLOBIN A1C     Lipid panel reflex to direct LDL Fasting          Today's Medication Changes          These changes are accurate as of 9/5/18 11:33 AM.  If you have any questions, ask your nurse or doctor.               Start taking these medicines.        Dose/Directions    glycopyrrolate 1 MG tablet   Commonly known as:  RASHADINUL   Used for:  Generalized hyperhidrosis   Started by:  Kirsten Marshall MD        Dose:  1 mg   Take 1 tablet (1 mg) by mouth 2 times daily   Quantity:  180 tablet   Refills:  3         Stop taking these medicines if you haven't already. Please contact your care team if you have questions.     TYLENOL PO   Stopped by:  Kirsten Marshall MD                Where to get your medicines      [facility] [place] [place] - [address] AT Portal to Reg[facility]  [address], [facility] [address]     Phone:  [phone]     atorvastatin 10 MG tablet    chlorthalidone 25 MG tablet    glycopyrrolate 1 MG tablet    lisinopril 10 MG tablet    metFORMIN 500 MG tablet    metoprolol succinate 100 " MG 24 hr tablet                Primary Care Provider Office Phone # Fax #    Judy Verde [phone] [address] [facility] [facility] [address]        Equal Access to Services     BRITTNEE GOMEZMARCO A : Hadii aad ku bharato Sodelorisali, waaxda luqadaha, qaybta kaalmada adechandrika, home cleaningsera julia. So [facility] [phone].    ATENCIÓN: Si habla español, tiene a boyce disposición servicios gratuitos de asistencia lingüística. Llame al [phone].    We comply with applicable federal civil rights laws and [place] laws. We do not discriminate on the basis of race, color, national origin, age, disability, sex, sexual orientation, or gender identity.            Thank you!     Thank you for choosing [facility]  for your care. Our goal is always to provide you with excellent care. Hearing back from our patients is one way we can continue to improve our services. Please take a few minutes to complete the written survey that you may receive in the mail after your visit with us. Thank you!             Your Updated Medication List - Protect others around you: Learn how to safely use, store and throw away your medicines at www.disposemymeds.org.          This list is accurate as of 9/5/18 11:33 AM.  Always use your most recent med list.                   Brand Name Dispense Instructions for use Diagnosis    aspirin 81 MG EC tablet      Take 1 tablet (81 mg) by mouth daily Once finished with Eliquis    Typical atrial flutter (H)       atorvastatin 10 MG tablet    LIPITOR    90 tablet    Take 1 tablet (10 mg) by mouth daily    Type 2 diabetes mellitus with diabetic nephropathy, without long-term current use of insulin (H)       blood glucose monitoring test strip    no brand specified    3 Box    Use to test blood sugars one time daily or as directed  One touch meter - one touch strips    CKD (chronic kidney disease) stage 3, GFR 30-59 ml/min       calcium-vitamin D-vitamin K  500-500-40 MG-UNT-MCG Chew    VIACTIV     Take 1 tablet by mouth daily        chlorthalidone 25 MG tablet    HYGROTON    45 tablet    Take 0.5 tablets (12.5 mg) by mouth daily    Essential hypertension       GLUCOMETER KIT     1 Kit    for testing bid, One Touch or please fill with formulary glucometer    Type II or unspecified type diabetes mellitus without mention of complication, not stated as uncontrolled       glycopyrrolate 1 MG tablet    ROBINUL    180 tablet    Take 1 tablet (1 mg) by mouth 2 times daily    Generalized hyperhidrosis       lisinopril 10 MG tablet    PRINIVIL/ZESTRIL    90 tablet    Take 1 tablet (10 mg) by mouth daily    Typical atrial flutter (H)       metFORMIN 500 MG tablet    GLUCOPHAGE    360 tablet    Take 2 tablets (1,000 mg) by mouth 2 times daily (with meals)    Type 2 diabetes mellitus with diabetic nephropathy, without long-term current use of insulin (H)       metoprolol succinate 100 MG 24 hr tablet    TOPROL-XL    90 tablet    Take 1 tablet (100 mg) by mouth daily    Atrial flutter with rapid ventricular response (H)       VITAMIN D PO      Take 1,000 Units by mouth every morning

## 2018-09-05 NOTE — PATIENT INSTRUCTIONS
Take the blood sugar a few times per week but at different times of the day       Take the robinul 2 times per day for now

## 2019-02-19 ENCOUNTER — OFFICE VISIT (OUTPATIENT)
Dept: FAMILY MEDICINE | Facility: CLINIC | Age: 74
End: 2019-02-19
Payer: MEDICARE

## 2019-02-19 VITALS
HEIGHT: 64 IN | RESPIRATION RATE: 16 BRPM | HEART RATE: 73 BPM | BODY MASS INDEX: 32.78 KG/M2 | WEIGHT: 192 LBS | DIASTOLIC BLOOD PRESSURE: 73 MMHG | SYSTOLIC BLOOD PRESSURE: 134 MMHG | TEMPERATURE: 98 F

## 2019-02-19 DIAGNOSIS — E66.811 OBESITY (BMI 30.0-34.9): ICD-10-CM

## 2019-02-19 DIAGNOSIS — N18.30 CKD (CHRONIC KIDNEY DISEASE) STAGE 3, GFR 30-59 ML/MIN (H): Chronic | ICD-10-CM

## 2019-02-19 DIAGNOSIS — E11.21 TYPE 2 DIABETES MELLITUS WITH DIABETIC NEPHROPATHY, WITHOUT LONG-TERM CURRENT USE OF INSULIN (H): Primary | Chronic | ICD-10-CM

## 2019-02-19 DIAGNOSIS — H26.9 CATARACT, UNSPECIFIED CATARACT TYPE, UNSPECIFIED LATERALITY: ICD-10-CM

## 2019-02-19 DIAGNOSIS — Z76.89 HEALTH CARE HOME: ICD-10-CM

## 2019-02-19 DIAGNOSIS — E78.5 HYPERLIPIDEMIA LDL GOAL <100: Chronic | ICD-10-CM

## 2019-02-19 DIAGNOSIS — I48.0 PAROXYSMAL ATRIAL FIBRILLATION (H): Chronic | ICD-10-CM

## 2019-02-19 LAB — HBA1C MFR BLD: 7.1 % (ref 0–5.6)

## 2019-02-19 PROCEDURE — 83036 HEMOGLOBIN GLYCOSYLATED A1C: CPT | Performed by: FAMILY MEDICINE

## 2019-02-19 PROCEDURE — 36415 COLL VENOUS BLD VENIPUNCTURE: CPT | Performed by: FAMILY MEDICINE

## 2019-02-19 PROCEDURE — 99214 OFFICE O/P EST MOD 30 MIN: CPT | Performed by: FAMILY MEDICINE

## 2019-02-19 ASSESSMENT — MIFFLIN-ST. JEOR: SCORE: 1346.94

## 2019-02-19 NOTE — PROGRESS NOTES
SUBJECTIVE:   Szuette Britton is a 73 year old female who presents to clinic today for the following health issues:      Diabetes Follow-up      Patient is checking blood sugars: once a day  In the morning     Diabetic concerns: None     Symptoms of hypoglycemia (low blood sugar): none     Paresthesias (numbness or burning in feet) or sores: No     Date of last diabetic eye exam: 08/24/2017    Taking metformin 2000/day  Less exercise, more treats this winter     Does eye exam every 2 years      BP Readings from Last 2 Encounters:   09/05/18 130/72   03/15/18 121/73     Hemoglobin A1C (%)   Date Value   02/19/2019 7.1 (H)   09/05/2018 6.7 (H)     LDL Cholesterol Calculated (mg/dL)   Date Value   09/05/2018 55   09/12/2017 46       Diabetes Management Resources  Hypertension Follow-up      Outpatient blood pressures are not being checked.    Low Salt Diet: low salt      Amount of exercise or physical activity: None    Problems taking medications regularly: No    Medication side effects: none    Diet- watches what she eats       Review of systems:  No blurry vision  No headache  No cp/sob  No lower extremity edema       Problem list and histories reviewed & adjusted, as indicated.  Additional history: as documented      Patient Active Problem List   Diagnosis     Osteoarthrosis, hand     Hyperlipidemia LDL goal <100     Atrial fibrillation (H)     CKD (chronic kidney disease) stage 3, GFR 30-59 ml/min (H)     Health Care Home     Cataract     Obesity (BMI 30.0-34.9)     S/P total knee arthroplasty     ACP (advance care planning)     Essential hypertension     Type 2 diabetes mellitus with diabetic nephropathy, without long-term current use of insulin (H)     NICK (acute kidney injury) (H)     Elevated lactic acid level     Elevated LFTs     Atrial fibrillation with RVR (H)     Chronic vasomotor rhinitis     S/P ablation of atrial fibrillation     Current Outpatient Medications   Medication     aspirin EC 81 MG EC  "tablet     atorvastatin (LIPITOR) 10 MG tablet     calcium-vitamin D-vitamin K (VIACTIV) 500-500-40 MG-UNT-MCG CHEW     chlorthalidone (HYGROTON) 25 MG tablet     Cholecalciferol (VITAMIN D PO)     lisinopril (PRINIVIL/ZESTRIL) 10 MG tablet     metFORMIN (GLUCOPHAGE) 500 MG tablet     metoprolol succinate (TOPROL-XL) 100 MG 24 hr tablet     blood glucose monitoring (NO BRAND SPECIFIED) test strip     GLUCOMETER KIT     No current facility-administered medications for this visit.         Allergies   Allergen Reactions     Nka [No Known Allergies]      /73 (BP Location: Right arm, Patient Position: Sitting, Cuff Size: Adult Large)   Pulse 73   Temp 98  F (36.7  C) (Tympanic)   Resp 16   Ht 1.619 m (5' 3.75\")   Wt 87.1 kg (192 lb)   BMI 33.22 kg/m    GENERAL - Pt is alert and oriented in no acute distress.  Affect is appropriate. Good eye contact.  HEET - Head is normocephalic, atraumatic.    PERRLA,EEMI. Conjunctiva are free of icterus or erythema.    Oropharynx free of masses and lesions, no tonsillar exudate or petechiae.    NECK - Neck is supple w/o LA or thyromegaly  RESPIRATORY - Clear to auscultation bilaterally.  No wheezing noted  CV - RRR, no murmurs, rubs, gallops.   EXTREM - No edema.  Feet - no wounds. Normal capillary refill. Warm to touch. Intact microfilament testing. Normal DP and PT pulses  Normal gait       Assessment/Plan -    (E11.21) Type 2 diabetes mellitus with diabetic nephropathy, without long-term current use of insulin (H)  (primary encounter diagnosis)  Comment: A1c kendal from last check. She hasn't been exercising this winter. Consider adding another med - discussed. She wants to try adding in daily exercise and watching her sweet intake more closely.  Will return for 3 month a1c in clinic. Continue metformin dose for now. Due for eye exam - she knows and will schedule. The patient indicates understanding of these issues and agrees with the plan.   Plan: Hemoglobin A1c        "     (E78.5) Hyperlipidemia LDL goal <100  Comment: stable. Doing well   Plan:     (Z76.89) Health Care Home  Comment:   Plan:     (N18.3) CKD (chronic kidney disease) stage 3, GFR 30-59 ml/min (H)  Comment: stable and doing well   Plan:     (H26.9) Cataract, unspecified cataract type, unspecified laterality  Comment: says she was told that she has a cataract and needs to check in with eye doctor to reassess   Plan:     (I48.0) Paroxysmal atrial fibrillation (H)  Comment: stable, in NSR today   Plan:     (E66.9) Obesity (BMI 30.0-34.9)  Comment: will work on getting 30minutes exercise/day. Plans to eat more healthy foods, less snacks. The patient indicates understanding of these issues and agrees with the plan.   Plan:     She declines shinrix shot today     JADEN Verde MD

## 2019-03-07 ENCOUNTER — TRANSFERRED RECORDS (OUTPATIENT)
Dept: HEALTH INFORMATION MANAGEMENT | Facility: CLINIC | Age: 74
End: 2019-03-07

## 2019-05-13 ENCOUNTER — TELEPHONE (OUTPATIENT)
Dept: FAMILY MEDICINE | Facility: CLINIC | Age: 74
End: 2019-05-13

## 2019-05-13 DIAGNOSIS — I48.92 ATRIAL FLUTTER WITH RAPID VENTRICULAR RESPONSE (H): ICD-10-CM

## 2019-05-13 DIAGNOSIS — E11.21 TYPE 2 DIABETES MELLITUS WITH DIABETIC NEPHROPATHY, WITHOUT LONG-TERM CURRENT USE OF INSULIN (H): Chronic | ICD-10-CM

## 2019-05-13 DIAGNOSIS — E11.21 TYPE 2 DIABETES MELLITUS WITH DIABETIC NEPHROPATHY, WITHOUT LONG-TERM CURRENT USE OF INSULIN (H): Primary | Chronic | ICD-10-CM

## 2019-05-13 LAB — HBA1C MFR BLD: 7.1 % (ref 0–5.6)

## 2019-05-13 PROCEDURE — 83036 HEMOGLOBIN GLYCOSYLATED A1C: CPT | Performed by: FAMILY MEDICINE

## 2019-05-13 PROCEDURE — 36415 COLL VENOUS BLD VENIPUNCTURE: CPT | Performed by: FAMILY MEDICINE

## 2019-05-13 RX ORDER — METOPROLOL SUCCINATE 100 MG/1
100 TABLET, EXTENDED RELEASE ORAL DAILY
Qty: 90 TABLET | Refills: 2 | Status: SHIPPED | OUTPATIENT
Start: 2019-05-13 | End: 2019-09-17

## 2019-05-13 NOTE — TELEPHONE ENCOUNTER
Prescription approved per [facility] Refill Protocol.  Robert Rodgers RN

## 2019-05-13 NOTE — TELEPHONE ENCOUNTER
Reason for Call:  Other order    Detailed comments: Pt has an appt today at 2:15 for Lab and needs a order put in for A1C. Thanks      Phone Number Patient can be reached at: Home number on file [phone] (home)    Best Time: any    Can we leave a detailed message on this number? YES    Call taken on 5/13/2019 at 10:57 AM by Basilia Andrade

## 2019-06-25 ENCOUNTER — OFFICE VISIT (OUTPATIENT)
Dept: FAMILY MEDICINE | Facility: CLINIC | Age: 74
End: 2019-06-25
Payer: MEDICARE

## 2019-06-25 VITALS
WEIGHT: 195 LBS | DIASTOLIC BLOOD PRESSURE: 70 MMHG | TEMPERATURE: 98.4 F | HEART RATE: 82 BPM | OXYGEN SATURATION: 98 % | BODY MASS INDEX: 33.73 KG/M2 | SYSTOLIC BLOOD PRESSURE: 126 MMHG | RESPIRATION RATE: 12 BRPM

## 2019-06-25 DIAGNOSIS — N30.00 ACUTE CYSTITIS WITHOUT HEMATURIA: Primary | ICD-10-CM

## 2019-06-25 DIAGNOSIS — R61 GENERALIZED HYPERHIDROSIS: ICD-10-CM

## 2019-06-25 DIAGNOSIS — R82.90 NONSPECIFIC FINDING ON EXAMINATION OF URINE: ICD-10-CM

## 2019-06-25 DIAGNOSIS — R30.0 DYSURIA: ICD-10-CM

## 2019-06-25 LAB
ALBUMIN UR-MCNC: 30 MG/DL
APPEARANCE UR: ABNORMAL
BILIRUB UR QL STRIP: NEGATIVE
COLOR UR AUTO: YELLOW
GLUCOSE UR STRIP-MCNC: 100 MG/DL
HGB UR QL STRIP: ABNORMAL
KETONES UR STRIP-MCNC: NEGATIVE MG/DL
LEUKOCYTE ESTERASE UR QL STRIP: ABNORMAL
NITRATE UR QL: POSITIVE
PH UR STRIP: 6.5 PH (ref 5–7)
RBC #/AREA URNS AUTO: ABNORMAL /HPF
SOURCE: ABNORMAL
SP GR UR STRIP: 1.01 (ref 1–1.03)
UROBILINOGEN UR STRIP-ACNC: 0.2 EU/DL (ref 0.2–1)
WBC #/AREA URNS AUTO: ABNORMAL /HPF

## 2019-06-25 PROCEDURE — 87186 SC STD MICRODIL/AGAR DIL: CPT | Performed by: INTERNAL MEDICINE

## 2019-06-25 PROCEDURE — 87086 URINE CULTURE/COLONY COUNT: CPT | Performed by: INTERNAL MEDICINE

## 2019-06-25 PROCEDURE — 99213 OFFICE O/P EST LOW 20 MIN: CPT | Performed by: INTERNAL MEDICINE

## 2019-06-25 PROCEDURE — 81001 URINALYSIS AUTO W/SCOPE: CPT | Performed by: INTERNAL MEDICINE

## 2019-06-25 PROCEDURE — 87088 URINE BACTERIA CULTURE: CPT | Performed by: INTERNAL MEDICINE

## 2019-06-25 RX ORDER — GLYCOPYRROLATE 1 MG/1
1-2 TABLET ORAL
COMMUNITY
End: 2019-09-17

## 2019-06-25 RX ORDER — SULFAMETHOXAZOLE/TRIMETHOPRIM 800-160 MG
1 TABLET ORAL 2 TIMES DAILY
Qty: 6 TABLET | Refills: 0 | Status: SHIPPED | OUTPATIENT
Start: 2019-06-25 | End: 2019-06-28

## 2019-06-25 NOTE — PATIENT INSTRUCTIONS
"1. Start bactrim twice daily x 3 days.  If you feel symptoms are not totally resolved, can call in for total of 5 days  2. Watch for mild nausea or mild diarrhea  3. Increase water intake while on the antibiotic         Patient Education     Bladder Infection, Female (Adult)    Urine is normally doesn't have any bacteria in it. But bacteria can get into the urinary tract from the skin around the rectum. Or they can travel in the blood from elsewhere in the body. Once they are in your urinary tract, they can cause infection in the urethra (urethritis), the bladder (cystitis), or the kidneys (pyelonephritis).  The most common place for an infection is in the bladder. This is called a bladder infection. This is one of the most common infections in women. Most bladder infections are easily treated. They are not serious unless the infection spreads to the kidney.  The phrases \"bladder infection,\" \"UTI,\" and \"cystitis\" are often used to describe the same thing. But they are not always the same. Cystitis is an inflammation of the bladder. The most common cause of cystitis is an infection.  Symptoms  The infection causes inflammation in the urethra and bladder. This causes many of the symptoms. The most common symptoms of a bladder infection are:    Pain or burning when urinating    Having to urinate more often than usual    Urgent need to urinate    Only a small amount of urine comes out    Blood in urine    Abdominal discomfort. This is usually in the lower abdomen above the pubic bone.    Cloudy urine    Strong- or bad-smelling urine    Unable to urinate (urinary retention)    Unable to hold urine in (urinary incontinence)    Fever    Loss of appetite    Confusion (in older adults)  Causes  Bladder infections are not contagious. You can't get one from someone else, from a toilet seat, or from sharing a bath.  The most common cause of bladder infections is bacteria from the bowels. The bacteria get onto the skin around " the opening of the urethra. From there, they can get into the urine and travel up to the bladder, causing inflammation and infection. This usually happens because of:    Wiping improperly after urinating. Always wipe from front to back.    Bowel incontinence    Pregnancy    Procedures such as having a catheter inserted    Older age    Not emptying your bladder. This can allow bacteria a chance to grow in your urine.    Dehydration    Constipation    Sex    Use of a diaphragm for birth control   Treatment  Bladder infections are diagnosed by a urine test. They are treated with antibiotics and usually clear up quickly without complications. Treatment helps prevent a more serious kidney infection.  Medicines  Medicines can help in the treatment of a bladder infection:    Take antibiotics until they are used up, even if you feel better. It is important to finish them to make sure the infection has cleared.    You can use acetaminophen or ibuprofen for pain, fever, or discomfort, unless another medicine was prescribed. If you have chronic liver or kidney disease, talk with your healthcare provider before using these medicines. Also talk with your provider if you've ever had a stomach ulcer or gastrointestinal bleeding, or are taking blood-thinner medicines.    If you are given phenazopydridine to reduce burning with urination, it will cause your urine to become a bright orange color. This can stain clothing.  Care and prevention  These self-care steps can help prevent future infections:    Drink plenty of fluids to prevent dehydration and flush out your bladder. Do this unless you must restrict fluids for other health reasons, or your doctor told you not to.    Proper cleaning after going to the bathroom is important. Wipe from front to back after using the toilet to prevent the spread of bacteria.    Urinate more often. Don't try to hold urine in for a long time.    Wear loose-fitting clothes and cotton underwear. Avoid  tight-fitting pants.    Improve your diet and prevent constipation. Eat more fresh fruit and vegetables, and fiber, and less junk and fatty foods.    Avoid sex until your symptoms are gone.    Avoid caffeine, alcohol, and spicy foods. These can irritate your bladder.    Urinate right after intercourse to flush out your bladder.    If you use birth control pills and have frequent bladder infections, discuss it with your doctor.  Follow-up care  Call your healthcare provider if all symptoms are not gone after 3 days of treatment. This is especially important if you have repeat infections.  If a culture was done, you will be told if your treatment needs to be changed. If directed, you can call to find out the results.  If X-rays were done, you will be told if the results will affect your treatment.  Call 911  Call 911 if any of the following occur:    Trouble breathing    Hard to wake up or confusion    Fainting or loss of consciousness    Rapid heart rate  When to seek medical advice  Call your healthcare provider right away if any of these occur:    Fever of 100.4 F (38.0 C) or higher, or as directed by your healthcare provider    Symptoms are not better by the third day of treatment    Back or belly (abdominal) pain that gets worse    Repeated vomiting, or unable to keep medicine down    Weakness or dizziness    Vaginal discharge    Pain, redness, or swelling in the outer vaginal area (labia)  Date Last Reviewed: 10/1/2016    8144-8545 [employer]. [address] [place] [address], [place] [address]. All rights reserved. This information is not intended as a substitute for professional medical care. Always follow your healthcare professional's instructions.

## 2019-06-25 NOTE — PROGRESS NOTES
Subjective     Suzette Britton is a 74 year old female who presents to clinic today for the following health issues:    HPI   URINARY TRACT SYMPTOMS  Onset: Last Saturday 6/22    Description:   Painful urination (Dysuria): YES  Blood in urine (Hematuria): no   Delay in urine (Hesitency): no   Urgency: Yes    Intensity: moderate    Progression of Symptoms:  worsening and constant    Accompanying Signs & Symptoms:  Fever/chills: no   Flank pain no   Nausea and vomiting: no   Any vaginal symptoms: none  Abdominal/Pelvic Pain: no     History:   History of frequent UTI's: YES  History of kidney stones: no   Sexually Active: no   Possibility of pregnancy: No    Precipitating factors: na    Therapies Tried and outcome: na    Having new incontinence     Has seen Derm for hyperhidrosis and takes Robinul 2 mg BID x 2 days.  Bladder symptoms started after taking this.          Current Outpatient Medications   Medication Sig Dispense Refill     aspirin EC 81 MG EC tablet Take 1 tablet (81 mg) by mouth daily Once finished with Eliquis       atorvastatin (LIPITOR) 10 MG tablet Take 1 tablet (10 mg) by mouth daily 90 tablet 3     blood glucose monitoring (NO BRAND SPECIFIED) test strip Use to test blood sugars one time daily or as directed  One touch meter - one touch strips 3 Box 3     calcium-vitamin D-vitamin K (VIACTIV) 500-500-40 MG-UNT-MCG CHEW Take 1 tablet by mouth daily       chlorthalidone (HYGROTON) 25 MG tablet Take 0.5 tablets (12.5 mg) by mouth daily 45 tablet 3     Cholecalciferol (VITAMIN D PO) Take 1,000 Units by mouth every morning        GLUCOMETER KIT for testing bid, One Touch or please fill with formulary glucometer 1 Kit 0     lisinopril (PRINIVIL/ZESTRIL) 10 MG tablet Take 1 tablet (10 mg) by mouth daily 90 tablet 3     metFORMIN (GLUCOPHAGE) 500 MG tablet Take 2 tablets (1,000 mg) by mouth 2 times daily (with meals) 360 tablet 3     metoprolol succinate ER (TOPROL-XL) 100 MG 24 hr tablet Take 1 tablet (100  mg) by mouth daily 90 tablet 2         Reviewed and updated as needed this visit by Provider         Review of Systems   ROS COMP: Constitutional, HEENT, cardiovascular, pulmonary, gi and gu systems are negative, except as otherwise noted.      Objective    /70   Pulse 82   Temp 98.4  F (36.9  C) (Tympanic)   Resp 12   Wt 88.5 kg (195 lb)   SpO2 98%   Breastfeeding? No   BMI 33.73 kg/m    Body mass index is 33.73 kg/m .  Physical Exam   GENERAL APPEARANCE: healthy, alert and no distress  ABDOMEN: soft, nontender, without hepatosplenomegaly or masses and bowel sounds normal  MS: no CVA tenderness    Diagnostic Test Results:  Labs reviewed in Epic  Results for orders placed or performed in visit on 06/25/19 (from the past 24 hour(s))   UA reflex to Microscopic and Culture   Result Value Ref Range    Color Urine Yellow     Appearance Urine Slightly Cloudy     Glucose Urine 100 (A) NEG^Negative mg/dL    Bilirubin Urine Negative NEG^Negative    Ketones Urine Negative NEG^Negative mg/dL    Specific Gravity Urine 1.015 1.003 - 1.035    Blood Urine Large (A) NEG^Negative    pH Urine 6.5 5.0 - 7.0 pH    Protein Albumin Urine 30 (A) NEG^Negative mg/dL    Urobilinogen Urine 0.2 0.2 - 1.0 EU/dL    Nitrite Urine Positive (A) NEG^Negative    Leukocyte Esterase Urine Large (A) NEG^Negative    Source Midstream Urine    Urine Microscopic   Result Value Ref Range    WBC Urine  (A) OTO5^0 - 5 /HPF    RBC Urine 25-50 (A) OTO2^O - 2 /HPF           Assessment & Plan       ICD-10-CM    1. Acute cystitis without hematuria N30.00 sulfamethoxazole-trimethoprim (BACTRIM DS/SEPTRA DS) 800-160 MG tablet   2. Dysuria R30.0 UA reflex to Microscopic and Culture     Urine Microscopic   3. Nonspecific finding on examination of urine R82.90 Urine Culture Aerobic Bacterial   4. Generalized hyperhidrosis R61      1. Start bactrim twice daily x 3 days.  If you feel symptoms are not totally resolved, can call in for total of 5 days  2.  Watch for mild nausea or mild diarrhea  3. Increase water intake while on the antibiotic       Return in about 3 days (around 6/28/2019) for If symptoms don't improve or if they worsen.    Dr. Irlanda Shah,   [facility]

## 2019-06-27 LAB
BACTERIA SPEC CULT: ABNORMAL
Lab: ABNORMAL
SPECIMEN SOURCE: ABNORMAL

## 2019-09-10 ENCOUNTER — TELEPHONE (OUTPATIENT)
Dept: FAMILY MEDICINE | Facility: CLINIC | Age: 74
End: 2019-09-10

## 2019-09-10 DIAGNOSIS — E11.21 TYPE 2 DIABETES MELLITUS WITH DIABETIC NEPHROPATHY, WITHOUT LONG-TERM CURRENT USE OF INSULIN (H): Primary | Chronic | ICD-10-CM

## 2019-09-10 NOTE — TELEPHONE ENCOUNTER
Reason for Call: Request for an order or referral:    Order or referral being requested: Suzette has diabetic check with Dr. Verde on 9/17/19 and is coming in early that day for labs.  Lipids, TSH and CBC already ordered.  She is also due for Microalbumin, BMP and A1C.  No orders for those.  If you could place orders for her.  She is wondering why she is getting TSH tested.  She would like to refuse that lab if at all possible.  Please review and place orders if appropriate.  Thank you..Jeni Echevarria    Date needed: as soon as possible    Has the patient been seen by the PCP for this problem? YES  Future 9/17/19    Phone number Patient can be reached at:  Home number on file [phone] (home)    Best Time:  Any time    Can we leave a detailed message on this number?  YES    Call taken on 9/10/2019 at 2:30 PM by Jeni Echevarria

## 2019-09-11 NOTE — TELEPHONE ENCOUNTER
Spoke with Suzette and advised her of fasting labs that are ordered and tthe importance of checking thyroid function as one ages and when one has diabetes. Suzette agreed with plan.  Robert Rodgers RN

## 2019-09-17 ENCOUNTER — OFFICE VISIT (OUTPATIENT)
Dept: FAMILY MEDICINE | Facility: CLINIC | Age: 74
End: 2019-09-17
Payer: MEDICARE

## 2019-09-17 VITALS
HEIGHT: 64 IN | BODY MASS INDEX: 34.21 KG/M2 | RESPIRATION RATE: 16 BRPM | WEIGHT: 200.4 LBS | SYSTOLIC BLOOD PRESSURE: 148 MMHG | TEMPERATURE: 97.7 F | DIASTOLIC BLOOD PRESSURE: 78 MMHG | HEART RATE: 76 BPM

## 2019-09-17 DIAGNOSIS — R61 NIGHT SWEATS: ICD-10-CM

## 2019-09-17 DIAGNOSIS — R53.81 PHYSICAL DECONDITIONING: ICD-10-CM

## 2019-09-17 DIAGNOSIS — E11.21 TYPE 2 DIABETES MELLITUS WITH DIABETIC NEPHROPATHY, WITHOUT LONG-TERM CURRENT USE OF INSULIN (H): Primary | Chronic | ICD-10-CM

## 2019-09-17 DIAGNOSIS — I48.92 ATRIAL FLUTTER WITH RAPID VENTRICULAR RESPONSE (H): ICD-10-CM

## 2019-09-17 DIAGNOSIS — R61 SWEATING INCREASE: ICD-10-CM

## 2019-09-17 DIAGNOSIS — Z23 NEED FOR PROPHYLACTIC VACCINATION AND INOCULATION AGAINST INFLUENZA: ICD-10-CM

## 2019-09-17 DIAGNOSIS — I10 ESSENTIAL HYPERTENSION: Chronic | ICD-10-CM

## 2019-09-17 DIAGNOSIS — I48.3 TYPICAL ATRIAL FLUTTER (H): ICD-10-CM

## 2019-09-17 LAB
ANION GAP SERPL CALCULATED.3IONS-SCNC: 4 MMOL/L (ref 3–14)
BUN SERPL-MCNC: 23 MG/DL (ref 7–30)
CALCIUM SERPL-MCNC: 9.1 MG/DL (ref 8.5–10.1)
CHLORIDE SERPL-SCNC: 106 MMOL/L (ref 94–109)
CHOLEST SERPL-MCNC: 145 MG/DL
CO2 SERPL-SCNC: 28 MMOL/L (ref 20–32)
CREAT SERPL-MCNC: 1.08 MG/DL (ref 0.52–1.04)
CREAT UR-MCNC: 98 MG/DL
ERYTHROCYTE [DISTWIDTH] IN BLOOD BY AUTOMATED COUNT: 13.3 % (ref 10–15)
GFR SERPL CREATININE-BSD FRML MDRD: 50 ML/MIN/{1.73_M2}
GLUCOSE SERPL-MCNC: 176 MG/DL (ref 70–99)
HBA1C MFR BLD: 7.2 % (ref 0–5.6)
HCT VFR BLD AUTO: 40.4 % (ref 35–47)
HDLC SERPL-MCNC: 64 MG/DL
HGB BLD-MCNC: 13.5 G/DL (ref 11.7–15.7)
LDLC SERPL CALC-MCNC: 49 MG/DL
MCH RBC QN AUTO: 29.7 PG (ref 26.5–33)
MCHC RBC AUTO-ENTMCNC: 33.4 G/DL (ref 31.5–36.5)
MCV RBC AUTO: 89 FL (ref 78–100)
MICROALBUMIN UR-MCNC: 46 MG/L
MICROALBUMIN/CREAT UR: 46.29 MG/G CR (ref 0–25)
NONHDLC SERPL-MCNC: 81 MG/DL
PLATELET # BLD AUTO: 175 10E9/L (ref 150–450)
POTASSIUM SERPL-SCNC: 4.3 MMOL/L (ref 3.4–5.3)
RBC # BLD AUTO: 4.54 10E12/L (ref 3.8–5.2)
SODIUM SERPL-SCNC: 138 MMOL/L (ref 133–144)
TRIGL SERPL-MCNC: 160 MG/DL
TSH SERPL DL<=0.005 MIU/L-ACNC: 1.42 MU/L (ref 0.4–4)
WBC # BLD AUTO: 6.3 10E9/L (ref 4–11)

## 2019-09-17 PROCEDURE — 90662 IIV NO PRSV INCREASED AG IM: CPT | Performed by: FAMILY MEDICINE

## 2019-09-17 PROCEDURE — 84443 ASSAY THYROID STIM HORMONE: CPT | Performed by: FAMILY MEDICINE

## 2019-09-17 PROCEDURE — 80048 BASIC METABOLIC PNL TOTAL CA: CPT | Performed by: FAMILY MEDICINE

## 2019-09-17 PROCEDURE — 83036 HEMOGLOBIN GLYCOSYLATED A1C: CPT | Performed by: FAMILY MEDICINE

## 2019-09-17 PROCEDURE — 36415 COLL VENOUS BLD VENIPUNCTURE: CPT | Performed by: FAMILY MEDICINE

## 2019-09-17 PROCEDURE — 80061 LIPID PANEL: CPT | Performed by: FAMILY MEDICINE

## 2019-09-17 PROCEDURE — G0008 ADMIN INFLUENZA VIRUS VAC: HCPCS | Performed by: FAMILY MEDICINE

## 2019-09-17 PROCEDURE — 85027 COMPLETE CBC AUTOMATED: CPT | Performed by: FAMILY MEDICINE

## 2019-09-17 PROCEDURE — 82043 UR ALBUMIN QUANTITATIVE: CPT | Performed by: FAMILY MEDICINE

## 2019-09-17 PROCEDURE — 99214 OFFICE O/P EST MOD 30 MIN: CPT | Mod: 25 | Performed by: FAMILY MEDICINE

## 2019-09-17 RX ORDER — LISINOPRIL 10 MG/1
10 TABLET ORAL DAILY
Qty: 90 TABLET | Refills: 3 | Status: SHIPPED | OUTPATIENT
Start: 2019-09-17 | End: 2020-12-08

## 2019-09-17 RX ORDER — ATORVASTATIN CALCIUM 10 MG/1
10 TABLET, FILM COATED ORAL DAILY
Qty: 90 TABLET | Refills: 3 | Status: SHIPPED | OUTPATIENT
Start: 2019-09-17 | End: 2020-09-08

## 2019-09-17 RX ORDER — CHLORTHALIDONE 25 MG/1
12.5 TABLET ORAL DAILY
Qty: 45 TABLET | Refills: 3 | Status: SHIPPED | OUTPATIENT
Start: 2019-09-17 | End: 2020-09-08

## 2019-09-17 RX ORDER — GLYCOPYRROLATE 1 MG/1
1-2 TABLET ORAL
Qty: 180 TABLET | Refills: 3 | Status: SHIPPED | OUTPATIENT
Start: 2019-09-17 | End: 2020-01-14

## 2019-09-17 RX ORDER — METOPROLOL SUCCINATE 100 MG/1
100 TABLET, EXTENDED RELEASE ORAL DAILY
Qty: 90 TABLET | Refills: 3 | Status: SHIPPED | OUTPATIENT
Start: 2019-09-17 | End: 2020-09-08

## 2019-09-17 ASSESSMENT — MIFFLIN-ST. JEOR: SCORE: 1380.04

## 2019-09-17 NOTE — PROGRESS NOTES
SUBJECTIVE:                                                    Suzette Britton is a 74 year old female who presents to clinic today for the following health issues:     Diabetes Follow-up      How often are you checking your blood sugar? One time daily in the morning     What time of day are you checking your blood sugars (select all that apply)?  Before meals    Have you had any blood sugars above 200?  No    Have you had any blood sugars below 70?  No    What symptoms do you notice when your blood sugar is low?  None    What concerns do you have today about your diabetes? None     Do you have any of these symptoms? (Select all that apply)  No numbness or tingling in feet.  No redness, sores or blisters on feet.  No complaints of excessive thirst.  No reports of blurry vision.  No significant changes to weight.     Have you had a diabetic eye exam in the last 12 months? Yes- Date of last eye exam: 03/17/2019  Recommended 2021 for next visit   Diabetes Management Resources    Lab Results   Component Value Date    A1C 7.2 09/17/2019    A1C 7.1 05/13/2019    A1C 7.1 02/19/2019    A1C 6.7 09/05/2018    A1C 6.3 03/07/2018         Hyperlipidemia Follow-Up      Are you having any of the following symptoms? (Select all that apply)  No complaints of shortness of breath, chest pain or pressure.  No increased sweating or nausea with activity.  No left-sided neck or arm pain.  No complaints of pain in calves when walking 1-2 blocks.    Are you regularly taking any medication or supplement to lower your cholesterol?   Yes- Lipitor     Are you having muscle aches or other side effects that you think could be caused by your cholesterol lowering medication?  No    Hypertension Follow-up      Do you check your blood pressure regularly outside of the clinic? Yes - once in a while     Are you following a low salt diet? Yes    Are your blood pressures ever more than 140 on the top number (systolic) OR more   than 90 on the bottom  number (diastolic), for example 140/90? No    BP Readings from Last 2 Encounters:   09/17/19 (!) 148/78   06/25/19 126/70     Hemoglobin A1C (%)   Date Value   09/17/2019 7.2 (H)   05/13/2019 7.1 (H)     LDL Cholesterol Calculated (mg/dL)   Date Value   09/05/2018 55   09/12/2017 46         How many servings of fruits and vegetables do you eat daily?  2-3- of fruit     On average, how many sweetened beverages do you drink each day (soda, juice, sweet tea, etc)?   3- of diet drinks     How many days per week do you miss taking your medication? 0    Didn't take her meds today     Hard to bend over to tie shoes  No back pain  Having  do it  Hoping for PT referral  To work on strength and flexibility    Review of systems:  No f/c   No weight loss or night sweats  No n/v       Problem list and histories reviewed & adjusted, as indicated.  Additional history: as documented     Patient Active Problem List   Diagnosis     Osteoarthrosis, hand     Generalized hyperhidrosis     Hyperlipidemia LDL goal <100     Atrial fibrillation (H)     CKD (chronic kidney disease) stage 3, GFR 30-59 ml/min (H)     Health Care Home     Cataract     Obesity (BMI 30.0-34.9)     S/P total knee arthroplasty     ACP (advance care planning)     Essential hypertension     Type 2 diabetes mellitus with diabetic nephropathy, without long-term current use of insulin (H)     NICK (acute kidney injury) (H)     Elevated lactic acid level     Elevated LFTs     Atrial fibrillation with RVR (H)     Chronic vasomotor rhinitis     S/P ablation of atrial fibrillation     Current Outpatient Medications   Medication     aspirin EC 81 MG EC tablet     atorvastatin (LIPITOR) 10 MG tablet     calcium-vitamin D-vitamin K (VIACTIV) 500-500-40 MG-UNT-MCG CHEW     chlorthalidone (HYGROTON) 25 MG tablet     Cholecalciferol (VITAMIN D PO)     glycopyrrolate (ROBINUL) 1 MG tablet     lisinopril (PRINIVIL/ZESTRIL) 10 MG tablet     metFORMIN (GLUCOPHAGE) 500 MG tablet  "    metoprolol succinate ER (TOPROL-XL) 100 MG 24 hr tablet     blood glucose monitoring (NO BRAND SPECIFIED) test strip     GLUCOMETER KIT     No current facility-administered medications for this visit.         Allergies   Allergen Reactions     Nka [No Known Allergies]            OBJECTIVE:                                                    BP (!) 148/78 (BP Location: Right arm, Patient Position: Left side, Cuff Size: Adult Regular)   Pulse 76   Temp 97.7  F (36.5  C) (Tympanic)   Resp 16   Ht 1.619 m (5' 3.75\")   Wt 90.9 kg (200 lb 6.4 oz)   BMI 34.67 kg/m   Body mass index is 34.67 kg/m .   GENERAL - Pt is alert and oriented in no acute distress.  Affect is appropriate. Good eye contact.  HEET - Head is normocephalic, atraumatic.    PERRLA,EEMI. Conjunctiva are free of icterus or erythema.    TMs bilaterally normal.   Oropharynx free of masses and lesions, no tonsillar exudate or petechiae.    NECK - Neck is supple w/o LA or thyromegaly  RESPIRATORY - Clear to auscultation bilaterally.  No wheezing noted  CV - RRR, no murmurs, rubs, gallops.   EXTREM - No edema.       ASSESSMENT/PLAN:                                                      (E11.21) Type 2 diabetes mellitus with diabetic nephropathy, without long-term current use of insulin (H)  (primary encounter diagnosis)  Comment: check labs and refilled meds.   Check A1c in 3 months. If stable or improved, she can see me in 6 months. The patient indicates understanding of these issues and agrees with the plan.   Plan: atorvastatin (LIPITOR) 10 MG tablet, metFORMIN         (GLUCOPHAGE) 500 MG tablet, Hemoglobin A1c            (Z23) Need for prophylactic vaccination and inoculation against influenza  Comment:   Plan: INFLUENZA (HIGH DOSE) 3 VALENT VACCINE [65887],        ADMIN INFLUENZA (For MEDICARE Patients ONLY)         []            (I10) Essential hypertension  Comment: she didn't take her meds this a.m. She will return to clinic for blood " pressure  Check soon .   Plan: chlorthalidone (HYGROTON) 25 MG tablet            (I48.3) Typical atrial flutter (H)  Comment:   Plan: lisinopril (PRINIVIL/ZESTRIL) 10 MG tablet            (I48.92) Atrial flutter with rapid ventricular response (H)  Comment:   Plan: metoprolol succinate ER (TOPROL-XL) 100 MG 24         hr tablet            (R61) Night sweats  Comment: the med helps.   Plan:     (R61) Sweating increase  Comment: the med helps   Plan: glycopyrrolate (ROBINUL) 1 MG tablet            (R53.81) Physical deconditioning  Comment: referral made   Plan: [facility] PT, HAND, AND CHIROPRACTIC REFERRAL            JADEN Verde MD     [facility]

## 2019-09-17 NOTE — LETTER
September 30, 2019      Suzette Murdock Reba  [address] [facility] [address]        Dear Suzette,    We are writing to inform you of your test results.    The microalbumin test is just mildly elevated. We can recheck this in 6 months or so.  This test tries to estimate whether your kidneys have been impacted by the diabetes.  Your thyroid is normal.  Your cholesterol panel looks good. Your metabolic panel ( electrolytes, blood sugar, kidney function) is stable.  Your CBC( checks for infection, anemia, etc) is normal.      If you have any questions or concerns, please call the clinic at the number listed above.     Sincerely,    Thanks,     JADEN Verde MD/sc    Resulted Orders   Hemoglobin A1c   Result Value Ref Range    Hemoglobin A1C 7.2 (H) 0 - 5.6 %      Comment:      Normal <5.7% Prediabetes 5.7-6.4%  Diabetes 6.5% or higher - adopted from ADA   consensus guidelines.   Basic metabolic panel   Result Value Ref Range    Sodium 138 133 - 144 mmol/L    Potassium 4.3 3.4 - 5.3 mmol/L    Chloride 106 94 - 109 mmol/L    Carbon Dioxide 28 20 - 32 mmol/L    Anion Gap 4 3 - 14 mmol/L    Glucose 176 (H) 70 - 99 mg/dL    Urea Nitrogen 23 7 - 30 mg/dL    Creatinine 1.08 (H) 0.52 - 1.04 mg/dL    GFR Estimate 50 (L) >60 mL/min/[1.73_m2]    GFR Estimate If Black 58 (L) >60 mL/min/[1.73_m2]    Calcium 9.1 8.5 - 10.1 mg/dL     TSH with free T4 reflex   Result Value Ref Range    TSH 1.42 0.40 - 4.00 mU/L   CBC with platelets   Result Value Ref Range    WBC 6.3 4.0 - 11.0 10e9/L    RBC Count 4.54 3.8 - 5.2 10e12/L    Hemoglobin 13.5 11.7 - 15.7 g/dL    Hematocrit 40.4 35.0 - 47.0 %    MCV 89 78 - 100 fl    MCH 29.7 26.5 - 33.0 pg    MCHC 33.4 31.5 - 36.5 g/dL    RDW 13.3 10.0 - 15.0 %    Platelet Count 175 150 - 450 10e9/L   Lipid panel reflex to direct LDL Fasting   Result Value Ref Range    Cholesterol 145 <200 mg/dL    Triglycerides 160 (H) <150 mg/dL      Comment:      Borderline high:  150-199 mg/dl  High:              200-499 mg/dl  Very high:       >499 mg/dl    HDL Cholesterol 64 >49 mg/dL    LDL Cholesterol Calculated 49 <100 mg/dL      Comment:      Desirable:       <100 mg/dl    Non HDL Cholesterol 81 <130 mg/dL   Albumin Random Urine Quantitative with Creat Ratio   Result Value Ref Range    Creatinine Urine 98 mg/dL    Albumin Urine mg/L 46 mg/L    Albumin Urine mg/g Cr 46.29 (H) 0 - 25 mg/g Cr

## 2019-09-18 ENCOUNTER — THERAPY VISIT (OUTPATIENT)
Dept: PHYSICAL THERAPY | Facility: CLINIC | Age: 74
End: 2019-09-18
Payer: MEDICARE

## 2019-09-18 ENCOUNTER — TELEPHONE (OUTPATIENT)
Dept: FAMILY MEDICINE | Facility: CLINIC | Age: 74
End: 2019-09-18

## 2019-09-18 DIAGNOSIS — R53.81 PHYSICAL DECONDITIONING: ICD-10-CM

## 2019-09-18 DIAGNOSIS — M25.659 HIP STIFFNESS: Primary | ICD-10-CM

## 2019-09-18 PROCEDURE — 97161 PT EVAL LOW COMPLEX 20 MIN: CPT | Mod: GP | Performed by: PHYSICAL THERAPIST

## 2019-09-18 PROCEDURE — 97110 THERAPEUTIC EXERCISES: CPT | Mod: GP | Performed by: PHYSICAL THERAPIST

## 2019-09-18 PROCEDURE — 97140 MANUAL THERAPY 1/> REGIONS: CPT | Mod: GP | Performed by: PHYSICAL THERAPIST

## 2019-09-18 NOTE — LETTER
DEPARTMENT OF HEALTH AND HUMAN SERVICES  CENTERS FOR MEDICARE & MEDICAID SERVICES    PLAN/UPDATED PLAN OF PROGRESS FOR OUTPATIENT REHABILITATION    PATIENTS NAME:  Suzette Britton   : 1945  PROVIDER NUMBER:    [id number]  HICN:  [id number]    PROVIDER NAME: [facility]  MEDICAL RECORD NUMBER: [id number]   START OF CARE DATE:  SOC Date: 19   TYPE:  PT    PRIMARY/TREATMENT DIAGNOSIS: (Pertinent Medical Diagnosis)     Physical deconditioning  Hip stiffness    VISITS FROM START OF CARE:  Rxs Used: 1     [facility] Initial Evaluation  Subjective:  The history is provided by the patient and medical records.   Suzette Britton is a 74 year old female with deconditioning and other (stiffness) condition which occurred with .       Problem details: Suzette is here today with a goal of loosening muscles so that she is able to bend over to put her own socks on.  She denies having pain, just feels her back and hips are stiff..                                 Barriers to activity include. Pt has been diagnosed with Type 2 diabetes.  Problem began 2019 (date of office visit for this, although has been gradually increasind).   and reported as 0/10 on pain scale. General health as reported by patient is good. Pertinent medical history includes:  Osteoarthritis and diabetes.    Surgeries include:  Orthopedic surgery (bilateral TKA).                Patient is retired.   Barriers include:  None as reported by patient.  Red flags:  None as reported by patient.       Cervical/Thoracic Evaluation    AROM:  AROM Cervical:  Flexion:         Extension:        Rotation:         Left:     Right:  Side Bend:      Left: tight right upper trap, moderate loss of motion     Right:  Tight left upper trap, moderate loss of motion  AROM Thoracic:  Flexion:             Good, feels good stretch in upper back  Extension:          Difficulty with sitting thoracic ext  Rotation:            Left:      Right:                  PATIENTS NAME:  Suzette Britton   : 1945         Hip Evaluation  Hip PROM:    Flexion: Left: 90 deg, increased to 100 after PROM   Right: > 100, does not feel restricted  External Rotation: Left: 45    Right: 30  Pain: no pain wtih stretch just very tight on left    General Evaluation:  AROM:    Upper Extremity:  Normal    Lower Extremity:  Significant findings:  When sitting can flex forward for hands to reach mid lower leg    Gross Strength:    Upper Extremity:  Normal     Lower Extremity:  Normal       Assessment/Plan:    Patient is a 74 year old female with decreased back and hip flexibility complaints.    Patient has the following significant findings with corresponding treatment plan.                Diagnosis 1:  Hip and upper back stiffness  Decreased ROM/flexibility - manual therapy and therapeutic exercise    Therapy Evaluation Codes:   1) History comprised of:   Personal factors that impact the plan of care:      Age.    Comorbidity factors that impact the plan of care are:      Diabetes.     Medications impacting care: High blood pressure.  2) Examination of Body Systems comprised of:   Body structures and functions that impact the plan of care:      Hip, Lumbar spine and Thoracic Spine.   Activity limitations that impact the plan of care are:      Bending and Dressing.  3) Clinical presentation characteristics are:   Stable/Uncomplicated.  4) Decision-Making    Low complexity using standardized patient assessment instrument and/or measureable assessment of functional outcome.  Cumulative Therapy Evaluation is: Low complexity.    Previous and current functional limitations:  (See Goal Flow Sheet for this information)    Short term and Long term goals: (See Goal Flow Sheet for this information)     Communication ability:  Patient appears to be able to clearly communicate and understand verbal and written communication and follow directions correctly.  Treatment Explanation - The  "following has been discussed with the patient:   RX ordered/plan of care  PATIENTS NAME:  Suzette Britton   : 1945      Anticipated outcomes  Possible risks and side effects  This patient would benefit from PT intervention to resume normal activities.   Rehab potential is excellent.    Frequency:  1 X week, once daily  Duration:  for 12 weeks  Discharge Plan:  Achieve all LTG.  Independent in home treatment program.  Reach maximal therapeutic benefit.          Caregiver Signature/Credentials _____________________________ Date ________       Treating Provider: Alva Weaver PT     I have reviewed and certified the need for these services and plan of treatment while under my care.        PHYSICIAN'S SIGNATURE:   _________________________________________  Date___________   Judy Verde MD     Certification period:  Beginning of Cert date period: 19 to  End of Cert period date: 19     Functional Level Progress Report: Please see attached \"Goal Flow sheet for Functional level.\"    ____X____ Continue Services or       ________ DC Services                Service dates: From  SOC Date: 19 date to present                         "

## 2019-09-18 NOTE — TELEPHONE ENCOUNTER
Reason for Call:  Other Blood sugar readings from 9/2 - 9/16/19    Detailed comments:   9/2 177  9/3 192  9/4 193  9/5 188  9/6 208  9/7 210  9/8 ------  9/9 187  9/10 172  9/11 180  9/12 191  9/13 172  9/14 166  9/15 177  9/16 175    No other information was left.      Phone Number Patient can be reached at: Home number on file [phone] (home)      Call taken on 9/18/2019 at 11:33 AM by Jeni Echevarria

## 2019-09-18 NOTE — PROGRESS NOTES
[facility] Initial Evaluation  Subjective:  The history is provided by the patient and medical records.   Suzette Britton is a 74 year old female with deconditioning and other (stiffness) condition which occurred with .       Problem details: Suzette is here today with a goal of loosening muscles so that she is able to bend over to put her own socks on.  She denies having pain, just feels her back and hips are stiff..                                         Barriers to activity include. Pt has been diagnosed with Type 2 diabetes.        Problem began 9/17/2019 (date of office visit for this, although has been gradually increasind).   and reported as 0/10 on pain scale. General health as reported by patient is good. Pertinent medical history includes:  Osteoarthritis and diabetes.    Surgeries include:  Orthopedic surgery (bilateral TKA).                Patient is retired.   Barriers include:  None as reported by patient.  Red flags:  None as reported by patient.                      Objective:  System              Cervical/Thoracic Evaluation    AROM:  AROM Cervical:    Flexion:         Extension:        Rotation:         Left:     Right:  Side Bend:      Left: tight right upper trap, moderate loss of motion     Right:  Tight left upper trap, moderate loss of motion  AROM Thoracic:    Flexion:             Good, feels good stretch in upper back  Extension:          Difficulty with sitting thoracic ext  Rotation:            Left:     Right:                                                       Hip Evaluation  Hip PROM:    Flexion: Left: 90 deg, increased to 100 after PROM   Right: > 100, does not feel restricted          External Rotation: Left: 45    Right: 30      Pain: no pain wtih stretch just very tight on left                             General Evaluation:  AROM:          Upper Extremity:  Normal    Lower Extremity:  Significant findings:  When sitting can flex forward for hands to reach mid  lower leg    Gross Strength:            Upper Extremity:  Normal     Lower Extremity:  Normal                                                                  ROS    Assessment/Plan:    Patient is a 74 year old female with decreased back and hip flexibility complaints.    Patient has the following significant findings with corresponding treatment plan.                Diagnosis 1:  Hip and upper back stiffness  Decreased ROM/flexibility - manual therapy and therapeutic exercise    Therapy Evaluation Codes:   1) History comprised of:   Personal factors that impact the plan of care:      Age.    Comorbidity factors that impact the plan of care are:      Diabetes.     Medications impacting care: High blood pressure.  2) Examination of Body Systems comprised of:   Body structures and functions that impact the plan of care:      Hip, Lumbar spine and Thoracic Spine.   Activity limitations that impact the plan of care are:      Bending and Dressing.  3) Clinical presentation characteristics are:   Stable/Uncomplicated.  4) Decision-Making    Low complexity using standardized patient assessment instrument and/or measureable assessment of functional outcome.  Cumulative Therapy Evaluation is: Low complexity.    Previous and current functional limitations:  (See Goal Flow Sheet for this information)    Short term and Long term goals: (See Goal Flow Sheet for this information)     Communication ability:  Patient appears to be able to clearly communicate and understand verbal and written communication and follow directions correctly.  Treatment Explanation - The following has been discussed with the patient:   RX ordered/plan of care  Anticipated outcomes  Possible risks and side effects  This patient would benefit from PT intervention to resume normal activities.   Rehab potential is excellent.    Frequency:  1 X week, once daily  Duration:  for 12 weeks  Discharge Plan:  Achieve all LTG.  Independent in home treatment  program.  Reach maximal therapeutic benefit.    Please refer to the daily flowsheet for treatment today, total treatment time and time spent performing 1:1 timed codes.

## 2019-09-19 NOTE — TELEPHONE ENCOUNTER
Let Suzette know that I appreciate her sending in this data. Things look good/stable. Recheck A1c in 3 months. Try to get 20-30minutes of daily exercise.     Thanks, JADEN Verde MD

## 2019-09-23 ENCOUNTER — THERAPY VISIT (OUTPATIENT)
Dept: PHYSICAL THERAPY | Facility: CLINIC | Age: 74
End: 2019-09-23
Payer: MEDICARE

## 2019-09-23 DIAGNOSIS — M25.659 HIP STIFFNESS: Primary | ICD-10-CM

## 2019-09-23 PROCEDURE — 97110 THERAPEUTIC EXERCISES: CPT | Mod: GP | Performed by: PHYSICAL THERAPIST

## 2019-09-30 ENCOUNTER — THERAPY VISIT (OUTPATIENT)
Dept: PHYSICAL THERAPY | Facility: CLINIC | Age: 74
End: 2019-09-30
Payer: MEDICARE

## 2019-09-30 DIAGNOSIS — M25.659 HIP STIFFNESS: Primary | ICD-10-CM

## 2019-09-30 PROCEDURE — 97110 THERAPEUTIC EXERCISES: CPT | Mod: GP | Performed by: PHYSICAL THERAPIST

## 2019-09-30 PROCEDURE — 97140 MANUAL THERAPY 1/> REGIONS: CPT | Mod: GP | Performed by: PHYSICAL THERAPIST

## 2019-10-07 ENCOUNTER — THERAPY VISIT (OUTPATIENT)
Dept: PHYSICAL THERAPY | Facility: CLINIC | Age: 74
End: 2019-10-07
Payer: MEDICARE

## 2019-10-07 DIAGNOSIS — M25.659 HIP STIFFNESS: Primary | ICD-10-CM

## 2019-10-07 PROCEDURE — 97140 MANUAL THERAPY 1/> REGIONS: CPT | Mod: GP | Performed by: PHYSICAL THERAPIST

## 2019-10-07 PROCEDURE — 97110 THERAPEUTIC EXERCISES: CPT | Mod: GP | Performed by: PHYSICAL THERAPIST

## 2019-10-21 ENCOUNTER — THERAPY VISIT (OUTPATIENT)
Dept: PHYSICAL THERAPY | Facility: CLINIC | Age: 74
End: 2019-10-21
Payer: MEDICARE

## 2019-10-21 DIAGNOSIS — M25.659 HIP STIFFNESS: Primary | ICD-10-CM

## 2019-10-21 PROCEDURE — 97110 THERAPEUTIC EXERCISES: CPT | Mod: GP | Performed by: PHYSICAL THERAPIST

## 2019-10-21 PROCEDURE — 97140 MANUAL THERAPY 1/> REGIONS: CPT | Mod: GP | Performed by: PHYSICAL THERAPIST

## 2019-10-28 ENCOUNTER — THERAPY VISIT (OUTPATIENT)
Dept: PHYSICAL THERAPY | Facility: CLINIC | Age: 74
End: 2019-10-28
Payer: MEDICARE

## 2019-10-28 DIAGNOSIS — M25.659 HIP STIFFNESS: Primary | ICD-10-CM

## 2019-10-28 PROCEDURE — 97110 THERAPEUTIC EXERCISES: CPT | Mod: GP | Performed by: PHYSICAL THERAPIST

## 2019-10-28 PROCEDURE — 97140 MANUAL THERAPY 1/> REGIONS: CPT | Mod: GP | Performed by: PHYSICAL THERAPIST

## 2019-11-03 ENCOUNTER — HEALTH MAINTENANCE LETTER (OUTPATIENT)
Age: 74
End: 2019-11-03

## 2019-11-04 ENCOUNTER — THERAPY VISIT (OUTPATIENT)
Dept: PHYSICAL THERAPY | Facility: CLINIC | Age: 74
End: 2019-11-04
Payer: MEDICARE

## 2019-11-04 DIAGNOSIS — M25.659 HIP STIFFNESS: Primary | ICD-10-CM

## 2019-11-04 PROCEDURE — 97110 THERAPEUTIC EXERCISES: CPT | Mod: GP | Performed by: PHYSICAL THERAPIST

## 2019-11-04 PROCEDURE — 97140 MANUAL THERAPY 1/> REGIONS: CPT | Mod: GP | Performed by: PHYSICAL THERAPIST

## 2019-11-19 ENCOUNTER — ALLIED HEALTH/NURSE VISIT (OUTPATIENT)
Dept: FAMILY MEDICINE | Facility: CLINIC | Age: 74
End: 2019-11-19
Payer: MEDICARE

## 2019-11-19 ENCOUNTER — TELEPHONE (OUTPATIENT)
Dept: FAMILY MEDICINE | Facility: CLINIC | Age: 74
End: 2019-11-19

## 2019-11-19 VITALS — SYSTOLIC BLOOD PRESSURE: 122 MMHG | HEART RATE: 75 BPM | DIASTOLIC BLOOD PRESSURE: 60 MMHG

## 2019-11-19 DIAGNOSIS — Z01.30 BP CHECK: Primary | ICD-10-CM

## 2019-11-19 PROCEDURE — 99207 ZZC NO CHARGE NURSE ONLY: CPT

## 2019-11-19 NOTE — PROGRESS NOTES
S-(situation): Bp Check    B-(background): 09/17/19 Dr. Verde   (I10) Essential hypertension  Comment: she didn't take her meds this a.m. She will return to clinic for blood pressure  Check soon .   Plan: chlorthalidone (HYGROTON) 25 MG tablet    A-(assessment):   Vital Signs 11/19/2019   Systolic 122   Diastolic 60   Pulse 75       Will discuss lisinopril at next visit. Thinks it may be causing dry cough. Not always there, wants to discuss in February.     R-(recommendations): Routing to provider to update.      DANTE SeniorN, RN

## 2019-12-31 DIAGNOSIS — E11.21 TYPE 2 DIABETES MELLITUS WITH DIABETIC NEPHROPATHY, WITHOUT LONG-TERM CURRENT USE OF INSULIN (H): Chronic | ICD-10-CM

## 2019-12-31 LAB — HBA1C MFR BLD: 8 % (ref 0–5.6)

## 2019-12-31 PROCEDURE — 83036 HEMOGLOBIN GLYCOSYLATED A1C: CPT | Performed by: FAMILY MEDICINE

## 2019-12-31 PROCEDURE — 36415 COLL VENOUS BLD VENIPUNCTURE: CPT | Performed by: FAMILY MEDICINE

## 2020-01-14 ENCOUNTER — OFFICE VISIT (OUTPATIENT)
Dept: FAMILY MEDICINE | Facility: CLINIC | Age: 75
End: 2020-01-14
Payer: COMMERCIAL

## 2020-01-14 VITALS
WEIGHT: 197.1 LBS | DIASTOLIC BLOOD PRESSURE: 54 MMHG | SYSTOLIC BLOOD PRESSURE: 126 MMHG | BODY MASS INDEX: 33.65 KG/M2 | TEMPERATURE: 97.9 F | HEIGHT: 64 IN | HEART RATE: 83 BPM

## 2020-01-14 DIAGNOSIS — R61 GENERALIZED HYPERHIDROSIS: ICD-10-CM

## 2020-01-14 DIAGNOSIS — E66.811 OBESITY (BMI 30.0-34.9): ICD-10-CM

## 2020-01-14 DIAGNOSIS — R61 SWEATING INCREASE: ICD-10-CM

## 2020-01-14 DIAGNOSIS — Z86.79 S/P ABLATION OF ATRIAL FIBRILLATION: ICD-10-CM

## 2020-01-14 DIAGNOSIS — I10 ESSENTIAL HYPERTENSION: Chronic | ICD-10-CM

## 2020-01-14 DIAGNOSIS — E11.21 TYPE 2 DIABETES MELLITUS WITH DIABETIC NEPHROPATHY, WITHOUT LONG-TERM CURRENT USE OF INSULIN (H): Primary | Chronic | ICD-10-CM

## 2020-01-14 DIAGNOSIS — Z98.890 S/P ABLATION OF ATRIAL FIBRILLATION: ICD-10-CM

## 2020-01-14 PROCEDURE — 99214 OFFICE O/P EST MOD 30 MIN: CPT | Performed by: FAMILY MEDICINE

## 2020-01-14 RX ORDER — GLIPIZIDE 5 MG/1
5 TABLET, FILM COATED, EXTENDED RELEASE ORAL DAILY
Qty: 90 TABLET | Refills: 1 | Status: SHIPPED | OUTPATIENT
Start: 2020-01-14 | End: 2020-07-10

## 2020-01-14 RX ORDER — GLIPIZIDE 5 MG/1
5 TABLET, FILM COATED, EXTENDED RELEASE ORAL DAILY
Qty: 14 TABLET | Refills: 0 | Status: SHIPPED | OUTPATIENT
Start: 2020-01-14 | End: 2020-07-09

## 2020-01-14 RX ORDER — GLYCOPYRROLATE 1 MG/1
1-2 TABLET ORAL
Qty: 180 TABLET | Refills: 3 | Status: SHIPPED | OUTPATIENT
Start: 2020-01-14 | End: 2020-09-08

## 2020-01-14 ASSESSMENT — MIFFLIN-ST. JEOR: SCORE: 1365.07

## 2020-01-14 NOTE — PROGRESS NOTES
SUBJECTIVE:                                                    Suzette Britton is a 74 year old female who presents to clinic today for the following health issues:    Diabetes Follow-up    A1c has risen recently  No change in meds but lots of holiday treats   No exercise   Numbers in 200s      How often are you checking your blood sugar?once a day in the am     What concerns do you have today about your diabetes? None and Blood sugar is often over 200     Do you have any of these symptoms? (Select all that apply)  No numbness or tingling in feet.  No redness, sores or blisters on feet.  No complaints of excessive thirst.  No reports of blurry vision.  No significant changes to weight.      BP Readings from Last 2 Encounters:   01/14/20 (!) 142/60   11/19/19 122/60     Hemoglobin A1C (%)   Date Value   12/31/2019 8.0 (H)   09/17/2019 7.2 (H)     LDL Cholesterol Calculated (mg/dL)   Date Value   09/17/2019 49   09/05/2018 55         How many servings of fruits and vegetables do you eat daily?  2-3    On average, how many sweetened beverages do you drink each day (Examples: soda, juice, sweet tea, etc.  Do NOT count diet or artificially sweetened beverages)?   0      How many minutes a day do you exercise enough to make your heart beat faster? 0    How many days per week do you miss taking your medication? 0      Occasional dry cough  Notices it when she lay down  On lisinopril for years  Denies acid reflux       Wt Readings from Last 10 Encounters:   01/14/20 89.4 kg (197 lb 1.6 oz)   09/17/19 90.9 kg (200 lb 6.4 oz)   06/25/19 88.5 kg (195 lb)   02/19/19 87.1 kg (192 lb)   09/05/18 86 kg (189 lb 9.6 oz)   03/15/18 81.9 kg (180 lb 8 oz)   09/05/17 80 kg (176 lb 4.8 oz)   08/15/17 80 kg (176 lb 6.4 oz)   06/15/17 79.3 kg (174 lb 12.8 oz)   05/18/17 81.2 kg (179 lb)       Review of systems:  No cp/sob  No n/v   No diarrhea/constipation     Problem list and histories reviewed & adjusted, as indicated.  Additional  "history: as documented   Patient Active Problem List   Diagnosis     Osteoarthrosis, hand     Generalized hyperhidrosis     Hyperlipidemia LDL goal <100     Atrial fibrillation (H)     CKD (chronic kidney disease) stage 3, GFR 30-59 ml/min (H)     Health Care Home     Cataract     Obesity (BMI 30.0-34.9)     S/P total knee arthroplasty     ACP (advance care planning)     Essential hypertension     Type 2 diabetes mellitus with diabetic nephropathy, without long-term current use of insulin (H)     NICK (acute kidney injury) (H)     Elevated lactic acid level     Elevated LFTs     Atrial fibrillation with RVR (H)     Chronic vasomotor rhinitis     S/P ablation of atrial fibrillation     Current Outpatient Medications   Medication     aspirin EC 81 MG EC tablet     atorvastatin (LIPITOR) 10 MG tablet     calcium-vitamin D-vitamin K (VIACTIV) 500-500-40 MG-UNT-MCG CHEW     chlorthalidone (HYGROTON) 25 MG tablet     Cholecalciferol (VITAMIN D PO)     glipiZIDE (GLUCOTROL XL) 5 MG 24 hr tablet     glipiZIDE (GLUCOTROL XL) 5 MG 24 hr tablet     GLUCOMETER KIT     glycopyrrolate (ROBINUL) 1 MG tablet     lisinopril (PRINIVIL/ZESTRIL) 10 MG tablet     metFORMIN (GLUCOPHAGE) 500 MG tablet     metoprolol succinate ER (TOPROL-XL) 100 MG 24 hr tablet     blood glucose monitoring (NO BRAND SPECIFIED) test strip     No current facility-administered medications for this visit.        OBJECTIVE:                                                    /54   Pulse 83   Temp 97.9  F (36.6  C) (Tympanic)   Ht 1.619 m (5' 3.75\")   Wt 89.4 kg (197 lb 1.6 oz)   BMI 34.10 kg/m   Body mass index is 34.1 kg/m .   GENERAL - Pt is alert and oriented in no acute distress.  Affect is appropriate. Good eye contact.  NECK - Neck is supple w/o LA or thyromegaly  RESPIRATORY - Clear to auscultation bilaterally.  No wheezing noted  CV - RRR, no murmurs, rubs, gallops.   EXTREM - No edema.         ASSESSMENT/PLAN:                                   "                    (E11.21) Type 2 diabetes mellitus with diabetic nephropathy, without long-term current use of insulin (H)  (primary encounter diagnosis)  Comment: continue current dose of glucotrol. Start low dose glipizide. Start daily walking. Try to reduce intake of simple sugars/carbs.  Check a1c in 3 months. The patient indicates understanding of these issues and agrees with the plan.   Plan: glipiZIDE (GLUCOTROL XL) 5 MG 24 hr tablet,         **A1C FUTURE 1yr, glipiZIDE (GLUCOTROL XL) 5 MG        24 hr tablet            (R61) Sweating increase  Comment: requesting refill of robinul. The patient indicates understanding of these issues and agrees with the plan.   Plan: glycopyrrolate (ROBINUL) 1 MG tablet    (I10) Essential hypertension  Comment:   Plan:     (R61) Generalized hyperhidrosis  Comment:   Plan:     (Z98.890,  Z86.79) S/P ablation of atrial fibrillation  Comment:   Plan:     (E66.9) Obesity (BMI 30.0-34.9)  Comment: Discussed increasing fruits/vegies/fiber/water. Three meals a day - with reasonable portion sizes. Do not eat within 3 hours of bed. Increase activity to at least 30 minutes/day. Weigh weekly, not daily.  Plan:        JADEN Verde MD   [facility]

## 2020-02-10 ENCOUNTER — HEALTH MAINTENANCE LETTER (OUTPATIENT)
Age: 75
End: 2020-02-10

## 2020-03-06 NOTE — PROGRESS NOTES
Discharge Note    Progress reporting period is from last progress note on   to Sep 30, 2019.    Suzette failed to follow up and current status is unknown.  Please see information below for last relevant information on current status.  Patient seen for 3 visits.    SUBJECTIVE  Subjective changes noted by patient:  Suzette feels about the same but is not getting sore after PT.  Right foot is easier to reach than left  .  Current pain level is  .     Previous pain level was   .   Changes in function:  Yes (See Goal flowsheet attached for changes in current functional level)  Adverse reaction to treatment or activity: None    OBJECTIVE  Changes noted in objective findings: Tender left quad, HF, HS and after MFR was able to move more without pain.       ASSESSMENT/PLAN  Diagnosis: Hip and upper back stiffness   Updated problem list and treatment plan:   Decreased ROM/flexibility - HEP  STG/LTGs have been met or progress has been made towards goals:  Yes, please see goal flowsheet for most current information  Assessment of Progress: current status is unknown.    Last current status:     Self Management Plans:  HEP  I have re-evaluated this patient and find that the nature, scope, duration and intensity of the therapy is appropriate for the medical condition of the patient.  Suzette continues to require the following intervention to meet STG and LTG's:  HEP.    Recommendations:  Discharge with current home program.  Patient to follow up with MD as needed.    Please refer to the daily flowsheet for treatment today, total treatment time and time spent performing 1:1 timed codes.

## 2020-07-09 DIAGNOSIS — E11.21 TYPE 2 DIABETES MELLITUS WITH DIABETIC NEPHROPATHY, WITHOUT LONG-TERM CURRENT USE OF INSULIN (H): Chronic | ICD-10-CM

## 2020-07-09 RX ORDER — GLIPIZIDE 5 MG/1
5 TABLET, FILM COATED, EXTENDED RELEASE ORAL DAILY
Qty: 14 TABLET | Refills: 0 | Status: SHIPPED | OUTPATIENT
Start: 2020-07-09 | End: 2020-07-10

## 2020-07-09 NOTE — TELEPHONE ENCOUNTER
glipizide      Last Written Prescription Date:  12/14/20  Last Fill Quantity: 90,   # refills: 1  Last Office Visit: 1/14/20  Future Office visit:       Routing refill request to provider for review/approval because:  Drug not on the G, P or [facility] refill protocol or controlled substance

## 2020-07-10 ENCOUNTER — TELEPHONE (OUTPATIENT)
Dept: FAMILY MEDICINE | Facility: CLINIC | Age: 75
End: 2020-07-10

## 2020-07-10 DIAGNOSIS — E11.21 TYPE 2 DIABETES MELLITUS WITH DIABETIC NEPHROPATHY, WITHOUT LONG-TERM CURRENT USE OF INSULIN (H): Chronic | ICD-10-CM

## 2020-07-10 RX ORDER — GLIPIZIDE 5 MG/1
5 TABLET, FILM COATED, EXTENDED RELEASE ORAL DAILY
Qty: 90 TABLET | Refills: 0 | Status: SHIPPED | OUTPATIENT
Start: 2020-07-10 | End: 2020-09-08

## 2020-07-10 NOTE — TELEPHONE ENCOUNTER
Reason for Call:  Other prescription    Detailed comments: Suzette is requesting that her glipizide be filled for 90 days.  Only 14 days was given and insurance will not cover this. They will only cover 90 days.   She knows she needs labs done, but she is not wanting to come in yet for anything.  Please review and advise. Thank you..Jeni Echevarria    Phone Number Patient can be reached at: Home number on file [phone] (home)    Best Time: any time    Can we leave a detailed message on this number? YES    Call taken on 7/10/2020 at 9:48 AM by Jeni Echevarria

## 2020-07-10 NOTE — TELEPHONE ENCOUNTER
Yes, I will do this for her.   Please ask her to see me in sept for diabetes visit     JADEN Koroma MD ( formerly Mehreen)

## 2020-09-08 ENCOUNTER — OFFICE VISIT (OUTPATIENT)
Dept: FAMILY MEDICINE | Facility: CLINIC | Age: 75
End: 2020-09-08
Payer: COMMERCIAL

## 2020-09-08 ENCOUNTER — HOSPITAL ENCOUNTER (OUTPATIENT)
Facility: CLINIC | Age: 75
Setting detail: SPECIMEN
Discharge: HOME | End: 2020-09-08
Admitting: FAMILY MEDICINE
Payer: COMMERCIAL

## 2020-09-08 VITALS
HEART RATE: 79 BPM | HEIGHT: 63 IN | DIASTOLIC BLOOD PRESSURE: 70 MMHG | TEMPERATURE: 98.3 F | WEIGHT: 204.2 LBS | SYSTOLIC BLOOD PRESSURE: 130 MMHG | BODY MASS INDEX: 36.18 KG/M2

## 2020-09-08 DIAGNOSIS — I10 ESSENTIAL HYPERTENSION: Chronic | ICD-10-CM

## 2020-09-08 DIAGNOSIS — I48.92 ATRIAL FLUTTER WITH RAPID VENTRICULAR RESPONSE (H): ICD-10-CM

## 2020-09-08 DIAGNOSIS — N18.30 CKD (CHRONIC KIDNEY DISEASE) STAGE 3, GFR 30-59 ML/MIN (H): ICD-10-CM

## 2020-09-08 DIAGNOSIS — E78.5 HYPERLIPIDEMIA LDL GOAL <100: Chronic | ICD-10-CM

## 2020-09-08 DIAGNOSIS — R61 SWEATING INCREASE: ICD-10-CM

## 2020-09-08 DIAGNOSIS — E11.21 TYPE 2 DIABETES MELLITUS WITH DIABETIC NEPHROPATHY, WITHOUT LONG-TERM CURRENT USE OF INSULIN (H): Primary | Chronic | ICD-10-CM

## 2020-09-08 DIAGNOSIS — R79.89 ELEVATED LFTS: ICD-10-CM

## 2020-09-08 DIAGNOSIS — Z23 NEED FOR PROPHYLACTIC VACCINATION AND INOCULATION AGAINST INFLUENZA: ICD-10-CM

## 2020-09-08 DIAGNOSIS — E66.01 MORBID OBESITY (H): ICD-10-CM

## 2020-09-08 LAB
ALBUMIN SERPL-MCNC: 3.8 G/DL (ref 3.4–5)
ALP SERPL-CCNC: 85 U/L (ref 40–150)
ALT SERPL W P-5'-P-CCNC: 28 U/L (ref 0–50)
ANION GAP SERPL CALCULATED.3IONS-SCNC: 5 MMOL/L (ref 3–14)
AST SERPL W P-5'-P-CCNC: 22 U/L (ref 0–45)
BILIRUB SERPL-MCNC: 0.5 MG/DL (ref 0.2–1.3)
BUN SERPL-MCNC: 28 MG/DL (ref 7–30)
CALCIUM SERPL-MCNC: 9.6 MG/DL (ref 8.5–10.1)
CHLORIDE SERPL-SCNC: 104 MMOL/L (ref 94–109)
CHOLEST SERPL-MCNC: 147 MG/DL
CO2 SERPL-SCNC: 28 MMOL/L (ref 20–32)
CREAT SERPL-MCNC: 1.22 MG/DL (ref 0.52–1.04)
CREAT UR-MCNC: 70 MG/DL
GFR SERPL CREATININE-BSD FRML MDRD: 43 ML/MIN/{1.73_M2}
GLUCOSE SERPL-MCNC: 277 MG/DL (ref 70–99)
HBA1C MFR BLD: 7 % (ref 0–5.6)
HDLC SERPL-MCNC: 58 MG/DL
LDLC SERPL CALC-MCNC: 41 MG/DL
MICROALBUMIN UR-MCNC: 38 MG/L
MICROALBUMIN/CREAT UR: 54.01 MG/G CR (ref 0–25)
NONHDLC SERPL-MCNC: 89 MG/DL
POTASSIUM SERPL-SCNC: 5.1 MMOL/L (ref 3.4–5.3)
PROT SERPL-MCNC: 7.6 G/DL (ref 6.8–8.8)
SODIUM SERPL-SCNC: 137 MMOL/L (ref 133–144)
TRIGL SERPL-MCNC: 242 MG/DL

## 2020-09-08 PROCEDURE — 82043 UR ALBUMIN QUANTITATIVE: CPT | Performed by: FAMILY MEDICINE

## 2020-09-08 PROCEDURE — 90662 IIV NO PRSV INCREASED AG IM: CPT | Performed by: FAMILY MEDICINE

## 2020-09-08 PROCEDURE — 36415 COLL VENOUS BLD VENIPUNCTURE: CPT | Performed by: FAMILY MEDICINE

## 2020-09-08 PROCEDURE — 99214 OFFICE O/P EST MOD 30 MIN: CPT | Mod: 25 | Performed by: FAMILY MEDICINE

## 2020-09-08 PROCEDURE — 80053 COMPREHEN METABOLIC PANEL: CPT | Performed by: FAMILY MEDICINE

## 2020-09-08 PROCEDURE — G0008 ADMIN INFLUENZA VIRUS VAC: HCPCS | Performed by: FAMILY MEDICINE

## 2020-09-08 PROCEDURE — 83036 HEMOGLOBIN GLYCOSYLATED A1C: CPT | Performed by: FAMILY MEDICINE

## 2020-09-08 PROCEDURE — 80061 LIPID PANEL: CPT | Performed by: FAMILY MEDICINE

## 2020-09-08 RX ORDER — METOPROLOL SUCCINATE 100 MG/1
100 TABLET, EXTENDED RELEASE ORAL DAILY
Qty: 90 TABLET | Refills: 3 | Status: SHIPPED | OUTPATIENT
Start: 2020-09-08 | End: 2021-08-25

## 2020-09-08 RX ORDER — CHLORTHALIDONE 25 MG/1
12.5 TABLET ORAL DAILY
Qty: 45 TABLET | Refills: 3 | Status: SHIPPED | OUTPATIENT
Start: 2020-09-08 | End: 2020-10-08

## 2020-09-08 RX ORDER — ATORVASTATIN CALCIUM 10 MG/1
10 TABLET, FILM COATED ORAL DAILY
Qty: 90 TABLET | Refills: 3 | Status: SHIPPED | OUTPATIENT
Start: 2020-09-08 | End: 2021-08-25

## 2020-09-08 RX ORDER — GLIPIZIDE 5 MG/1
5 TABLET, FILM COATED, EXTENDED RELEASE ORAL DAILY
Qty: 90 TABLET | Refills: 3 | Status: SHIPPED | OUTPATIENT
Start: 2020-09-08 | End: 2021-08-25

## 2020-09-08 RX ORDER — GLYCOPYRROLATE 1 MG/1
1-2 TABLET ORAL
Qty: 180 TABLET | Refills: 3 | Status: SHIPPED | OUTPATIENT
Start: 2020-09-08 | End: 2021-02-22

## 2020-09-08 ASSESSMENT — MIFFLIN-ST. JEOR: SCORE: 1384.34

## 2020-09-08 NOTE — PROGRESS NOTES
SUBJECTIVE:                                                    Suzette Britton is a 75 year old female who presents to clinic today for the following health issues:    Diabetes Follow-up    How often are you checking your blood sugar? One time daily  What time of day are you checking your blood sugars (select all that apply)?  Before breakfast   Have you had any blood sugars above 200?  Yes- once   Have you had any blood sugars below 70?  No    What symptoms do you notice when your blood sugar is low?  None    What concerns do you have today about your diabetes?      Do you have any of these symptoms? (Select all that apply)  No numbness or tingling in feet.  No redness, sores or blisters on feet.  No complaints of excessive thirst.  No reports of blurry vision.  No significant changes to weight.    Have you had a diabetic eye exam in the last 12 months? Yes- Date of last eye exam: 03/07/2019    Hemoglobin A1C   Date Value Ref Range Status   09/08/2020 7.0 (H) 0 - 5.6 % Final     Comment:     Normal <5.7% Prediabetes 5.7-6.4%  Diabetes 6.5% or higher - adopted from ADA   consensus guidelines.         Hyperlipidemia Follow-Up      Are you regularly taking any medication or supplement to lower your cholesterol?   Yes- Atorvastatin     Are you having muscle aches or other side effects that you think could be caused by your cholesterol lowering medication?  No    Hypertension Follow-up      Do you check your blood pressure regularly outside of the clinic? No     Are you following a low salt diet? Yes    Are your blood pressures ever more than 140 on the top number (systolic) OR more   than 90 on the bottom number (diastolic), for example 140/90? N/A    BP Readings from Last 2 Encounters:   09/08/20 (!) 168/82   01/14/20 126/54     Hemoglobin A1C (%)   Date Value   09/08/2020 7.0 (H)   12/31/2019 8.0 (H)     LDL Cholesterol Calculated (mg/dL)   Date Value   09/17/2019 49   09/05/2018 55         How many servings of fruits  and vegetables do you eat daily?  2-3    On average, how many sweetened beverages do you drink each day (Examples: soda, juice, sweet tea, etc.  Do NOT count diet or artificially sweetened beverages)?   0    How many days per week do you exercise enough to make your heart beat faster? 0    How many minutes a day do you exercise enough to make your heart beat faster? 0    How many days per week do you miss taking your medication? 0        Review of systems:  No f/c   No cp/sob  Dry intermittent cough - doesn't bother her, she doesn't want to take meds     Problem list and histories reviewed & adjusted, as indicated.  Additional history: as documented     Patient Active Problem List   Diagnosis     Osteoarthrosis, hand     Generalized hyperhidrosis     Hyperlipidemia LDL goal <100     Atrial fibrillation (H)     CKD (chronic kidney disease) stage 3, GFR 30-59 ml/min (H)     Health Care Home     Cataract     Obesity (BMI 30.0-34.9)     S/P total knee arthroplasty     ACP (advance care planning)     Essential hypertension     Type 2 diabetes mellitus with diabetic nephropathy, without long-term current use of insulin (H)     NICK (acute kidney injury) (H)     Elevated lactic acid level     Elevated LFTs     Atrial fibrillation with RVR (H)     Chronic vasomotor rhinitis     S/P ablation of atrial fibrillation     Current Outpatient Medications   Medication     aspirin EC 81 MG EC tablet     atorvastatin (LIPITOR) 10 MG tablet     calcium-vitamin D-vitamin K (VIACTIV) 500-500-40 MG-UNT-MCG CHEW     chlorthalidone (HYGROTON) 25 MG tablet     Cholecalciferol (VITAMIN D PO)     glipiZIDE (GLUCOTROL XL) 5 MG 24 hr tablet     glycopyrrolate (ROBINUL) 1 MG tablet     lisinopril (PRINIVIL/ZESTRIL) 10 MG tablet     metFORMIN (GLUCOPHAGE) 500 MG tablet     metoprolol succinate ER (TOPROL-XL) 100 MG 24 hr tablet     blood glucose monitoring (NO BRAND SPECIFIED) test strip     GLUCOMETER KIT     No current facility-administered  "medications for this visit.         Allergies   Allergen Reactions     Nka [No Known Allergies]          OBJECTIVE:                                                    /70   Pulse 79   Temp 98.3  F (36.8  C) (Tympanic)   Ht 1.607 m (5' 3.25\")   Wt 92.6 kg (204 lb 3.2 oz)   BMI 35.89 kg/m   Body mass index is 35.89 kg/m .  *GENERAL - Pt is alert and oriented in no acute distress.  Affect is appropriate. Good eye contact.  HEET - Head is normocephalic, atraumatic.    PERRLA,EEMI. Conjunctiva are free of icterus or erythema.   Oropharynx free of masses and lesions, no tonsillar exudate or petechiae.    NECK - Neck is supple w/o LA or thyromegaly  RESPIRATORY - Clear to auscultation bilaterally.  No wheezing noted  CV - RRR, no murmurs, rubs, gallops.   ABD - +BS, soft, nontender, no rebound, no guarding. No palpable organomegaly.  EXTREM - No edema.   Feet - no wounds. Normal capillary refill. Warm to touch. Intact microfilament testing. Normal DP and PT pulses         ASSESSMENT/PLAN:                                                      (E11.21) Type 2 diabetes mellitus with diabetic nephropathy, without long-term current use of insulin (H)  (primary encounter diagnosis)  Comment: discussed diabetes. Adding glyburide has helped a1c a lot. Labs done today. meds refilled. See me in three months   Plan: Hemoglobin A1c, Albumin Random Urine         Quantitative with Creat Ratio            (I10) Essential hypertension  Comment: check labs. meds refilled. Home blood pressure checking is recommended. The patient indicates understanding of these issues and agrees with the plan.   Plan: Comprehensive metabolic panel (BMP + Alb, Alk         Phos, ALT, AST, Total. Bili, TP)            (E78.5) Hyperlipidemia LDL goal <100  Comment:   Plan: Lipid panel reflex to direct LDL Fasting            (R79.89) Elevated LFTs  Comment:  Rechecking today   Plan: Comprehensive metabolic panel (BMP + Alb, Alk         Phos, ALT, AST, " Total. Damaris, DANY)            JADEN Koroma MD ( formerly Mehreen)     [facility] (formerly [facility])

## 2020-09-08 NOTE — PATIENT INSTRUCTIONS
Component      Latest Ref Rng & Units 9/17/2019 12/31/2019 9/8/2020   Hemoglobin A1C      0 - 5.6 % 7.2 (H) 8.0 (H) 7.0 (H)

## 2020-09-17 ENCOUNTER — MYC MEDICAL ADVICE (OUTPATIENT)
Dept: FAMILY MEDICINE | Facility: CLINIC | Age: 75
End: 2020-09-17

## 2020-10-08 DIAGNOSIS — I10 ESSENTIAL HYPERTENSION: Chronic | ICD-10-CM

## 2020-10-08 RX ORDER — CHLORTHALIDONE 25 MG/1
12.5 TABLET ORAL DAILY
Qty: 45 TABLET | Refills: 3 | Status: SHIPPED | OUTPATIENT
Start: 2020-10-08 | End: 2021-08-25

## 2020-10-08 NOTE — TELEPHONE ENCOUNTER
Pt stating she wants 15 pills to go to [facility] in [place] as her mail service pharmacy will not issue her med.    Basilia Steinberg

## 2020-12-08 ENCOUNTER — THERAPY VISIT (OUTPATIENT)
Dept: PHYSICAL THERAPY | Facility: CLINIC | Age: 75
End: 2020-12-08
Payer: COMMERCIAL

## 2020-12-08 ENCOUNTER — OFFICE VISIT (OUTPATIENT)
Dept: FAMILY MEDICINE | Facility: CLINIC | Age: 75
End: 2020-12-08
Payer: COMMERCIAL

## 2020-12-08 DIAGNOSIS — E11.21 TYPE 2 DIABETES MELLITUS WITH DIABETIC NEPHROPATHY, WITHOUT LONG-TERM CURRENT USE OF INSULIN (H): Primary | Chronic | ICD-10-CM

## 2020-12-08 DIAGNOSIS — M79.18 LEFT BUTTOCK PAIN: ICD-10-CM

## 2020-12-08 DIAGNOSIS — M79.18 BILATERAL BUTTOCK PAIN: ICD-10-CM

## 2020-12-08 DIAGNOSIS — R05.9 COUGH: ICD-10-CM

## 2020-12-08 PROCEDURE — 97110 THERAPEUTIC EXERCISES: CPT | Mod: GP | Performed by: PHYSICAL THERAPIST

## 2020-12-08 PROCEDURE — 99214 OFFICE O/P EST MOD 30 MIN: CPT | Performed by: FAMILY MEDICINE

## 2020-12-08 PROCEDURE — 97161 PT EVAL LOW COMPLEX 20 MIN: CPT | Mod: GP | Performed by: PHYSICAL THERAPIST

## 2020-12-08 RX ORDER — LOSARTAN POTASSIUM 25 MG/1
25 TABLET ORAL DAILY
Qty: 90 TABLET | Refills: 0 | Status: SHIPPED | OUTPATIENT
Start: 2020-12-08 | End: 2021-02-22

## 2020-12-08 NOTE — PROGRESS NOTES
SUBJECTIVE:                                                    Suzette Britton is a 75 year old female who presents to clinic today for the following health issues:     Diabetes Follow-up      How often are you checking your blood sugar? Checking in the am     What concerns do you have today about your diabetes? Other: coughing     Do you have any of these symptoms? (Select all that apply)  Blurry vision- sometimes     Have you had a diabetic eye exam in the last 12 months? Dr. Mcghee 08/2019- due in 2021     Angry about lab draw at our last visit - says  wasn't nice to her.   Didn't think she needed labs today       Hyperlipidemia Follow-Up      Are you regularly taking any medication or supplement to lower your cholesterol?   Yes- Atorvastatin    Are you having muscle aches or other side effects that you think could be caused by your cholesterol lowering medication?  No    Hypertension Follow-up      Do you check your blood pressure regularly outside of the clinic? Yes      Last bps- 112/61 09/27/2020, 127/72 09/29/2020, 90/54 10/09/2020, 143/70 11/09/2020,150/77 12/04/2020.    Are you following a low salt diet? Yes    Are your blood pressures ever more than 140 on the top number (systolic) OR more   than 90 on the bottom number (diastolic), for example 140/90? See above     Brings home blood pressure data - numbers at home 120/60    BP Readings from Last 2 Encounters:   12/09/20 129/78   09/08/20 130/70     Hemoglobin A1C (%)   Date Value   09/08/2020 7.0 (H)   12/31/2019 8.0 (H)     LDL Cholesterol Calculated (mg/dL)   Date Value   09/08/2020 41   09/17/2019 49         How many servings of fruits and vegetables do you eat daily?  4 or more    On average, how many sweetened beverages do you drink each day (Examples: soda, juice, sweet tea, etc.  Do NOT count diet or artificially sweetened beverages)?   0    How many days per week do you exercise enough to make your heart beat faster? 0    How many  minutes a day do you exercise enough to make your heart beat faster? 0    How many days per week do you miss taking your medication? 0    Cough  - random  On lisinopril 10 daily   No f/c   No congestion   No shortness of breath/wheezing    Sore left buttock with walking   Gets better when she stops   No tingling/numbing   Minimal exercise     Review of systems:  No headache  No shortness of breath/cough  Struggling somewhat with isolation at home. Denies depression. Is irritable       Problem list and histories reviewed & adjusted, as indicated.  Additional history: as documented     Patient Active Problem List   Diagnosis     Osteoarthrosis, hand     Generalized hyperhidrosis     Hyperlipidemia LDL goal <100     Atrial fibrillation (H)     CKD (chronic kidney disease) stage 3, GFR 30-59 ml/min     Health Care Home     Cataract     Obesity (BMI 30.0-34.9)     S/P total knee arthroplasty     ACP (advance care planning)     Essential hypertension     Type 2 diabetes mellitus with diabetic nephropathy, without long-term current use of insulin (H)     NICK (acute kidney injury) (H)     Elevated lactic acid level     Elevated LFTs     Atrial fibrillation with RVR (H)     Chronic vasomotor rhinitis     S/P ablation of atrial fibrillation     Morbid obesity (H)     Current Outpatient Medications   Medication     aspirin EC 81 MG EC tablet     atorvastatin (LIPITOR) 10 MG tablet     CALCIUM PO     calcium-vitamin D-vitamin K (VIACTIV) 500-500-40 MG-UNT-MCG CHEW     chlorthalidone (HYGROTON) 25 MG tablet     Cholecalciferol (VITAMIN D PO)     glipiZIDE (GLUCOTROL XL) 5 MG 24 hr tablet     glycopyrrolate (ROBINUL) 1 MG tablet     metFORMIN (GLUCOPHAGE) 500 MG tablet     metoprolol succinate ER (TOPROL-XL) 100 MG 24 hr tablet     blood glucose monitoring (NO BRAND SPECIFIED) test strip     GLUCOMETER KIT     No current facility-administered medications for this visit.         Allergies   Allergen Reactions     Nka [No Known  "Allergies]          OBJECTIVE:                                                    /78   Pulse 74   Temp 99.1  F (37.3  C) (Tympanic)   Ht 1.619 m (5' 3.75\")   BMI 35.33 kg/m   Body mass index is 35.33 kg/m .   GENERAL - Pt is alert and oriented in no acute distress.  Affect is appropriate. Good eye contact.  HEET - Head is normocephalic, atraumatic.  PERRLA,EEMI. Conjunctiva are free of icterus or erythema.  TMs bilaterally normal. Nares without congestion. No sinus tenderness. Oropharynx free of masses and lesions, no tonsillar exudate or petechiae.    NECK - Neck is supple w/o LA or thyromegaly  RESPIRATORY - Clear to auscultation bilaterally.  No wheezing noted  CV - RRR, no murmurs, rubs, gallops.   EXTREM - No edema.  PSYCH - Pt makes good eye contact, is clean and well-dressed. Oriented to person,place,and time. Cooperative. Irritable. Flashes of anger today.  No speech abnormalities. Insight and judgement were fair.       ASSESSMENT/PLAN:                                                      (E11.21) Type 2 diabetes mellitus with diabetic nephropathy, without long-term current use of insulin (H)  (primary encounter diagnosis)  Comment: due for a1c soon. She will return to get it done. Doesn't want to get it done today  Plan: Hemoglobin A1c            (M79.18) Left buttock pain  Comment: no indication for sciatic. Likely tight/spastic muscles, deconditioning. PT appointment made for her.   Plan: [facility] HAND, AND CHIROPRACTIC REFERRAL            (R05) Cough  Comment: discussed. Will try stopping the lisinopril and starting losartan. She will let me know how its going. The patient indicates understanding of these issues and agrees with the plan.   Plan: losartan (COZAAR) 25 MG tablet            JADEN Koroma MD ( formerly Mehreen)   [facility]    "

## 2020-12-08 NOTE — PROGRESS NOTES
[facility] Initial Evaluation  Subjective:  The history is provided by the patient.   Patient Health History  Suzette Britton being seen for L buttock pain.              Health conditions: see PMH.        Surgeries include:  Orthopedic surgery. Other surgery history details: 2016 & 2017 TKA.    Current medications: see PMH.    Current occupation is retired.                     Therapist Generated HPI Evaluation  Problem details: Feels pain in her buttocks after walking, which is her main complaint. Also has difficulty reaching down to her ankle. Denies back pain specifically. Would like to work towards walking faster with less stiffness in PT. .         Type of problem:  Lumbar.      Occurance: prolonged inactivity.    Site of Pain: B buttocks - L>R   Pain is described as aching       Associated symptoms:  Loss of motion/stiffness. Symptoms are exacerbated by standing and walking  and relieved by nothing and rest.                              Objective:    Gait:  Favors RLE stance, avoids LLE stance through shorter duration. Decreased foot clearance.                   Lumbar/SI Evaluation  ROM:    AROM Lumbar:   Flexion:        To ankles - no pain, just stretch  Ext:                    Mod loss - no pain, relieving with repeated   Side Bend:        Left:     Right:   Rotation:           Left:     Right:   Side Glide:        Left:     Right:                     Lumbar Palpation:  normal            SI joint/Sacrum:    Negative SI special tests                                            Hip Evaluation  Hip PROM:    Flexion: Left: 90   Right: 100    Abduction: Left: 25    Right: 35    Internal Rotation: Left: 5    Right: 10  External Rotation: Left: 20    Right: 30              Hip Strength:      Extension:  Left: 4/5  Pain:Right: 4/5    Pain:    Abduction:  Left: 4/5     Pain:Right: 4+/5    Pain:                                     General Evaluation:          Lower Extremity Flexibility:           Hip Flexors:  Decreased flexibility      Hamstrings:  Decreased flexibility                     Balance:  Balance wnl general: hip drop during L SL stance.  Single Leg Stance--Eyes Open:  Left: <2/30 sec    Right: 5/30 sec          Posture:    Standing:   Thoracic kyphosis increased                                             ROS    Assessment/Plan:    Patient is a 75 year old female with sacral complaints.    Patient has the following significant findings with corresponding treatment plan.                Diagnosis 1:  B buttock pain, SI region    Pain -  manual therapy  Decreased ROM/flexibility - manual therapy, therapeutic exercise and therapeutic activity  Decreased strength - therapeutic exercise and therapeutic activities  Impaired balance - neuro re-education and therapeutic activities  Impaired gait - home program  Decreased function - therapeutic activities  Impaired posture - neuro re-education    Therapy Evaluation Codes:     Cumulative Therapy Evaluation is: Low complexity.    Previous and current functional limitations:  (See Goal Flow Sheet for this information)    Short term and Long term goals: (See Goal Flow Sheet for this information)     Communication ability:  Patient appears to be able to clearly communicate and understand verbal and written communication and follow directions correctly.  Treatment Explanation - The following has been discussed with the patient:   RX ordered/plan of care  Anticipated outcomes  Possible risks and side effects  This patient would benefit from PT intervention to resume normal activities.   Rehab potential is good.    Frequency:  1 X week, once daily  Duration:  for 4 weeks  Discharge Plan:  Achieve all LTG.  Independent in home treatment program.  Reach maximal therapeutic benefit.    Please refer to the daily flowsheet for treatment today, total treatment time and time spent performing 1:1 timed codes.

## 2020-12-09 VITALS
SYSTOLIC BLOOD PRESSURE: 129 MMHG | DIASTOLIC BLOOD PRESSURE: 78 MMHG | HEART RATE: 74 BPM | HEIGHT: 64 IN | BODY MASS INDEX: 35.33 KG/M2 | TEMPERATURE: 99.1 F

## 2020-12-15 ENCOUNTER — THERAPY VISIT (OUTPATIENT)
Dept: PHYSICAL THERAPY | Facility: CLINIC | Age: 75
End: 2020-12-15
Payer: COMMERCIAL

## 2020-12-15 DIAGNOSIS — M79.18 BILATERAL BUTTOCK PAIN: ICD-10-CM

## 2020-12-15 PROCEDURE — 97110 THERAPEUTIC EXERCISES: CPT | Mod: GP | Performed by: PHYSICAL THERAPIST

## 2020-12-15 PROCEDURE — 97530 THERAPEUTIC ACTIVITIES: CPT | Mod: GP | Performed by: PHYSICAL THERAPIST

## 2020-12-30 DIAGNOSIS — E11.21 TYPE 2 DIABETES MELLITUS WITH DIABETIC NEPHROPATHY, WITHOUT LONG-TERM CURRENT USE OF INSULIN (H): Chronic | ICD-10-CM

## 2020-12-30 LAB — HBA1C MFR BLD: 7 % (ref 0–5.6)

## 2020-12-30 PROCEDURE — 83036 HEMOGLOBIN GLYCOSYLATED A1C: CPT | Performed by: FAMILY MEDICINE

## 2020-12-30 PROCEDURE — 36415 COLL VENOUS BLD VENIPUNCTURE: CPT | Performed by: FAMILY MEDICINE

## 2021-01-07 PROBLEM — M79.18 BILATERAL BUTTOCK PAIN: Status: RESOLVED | Noted: 2020-12-08 | Resolved: 2021-01-07

## 2021-01-07 NOTE — PROGRESS NOTES
DISCHARGE SUMMARY    Suzette Britton was seen 2 times for evaluation and treatment.  Patient did not return for further treatment and current status is unknown.  Due to short treatment duration, no objective or functional changes were made.  Please see goal flow sheet from episode noted date below and initial evaluation for further information.  Patient is discharged from therapy and therapy episode is resolved as of 01/07/21.      Linked Episodes   Type: Episode: Status: Noted: Resolved: Last update: Updated by:   PHYSICAL THERAPY L buttocks 12.8.2020 Active 12/8/2020  12/15/2020  2:28 PM Javad García, PT      Comments:

## 2021-02-22 ENCOUNTER — ANCILLARY PROCEDURE (OUTPATIENT)
Dept: GENERAL RADIOLOGY | Facility: CLINIC | Age: 76
End: 2021-02-22
Attending: FAMILY MEDICINE
Payer: COMMERCIAL

## 2021-02-22 ENCOUNTER — OFFICE VISIT (OUTPATIENT)
Dept: FAMILY MEDICINE | Facility: CLINIC | Age: 76
End: 2021-02-22
Payer: COMMERCIAL

## 2021-02-22 VITALS
TEMPERATURE: 97.9 F | WEIGHT: 209 LBS | OXYGEN SATURATION: 98 % | DIASTOLIC BLOOD PRESSURE: 60 MMHG | RESPIRATION RATE: 16 BRPM | SYSTOLIC BLOOD PRESSURE: 138 MMHG | HEART RATE: 87 BPM | BODY MASS INDEX: 35.68 KG/M2 | HEIGHT: 64 IN

## 2021-02-22 DIAGNOSIS — M25.552 HIP PAIN, LEFT: ICD-10-CM

## 2021-02-22 DIAGNOSIS — I10 ESSENTIAL HYPERTENSION: Chronic | ICD-10-CM

## 2021-02-22 DIAGNOSIS — E11.21 TYPE 2 DIABETES MELLITUS WITH DIABETIC NEPHROPATHY, WITHOUT LONG-TERM CURRENT USE OF INSULIN (H): Chronic | ICD-10-CM

## 2021-02-22 DIAGNOSIS — M25.552 HIP PAIN, LEFT: Primary | ICD-10-CM

## 2021-02-22 PROCEDURE — 73502 X-RAY EXAM HIP UNI 2-3 VIEWS: CPT | Performed by: RADIOLOGY

## 2021-02-22 PROCEDURE — 99214 OFFICE O/P EST MOD 30 MIN: CPT | Performed by: FAMILY MEDICINE

## 2021-02-22 RX ORDER — LOSARTAN POTASSIUM 25 MG/1
25 TABLET ORAL DAILY
Qty: 90 TABLET | Refills: 1 | Status: SHIPPED | OUTPATIENT
Start: 2021-02-22 | End: 2021-08-25

## 2021-02-22 RX ORDER — LOSARTAN POTASSIUM 25 MG/1
25 TABLET ORAL DAILY
Qty: 90 TABLET | Refills: 0 | Status: SHIPPED | OUTPATIENT
Start: 2021-02-22 | End: 2021-02-22

## 2021-02-22 ASSESSMENT — MIFFLIN-ST. JEOR: SCORE: 1414.05

## 2021-02-22 NOTE — PROGRESS NOTES
"    Assessment & Plan     Hip pain, left  Due to osteoarthritis and slight greater trochanteric bursitis too  - XR Pelvis and Hip Left 1 View; Future  - Orthopedic & Spine  Referral; Future        Essential hypertension  Stable, refill  Doing well con losartan and off lisinopril (cough)  - **Basic metabolic panel FUTURE anytime; Future  - losartan (COZAAR) 25 MG tablet; Take 1 tablet (25 mg) by mouth daily    Type 2 diabetes mellitus with diabetic nephropathy, without long-term current use of insulin (H)  Recheck in August.  - Lipid panel reflex to direct LDL Fasting; Future  - **A1C FUTURE anytime; Future       BMI:   Estimated body mass index is 36.16 kg/m  as calculated from the following:    Height as of this encounter: 1.619 m (5' 3.75\").    Weight as of this encounter: 94.8 kg (209 lb).   Weight management plan: Discussed healthy diet and exercise guidelines    Patient Instructions   1. Xray left hip, I'll call with results and we'll plan for you to see the orthopedic hip doctor to discuss next steps.      Plan a clinic appt and fasting labs in June for the A1c and cholesterol (ok to drink water or black coffee).  If you do a Pre-Op then we'll need labs also at the Pre-Op        Return in about 6 months (around 8/22/2021).    Dave Jarrett MD  [facility]    Giancarlo Bradford is a 75 year old who presents for the following health issues     HPI       Musculoskeletal problem/pain  Onset/Duration: x 1 year, getting worse. Patient states both of her knees have been replaced due to arthritis. Patient thinking this hip pain in arthritis.   Description  Location: hip - left. Patient states she has started to walk funny because of the pain.  Joint Swelling: no  Redness: no  Pain: YES  Warmth: no  Intensity:  Mild- gets worse throughout the day. When she is out and about.  Progression of Symptoms:  worsening  Accompanying signs and symptoms:   Fevers: " "no  Numbness/tingling/weakness: no  History  Trauma to the area: no  Recent illness:  no  Previous similar problem: no  Previous evaluation:  no  Precipitating or alleviating factors:  Aggravating factors include: moving up and down (sitting to standing a lot)  Therapies tried and outcome: rest/inactivity    Hypertension Follow-up      Do you check your blood pressure regularly outside of the clinic? No     Are you following a low salt diet? Yes    Are your blood pressures ever more than 140 on the top number (systolic) OR more   than 90 on the bottom number (diastolic), for example 140/90? No          Review of Systems   Constitutional, HEENT, cardiovascular, pulmonary, gi and gu systems are negative, except as otherwise noted.      Objective    /60   Pulse 87   Temp 97.9  F (36.6  C) (Tympanic)   Resp 16   Ht 1.619 m (5' 3.75\")   Wt 94.8 kg (209 lb)   SpO2 98%   BMI 36.16 kg/m    Body mass index is 36.16 kg/m .  Physical Exam   GENERAL APPEARANCE: healthy, alert and no distress  MS: extremities normal- no gross deformities noted  ORTHO: Hip Exam: Palpation: Tender:   left greater trochanter  Non-tender:  left ASIS, left iliac crest  Range of Motion:  Left Hip  flexion   Slightly decreased, external rotation  decreased, painful, internal rotation  decreased, painful  Strength:  full strength  Special tests:      NEURO: Normal strength and tone, mentation intact and speech normal    Left hip and pelvis Xray - Reviewed and interpreted by me.  Left hip arthritis.            "

## 2021-02-22 NOTE — PATIENT INSTRUCTIONS
1. Xray left hip, I'll call with results and we'll plan for you to see the orthopedic hip doctor to discuss next steps.      Plan a clinic appt and fasting labs in June for the A1c and cholesterol (ok to drink water or black coffee).  If you do a Pre-Op then we'll need labs also at the Pre-Op

## 2021-02-25 ENCOUNTER — MEDICAL CORRESPONDENCE (OUTPATIENT)
Dept: HEALTH INFORMATION MANAGEMENT | Facility: CLINIC | Age: 76
End: 2021-02-25

## 2021-02-25 ENCOUNTER — TRANSFERRED RECORDS (OUTPATIENT)
Dept: HEALTH INFORMATION MANAGEMENT | Facility: CLINIC | Age: 76
End: 2021-02-25

## 2021-02-26 NOTE — H&P (VIEW-ONLY)
[facility]  [address] [place] [address]  Phone: [phone]  Primary Provider: Judy Koroma  Pre-op Performing Provider: SAMMY BARROW      PREOPERATIVE EVALUATION:  Today's date: 3/2/2021    Suzette Britton is a 75 year old female who presents for a preoperative evaluation.    Surgical Information:  Surgery/Procedure: TOTAL Hip Arthroplasty, Left  Surgery Location: [facility]  Surgeon: Zachary Arteaga MD  Surgery Date: 4/1/2021  Time of Surgery: 2:00PM  Where patient plans to recover: At home with family  Fax number for surgical facility: Note does not need to be faxed, will be available electronically in Epic.    Type of Anesthesia Anticipated: Spinal    Assessment & Plan     Suzette was seen today for pre-op exam.    Diagnoses and all orders for this visit:    Preop general physical exam  -     EKG 12-lead complete w/read - Clinics  -     Hemoglobin A1c  -     Basic metabolic panel  (Ca, Cl, CO2, Creat, Gluc, K, Na, BUN)    Primary osteoarthritis of left hip    Urinary incontinence, unspecified type  -     Incontinence Supplies Order    Type 2 diabetes mellitus with diabetic nephropathy, without long-term current use of insulin (H): recheck, stable A1c at 7.5  -     Hemoglobin A1c    Essential hypertension: stable, recheck labs  -     Basic metabolic panel  (Ca, Cl, CO2, Creat, Gluc, K, Na, BUN)    Morbid obesity (H): BMI 35+ with comorbid condition.    Stage 3a chronic kidney disease: recheck labs    History of atrial fibrillation: resolved after ablation               Risks and Recommendations:  The patient has the following additional risks and recommendations for perioperative complications:   - Morbid obesity (BMI >40)   - History of urinary incontinence  Diabetes:  - Patient is not on insulin therapy: regular NPO guidelines can be followed.     Medication Instructions:  Patient is to take all scheduled medications on the day of surgery  EXCEPT for modifications listed below:   - aspirin: Discontinue aspirin 7-10 days prior to procedure to reduce bleeding risk. It should be resumed postoperatively.    - metformin: HOLD day of surgery.   - sulfonylurea (e.g. glyburide, glipizide): HOLD day of surgery    Hold chlorthalidone due to dehydration risk and CKD    RECOMMENDATION:  APPROVAL GIVEN to proceed with proposed procedure pending review of diagnostic evaluation.      Subjective     HPI related to upcoming procedure: Worsening left hip pain over the last year and starting to limp due to this. Tried rest. Doesn't like to take pills unless needed. Worse pain in the evening, needing to go slower than usual.  Has had both knees replaced in the past.      Preop Questions 3/2/2021   1. Have you ever had a heart attack or stroke? No   2. Have you ever had surgery on your heart or blood vessels, such as a stent placement, a coronary artery bypass, or surgery on an artery in your head, neck, heart, or legs? YES - ablation in 2017: A-fib    3. Do you have chest pain with activity? No   4. Do you have a history of  heart failure? No   5. Do you currently have a cold, bronchitis or symptoms of other infection? No   6. Do you have a cough, shortness of breath, or wheezing? No   7. Do you or anyone in your family have previous history of blood clots? No   8. Do you or does anyone in your family have a serious bleeding problem such as prolonged bleeding following surgeries or cuts? No   9. Have you ever had problems with anemia or been told to take iron pills? No   10. Have you had any abnormal blood loss such as black, tarry or bloody stools, or abnormal vaginal bleeding? No   11. Have you ever had a blood transfusion? No   12. Are you willing to have a blood transfusion if it is medically needed before, during, or after your surgery? Yes   13. Have you or any of your relatives ever had problems with anesthesia? No   14. Do you have sleep apnea, excessive snoring  or daytime drowsiness? No   15. Do you have any artifical heart valves or other implanted medical devices like a pacemaker, defibrillator, or continuous glucose monitor? No   16. Do you have artificial joints? YES - bilateral knees   17. Are you allergic to latex? No     Health Care Directive:  Patient has a Health Care Directive on file      Preoperative Review of :   reviewed - no record of controlled substances prescribed.    Status of Chronic Conditions:  A-FIB - Patient has a history of afib, with normal rate and rhythm since ablation June 2017. Current treatment regimen includes Aspirin and after done with elliquis per cardiology for stroke prevention and denies significant symptoms of lightheadedness, palpitations or dyspnea.     DIABETES - Patient has a longstanding history of DiabetesType Type II . Patient is being treated with oral agents and ASA and denies significant side effects. Control has been good. Complicating factors include but are not limited to: hypertension, hyperlipidemia and morbid obesity .   Average blood sugars 190s.    HYPERLIPIDEMIA - Patient has a long history of significant Hyperlipidemia requiring medication for treatment with recent good control. Patient reports no problems or side effects with the medication.     HYPERTENSION - Patient has longstanding history of HTN , currently denies any symptoms referable to elevated blood pressure. Specifically denies chest pain, palpitations, dyspnea, orthopnea, PND or peripheral edema. Blood pressure readings have been in normal range. Current medication regimen is as listed below. Patient denies any side effects of medication.       Review of Systems  CONSTITUTIONAL: NEGATIVE for fever, chills, change in weight  INTEGUMENTARY/SKIN: NEGATIVE for worrisome rashes, moles or lesions  EYES: NEGATIVE for vision changes or irritation  ENT/MOUTH: NEGATIVE for ear, mouth and throat problems  RESP: NEGATIVE for significant cough or SOB  BREAST:  NEGATIVE for masses, tenderness or discharge  CV: NEGATIVE for chest pain, palpitations or peripheral edema  GI: NEGATIVE for nausea, abdominal pain, heartburn, or change in bowel habits  : NEGATIVE for frequency, dysuria, or hematuria  MUSCULOSKELETAL: NEGATIVE for significant arthralgias or myalgia  NEURO: NEGATIVE for weakness, dizziness or paresthesias  ENDOCRINE: NEGATIVE for temperature intolerance, skin/hair changes  HEME: NEGATIVE for bleeding problems  PSYCHIATRIC: NEGATIVE for changes in mood or affect    Patient Active Problem List    Diagnosis Date Noted     Morbid obesity (H) 09/08/2020     Priority: Medium     S/P ablation of atrial fibrillation 03/15/2018     Priority: Medium     spring 2017        Chronic vasomotor rhinitis 09/05/2017     Priority: Medium     NICK (acute kidney injury) (H) 05/07/2017     Priority: Medium     Elevated lactic acid level 05/07/2017     Priority: Medium     Elevated LFTs 05/07/2017     Priority: Medium     History of atrial fibrillation 05/07/2017     Priority: Medium     Resolved after ablation 2017       Type 2 diabetes mellitus with diabetic nephropathy, without long-term current use of insulin (H) 04/13/2017     Priority: Medium     Essential hypertension 02/09/2017     Priority: Medium     ACP (advance care planning) 02/06/2017     Priority: Medium     Advance Care Planning 2/6/2017: Receipt of ACP document:  Received: Health Care Directive which was witnessed or notarized on 10-16-14.  Document previously scanned on 12-5-16.  Validation form completed and sent to be scanned.  Code Status reflects choices in most recent ACP document.  Confirmed/documented designated decision maker(s).  Added by Sasha Cameron RN Advance Care Planning Liaison with [employer]         S/P total knee arthroplasty 12/01/2016     Priority: Medium     Obesity (BMI 30.0-34.9) 10/23/2015     Priority: Medium     Cataract 05/30/2014     Priority: Medium     CKD (chronic kidney disease)  stage 3, GFR 30-59 ml/min 03/19/2013     Priority: Medium     Hyperlipidemia LDL goal <100 10/20/2011     Priority: Medium     Recent Labs   Lab Test 9/28/10 0731 8/10/09 0713     CHOL 196 183     HDL 52 52     LDL 98 83     TRIG 228* 242*     CHOLHDLRATIO 4.0 4.0            Atrial fibrillation (H) 10/20/2011     Priority: Medium     One bout in 2010 - converted in ER with iv diltizem  Negative holter.no recurrence as far as she knows        Generalized hyperhidrosis 06/27/2010     Priority: Medium     On robinul Rx by Derm.       Osteoarthrosis, hand 10/06/2005     Priority: Medium     Problem list name updated by automated process. Provider to review       Health Care Home 08/05/2013     Priority: Low     *See Letters for [facility] Care Plan: My Access Plan            Past Medical History:   Diagnosis Date     Osteoarthrosis, unspecified whether generalized or localized, hand      S/P ablation of atrial flutter 05/18/2017    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)     Type II or unspecified type diabetes mellitus without mention of complication, not stated as uncontrolled      Unspecified essential hypertension      Past Surgical History:   Procedure Laterality Date     ARTHROPLASTY KNEE Left 12/1/2016    Procedure: ARTHROPLASTY KNEE;  Surgeon: Zachary Arteaga MD;  Location: [facility] OR     ARTHROPLASTY KNEE Right 5/4/2017    Procedure: ARTHROPLASTY KNEE;  Right Total Knee Arthroplasty;  Surgeon: Zahcary Arteaga MD;  Location: [facility] OR     Colonoscopy, normal  09/13/2002     PHACOEMULSIFICATION WITH STANDARD INTRAOCULAR LENS IMPLANT  6/26/2014    Procedure: PHACOEMULSIFICATION WITH STANDARD INTRAOCULAR LENS IMPLANT;  Surgeon: Dario Mcghee MD;  Location: [facility] OR     PHACOEMULSIFICATION WITH STANDARD INTRAOCULAR LENS IMPLANT Left 9/4/2014    Procedure: PHACOEMULSIFICATION WITH STANDARD INTRAOCULAR LENS IMPLANT;  Surgeon: Dario Mcghee MD;  Location: [facility] OR     Current Outpatient  Medications   Medication Sig Dispense Refill     aspirin EC 81 MG EC tablet Take 1 tablet (81 mg) by mouth daily Once finished with Eliquis       atorvastatin (LIPITOR) 10 MG tablet Take 1 tablet (10 mg) by mouth daily 90 tablet 3     blood glucose monitoring (NO BRAND SPECIFIED) test strip Use to test blood sugars one time daily or as directed  One touch meter - one touch strips 3 Box 3     CALCIUM PO        calcium-vitamin D-vitamin K (VIACTIV) 500-500-40 MG-UNT-MCG CHEW Take 1 tablet by mouth daily       chlorthalidone (HYGROTON) 25 MG tablet Take 0.5 tablets (12.5 mg) by mouth daily 45 tablet 3     Cholecalciferol (VITAMIN D PO) Take 1,000 Units by mouth every morning        glipiZIDE (GLUCOTROL XL) 5 MG 24 hr tablet Take 1 tablet (5 mg) by mouth daily 90 tablet 3     GLUCOMETER KIT for testing bid, One Touch or please fill with formulary glucometer 1 Kit 0     losartan (COZAAR) 25 MG tablet Take 1 tablet (25 mg) by mouth daily 90 tablet 1     metFORMIN (GLUCOPHAGE) 500 MG tablet Take 2 tablets (1,000 mg) by mouth 2 times daily (with meals) 360 tablet 3     metoprolol succinate ER (TOPROL-XL) 100 MG 24 hr tablet Take 1 tablet (100 mg) by mouth daily 90 tablet 3       Allergies   Allergen Reactions     Nka [No Known Allergies]         Social History     Tobacco Use     Smoking status: Never Smoker     Smokeless tobacco: Never Used   Substance Use Topics     Alcohol use: No     Alcohol/week: 0.0 standard drinks     Family History   Problem Relation Age of Onset     Hypertension Mother      Diabetes Mother      Cancer Mother         uterine     Arthritis Father      Genitourinary Problems Father         kidney disease     Cancer Father         bladder cancer     Diabetes Brother      Diabetes Paternal Grandmother      Diabetes Paternal Aunt      History   Drug Use No         Objective     /80   Pulse 76   Temp 97  F (36.1  C) (Tympanic)   Resp 16   Wt 94.4 kg (208 lb 3.2 oz)   SpO2 98%   BMI 36.02 kg/m       Physical Exam    GENERAL APPEARANCE: healthy, alert and no distress     EYES: EOMI, PERRL     HENT: ear canals and TM's normal and nose and mouth without ulcers or lesions     NECK: no adenopathy, no asymmetry, masses, or scars and thyroid normal to palpation     RESP: lungs clear to auscultation - no rales, rhonchi or wheezes     CV: regular rates and rhythm, normal S1 S2, no S3 or S4 and no murmur, click or rub     ABDOMEN:  soft, nontender, no HSM or masses and bowel sounds normal     MS: extremities normal- no gross deformities noted, no evidence of inflammation in joints, FROM in all extremities.     SKIN: no suspicious lesions or rashes     NEURO: Normal strength and tone, sensory exam grossly normal, mentation intact and speech normal     PSYCH: mentation appears normal. and affect normal/bright     LYMPHATICS: No cervical adenopathy    Recent Labs   Lab Test 12/30/20  1608 09/08/20  1108 09/17/19  1035 09/17/19  1035   HGB  --   --   --  13.5   PLT  --   --   --  175   NA  --  137  --  138   POTASSIUM  --  5.1  --  4.3   CR  --  1.22*  --  1.08*   A1C 7.0* 7.0*   < > 7.2*    < > = values in this interval not displayed.      Results for orders placed or performed in visit on 03/02/21   Hemoglobin A1c     Status: Abnormal   Result Value Ref Range    Hemoglobin A1C 7.5 (H) 0 - 5.6 %       Diagnostics:  Labs pending at this time.  Results will be reviewed when available.   EKG: Normal Sinus Rhythm, normal axis, normal intervals, no acute ST/T changes c/w ischemia, no LVH by voltage criteria, unchanged from previous tracings    Revised Cardiac Risk Index (RCRI):  The patient has the following serious cardiovascular risks for perioperative complications:   - No serious cardiac risks = 0 points     RCRI Interpretation: 0 points: Class I (very low risk - 0.4% complication rate)             Signed Electronically by: Dave Jarrett MD  Copy of this evaluation report is provided to requesting  physician.    Preop [facility] Preop Guidelines    Revised Cardiac Risk Index   DME (Durable Medical Equipment) Orders and Documentation  Orders Placed This Encounter   Procedures     Incontinence Supplies Order      The patient was assessed and it was determined the patient is in need of the following listed DME Supplies/Equipment. Please complete supporting documentation below to demonstrate medical necessity.      Incontinence Supplies Documentation  Incontinence supplies are needed due to urinary incontinence.

## 2021-02-26 NOTE — PROGRESS NOTES
[facility]  [address] [address]  Phone: [phone]  Primary Provider: Judy Koroma  Pre-op Performing Provider: SAMMY BARROW      PREOPERATIVE EVALUATION:  Today's date: 3/2/2021    Suzette Britton is a 75 year old female who presents for a preoperative evaluation.    Surgical Information:  Surgery/Procedure: TOTAL Hip Arthroplasty, Left  Surgery Location: [facility]  Surgeon: Zachary Arteaga MD  Surgery Date: 4/1/2021  Time of Surgery: 2:00PM  Where patient plans to recover: At home with family  Fax number for surgical facility: Note does not need to be faxed, will be available electronically in Epic.    Type of Anesthesia Anticipated: Spinal    Assessment & Plan     Suzette was seen today for pre-op exam.    Diagnoses and all orders for this visit:    Preop general physical exam  -     EKG 12-lead complete w/read - Clinics  -     Hemoglobin A1c  -     Basic metabolic panel  (Ca, Cl, CO2, Creat, Gluc, K, Na, BUN)    Primary osteoarthritis of left hip    Urinary incontinence, unspecified type  -     Incontinence Supplies Order    Type 2 diabetes mellitus with diabetic nephropathy, without long-term current use of insulin (H): recheck, stable A1c at 7.5  -     Hemoglobin A1c    Essential hypertension: stable, recheck labs  -     Basic metabolic panel  (Ca, Cl, CO2, Creat, Gluc, K, Na, BUN)    Morbid obesity (H): BMI 35+ with comorbid condition.    Stage 3a chronic kidney disease: recheck labs    History of atrial fibrillation: resolved after ablation               Risks and Recommendations:  The patient has the following additional risks and recommendations for perioperative complications:   - Morbid obesity (BMI >40)   - History of urinary incontinence  Diabetes:  - Patient is not on insulin therapy: regular NPO guidelines can be followed.     Medication Instructions:  Patient is to take all scheduled medications on the day of surgery  EXCEPT for modifications listed below:   - aspirin: Discontinue aspirin 7-10 days prior to procedure to reduce bleeding risk. It should be resumed postoperatively.    - metformin: HOLD day of surgery.   - sulfonylurea (e.g. glyburide, glipizide): HOLD day of surgery    Hold chlorthalidone due to dehydration risk and CKD    RECOMMENDATION:  APPROVAL GIVEN to proceed with proposed procedure pending review of diagnostic evaluation.      Subjective     HPI related to upcoming procedure: Worsening left hip pain over the last year and starting to limp due to this. Tried rest. Doesn't like to take pills unless needed. Worse pain in the evening, needing to go slower than usual.  Has had both knees replaced in the past.      Preop Questions 3/2/2021   1. Have you ever had a heart attack or stroke? No   2. Have you ever had surgery on your heart or blood vessels, such as a stent placement, a coronary artery bypass, or surgery on an artery in your head, neck, heart, or legs? YES - ablation in 2017: A-fib    3. Do you have chest pain with activity? No   4. Do you have a history of  heart failure? No   5. Do you currently have a cold, bronchitis or symptoms of other infection? No   6. Do you have a cough, shortness of breath, or wheezing? No   7. Do you or anyone in your family have previous history of blood clots? No   8. Do you or does anyone in your family have a serious bleeding problem such as prolonged bleeding following surgeries or cuts? No   9. Have you ever had problems with anemia or been told to take iron pills? No   10. Have you had any abnormal blood loss such as black, tarry or bloody stools, or abnormal vaginal bleeding? No   11. Have you ever had a blood transfusion? No   12. Are you willing to have a blood transfusion if it is medically needed before, during, or after your surgery? Yes   13. Have you or any of your relatives ever had problems with anesthesia? No   14. Do you have sleep apnea, excessive snoring  or daytime drowsiness? No   15. Do you have any artifical heart valves or other implanted medical devices like a pacemaker, defibrillator, or continuous glucose monitor? No   16. Do you have artificial joints? YES - bilateral knees   17. Are you allergic to latex? No     Health Care Directive:  Patient has a Health Care Directive on file      Preoperative Review of :   reviewed - no record of controlled substances prescribed.    Status of Chronic Conditions:  A-FIB - Patient has a history of afib, with normal rate and rhythm since ablation June 2017. Current treatment regimen includes Aspirin and after done with elliquis per cardiology for stroke prevention and denies significant symptoms of lightheadedness, palpitations or dyspnea.     DIABETES - Patient has a longstanding history of DiabetesType Type II . Patient is being treated with oral agents and ASA and denies significant side effects. Control has been good. Complicating factors include but are not limited to: hypertension, hyperlipidemia and morbid obesity .   Average blood sugars 190s.    HYPERLIPIDEMIA - Patient has a long history of significant Hyperlipidemia requiring medication for treatment with recent good control. Patient reports no problems or side effects with the medication.     HYPERTENSION - Patient has longstanding history of HTN , currently denies any symptoms referable to elevated blood pressure. Specifically denies chest pain, palpitations, dyspnea, orthopnea, PND or peripheral edema. Blood pressure readings have been in normal range. Current medication regimen is as listed below. Patient denies any side effects of medication.       Review of Systems  CONSTITUTIONAL: NEGATIVE for fever, chills, change in weight  INTEGUMENTARY/SKIN: NEGATIVE for worrisome rashes, moles or lesions  EYES: NEGATIVE for vision changes or irritation  ENT/MOUTH: NEGATIVE for ear, mouth and throat problems  RESP: NEGATIVE for significant cough or SOB  BREAST:  NEGATIVE for masses, tenderness or discharge  CV: NEGATIVE for chest pain, palpitations or peripheral edema  GI: NEGATIVE for nausea, abdominal pain, heartburn, or change in bowel habits  : NEGATIVE for frequency, dysuria, or hematuria  MUSCULOSKELETAL: NEGATIVE for significant arthralgias or myalgia  NEURO: NEGATIVE for weakness, dizziness or paresthesias  ENDOCRINE: NEGATIVE for temperature intolerance, skin/hair changes  HEME: NEGATIVE for bleeding problems  PSYCHIATRIC: NEGATIVE for changes in mood or affect    Patient Active Problem List    Diagnosis Date Noted     Morbid obesity (H) 09/08/2020     Priority: Medium     S/P ablation of atrial fibrillation 03/15/2018     Priority: Medium     spring 2017        Chronic vasomotor rhinitis 09/05/2017     Priority: Medium     NICK (acute kidney injury) (H) 05/07/2017     Priority: Medium     Elevated lactic acid level 05/07/2017     Priority: Medium     Elevated LFTs 05/07/2017     Priority: Medium     History of atrial fibrillation 05/07/2017     Priority: Medium     Resolved after ablation 2017       Type 2 diabetes mellitus with diabetic nephropathy, without long-term current use of insulin (H) 04/13/2017     Priority: Medium     Essential hypertension 02/09/2017     Priority: Medium     ACP (advance care planning) 02/06/2017     Priority: Medium     Advance Care Planning 2/6/2017: Receipt of ACP document:  Received: Health Care Directive which was witnessed or notarized on 10-16-14.  Document previously scanned on 12-5-16.  Validation form completed and sent to be scanned.  Code Status reflects choices in most recent ACP document.  Confirmed/documented designated decision maker(s).  Added by Sasha Cameron RN Advance Care Planning Liaison with [employer]         S/P total knee arthroplasty 12/01/2016     Priority: Medium     Obesity (BMI 30.0-34.9) 10/23/2015     Priority: Medium     Cataract 05/30/2014     Priority: Medium     CKD (chronic kidney disease)  stage 3, GFR 30-59 ml/min 03/19/2013     Priority: Medium     Hyperlipidemia LDL goal <100 10/20/2011     Priority: Medium     Recent Labs   Lab Test 9/28/10 0731 8/10/09 0713     CHOL 196 183     HDL 52 52     LDL 98 83     TRIG 228* 242*     CHOLHDLRATIO 4.0 4.0            Atrial fibrillation (H) 10/20/2011     Priority: Medium     One bout in 2010 - converted in ER with iv diltizem  Negative holter.no recurrence as far as she knows        Generalized hyperhidrosis 06/27/2010     Priority: Medium     On robinul Rx by Derm.       Osteoarthrosis, hand 10/06/2005     Priority: Medium     Problem list name updated by automated process. Provider to review       Health Care Home 08/05/2013     Priority: Low     *See Letters for [facility] Care Plan: My Access Plan            Past Medical History:   Diagnosis Date     Osteoarthrosis, unspecified whether generalized or localized, hand      S/P ablation of atrial flutter 05/18/2017    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)     Type II or unspecified type diabetes mellitus without mention of complication, not stated as uncontrolled      Unspecified essential hypertension      Past Surgical History:   Procedure Laterality Date     ARTHROPLASTY KNEE Left 12/1/2016    Procedure: ARTHROPLASTY KNEE;  Surgeon: Zachary Arteaga MD;  Location: [facility] OR     ARTHROPLASTY KNEE Right 5/4/2017    Procedure: ARTHROPLASTY KNEE;  Right Total Knee Arthroplasty;  Surgeon: Zachary Arteaga MD;  Location: [facility] OR     Colonoscopy, normal  09/13/2002     PHACOEMULSIFICATION WITH STANDARD INTRAOCULAR LENS IMPLANT  6/26/2014    Procedure: PHACOEMULSIFICATION WITH STANDARD INTRAOCULAR LENS IMPLANT;  Surgeon: Dario Mcghee MD;  Location: [facility] OR     PHACOEMULSIFICATION WITH STANDARD INTRAOCULAR LENS IMPLANT Left 9/4/2014    Procedure: PHACOEMULSIFICATION WITH STANDARD INTRAOCULAR LENS IMPLANT;  Surgeon: Dario Mcghee MD;  Location: [facility] OR     Current Outpatient  Medications   Medication Sig Dispense Refill     aspirin EC 81 MG EC tablet Take 1 tablet (81 mg) by mouth daily Once finished with Eliquis       atorvastatin (LIPITOR) 10 MG tablet Take 1 tablet (10 mg) by mouth daily 90 tablet 3     blood glucose monitoring (NO BRAND SPECIFIED) test strip Use to test blood sugars one time daily or as directed  One touch meter - one touch strips 3 Box 3     CALCIUM PO        calcium-vitamin D-vitamin K (VIACTIV) 500-500-40 MG-UNT-MCG CHEW Take 1 tablet by mouth daily       chlorthalidone (HYGROTON) 25 MG tablet Take 0.5 tablets (12.5 mg) by mouth daily 45 tablet 3     Cholecalciferol (VITAMIN D PO) Take 1,000 Units by mouth every morning        glipiZIDE (GLUCOTROL XL) 5 MG 24 hr tablet Take 1 tablet (5 mg) by mouth daily 90 tablet 3     GLUCOMETER KIT for testing bid, One Touch or please fill with formulary glucometer 1 Kit 0     losartan (COZAAR) 25 MG tablet Take 1 tablet (25 mg) by mouth daily 90 tablet 1     metFORMIN (GLUCOPHAGE) 500 MG tablet Take 2 tablets (1,000 mg) by mouth 2 times daily (with meals) 360 tablet 3     metoprolol succinate ER (TOPROL-XL) 100 MG 24 hr tablet Take 1 tablet (100 mg) by mouth daily 90 tablet 3       Allergies   Allergen Reactions     Nka [No Known Allergies]         Social History     Tobacco Use     Smoking status: Never Smoker     Smokeless tobacco: Never Used   Substance Use Topics     Alcohol use: No     Alcohol/week: 0.0 standard drinks     Family History   Problem Relation Age of Onset     Hypertension Mother      Diabetes Mother      Cancer Mother         uterine     Arthritis Father      Genitourinary Problems Father         kidney disease     Cancer Father         bladder cancer     Diabetes Brother      Diabetes Paternal Grandmother      Diabetes Paternal Aunt      History   Drug Use No         Objective     /80   Pulse 76   Temp 97  F (36.1  C) (Tympanic)   Resp 16   Wt 94.4 kg (208 lb 3.2 oz)   SpO2 98%   BMI 36.02 kg/m       Physical Exam    GENERAL APPEARANCE: healthy, alert and no distress     EYES: EOMI, PERRL     HENT: ear canals and TM's normal and nose and mouth without ulcers or lesions     NECK: no adenopathy, no asymmetry, masses, or scars and thyroid normal to palpation     RESP: lungs clear to auscultation - no rales, rhonchi or wheezes     CV: regular rates and rhythm, normal S1 S2, no S3 or S4 and no murmur, click or rub     ABDOMEN:  soft, nontender, no HSM or masses and bowel sounds normal     MS: extremities normal- no gross deformities noted, no evidence of inflammation in joints, FROM in all extremities.     SKIN: no suspicious lesions or rashes     NEURO: Normal strength and tone, sensory exam grossly normal, mentation intact and speech normal     PSYCH: mentation appears normal. and affect normal/bright     LYMPHATICS: No cervical adenopathy    Recent Labs   Lab Test 12/30/20  1608 09/08/20  1108 09/17/19  1035 09/17/19  1035   HGB  --   --   --  13.5   PLT  --   --   --  175   NA  --  137  --  138   POTASSIUM  --  5.1  --  4.3   CR  --  1.22*  --  1.08*   A1C 7.0* 7.0*   < > 7.2*    < > = values in this interval not displayed.      Results for orders placed or performed in visit on 03/02/21   Hemoglobin A1c     Status: Abnormal   Result Value Ref Range    Hemoglobin A1C 7.5 (H) 0 - 5.6 %       Diagnostics:  Labs pending at this time.  Results will be reviewed when available.   EKG: Normal Sinus Rhythm, normal axis, normal intervals, no acute ST/T changes c/w ischemia, no LVH by voltage criteria, unchanged from previous tracings    Revised Cardiac Risk Index (RCRI):  The patient has the following serious cardiovascular risks for perioperative complications:   - No serious cardiac risks = 0 points     RCRI Interpretation: 0 points: Class I (very low risk - 0.4% complication rate)             Signed Electronically by: Dave Jarrett MD  Copy of this evaluation report is provided to requesting  physician.    Preop [facility] Preop Guidelines    Revised Cardiac Risk Index   DME (Durable Medical Equipment) Orders and Documentation  Orders Placed This Encounter   Procedures     Incontinence Supplies Order      The patient was assessed and it was determined the patient is in need of the following listed DME Supplies/Equipment. Please complete supporting documentation below to demonstrate medical necessity.      Incontinence Supplies Documentation  Incontinence supplies are needed due to urinary incontinence.

## 2021-02-26 NOTE — PATIENT INSTRUCTIONS
Plan the 1st COVID vaccine mid to late March or you could wait until after surgery.  I don't want you to get the 2nd vaccine right before the surgery.    1. To lab    Do take your losartan and metoprolol in the morning.    How to Take Your Medication Before Surgery  - HOLD (do not take) your chlorthalidone on the morning of surgery.   - HOLD (do not take) your METFORMIN on the morning of surgery.  - HOLD (do not take) your GLIPIZIDE on the morning of surgery.   - STOP taking all vitamins and herbal supplements 14 days before surgery.  -STOP the Aspirin 7 days before surgery.    Preparing for Your Surgery  Getting started  A nurse will call you to review your health history and instructions. They will give you an arrival time based on your scheduled surgery time.  Please be ready to share the following:    Your doctor's clinic name and phone number    Your medical, surgical and anesthesia history    A list of allergies and sensitivities    A list of medicines, including herbal treatments and over-the-counter drugs    Whether the patient has a legal guardian (ask how to send us the papers in advance)  If you have a child who's having surgery, please ask for a copy of Preparing for Your Child's Surgery.    Preparing for surgery    Within 30 days of surgery: Have a pre-op exam (sometimes called an H&P, or History and Physical). This can be done at a clinic or pre-operative center.  ? If you're having a , you may not need this exam. Talk to your care team    At your pre-op exam, talk to your care team about all medicines you take. If you need to stop any medicines before surgery, ask when to start taking them again.  ? We do this for your safety. Many medicines can make you bleed too much during surgery. Some change how well surgery (anesthesia) drugs work.    Call your insurance company to let them know you're having surgery. (If you don't have insurance, call [phone].)    Call your clinic if there's any  change in your health. This includes signs of a cold or flu (sore throat, runny nose, cough, rash, fever). It also includes a scrape or scratch near the surgery site.    If you have questions on the day of surgery, call your hospital or surgery center.  Eating and drinking guidelines  For your safety: Unless your surgeon tells you otherwise, follow the guidelines below.    Eat and drink as usual until 8 hours before surgery. After that, no food or milk.    Drink clear liquids until 2 hours before surgery. These are liquids you can see through, like water, Gatorade and Propel Water. You may also have black coffee and tea (no cream or milk).    Nothing by mouth within 2 hours of surgery. This includes gum, candy and breath mints.    If you drink, stop drinking alcohol the night before surgery.    If your care team tells you to take medicine on the morning of surgery, it's okay to take it with a sip of water.  Preventing infection    Shower or bathe the night before and morning of your surgery. Follow the instructions your clinic gave you. (If no instructions, use regular soap.)    Don't shave or clip hair near your surgery site. We'll remove the hair if needed.    Don't smoke or vape the morning of surgery. You may chew nicotine gum up to 2 hours before surgery. A nicotine patch is okay.  ? Note: Some surgeries require you to completely quit smoking and nicotine. Check with your surgeon.    Your care team will make every effort to keep you safe from infection. We will:  ? Clean our hands often with soap and water (or an alcohol-based hand rub).  ? Clean the skin at your surgery site with a special soap that kills germs.  ? Give you a special gown to keep you warm. (Cold raises the risk of infection.)  ? Wear special hair covers, masks, gowns and gloves during surgery.  ? Give antibiotic medicine, if prescribed. Not all surgeries need antibiotics.  What to bring on the day of surgery    Photo ID and insurance  card    Copy of your health care directive, if you have one    Glasses and hearing aides (bring cases)  ? You can't wear contacts during surgery    Inhaler and eye drops, if you use them (tell us about these when you arrive)    CPAP machine or breathing device, if you use them    A few personal items, if spending the night    If you have . . .  ? A pacemaker or ICD (cardiac defibrillator): Bring the ID card.  ? An implanted stimulator: Bring the remote control.  ? A legal guardian: Bring a copy of the certified (court-stamped) guardianship papers.  Please remove any jewelry, including body piercings. Leave jewelry and other valuables at home.  If you're going home the day of surgery  Important: If you don't follow the rules below, we must cancel your surgery.     Arrange for someone to drive you home after surgery. You may not drive, take a taxi or take public transportation by yourself (unless you'll have local anesthesia only).    Arrange for a responsible adult to stay with you overnight. If you don't, we may keep you in the hospital overnight, and you may need to pay the costs yourself.  Questions?   If you have any questions for your care team, list them here: _________________________________________________________________________________________________________________________________________________________________________________________________________________________________________________________________________________________________________________________  For informational purposes only. Not to replace the advice of your health care provider. Copyright   2003, 2019 [facility]. All rights reserved. Clinically reviewed by Guillermina Lambert MD. [employer] [id number] - REV 4/20.    Preparing for Your Surgery  Getting started  A nurse will call you to review your health history and instructions. They will give you an arrival time based on your scheduled surgery time.  Please be ready to share the  following:    Your doctor's clinic name and phone number    Your medical, surgical and anesthesia history    A list of allergies and sensitivities    A list of medicines, including herbal treatments and over-the-counter drugs    Whether the patient has a legal guardian (ask how to send us the papers in advance)  If you have a child who's having surgery, please ask for a copy of Preparing for Your Child's Surgery.    Preparing for surgery    Within 30 days of surgery: Have a pre-op exam (sometimes called an H&P, or History and Physical). This can be done at a clinic or pre-operative center.  ? If you're having a , you may not need this exam. Talk to your care team    At your pre-op exam, talk to your care team about all medicines you take. If you need to stop any medicines before surgery, ask when to start taking them again.  ? We do this for your safety. Many medicines can make you bleed too much during surgery. Some change how well surgery (anesthesia) drugs work.    Call your insurance company to let them know you're having surgery. (If you don't have insurance, call [phone].)    Call your clinic if there's any change in your health. This includes signs of a cold or flu (sore throat, runny nose, cough, rash, fever). It also includes a scrape or scratch near the surgery site.    If you have questions on the day of surgery, call your hospital or surgery center.  Eating and drinking guidelines  For your safety: Unless your surgeon tells you otherwise, follow the guidelines below.    Eat and drink as usual until 8 hours before surgery. After that, no food or milk.    Drink clear liquids until 2 hours before surgery. These are liquids you can see through, like water, Gatorade and Propel Water. You may also have black coffee and tea (no cream or milk).    Nothing by mouth within 2 hours of surgery. This includes gum, candy and breath mints.    If you drink, stop drinking alcohol the night before  surgery.    If your care team tells you to take medicine on the morning of surgery, it's okay to take it with a sip of water.  Preventing infection    Shower or bathe the night before and morning of your surgery. Follow the instructions your clinic gave you. (If no instructions, use regular soap.)    Don't shave or clip hair near your surgery site. We'll remove the hair if needed.    Don't smoke or vape the morning of surgery. You may chew nicotine gum up to 2 hours before surgery. A nicotine patch is okay.  ? Note: Some surgeries require you to completely quit smoking and nicotine. Check with your surgeon.    Your care team will make every effort to keep you safe from infection. We will:  ? Clean our hands often with soap and water (or an alcohol-based hand rub).  ? Clean the skin at your surgery site with a special soap that kills germs.  ? Give you a special gown to keep you warm. (Cold raises the risk of infection.)  ? Wear special hair covers, masks, gowns and gloves during surgery.  ? Give antibiotic medicine, if prescribed. Not all surgeries need antibiotics.  What to bring on the day of surgery    Photo ID and insurance card    Copy of your health care directive, if you have one    Glasses and hearing aides (bring cases)  ? You can't wear contacts during surgery    Inhaler and eye drops, if you use them (tell us about these when you arrive)    CPAP machine or breathing device, if you use them    A few personal items, if spending the night    If you have . . .  ? A pacemaker or ICD (cardiac defibrillator): Bring the ID card.  ? An implanted stimulator: Bring the remote control.  ? A legal guardian: Bring a copy of the certified (court-stamped) guardianship papers.  Please remove any jewelry, including body piercings. Leave jewelry and other valuables at home.  If you're going home the day of surgery  Important: If you don't follow the rules below, we must cancel your surgery.     Arrange for someone to drive  you home after surgery. You may not drive, take a taxi or take public transportation by yourself (unless you'll have local anesthesia only).    Arrange for a responsible adult to stay with you overnight. If you don't, we may keep you in the hospital overnight, and you may need to pay the costs yourself.  Questions?   If you have any questions for your care team, list them here: _________________________________________________________________________________________________________________________________________________________________________________________________________________________________________________________________________________________________________________________  For informational purposes only. Not to replace the advice of your health care provider. Copyright   2003, 2019 [facility] [employer]. All rights reserved. Clinically reviewed by Guillermina Lambert MD. [employer] [id number] - REV 4/20.    Before Your Procedure or Hospital Admission  Testing for COVID-19 (Coronavirus)  Thank you for choosing [facility] for your health care needs. This is a very challenging time for everyone. The World Health Organization and the [place] of [place] have declared the COVID-19 virus a pandemic.   Our goal is to keep you and our team here at [facility] safe and healthy. We've taken several steps to make this happen. For example:    We screen our staff, care teams and patients for COVID-19.    Everyone at [facility] must wear a mask and stay 6 feet apart.    We are limiting hospital and clinic visitors.  Before you come in  All patients must get tested for COVID-19. Your test needs to happen 2 to 4 days before you check in to the hospital or surgery site.   A clinic scheduler will call about a week in advance to set up a testing time at one of our labs where we'll take a swab of your nose or throat.  Note: If you go to a clinic or pharmacy like [facility] or [facility] for your test, make sure it's a  "\"RT-PCR\" test, not a \"rapid\" COVID-19 test. (See Questions and Answers below.)  After the test, please stay at home and stay out of contact with other people. It will be harder for you to recover if you get COVID-19 before your treatment.  Please follow all current safety guidelines, including:    Limit trips outside your home.    Limit the number of people you see.    Always wear a mask outside your home.    Use social distancing. (Stay 6 feet away from others whenever you can.)    Wash your hands often.  If your test shows you have COVID-19  If your test is positive, we'll let you know. A positive test means that you have the virus.   We'll probably have to postpone your admission, surgery or procedure. Your doctor will discuss this with you. After that, we'll let you know what to do and when you can reschedule.   We may need to cancel your treatment on short notice for other reasons, too.  If your test shows you DON'T have COVID-19  Even if your test is negative, you may still get COVID-19. It's rare but, sometimes, the test result is wrong. You could also catch the virus after taking the test.   There's a very small chance that you could catch COVID-19 in the hospital or surgery center. [facility] has taken many steps to prevent this from happening.   Day of your surgery or procedure    Please come wearing a mask or something else that covers both your nose and mouth.    When you arrive, we'll ask you some questions to find out if you have any signs or symptoms of COVID-19.    Ask your care team if you can have visitors. All visitors must wear masks and will be screened for signs of COVID-19.  ? Even if no visitors are allowed, you can still have with you:    Your legal guardian or legal decision maker    A parent and one other visitor, if you are younger than 18 years old    A partner and a , if you are in labor  ? We might need to teach you about taking care of yourself after surgery. If so, a " "visitor can come into the hospital to learn about it, too.  ? The rules for visitors change often, depending on how much the virus is spreading. To learn more, see Visiting a Loved One in the Hospital during the COVID-19 Outbreak.  Please call your care team, hospital or surgery center if you have any questions. We thank you for your understanding and for choosing [facility] for your care.   Questions and Answers  Does it matter where I get tested for COVID-19?  Yes. We urge you to get tested at one of our [facility] COVID-19 testing sites. We process these tests in our lab and can get the results quickly. Your [facility] care team needs to get your results before you check in.  What should I do if I can't get tested at [facility]?  You can get tested somewhere else, but you'll need to take these extra steps:  1. Contact your family doctor or clinic to arrange your test.  2. Take the test within 4 days of your surgery or procedure. We can't accept tests older than 4 days.  3. Make sure your doctor or clinic faxes your results to [facility] at [phone].  If we don't get your results in time, we may have to postpone or cancel your treatment.  Ask if you're getting a \"RT-PCR\" COVID-19 test. It should NOT be a \"rapid\" COVID-19 test. Many drug stores use \"rapid\" tests, but they may not be as accurate. We don't accept the results of \"rapid\" tests.  For informational purposes only. Not to replace the advice of your health care provider. Copyright   2020 [facility]. All rights reserved. Clinically reviewed by [facility] and the [facility] COVID-19 Clinical Team. [employer] [id number] - REV 10/20.    "

## 2021-03-02 ENCOUNTER — OFFICE VISIT (OUTPATIENT)
Dept: FAMILY MEDICINE | Facility: CLINIC | Age: 76
End: 2021-03-02
Payer: COMMERCIAL

## 2021-03-02 VITALS
DIASTOLIC BLOOD PRESSURE: 80 MMHG | TEMPERATURE: 97 F | OXYGEN SATURATION: 98 % | RESPIRATION RATE: 16 BRPM | BODY MASS INDEX: 36.02 KG/M2 | HEART RATE: 76 BPM | WEIGHT: 208.2 LBS | SYSTOLIC BLOOD PRESSURE: 133 MMHG

## 2021-03-02 DIAGNOSIS — E11.21 TYPE 2 DIABETES MELLITUS WITH DIABETIC NEPHROPATHY, WITHOUT LONG-TERM CURRENT USE OF INSULIN (H): Chronic | ICD-10-CM

## 2021-03-02 DIAGNOSIS — R32 URINARY INCONTINENCE, UNSPECIFIED TYPE: ICD-10-CM

## 2021-03-02 DIAGNOSIS — N18.31 STAGE 3A CHRONIC KIDNEY DISEASE (H): ICD-10-CM

## 2021-03-02 DIAGNOSIS — Z01.818 PREOP GENERAL PHYSICAL EXAM: Primary | ICD-10-CM

## 2021-03-02 DIAGNOSIS — Z86.79 HISTORY OF ATRIAL FIBRILLATION: ICD-10-CM

## 2021-03-02 DIAGNOSIS — E66.01 MORBID OBESITY (H): ICD-10-CM

## 2021-03-02 DIAGNOSIS — M16.12 PRIMARY OSTEOARTHRITIS OF LEFT HIP: ICD-10-CM

## 2021-03-02 DIAGNOSIS — I10 ESSENTIAL HYPERTENSION: Chronic | ICD-10-CM

## 2021-03-02 LAB
ANION GAP SERPL CALCULATED.3IONS-SCNC: 2 MMOL/L (ref 3–14)
BUN SERPL-MCNC: 23 MG/DL (ref 7–30)
CALCIUM SERPL-MCNC: 10 MG/DL (ref 8.5–10.1)
CHLORIDE SERPL-SCNC: 106 MMOL/L (ref 94–109)
CO2 SERPL-SCNC: 31 MMOL/L (ref 20–32)
CREAT SERPL-MCNC: 1.11 MG/DL (ref 0.52–1.04)
GFR SERPL CREATININE-BSD FRML MDRD: 48 ML/MIN/{1.73_M2}
GLUCOSE SERPL-MCNC: 209 MG/DL (ref 70–99)
HBA1C MFR BLD: 7.5 % (ref 0–5.6)
POTASSIUM SERPL-SCNC: 4.9 MMOL/L (ref 3.4–5.3)
SODIUM SERPL-SCNC: 139 MMOL/L (ref 133–144)

## 2021-03-02 PROCEDURE — 80048 BASIC METABOLIC PNL TOTAL CA: CPT | Performed by: FAMILY MEDICINE

## 2021-03-02 PROCEDURE — 83036 HEMOGLOBIN GLYCOSYLATED A1C: CPT | Performed by: FAMILY MEDICINE

## 2021-03-02 PROCEDURE — 93000 ELECTROCARDIOGRAM COMPLETE: CPT | Performed by: FAMILY MEDICINE

## 2021-03-02 PROCEDURE — 36415 COLL VENOUS BLD VENIPUNCTURE: CPT | Performed by: FAMILY MEDICINE

## 2021-03-02 PROCEDURE — 99214 OFFICE O/P EST MOD 30 MIN: CPT | Performed by: FAMILY MEDICINE

## 2021-03-02 NOTE — LETTER
March 4, 2021      Suzette Britton  [address] [address]        Dear ,    We are writing to inform you of your test results.  Glucose was 209. Which was a little high, our goal is always under 200.  So do avoid simple carbs (bread, pasta, cookies, cakes, potatoes, rice) and instead substitute with whole grains, sprouted grains or complex grains like quinoa or red lentil pastas or sweet potatoes but still in very small amounts.  If you are interested in talking with the dietician/diabetic educator again about healthy eating please let me know.   Kidney function is stable for you in the mild chronic kidney disease range.   A1c 7.5, in goal.     Resulted Orders   Hemoglobin A1c   Result Value Ref Range    Hemoglobin A1C 7.5 (H) 0 - 5.6 %      Comment:      Normal <5.7% Prediabetes 5.7-6.4%  Diabetes 6.5% or higher - adopted from ADA   consensus guidelines.     Basic metabolic panel  (Ca, Cl, CO2, Creat, Gluc, K, Na, BUN)   Result Value Ref Range    Sodium 139 133 - 144 mmol/L    Potassium 4.9 3.4 - 5.3 mmol/L    Chloride 106 94 - 109 mmol/L    Carbon Dioxide 31 20 - 32 mmol/L    Anion Gap 2 (L) 3 - 14 mmol/L    Glucose 209 (H) 70 - 99 mg/dL    Urea Nitrogen 23 7 - 30 mg/dL    Creatinine 1.11 (H) 0.52 - 1.04 mg/dL    GFR Estimate 48 (L) >60 mL/min/[1.73_m2]      Comment:      Non  GFR Calc  Starting 12/18/2018, serum creatinine based estimated GFR (eGFR) will be   calculated using the Chronic Kidney Disease Epidemiology Collaboration   (CKD-EPI) equation.      GFR Estimate If Black 55 (L) >60 mL/min/[1.73_m2]      Comment:       GFR Calc  Starting 12/18/2018, serum creatinine based estimated GFR (eGFR) will be   calculated using the Chronic Kidney Disease Epidemiology Collaboration   (CKD-EPI) equation.      Calcium 10.0 8.5 - 10.1 mg/dL       If you have any questions or concerns, please call the clinic at the number listed above.        Sincerely,      Dave Jarrett MD/bmc

## 2021-03-16 DIAGNOSIS — Z11.59 ENCOUNTER FOR SCREENING FOR OTHER VIRAL DISEASES: ICD-10-CM

## 2021-03-26 ENCOUNTER — TRANSFERRED RECORDS (OUTPATIENT)
Dept: HEALTH INFORMATION MANAGEMENT | Facility: CLINIC | Age: 76
End: 2021-03-26

## 2021-03-26 LAB — RETINOPATHY: NEGATIVE

## 2021-03-29 DIAGNOSIS — Z11.59 ENCOUNTER FOR SCREENING FOR OTHER VIRAL DISEASES: ICD-10-CM

## 2021-03-29 LAB
SARS-COV-2 RNA RESP QL NAA+PROBE: NORMAL
SPECIMEN SOURCE: NORMAL

## 2021-03-29 PROCEDURE — U0005 INFEC AGEN DETEC AMPLI PROBE: HCPCS | Performed by: ORTHOPAEDIC SURGERY

## 2021-03-29 PROCEDURE — U0003 INFECTIOUS AGENT DETECTION BY NUCLEIC ACID (DNA OR RNA); SEVERE ACUTE RESPIRATORY SYNDROME CORONAVIRUS 2 (SARS-COV-2) (CORONAVIRUS DISEASE [COVID-19]), AMPLIFIED PROBE TECHNIQUE, MAKING USE OF HIGH THROUGHPUT TECHNOLOGIES AS DESCRIBED BY CMS-2020-01-R: HCPCS | Performed by: ORTHOPAEDIC SURGERY

## 2021-03-30 LAB
LABORATORY COMMENT REPORT: NORMAL
SARS-COV-2 RNA RESP QL NAA+PROBE: NEGATIVE
SPECIMEN SOURCE: NORMAL

## 2021-03-31 ENCOUNTER — ANESTHESIA EVENT (OUTPATIENT)
Dept: SURGERY | Facility: CLINIC | Age: 76
End: 2021-03-31
Payer: COMMERCIAL

## 2021-03-31 ASSESSMENT — ENCOUNTER SYMPTOMS: DYSRHYTHMIAS: 1

## 2021-03-31 NOTE — ANESTHESIA PREPROCEDURE EVALUATION
Anesthesia Evaluation     . Pt has had prior anesthetic. Type: MAC, General and Regional.           ROS/MED HX    ENT/Pulmonary:     (+) BRE risk factors, hypertension, obese,     Neurologic:  - neg neurologic ROS     Cardiovascular:     (+) Dyslipidemia hypertension-----dysrhythmias (s/p ablation ), a-fib, Previous cardiac testing   Echo: Date: Results:    Stress Test: Date: Results:    ECG Reviewed: Date: 3/2021 Results:  Sinus Rhythm   WITHIN NORMAL LIMITS    Cath: Date: Results:        METS/Exercise Tolerance:  >4 METS   Hematologic:  - neg hematologic  ROS       Musculoskeletal:   (+) arthritis, other musculoskeletal- s/p left TKA,       GI/Hepatic:  - neg GI/hepatic ROS       Renal/Genitourinary:     (+) renal disease, type: CRI,       Endo:     (+) type II DM, Last HgA1c: 7.5, date: 3/2021, Not using insulin, - not using insulin pump. Normal glucose range: 151, Obesity,       Psychiatric:  - neg psychiatric ROS       Infectious Disease:  - neg infectious disease ROS       Malignancy:       Other: Comment: hyperhidrosis                    Physical Exam  Normal systems: cardiovascular, pulmonary and dental    Airway   Mallampati: II  TM distance: > 3 FB  Neck ROM: full  Mouth opening: > 3 cm    Dental     Cardiovascular       Pulmonary                     Anesthesia Plan     ASA Status:  3    H&P reviewed: Unable to attach H&P to encounter due to EHR limitations. H&P Update: appropriate H&P reviewed, patient examined. No interval changes since H&P (within 30 days).     NPO Status: > 8 hours  Anesthesia Plan Type Discussed: Spinal  Induction Technique/Agent: PropofolMaintenance: TIVA.  PONV Management: Ondansetron (or other 5HT-3) and Dexamethasone or Solumedrol      Postoperative Care  Pain management: IV analgesics, Oral pain medications, Multi-modal analgesia, Peripheral nerve block (Single Shot).    Consents  Anesthetic plan, risks, benefits and alternatives discussed with:  Patient and Daughter/Son..                           .    Anesthesia Evaluation   Pt has had prior anesthetic. Type: MAC, General and Regional.        ROS/MED HX  ENT/Pulmonary:     (+) BRE risk factors, hypertension, obese,     Neurologic:  - neg neurologic ROS     Cardiovascular:     (+) Dyslipidemia hypertension-----dysrhythmias (s/p ablation ), a-fib, Previous cardiac testing   Echo: Date: Results:    Stress Test: Date: Results:    ECG Reviewed: Date: 3/2021 Results:  Sinus Rhythm   WITHIN NORMAL LIMITS    Cath: Date: Results:      METS/Exercise Tolerance: >4 METS    Hematologic:  - neg hematologic  ROS     Musculoskeletal:   (+) arthritis, other musculoskeletal- s/p left TKA,     GI/Hepatic:  - neg GI/hepatic ROS     Renal/Genitourinary:     (+) renal disease, type: CRI,     Endo:     (+) type II DM, Last HgA1c: 7.5, date: 3/2021, Not using insulin, - not using insulin pump. Normal glucose range: 151, Obesity,     Psychiatric/Substance Use:  - neg psychiatric ROS     Infectious Disease:  - neg infectious disease ROS     Malignancy:  - neg malignancy ROS     Other: Comment: hyperhidrosis             Physical Exam    Airway        Mallampati: II   TM distance: > 3 FB   Neck ROM: full   Mouth opening: > 3 cm    Respiratory Devices and Support         Dental  no notable dental history         Cardiovascular   cardiovascular exam normal          Pulmonary   pulmonary exam normal                  Anesthesia Plan    ASA Status:  3   NPO Status:  NPO Appropriate    Anesthesia Type: Spinal.   Induction: Propofol.   Maintenance: TIVA.        Consents    Anesthesia Plan(s) and associated risks, benefits, and realistic alternatives discussed. Questions answered and patient/representative(s) expressed understanding.     - Discussed with:  Patient, Daughter/Son         Postoperative Care    Pain management: IV analgesics, Oral pain medications, Multi-modal analgesia, Peripheral nerve block (Single Shot).   PONV prophylaxis: Ondansetron (or other 5HT-3),  Dexamethasone or Solumedrol     Comments:         H&P reviewed: Unable to attach H&P to encounter due to EHR limitations. H&P Update: appropriate H&P reviewed, patient examined. No interval changes since H&P (within 30 days).

## 2021-04-01 ENCOUNTER — ANESTHESIA (OUTPATIENT)
Dept: SURGERY | Facility: CLINIC | Age: 76
End: 2021-04-01
Payer: COMMERCIAL

## 2021-04-01 ENCOUNTER — HOSPITAL ENCOUNTER (OUTPATIENT)
Facility: CLINIC | Age: 76
Discharge: HOME | End: 2021-04-02
Attending: ORTHOPAEDIC SURGERY | Admitting: ORTHOPAEDIC SURGERY
Payer: COMMERCIAL

## 2021-04-01 ENCOUNTER — APPOINTMENT (OUTPATIENT)
Dept: GENERAL RADIOLOGY | Facility: CLINIC | Age: 76
End: 2021-04-01
Attending: ORTHOPAEDIC SURGERY
Payer: COMMERCIAL

## 2021-04-01 DIAGNOSIS — Z96.642 S/P HIP REPLACEMENT, LEFT: Primary | ICD-10-CM

## 2021-04-01 PROBLEM — Z20.822 COVID-19 RULED OUT: Status: ACTIVE | Noted: 2021-04-01

## 2021-04-01 PROBLEM — I10 ESSENTIAL HYPERTENSION: Chronic | Status: ACTIVE | Noted: 2017-02-09

## 2021-04-01 PROBLEM — E11.21 TYPE 2 DIABETES MELLITUS WITH DIABETIC NEPHROPATHY, WITHOUT LONG-TERM CURRENT USE OF INSULIN (H): Chronic | Status: ACTIVE | Noted: 2017-04-13

## 2021-04-01 PROBLEM — Z96.649 STATUS POST TOTAL REPLACEMENT OF HIP: Status: ACTIVE | Noted: 2021-04-01

## 2021-04-01 LAB
GLUCOSE BLDC GLUCOMTR-MCNC: 177 MG/DL (ref 70–99)
GLUCOSE BLDC GLUCOMTR-MCNC: 182 MG/DL (ref 70–99)
GLUCOSE BLDC GLUCOMTR-MCNC: 383 MG/DL (ref 70–99)

## 2021-04-01 PROCEDURE — 250N000011 HC RX IP 250 OP 636

## 2021-04-01 PROCEDURE — 250N000013 HC RX MED GY IP 250 OP 250 PS 637: Performed by: PHYSICIAN ASSISTANT

## 2021-04-01 PROCEDURE — C1776 JOINT DEVICE (IMPLANTABLE): HCPCS | Performed by: ORTHOPAEDIC SURGERY

## 2021-04-01 PROCEDURE — 250N000011 HC RX IP 250 OP 636: Performed by: NURSE ANESTHETIST, CERTIFIED REGISTERED

## 2021-04-01 PROCEDURE — 258N000003 HC RX IP 258 OP 636: Performed by: NURSE ANESTHETIST, CERTIFIED REGISTERED

## 2021-04-01 PROCEDURE — 250N000009 HC RX 250

## 2021-04-01 PROCEDURE — 82962 GLUCOSE BLOOD TEST: CPT

## 2021-04-01 PROCEDURE — 999N000141 HC STATISTIC PRE-PROCEDURE NURSING ASSESSMENT: Performed by: ORTHOPAEDIC SURGERY

## 2021-04-01 PROCEDURE — 250N000013 HC RX MED GY IP 250 OP 250 PS 637: Performed by: NURSE ANESTHETIST, CERTIFIED REGISTERED

## 2021-04-01 PROCEDURE — 710N000009 HC RECOVERY PHASE 1, LEVEL 1, PER MIN: Performed by: ORTHOPAEDIC SURGERY

## 2021-04-01 PROCEDURE — 96372 THER/PROPH/DIAG INJ SC/IM: CPT | Performed by: PHYSICIAN ASSISTANT

## 2021-04-01 PROCEDURE — 250N000012 HC RX MED GY IP 250 OP 636 PS 637: Performed by: PHYSICIAN ASSISTANT

## 2021-04-01 PROCEDURE — 370N000017 HC ANESTHESIA TECHNICAL FEE, PER MIN: Performed by: ORTHOPAEDIC SURGERY

## 2021-04-01 PROCEDURE — 250N000009 HC RX 250: Performed by: ORTHOPAEDIC SURGERY

## 2021-04-01 PROCEDURE — 258N000003 HC RX IP 258 OP 636

## 2021-04-01 PROCEDURE — 271N000001 HC OR GENERAL SUPPLY NON-STERILE: Performed by: ORTHOPAEDIC SURGERY

## 2021-04-01 PROCEDURE — 272N000001 HC OR GENERAL SUPPLY STERILE: Performed by: ORTHOPAEDIC SURGERY

## 2021-04-01 PROCEDURE — 250N000011 HC RX IP 250 OP 636: Performed by: ORTHOPAEDIC SURGERY

## 2021-04-01 PROCEDURE — 360N000077 HC SURGERY LEVEL 4, PER MIN: Performed by: ORTHOPAEDIC SURGERY

## 2021-04-01 PROCEDURE — 258N000003 HC RX IP 258 OP 636: Performed by: ORTHOPAEDIC SURGERY

## 2021-04-01 PROCEDURE — 999N000065 XR PELVIS PORT 1/2 VW

## 2021-04-01 PROCEDURE — 250N000011 HC RX IP 250 OP 636: Performed by: PHYSICIAN ASSISTANT

## 2021-04-01 PROCEDURE — 250N000013 HC RX MED GY IP 250 OP 250 PS 637: Performed by: ORTHOPAEDIC SURGERY

## 2021-04-01 DEVICE — IMPLANTABLE DEVICE: Type: IMPLANTABLE DEVICE | Site: HIP | Status: FUNCTIONAL

## 2021-04-01 DEVICE — IMP CUP ACE PINNACLE 54MM 1217-22-054: Type: IMPLANTABLE DEVICE | Site: HIP | Status: FUNCTIONAL

## 2021-04-01 DEVICE — HEAD FEMORAL 36MM +5MML NECK BIOLOX DELTA CERAMIC HIP 12: Type: IMPLANTABLE DEVICE | Site: HIP | Status: FUNCTIONAL

## 2021-04-01 DEVICE — IMP INSERT HIP DEPUY PINNACLE ALTRX 36X54MM 1221-36-054: Type: IMPLANTABLE DEVICE | Site: HIP | Status: FUNCTIONAL

## 2021-04-01 RX ORDER — CEFAZOLIN SODIUM 2 G/100ML
2 INJECTION, SOLUTION INTRAVENOUS SEE ADMIN INSTRUCTIONS
Status: DISCONTINUED | OUTPATIENT
Start: 2021-04-01 | End: 2021-04-01 | Stop reason: HOSPADM

## 2021-04-01 RX ORDER — AMOXICILLIN 250 MG
1 CAPSULE ORAL 2 TIMES DAILY
Status: DISCONTINUED | OUTPATIENT
Start: 2021-04-01 | End: 2021-04-02 | Stop reason: HOSPADM

## 2021-04-01 RX ORDER — LOSARTAN POTASSIUM 25 MG/1
25 TABLET ORAL DAILY
Status: DISCONTINUED | OUTPATIENT
Start: 2021-04-02 | End: 2021-04-02 | Stop reason: HOSPADM

## 2021-04-01 RX ORDER — NALOXONE HYDROCHLORIDE 0.4 MG/ML
0.4 INJECTION, SOLUTION INTRAMUSCULAR; INTRAVENOUS; SUBCUTANEOUS
Status: DISCONTINUED | OUTPATIENT
Start: 2021-04-01 | End: 2021-04-02 | Stop reason: HOSPADM

## 2021-04-01 RX ORDER — TRANEXAMIC ACID 650 MG/1
1950 TABLET ORAL ONCE
Status: COMPLETED | OUTPATIENT
Start: 2021-04-01 | End: 2021-04-01

## 2021-04-01 RX ORDER — GLIPIZIDE 5 MG/1
5 TABLET, FILM COATED, EXTENDED RELEASE ORAL DAILY
Status: DISCONTINUED | OUTPATIENT
Start: 2021-04-02 | End: 2021-04-02 | Stop reason: HOSPADM

## 2021-04-01 RX ORDER — EPHEDRINE SULFATE 50 MG/ML
INJECTION, SOLUTION INTRAMUSCULAR; INTRAVENOUS; SUBCUTANEOUS PRN
Status: DISCONTINUED | OUTPATIENT
Start: 2021-04-01 | End: 2021-04-01

## 2021-04-01 RX ORDER — CEFAZOLIN SODIUM 2 G/100ML
2 INJECTION, SOLUTION INTRAVENOUS EVERY 8 HOURS
Status: COMPLETED | OUTPATIENT
Start: 2021-04-01 | End: 2021-04-02

## 2021-04-01 RX ORDER — LIDOCAINE 40 MG/G
CREAM TOPICAL
Status: DISCONTINUED | OUTPATIENT
Start: 2021-04-01 | End: 2021-04-02 | Stop reason: HOSPADM

## 2021-04-01 RX ORDER — HYDROXYZINE HYDROCHLORIDE 50 MG/1
50 TABLET, FILM COATED ORAL EVERY 6 HOURS PRN
Status: DISCONTINUED | OUTPATIENT
Start: 2021-04-01 | End: 2021-04-01 | Stop reason: HOSPADM

## 2021-04-01 RX ORDER — OXYCODONE HYDROCHLORIDE 5 MG/1
5-10 TABLET ORAL
Qty: 40 TABLET | Refills: 0 | Status: SHIPPED | OUTPATIENT
Start: 2021-04-01 | End: 2021-04-16

## 2021-04-01 RX ORDER — PROPOFOL 10 MG/ML
INJECTION, EMULSION INTRAVENOUS CONTINUOUS PRN
Status: DISCONTINUED | OUTPATIENT
Start: 2021-04-01 | End: 2021-04-01

## 2021-04-01 RX ORDER — ONDANSETRON 2 MG/ML
4 INJECTION INTRAMUSCULAR; INTRAVENOUS EVERY 6 HOURS PRN
Status: DISCONTINUED | OUTPATIENT
Start: 2021-04-01 | End: 2021-04-02 | Stop reason: HOSPADM

## 2021-04-01 RX ORDER — SODIUM CHLORIDE, SODIUM LACTATE, POTASSIUM CHLORIDE, CALCIUM CHLORIDE 600; 310; 30; 20 MG/100ML; MG/100ML; MG/100ML; MG/100ML
INJECTION, SOLUTION INTRAVENOUS CONTINUOUS
Status: DISCONTINUED | OUTPATIENT
Start: 2021-04-01 | End: 2021-04-01 | Stop reason: HOSPADM

## 2021-04-01 RX ORDER — DEXTROSE MONOHYDRATE 25 G/50ML
25-50 INJECTION, SOLUTION INTRAVENOUS
Status: DISCONTINUED | OUTPATIENT
Start: 2021-04-01 | End: 2021-04-02 | Stop reason: HOSPADM

## 2021-04-01 RX ORDER — MAGNESIUM SULFATE HEPTAHYDRATE 40 MG/ML
2 INJECTION, SOLUTION INTRAVENOUS ONCE
Status: COMPLETED | OUTPATIENT
Start: 2021-04-01 | End: 2021-04-01

## 2021-04-01 RX ORDER — ATORVASTATIN CALCIUM 10 MG/1
10 TABLET, FILM COATED ORAL EVERY EVENING
Status: DISCONTINUED | OUTPATIENT
Start: 2021-04-01 | End: 2021-04-02 | Stop reason: HOSPADM

## 2021-04-01 RX ORDER — LIDOCAINE HYDROCHLORIDE 10 MG/ML
INJECTION, SOLUTION INFILTRATION; PERINEURAL PRN
Status: DISCONTINUED | OUTPATIENT
Start: 2021-04-01 | End: 2021-04-01

## 2021-04-01 RX ORDER — METOPROLOL SUCCINATE 100 MG/1
100 TABLET, EXTENDED RELEASE ORAL DAILY
Status: DISCONTINUED | OUTPATIENT
Start: 2021-04-02 | End: 2021-04-02 | Stop reason: HOSPADM

## 2021-04-01 RX ORDER — ACETAMINOPHEN 325 MG/1
975 TABLET ORAL ONCE
Status: COMPLETED | OUTPATIENT
Start: 2021-04-01 | End: 2021-04-01

## 2021-04-01 RX ORDER — ONDANSETRON 2 MG/ML
INJECTION INTRAMUSCULAR; INTRAVENOUS PRN
Status: DISCONTINUED | OUTPATIENT
Start: 2021-04-01 | End: 2021-04-01

## 2021-04-01 RX ORDER — PROPOFOL 10 MG/ML
INJECTION, EMULSION INTRAVENOUS PRN
Status: DISCONTINUED | OUTPATIENT
Start: 2021-04-01 | End: 2021-04-01

## 2021-04-01 RX ORDER — ONDANSETRON 4 MG/1
4 TABLET, ORALLY DISINTEGRATING ORAL EVERY 30 MIN PRN
Status: DISCONTINUED | OUTPATIENT
Start: 2021-04-01 | End: 2021-04-01 | Stop reason: HOSPADM

## 2021-04-01 RX ORDER — LIDOCAINE 40 MG/G
CREAM TOPICAL
Status: DISCONTINUED | OUTPATIENT
Start: 2021-04-01 | End: 2021-04-01 | Stop reason: HOSPADM

## 2021-04-01 RX ORDER — GABAPENTIN 300 MG/1
300 CAPSULE ORAL ONCE
Status: COMPLETED | OUTPATIENT
Start: 2021-04-01 | End: 2021-04-01

## 2021-04-01 RX ORDER — FENTANYL CITRATE 50 UG/ML
25-50 INJECTION, SOLUTION INTRAMUSCULAR; INTRAVENOUS EVERY 5 MIN PRN
Status: DISCONTINUED | OUTPATIENT
Start: 2021-04-01 | End: 2021-04-01 | Stop reason: HOSPADM

## 2021-04-01 RX ORDER — OXYCODONE HYDROCHLORIDE 5 MG/1
10 TABLET ORAL EVERY 4 HOURS PRN
Status: DISCONTINUED | OUTPATIENT
Start: 2021-04-01 | End: 2021-04-02 | Stop reason: HOSPADM

## 2021-04-01 RX ORDER — ACETAMINOPHEN 325 MG/1
650 TABLET ORAL EVERY 4 HOURS PRN
Status: DISCONTINUED | OUTPATIENT
Start: 2021-04-04 | End: 2021-04-02 | Stop reason: HOSPADM

## 2021-04-01 RX ORDER — ONDANSETRON 4 MG/1
4 TABLET, ORALLY DISINTEGRATING ORAL EVERY 6 HOURS PRN
Status: DISCONTINUED | OUTPATIENT
Start: 2021-04-01 | End: 2021-04-02 | Stop reason: HOSPADM

## 2021-04-01 RX ORDER — BUPIVACAINE HYDROCHLORIDE 7.5 MG/ML
INJECTION, SOLUTION INTRASPINAL PRN
Status: DISCONTINUED | OUTPATIENT
Start: 2021-04-01 | End: 2021-04-01

## 2021-04-01 RX ORDER — OXYCODONE HYDROCHLORIDE 5 MG/1
5 TABLET ORAL EVERY 4 HOURS PRN
Status: DISCONTINUED | OUTPATIENT
Start: 2021-04-01 | End: 2021-04-02 | Stop reason: HOSPADM

## 2021-04-01 RX ORDER — BUPIVACAINE HYDROCHLORIDE 7.5 MG/ML
INJECTION, SOLUTION INTRASPINAL
Status: DISCONTINUED | OUTPATIENT
Start: 2021-04-01 | End: 2021-04-01

## 2021-04-01 RX ORDER — BISACODYL 10 MG
10 SUPPOSITORY, RECTAL RECTAL DAILY PRN
Status: DISCONTINUED | OUTPATIENT
Start: 2021-04-01 | End: 2021-04-02 | Stop reason: HOSPADM

## 2021-04-01 RX ORDER — CEFAZOLIN SODIUM 2 G/100ML
2 INJECTION, SOLUTION INTRAVENOUS
Status: DISCONTINUED | OUTPATIENT
Start: 2021-04-01 | End: 2021-04-01 | Stop reason: HOSPADM

## 2021-04-01 RX ORDER — DOCUSATE SODIUM 100 MG/1
100 CAPSULE, LIQUID FILLED ORAL 2 TIMES DAILY
Status: DISCONTINUED | OUTPATIENT
Start: 2021-04-01 | End: 2021-04-02 | Stop reason: HOSPADM

## 2021-04-01 RX ORDER — HYDROMORPHONE HYDROCHLORIDE 1 MG/ML
0.4 INJECTION, SOLUTION INTRAMUSCULAR; INTRAVENOUS; SUBCUTANEOUS
Status: DISCONTINUED | OUTPATIENT
Start: 2021-04-01 | End: 2021-04-02 | Stop reason: HOSPADM

## 2021-04-01 RX ORDER — NALOXONE HYDROCHLORIDE 0.4 MG/ML
0.2 INJECTION, SOLUTION INTRAMUSCULAR; INTRAVENOUS; SUBCUTANEOUS
Status: DISCONTINUED | OUTPATIENT
Start: 2021-04-01 | End: 2021-04-02 | Stop reason: HOSPADM

## 2021-04-01 RX ORDER — IBUPROFEN 600 MG/1
600 TABLET, FILM COATED ORAL EVERY 6 HOURS PRN
Qty: 30 TABLET | Refills: 0
Start: 2021-04-01 | End: 2021-08-25

## 2021-04-01 RX ORDER — NICOTINE POLACRILEX 4 MG
15-30 LOZENGE BUCCAL
Status: DISCONTINUED | OUTPATIENT
Start: 2021-04-01 | End: 2021-04-02 | Stop reason: HOSPADM

## 2021-04-01 RX ORDER — PROCHLORPERAZINE MALEATE 5 MG
5 TABLET ORAL EVERY 6 HOURS PRN
Status: DISCONTINUED | OUTPATIENT
Start: 2021-04-01 | End: 2021-04-02 | Stop reason: HOSPADM

## 2021-04-01 RX ORDER — ACETAMINOPHEN 325 MG/1
650 TABLET ORAL EVERY 4 HOURS PRN
Qty: 100 TABLET | Refills: 0
Start: 2021-04-01 | End: 2021-08-25

## 2021-04-01 RX ORDER — ONDANSETRON 2 MG/ML
4 INJECTION INTRAMUSCULAR; INTRAVENOUS EVERY 30 MIN PRN
Status: DISCONTINUED | OUTPATIENT
Start: 2021-04-01 | End: 2021-04-01 | Stop reason: HOSPADM

## 2021-04-01 RX ORDER — ACETAMINOPHEN 325 MG/1
975 TABLET ORAL EVERY 8 HOURS
Status: DISCONTINUED | OUTPATIENT
Start: 2021-04-01 | End: 2021-04-02 | Stop reason: HOSPADM

## 2021-04-01 RX ORDER — DEXAMETHASONE SODIUM PHOSPHATE 4 MG/ML
INJECTION, SOLUTION INTRA-ARTICULAR; INTRALESIONAL; INTRAMUSCULAR; INTRAVENOUS; SOFT TISSUE PRN
Status: DISCONTINUED | OUTPATIENT
Start: 2021-04-01 | End: 2021-04-01

## 2021-04-01 RX ORDER — KETOROLAC TROMETHAMINE 15 MG/ML
15 INJECTION, SOLUTION INTRAMUSCULAR; INTRAVENOUS EVERY 6 HOURS
Status: DISCONTINUED | OUTPATIENT
Start: 2021-04-01 | End: 2021-04-02 | Stop reason: HOSPADM

## 2021-04-01 RX ORDER — FENTANYL CITRATE 50 UG/ML
INJECTION, SOLUTION INTRAMUSCULAR; INTRAVENOUS PRN
Status: DISCONTINUED | OUTPATIENT
Start: 2021-04-01 | End: 2021-04-01

## 2021-04-01 RX ORDER — HYDROXYZINE HYDROCHLORIDE 25 MG/1
25 TABLET, FILM COATED ORAL EVERY 6 HOURS PRN
Status: DISCONTINUED | OUTPATIENT
Start: 2021-04-01 | End: 2021-04-01 | Stop reason: HOSPADM

## 2021-04-01 RX ORDER — SODIUM CHLORIDE, SODIUM LACTATE, POTASSIUM CHLORIDE, CALCIUM CHLORIDE 600; 310; 30; 20 MG/100ML; MG/100ML; MG/100ML; MG/100ML
INJECTION, SOLUTION INTRAVENOUS CONTINUOUS
Status: DISCONTINUED | OUTPATIENT
Start: 2021-04-01 | End: 2021-04-02 | Stop reason: HOSPADM

## 2021-04-01 RX ORDER — POLYETHYLENE GLYCOL 3350 17 G/17G
17 POWDER, FOR SOLUTION ORAL DAILY
Status: DISCONTINUED | OUTPATIENT
Start: 2021-04-02 | End: 2021-04-02 | Stop reason: HOSPADM

## 2021-04-01 RX ORDER — HYDROMORPHONE HCL IN WATER/PF 6 MG/30 ML
0.2 PATIENT CONTROLLED ANALGESIA SYRINGE INTRAVENOUS
Status: DISCONTINUED | OUTPATIENT
Start: 2021-04-01 | End: 2021-04-02 | Stop reason: HOSPADM

## 2021-04-01 RX ORDER — KETAMINE HYDROCHLORIDE 10 MG/ML
INJECTION, SOLUTION INTRAMUSCULAR; INTRAVENOUS PRN
Status: DISCONTINUED | OUTPATIENT
Start: 2021-04-01 | End: 2021-04-01

## 2021-04-01 RX ORDER — AMOXICILLIN 250 MG
1-2 CAPSULE ORAL DAILY PRN
Qty: 15 TABLET | Refills: 0 | Status: SHIPPED | OUTPATIENT
Start: 2021-04-01 | End: 2021-04-16

## 2021-04-01 RX ADMIN — PHENYLEPHRINE HYDROCHLORIDE 150 MCG: 10 INJECTION INTRAVENOUS at 15:30

## 2021-04-01 RX ADMIN — CEFAZOLIN SODIUM 2 G: 2 INJECTION, SOLUTION INTRAVENOUS at 14:05

## 2021-04-01 RX ADMIN — FENTANYL CITRATE 50 MCG: 50 INJECTION, SOLUTION INTRAMUSCULAR; INTRAVENOUS at 14:09

## 2021-04-01 RX ADMIN — Medication 10 MG: at 14:38

## 2021-04-01 RX ADMIN — KETOROLAC TROMETHAMINE 15 MG: 15 INJECTION, SOLUTION INTRAMUSCULAR; INTRAVENOUS at 17:29

## 2021-04-01 RX ADMIN — INSULIN ASPART 1 UNITS: 100 INJECTION, SOLUTION INTRAVENOUS; SUBCUTANEOUS at 17:46

## 2021-04-01 RX ADMIN — PHENYLEPHRINE HYDROCHLORIDE 150 MCG: 10 INJECTION INTRAVENOUS at 15:36

## 2021-04-01 RX ADMIN — DOCUSATE SODIUM AND SENNOSIDES 1 TABLET: 8.6; 5 TABLET ORAL at 19:21

## 2021-04-01 RX ADMIN — PHENYLEPHRINE HYDROCHLORIDE 150 MCG: 10 INJECTION INTRAVENOUS at 15:24

## 2021-04-01 RX ADMIN — BUPIVACAINE HYDROCHLORIDE IN DEXTROSE 1.6 ML: 7.5 INJECTION, SOLUTION SUBARACHNOID at 14:24

## 2021-04-01 RX ADMIN — ACETAMINOPHEN 975 MG: 325 TABLET, FILM COATED ORAL at 12:23

## 2021-04-01 RX ADMIN — SODIUM CHLORIDE, POTASSIUM CHLORIDE, SODIUM LACTATE AND CALCIUM CHLORIDE: 600; 310; 30; 20 INJECTION, SOLUTION INTRAVENOUS at 12:59

## 2021-04-01 RX ADMIN — PHENYLEPHRINE HYDROCHLORIDE 100 MCG: 10 INJECTION INTRAVENOUS at 14:53

## 2021-04-01 RX ADMIN — CEFAZOLIN SODIUM 2 G: 2 INJECTION, SOLUTION INTRAVENOUS at 21:57

## 2021-04-01 RX ADMIN — Medication 5 MG: at 15:32

## 2021-04-01 RX ADMIN — FENTANYL CITRATE 50 MCG: 50 INJECTION, SOLUTION INTRAMUSCULAR; INTRAVENOUS at 14:14

## 2021-04-01 RX ADMIN — PROPOFOL 75 MCG/KG/MIN: 10 INJECTION, EMULSION INTRAVENOUS at 14:25

## 2021-04-01 RX ADMIN — PHENYLEPHRINE HYDROCHLORIDE 150 MCG: 10 INJECTION INTRAVENOUS at 15:17

## 2021-04-01 RX ADMIN — PHENYLEPHRINE HYDROCHLORIDE 50 MCG: 10 INJECTION INTRAVENOUS at 15:13

## 2021-04-01 RX ADMIN — TRANEXAMIC ACID 1950 MG: 650 TABLET ORAL at 12:23

## 2021-04-01 RX ADMIN — LIDOCAINE HYDROCHLORIDE 100 MG: 10 INJECTION, SOLUTION INFILTRATION; PERINEURAL at 14:25

## 2021-04-01 RX ADMIN — Medication 10 MG: at 14:50

## 2021-04-01 RX ADMIN — KETOROLAC TROMETHAMINE 15 MG: 15 INJECTION, SOLUTION INTRAMUSCULAR; INTRAVENOUS at 23:08

## 2021-04-01 RX ADMIN — ATORVASTATIN CALCIUM 10 MG: 10 TABLET, FILM COATED ORAL at 17:29

## 2021-04-01 RX ADMIN — SODIUM CHLORIDE, POTASSIUM CHLORIDE, SODIUM LACTATE AND CALCIUM CHLORIDE: 600; 310; 30; 20 INJECTION, SOLUTION INTRAVENOUS at 17:28

## 2021-04-01 RX ADMIN — DOCUSATE SODIUM 100 MG: 100 CAPSULE, LIQUID FILLED ORAL at 19:21

## 2021-04-01 RX ADMIN — DEXAMETHASONE SODIUM PHOSPHATE 4 MG: 4 INJECTION, SOLUTION INTRA-ARTICULAR; INTRALESIONAL; INTRAMUSCULAR; INTRAVENOUS; SOFT TISSUE at 14:43

## 2021-04-01 RX ADMIN — ONDANSETRON 4 MG: 2 INJECTION INTRAMUSCULAR; INTRAVENOUS at 15:17

## 2021-04-01 RX ADMIN — ASPIRIN 325 MG: 325 TABLET ORAL at 17:29

## 2021-04-01 RX ADMIN — PHENYLEPHRINE HYDROCHLORIDE 50 MCG: 10 INJECTION INTRAVENOUS at 15:03

## 2021-04-01 RX ADMIN — PROPOFOL 40 MG: 10 INJECTION, EMULSION INTRAVENOUS at 14:44

## 2021-04-01 RX ADMIN — GABAPENTIN 300 MG: 300 CAPSULE ORAL at 12:23

## 2021-04-01 RX ADMIN — MAGNESIUM SULFATE HEPTAHYDRATE 2 G: 40 INJECTION, SOLUTION INTRAVENOUS at 14:34

## 2021-04-01 RX ADMIN — SODIUM CHLORIDE, POTASSIUM CHLORIDE, SODIUM LACTATE AND CALCIUM CHLORIDE: 600; 310; 30; 20 INJECTION, SOLUTION INTRAVENOUS at 15:00

## 2021-04-01 RX ADMIN — Medication 5 MG: at 14:53

## 2021-04-01 RX ADMIN — ACETAMINOPHEN 975 MG: 325 TABLET, FILM COATED ORAL at 19:21

## 2021-04-01 RX ADMIN — KETAMINE HYDROCHLORIDE 30 MG: 10 INJECTION INTRAMUSCULAR; INTRAVENOUS at 15:00

## 2021-04-01 ASSESSMENT — MIFFLIN-ST. JEOR
SCORE: 1397.61
SCORE: 1406.68

## 2021-04-01 NOTE — ANESTHESIA PROCEDURE NOTES
Intrathecal injection Procedure Note  Pre-Procedure   Staff -        CRNA: Flora Grijalva APRN CRNA       Other Anesthesia Staff: Sanford Wolf       Performed By: SRNA and CRNA       Location: OR       Pre-Anesthestic Checklist: patient identified, IV checked, risks and benefits discussed, informed consent, monitors and equipment checked, pre-op evaluation and at physician/surgeon's request  Timeout:       Correct Patient: Yes        Correct Procedure: Yes        Correct Site: Yes        Correct Position: Yes   Procedure Documentation  Procedure: intrathecal injection       Diagnosis: arthritis. Anesthestic block for DANAY       Patient Position: sitting       Skin prep: Betadine       Insertion Site: L3-4. (midline approach).       Needle Gauge: 22.        Needle Length (Inches): 3.5        Spinal Needle Type: Jose tipNo introducer used       # of attempts: 2 and  # of redirects:  0    Assessment/Narrative         Paresthesias: No.       CSF fluid: clear.    Medication(s) Administered   Medication Administration Time: 4/1/2021 2:24 PM

## 2021-04-01 NOTE — ANESTHESIA CARE TRANSFER NOTE
Patient: Suzette Britton    Procedure(s):  Left Total Hip Arthroplasty    Diagnosis: Arthritis of left hip [M16.12]  Diagnosis Additional Information: No value filed.    Anesthesia Type:   General, Spinal, Periph. Nerve Block for postop pain     Note:    Oropharynx: oropharynx clear of all foreign objects  Level of Consciousness: awake  Oxygen Supplementation: room air    Independent Airway: airway patency satisfactory and stable  Dentition: dentition unchanged  Vital Signs Stable: post-procedure vital signs reviewed and stable  Report to RN Given: handoff report given  Patient transferred to: PACU    Handoff Report: Identifed the Patient, Identified the Reponsible Provider, Reviewed the pertinent medical history, Discussed the surgical course, Reviewed Intra-OP anesthesia mangement and issues during anesthesia, Set expectations for post-procedure period and Allowed opportunity for questions and acknowledgement of understanding      Vitals: (Last set prior to Anesthesia Care Transfer)  CRNA VITALS  4/1/2021 1515 - 4/1/2021 1552      4/1/2021             Pulse:  65    SpO2:  100 %        Electronically Signed By: SRAVAN Candelario CRNA  April 1, 2021  3:52 PM

## 2021-04-01 NOTE — ANESTHESIA POSTPROCEDURE EVALUATION
Patient: Suzette Britton    Procedure(s):  Left Total Hip Arthroplasty    Diagnosis:Arthritis of left hip [M16.12]  Diagnosis Additional Information: No value filed.    Anesthesia Type:  General, Spinal, Periph. Nerve Block for postop pain    Note:  Disposition: Admission   Postop Pain Control: Uneventful            Sign Out: Well controlled pain   PONV: No   Neuro/Psych: Uneventful            Sign Out: Acceptable/Baseline neuro status   Airway/Respiratory: Uneventful            Sign Out: Acceptable/Baseline resp. status   CV/Hemodynamics: Uneventful            Sign Out: Acceptable CV status   Other NRE: NONE   DID A NON-ROUTINE EVENT OCCUR? No         Last vitals:  Vitals:    04/01/21 1549 04/01/21 1550 04/01/21 1600   BP: (!) 152/66  (!) 141/63   Pulse: 71 71 71   Resp:  9 15   Temp:      SpO2: 100% 100% 97%       Last vitals prior to Anesthesia Care Transfer:  CRNA VITALS  4/1/2021 1515 - 4/1/2021 1612      4/1/2021             Pulse:  65    SpO2:  100 %          Electronically Signed By: SRAVAN Candelario CRNA  April 1, 2021  4:12 PM

## 2021-04-01 NOTE — PLAN OF CARE
"[facility] [place] [facility] ADMISSION NOTE    Patient admitted to room 2315 at approximately 1650 via cart from surgery. Patient was accompanied by transport tech.     Verbal SBAR report received from Alba GRIFFIN prior to patient arrival.     Patient trasferred to bed via air sandra. Patient alert and oriented X 3. The patient is not having any pain.  . Admission vital signs: Blood pressure (!) 143/68, pulse 73, temperature 97.1  F (36.2  C), temperature source Oral, resp. rate 14, height 1.6 m (5' 3\"), weight 94.3 kg (208 lb), SpO2 96 %, not currently breastfeeding. Patient was oriented to plan of care, call light, bed controls, tv, telephone, bathroom, and visiting hours.     Risk Assessment    The following safety risks were identified during admission: fall. Yellow risk band applied: YES.     Skin Initial Assessment    This writer admitted this patient and completed a full skin assessment and Lake score in the Adult PCS flowsheet. Appropriate interventions initiated as needed.     Secondary skin check completed by Laura GRIFFIN.    Lake Risk Assessment  Sensory Perception: 4-->no impairment  Moisture: 3-->occasionally moist  Activity: 3-->walks occasionally  Mobility: 4-->no limitation  Nutrition: 3-->adequate  Friction and Shear: 3-->no apparent problem  Lake Score: 20    Education    Patient has a [place] to Observation order: Yes  Observation education completed and documented: N/A      Janeen Phillips RN      "

## 2021-04-01 NOTE — PROCEDURES
04/01/21    PREOP DIAGNOSIS: Left hip arthritis  POSTOP DIAGNOSIS: Left hip arthritis  PROCEDURE: Left total hip arthroplasty  SURGEON: Zachary Arteaga   ASSISTANT: Shandra Salinas.  The presence of a PA was necessary for positioning, retraction, and safe progression of the case  ANESTHESIA: Spinal with sedation   EBL: 200cc  COMPLICATIONS: None apparent   DISPO: Stable to PACU     IMPLANTS: DePuy Corail size 11 standard offset short neck collared femur, 36mm x +5mm ceramic femoral head, 36mm x 54mm neutral polyethylene liner, and 54mm Brandt Cup    INDICATIONS: Suzette is a 75 year old female with a history of progressive left hip pain due to end stage arthritis.  After failing nonoperative management, she elected to proceed with a left DANAY, understanding the risks of infection, damage to vessels or nerves, blood clots, instability, leg length discrepancy, ongoing pain and need for revision surgery.     PROCEDURE: Patient was met in the preoperative holding area where consent was reviewed and the left hip was marked.  She was then transferred to the operating theater. After a spinal anesthetic was placed, she was placed in a right lateral decubitus position with her left side up. All bony prominences were padded, she was secured with a Stulberg hip positioner, and an axillary roll was placed.  A timeout was performed verifying the correct patient, surgery, and location. She received preoperative antibiotics as well as transexamic acid. The left lower extremity was then prepped and draped in a standard fashion.     We then made an incision in line with a posterior approach to the hip. Dissection was sharply carried down through skin and subcutaneous tissue, and the gluteus fascia incised in line with the incision. After palpating and protecting the sciatic nerve, an east west retractor was placed. We then released the piriformis and short external rotators and then performed a T Capsulotomy. The hip was dislocated  and a neck cut made, approximately 12 mm proximal to the lesser trochanter. The femoral head measured 50mm and showed full thickness chondral wear with a large circumferential ring osteophyte.    We turned our attention to the acetabulum. After placing anterior and posterior retractors, foveal and labral tissue were removed.  We started with a 50mm reamer and sequentially reamed to a 54, in approximately 45 deg of abduction and 20 deg of anteversion. At that point, we had good bleeding bone circumferentially.  A trial cup was placed with good pressfit followed by our final 54mm cup, again with good press fit, and a neutral liner. We then turned our attention to the femur. After using a cookie cutter and canal finder, we broached to a size 11, keeping the stem in approximatley 10-15 deg of anteversion. At that point, we had excellent rotational stability.  We then placed a standard offset short neck with and a variety of different length femoral heads.  We felt the +5mm head most appropriate which showed leg lengths felt appropriate, the hip was stable in full extension and maximal external rotation, in the sleeping position, and with flexion to 90 degrees and internal rotation to 75 degrees.     We then removed all trial components and thoroughly irrigated the wound. We placed our final size 11 stem.  We then retrialed with a +5mm head.  Findings as above with flexion to 90 and IR to 65.  We impacted our final +5mm ceramic head. The wound was instilled with a dilute betadine solution. Capsule and short external rotators were repaired with 0-ethibond, gluteus fascia with #1 stratafix, subdermal sutures with 2-0 vicryl, and a running 3-0 subcuticular monocryl. A sterile dressing followed by an abductor pillow was placed and the patient was transferred to the PACU in stable condition.       POSTOPERATIVE PLAN:   1. WBAT left LE with PT for mobilization  2. DVT prophylaxis: ASA 325mg daily  x 30 days  3. Perioperative  antibiotics     Zachary Arteaga MD

## 2021-04-02 ENCOUNTER — APPOINTMENT (OUTPATIENT)
Dept: PHYSICAL THERAPY | Facility: CLINIC | Age: 76
End: 2021-04-02
Attending: ORTHOPAEDIC SURGERY
Payer: COMMERCIAL

## 2021-04-02 VITALS
OXYGEN SATURATION: 97 % | DIASTOLIC BLOOD PRESSURE: 61 MMHG | SYSTOLIC BLOOD PRESSURE: 135 MMHG | RESPIRATION RATE: 18 BRPM | WEIGHT: 208 LBS | HEIGHT: 63 IN | TEMPERATURE: 96.3 F | BODY MASS INDEX: 36.86 KG/M2 | HEART RATE: 65 BPM

## 2021-04-02 LAB
GLUCOSE BLDC GLUCOMTR-MCNC: 326 MG/DL (ref 70–99)
GLUCOSE BLDC GLUCOMTR-MCNC: 360 MG/DL (ref 70–99)
GLUCOSE BLDC GLUCOMTR-MCNC: 397 MG/DL (ref 70–99)
HGB BLD-MCNC: 12.7 G/DL (ref 11.7–15.7)

## 2021-04-02 PROCEDURE — 36415 COLL VENOUS BLD VENIPUNCTURE: CPT | Performed by: ORTHOPAEDIC SURGERY

## 2021-04-02 PROCEDURE — 250N000011 HC RX IP 250 OP 636: Performed by: ORTHOPAEDIC SURGERY

## 2021-04-02 PROCEDURE — 82962 GLUCOSE BLOOD TEST: CPT | Mod: 91

## 2021-04-02 PROCEDURE — 250N000013 HC RX MED GY IP 250 OP 250 PS 637: Performed by: ORTHOPAEDIC SURGERY

## 2021-04-02 PROCEDURE — 97116 GAIT TRAINING THERAPY: CPT | Mod: GP

## 2021-04-02 PROCEDURE — 97161 PT EVAL LOW COMPLEX 20 MIN: CPT | Mod: GP

## 2021-04-02 PROCEDURE — 85018 HEMOGLOBIN: CPT | Performed by: ORTHOPAEDIC SURGERY

## 2021-04-02 PROCEDURE — 250N000013 HC RX MED GY IP 250 OP 250 PS 637: Performed by: PHYSICIAN ASSISTANT

## 2021-04-02 RX ADMIN — POLYETHYLENE GLYCOL 3350 17 G: 17 POWDER, FOR SOLUTION ORAL at 07:46

## 2021-04-02 RX ADMIN — CHLORTHALIDONE 12.5 MG: 25 TABLET ORAL at 07:47

## 2021-04-02 RX ADMIN — ASPIRIN 325 MG: 325 TABLET ORAL at 07:49

## 2021-04-02 RX ADMIN — ACETAMINOPHEN 975 MG: 325 TABLET, FILM COATED ORAL at 04:41

## 2021-04-02 RX ADMIN — CEFAZOLIN SODIUM 2 G: 2 INJECTION, SOLUTION INTRAVENOUS at 05:53

## 2021-04-02 RX ADMIN — INSULIN ASPART 4 UNITS: 100 INJECTION, SOLUTION INTRAVENOUS; SUBCUTANEOUS at 07:51

## 2021-04-02 RX ADMIN — GLIPIZIDE 5 MG: 5 TABLET, FILM COATED, EXTENDED RELEASE ORAL at 07:49

## 2021-04-02 RX ADMIN — METOPROLOL SUCCINATE 100 MG: 100 TABLET, EXTENDED RELEASE ORAL at 07:47

## 2021-04-02 RX ADMIN — KETOROLAC TROMETHAMINE 15 MG: 15 INJECTION, SOLUTION INTRAMUSCULAR; INTRAVENOUS at 05:53

## 2021-04-02 RX ADMIN — LOSARTAN POTASSIUM 25 MG: 25 TABLET, FILM COATED ORAL at 07:50

## 2021-04-02 RX ADMIN — METFORMIN HYDROCHLORIDE 1000 MG: 500 TABLET ORAL at 07:49

## 2021-04-02 ASSESSMENT — ACTIVITIES OF DAILY LIVING (ADL): DEPENDENT_IADLS:: INDEPENDENT

## 2021-04-02 NOTE — PROGRESS NOTES
[unfilled]                                                                              [facility]      OUTPATIENT PHYSICAL THERAPY EVALUATION  PLAN OF TREATMENT FOR OUTPATIENT REHABILITATION  (COMPLETE FOR INITIAL CLAIMS ONLY)  Patient's Last Name, First Name, M.I.  YOB: 1945  Suzette Britton                        Provider's Name  [facility] Medical Record No.  [id number]                               Onset Date:  04/01/21   Start of Care Date:  (P) 04/02/21      Type:     _X_PT   ___OT   ___SLP Medical Diagnosis:  (P) S/p L DANAY                        PT Diagnosis:  S/p L DANAY   Visits from SOC:  1   _________________________________________________________________________________  Plan of Treatment/Functional Goals    Planned Interventions: bed mobility training, gait training, home exercise program, strengthening, transfer training     Goals: See Physical Therapy Goals on Care Plan in [facility] electronic health record.    Therapy Frequency: One time eval and treatment only  Predicted Duration of Therapy Intervention: 1 day  _________________________________________________________________________________    I CERTIFY THE NEED FOR THESE SERVICES FURNISHED UNDER        THIS PLAN OF TREATMENT AND WHILE UNDER MY CARE     (Physician co-signature of this document indicates review and certification of the therapy plan).                Certification date from: (P) 04/02/21, Certification date to: (P) 04/02/21    Referring Physician: Zachary Arteaga MD            Initial Assessment        See Physical Therapy evaluation dated (P) 04/02/21 in Epic electronic health record.

## 2021-04-02 NOTE — PROGRESS NOTES
[facility] Medicine Progress Note  Date of Service: 04/02/2021    Assessment & Plan   Suzette Britton is a 75 year old female who presented on 4/1/2021 for scheduled Procedure(s):  Left Total Hip Arthroplasty by Zachary Arteaga MD and is being followed by the hospital medicine service for co-management of acute and/or chronic perioperative medical problems.      S/p Procedure(s):  Left Total Hip Arthroplasty   1 Day Post-Op    - Pain control, wound cares, physical therapy, occupational therapy and DVT prophylaxis per orthopedic surgery service.     Principal Problem:    S/P hip replacement, left    Active Problems:  Hyperlipidemia LDL goal <100   - Continue prior to admission lipitor.     Essential hypertension  - Continue prior to admission medications with holding parameters.     Type 2 diabetes mellitus with diabetic nephropathy, without long-term current use of insulin   Received one time dose of 5 units novolog overnight for elevated bs in the 300s. She checks her sugars at home on occasion. Reports usually in the mid 100s. A1c 7.5.     - Continue prior to admission glipizide, metformin - will get this am.    - ISS.    - Advised to continue to monitor bs at home, keep record and discuss with PCP.     COVID-19 ruled out  Patient was tested on 3/29 for planned procedure, negative.     DVT Prophylaxis: as per orthopedic surgery service - Defer to primary service:  mg   Code Status: Full Code      Disposition: Anticipate discharge today following physical therapy and occupational therapy evaluation and if pain control is well managed with oral medications.     Attestation:  I have reviewed today's vital signs, notes, medications, labs and imaging.  I have discussed patient's care with attending physician, Dr. Barry.     Annabelle Mars PA-C       Interval History   Patient has done well post op. Pain has been well managed. She has been up with assist.  Overall she feels well.     Her blood sugar was elevated overnight. She had not taken her medications on the day of surgery. She has been receiving insulin here.     Denies chest pain, shortness of breath, abdominal pain. Tolerating regular diet with no nausea or vomiting. Able to void.     Patient lives at home with .     Physical Exam   Temp:  [67.6  F (19.8  C)-98.1  F (36.7  C)] 97.8  F (36.6  C)  Pulse:  [67-79] 70  Resp:  [9-18] 18  BP: (135-170)/(49-72) 135/61  SpO2:  [94 %-100 %] 94 %    Weights:   Vitals:    04/01/21 1208 04/01/21 1658   Weight: 95.3 kg (210 lb) 94.3 kg (208 lb)    Body mass index is 36.85 kg/m .    General: Alert, oriented. Pleasant. No acute distress. Sitting up in bed eating breakfast.   CV: Regular rate and rhythm. No murmur appreciated. Good radial pulses bilaterally.   Respiratory: CTA bilaterally.   GI: Soft, non tender. Normal BS.   Skin: Warm and dry. Bandage intact to left hip.   Musculoskeletal: Moves all extremities approprietly.      Data   Recent Labs   Lab 04/02/21  0444   HGB 12.7       Recent Labs   Lab 04/02/21  0241 04/02/21  0005 04/01/21  2142 04/01/21  1640 04/01/21  1306   * 397* 383* 182* 177*        Unresulted Labs Ordered in the Past 30 Days of this Admission     No orders found for last 31 day(s).           Imaging  Recent Results (from the past 24 hour(s))   XR Pelvis Port 1/2 Views    Narrative    XR PELVIS PORT 1/2 VW 4/1/2021 4:19 PM     HISTORY: Status post Hip surgery      Impression    IMPRESSION: Radiographs may be mislabeled right for left. When  compared to 2/22/2001, there appears to been a left total hip  arthroplasty since the prior radiographs. No apparent complication.    JANETH TELLES MD        I reviewed all new labs and imaging results over the last 24 hours.

## 2021-04-02 NOTE — PLAN OF CARE
Patient's Blood glucose 397 upon recheck at 0010. Updated Dr. Herman and received order for 5 units novolog. Patient was given 5 units per order. Patient ambulated in the encarnacion with stand by assist and walker at bedtime. She is steady on her feet. She reports minimal pain and declines pain med other than routine Tylenol and Toradol. Will continue to monitor.

## 2021-04-02 NOTE — PROGRESS NOTES
Ortho    No acute events overnight.  Pain well controlled  Moving well    AFVSS  Pleasant, NAD  Nonlabored breathing  Left LE: Dressing cdi.  Fires ehl/fhl/gsc/ta c SILT dp/sp/tib n.  Toes warm    IMPRESSION:   1.  s/p left DANAY 4.1.21    PLAN:   --WBAT left leg.  PT for mobilization   --DVT prophylaxis: ASA 325mg daily x 30d   --Cont tylenol, oxycodone for pain control    --Appreciate social working seeing about home care   --Dispo: Home today pending pain control, progress with PT    Zachary Arteaga MD

## 2021-04-02 NOTE — PROGRESS NOTES
Initial IP Physical Therapy Evaluation     04/02/21 1055   Quick Adds   Type of Visit Initial PT Evaluation   Living Environment   People in home spouse   Current Living Arrangements house   Home Accessibility stairs within home   Number of Stairs, Within Home, Primary 7   Stair Railings, Within Home, Primary railings on both sides of stairs   Transportation Anticipated family or friend will provide   Living Environment Comments Split level home, 3 stairs into house from outside   Self-Care   Usual Activity Tolerance good   Current Activity Tolerance good   Equipment Currently Used at Home walker, rolling   Activity/Exercise/Self-Care Comment Owns walker at home   Disability/Function   Hearing Difficulty or Deaf no   Wear Glasses or Blind yes   Vision Management glasses   Concentrating, Remembering or Making Decisions Difficulty no   Difficulty Communicating no   Difficulty Eating/Swallowing no   Walking or Climbing Stairs Difficulty no   Dressing/Bathing Difficulty no   Toileting issues no   Doing Errands Independently Difficulty (such as shopping) no   Fall history within last six months no   Change in Functional Status Since Onset of Current Illness/Injury no   General Information   Onset of Illness/Injury or Date of Surgery 04/01/21   Referring Physician Zachary Arteaga MD   Patient/Family Therapy Goals Statement (PT) Go home   Pertinent History of Current Problem (include personal factors and/or comorbidities that impact the POC) Patient is 75 s/p L DANAY on 4/1/21   Existing Precautions/Restrictions no known precautions/restrictions   Weight-Bearing Status - LLE weight-bearing as tolerated   Weight-Bearing Status - RLE full weight-bearing   Cognition   Orientation Status (Cognition) oriented x 4   Affect/Mental Status (Cognition) WNL   Follows Commands (Cognition) WNL   Pain Assessment   Patient Currently in Pain No  (Patient denies any pain with ambulation or at rest)   Integumentary/Edema    Integumentary/Edema no deficits were identifed   Posture    Posture Not impaired   Range of Motion (ROM)   ROM Comment R hip flexion to 90 degrees   Strength   Strength Comments LLE WFL, RLE DF/PF 5/5, knee flexion/extension 4+/5   Bed Mobility   Comment (Bed Mobility) Sit<>supine Independent, safe and efficient with proper body mechanics   Transfers   Transfer Safety Comments Sit<>stand Mod Independent with FWW   Gait/Stairs (Locomotion)   [place] Level (Gait) supervision   Assistive Device (Gait) walker, front-wheeled   Distance in Feet (Required for LE Total Joints) 350   Pattern (Gait) step-through   Negotiation (Stairs) stairs independence;handrail location;number of steps   [place] Level (Stairs) supervision   Handrail Location (Stairs) both sides   Number of Steps (Stairs) 10 total   Comment (Gait/Stairs) Patient demonstrates safe and efficient gait pattern, good step length and base of support. Reports no pain with activity in L hip throughout session. VC for walker positioning, slowing amelia for safety and breathing techniques. Stairs: Completes safely after initial demo of technique, slow first several steps but improved with practice. Step to step, two rails, no LOB, mildly fatigued upon completion.   Balance   Balance other (describe)   Balance Comments Requires FWW due to weakness   Sensory Examination   Sensory Perception WFL   Coordination   Coordination no deficits were identified   Muscle Tone   Muscle Tone no deficits were identified   Clinical Impression   Criteria for Skilled Therapeutic Intervention yes, treatment indicated   PT Diagnosis (PT) S/p L DANAY   Influenced by the following impairments Weakness, pain, decreased activity tolerance   Functional limitations due to impairments Mobility, transfers   Clinical Presentation Stable/Uncomplicated   Clinical Presentation Rationale Clinical judgment   Clinical Decision Making (Complexity) low complexity   Therapy Frequency (PT) One  time eval and treatment only   Predicted Duration of Therapy Intervention (days/wks) 1 day   Planned Therapy Interventions (PT) bed mobility training;gait training;home exercise program;strengthening;transfer training   Anticipated Equipment Needs at Discharge (PT) walker, rolling  (Owns FWW at home)   Risk & Benefits of therapy have been explained evaluation/treatment results reviewed;care plan/treatment goals reviewed;risks/benefits reviewed;current/potential barriers reviewed;participants voiced agreement with care plan;participants included;patient   PT Discharge Planning    PT Discharge Recommendation (DC Rec) home with outpatient physical therapy   PT Rationale for DC Rec Patient is moving well with minimal pain, can safely complete stairs. OP PT will be most beneficial for patient - pt agrees with plan   PT Brief overview of current status  Mod I ambulation with ', SBA transfers and bed mobility   Total Evaluation Time   Total Evaluation Time (Minutes) 6     Emiliano Gottlieb, PT, DPT

## 2021-04-02 NOTE — PLAN OF CARE
Updated Dr. Herman via [employer]. FYI:  at bedtime. She was given 4 units novolog per sliding scale. She did have a small cup of ice cream at bedtime also. She will be resuming glipizide and metformin tomorrow. Will recheck BG in 2 hours per Dr. Herman order.

## 2021-04-02 NOTE — DISCHARGE SUMMARY
DATE OF ADMISSION: 4/1/2021  DATE OF DISCHARGE: 4/2/2021    DISCHARGE DIAGNOSIS: s/p left total hip arthroplasty    HPI AND HOSPITAL COURSE: Suzette is a 75 year old female who underwent a left total hip arthroplasty on 4/1/2021. Postoperatively, she was admitted to the floor and followed by the hospitalist.  The postoperative course was uneventful and by post op day 1 , she had progressed with PT, tolerated PO diet, voided, and pain was well controlled on orals meds.      DVT PROPHYLAXIS: ASA 325mg daily x 30d     Suzette Britton   Home Medication Instructions SHANNON:[id number]    Printed on:04/02/21 2846   Medication Information                      acetaminophen (TYLENOL) 325 MG tablet  Take 2 tablets (650 mg) by mouth every 4 hours as needed for other (mild pain)             aspirin (ASA) 325 MG EC tablet  Take 1 tablet (325 mg) by mouth daily             atorvastatin (LIPITOR) 10 MG tablet  Take 1 tablet (10 mg) by mouth daily             blood glucose monitoring (NO BRAND SPECIFIED) test strip  Use to test blood sugars one time daily or as directed  One touch meter - one touch strips             CALCIUM PO               calcium-vitamin D-vitamin K (VIACTIV) 500-500-40 MG-UNT-MCG CHEW  Take 1 tablet by mouth daily             chlorthalidone (HYGROTON) 25 MG tablet  Take 0.5 tablets (12.5 mg) by mouth daily             Cholecalciferol (VITAMIN D PO)  Take 1,000 Units by mouth every morning              glipiZIDE (GLUCOTROL XL) 5 MG 24 hr tablet  Take 1 tablet (5 mg) by mouth daily             GLUCOMETER KIT  for testing bid, One Touch or please fill with formulary glucometer             ibuprofen (ADVIL/MOTRIN) 600 MG tablet  Take 1 tablet (600 mg) by mouth every 6 hours as needed for pain (mild)             losartan (COZAAR) 25 MG tablet  Take 1 tablet (25 mg) by mouth daily             metFORMIN (GLUCOPHAGE) 500 MG tablet  Take 2 tablets (1,000 mg) by mouth 2 times daily (with meals)             metoprolol  succinate ER (TOPROL-XL) 100 MG 24 hr tablet  Take 1 tablet (100 mg) by mouth daily             oxyCODONE (ROXICODONE) 5 MG tablet  Take 1-2 tablets (5-10 mg) by mouth every 3 hours as needed for pain (Moderate to Severe)             senna-docusate (SENOKOT-S/PERICOLACE) 8.6-50 MG tablet  Take 1-2 tablets by mouth daily as needed for constipation Take while on oral narcotics to prevent or treat constipation.                 DISCHARGE INSTRUCTIONS   1. Weight bearing as tolerated.     2. Aquacel dressing is waterproof.  May shower with dressing in place.  Dressing will be removed at 2 week post op check   3. Return to clinic: in 2 weeks as scheduled.  Call  with questions    DISCHARGE DISPOSITION: Home    JESSICA HUSTON MD

## 2021-04-02 NOTE — PLAN OF CARE
Physical Therapy Discharge Summary    Reason for therapy discharge:    Discharged to home with outpatient therapy.    Progress towards therapy goal(s). See goals on Care Plan in [facility] electronic health record for goal details.  Goals partially met.  Barriers to achieving goals:   discharge from facility.    Therapy recommendation(s):    Continued therapy is recommended.  Rationale/Recommendations:  OP PT to progress patient to PLOF.

## 2021-04-02 NOTE — PROGRESS NOTES
Skin affirmation note    Admitting nurse completed full skin assessment, Lake score and Lake interventions. This writer agrees with the initial skin assessment findings.

## 2021-04-02 NOTE — CONSULTS
Care Management Initial Consult    General Information  Assessment completed with: Suzette Ennis  Type of CM/SW Visit: Initial Assessment    Primary Care Provider verified and updated as needed: Yes   Readmission within the last 30 days: no previous admission in last 30 days      Reason for Consult: discharge planning  Advance Care Planning: Advance Care Planning Reviewed: no concerns identified          Communication Assessment  Patient's communication style: spoken language (English or Bilingual)    Hearing Difficulty or Deaf: no   Wear Glasses or Blind: yes    Cognitive  Cognitive/Neuro/Behavioral: WDL                      Living Environment:   People in home: spouse  Ramana  Current living Arrangements: house      Able to return to prior arrangements: yes       Family/Social Support:  Care provided by: self  Provides care for: no one  Marital Status:     Ramana       Description of Support System: Supportive, Involved    Support Assessment: Adequate family and caregiver support    Current Resources:   Patient receiving home care services: No     Community Resources: None  Equipment currently used at home: walker, rolling  Supplies currently used at home: Diabetic Supplies    Employment/Financial:  Employment Status: retired        Financial Concerns: No concerns identified   Referral to Financial Counselor: No       Lifestyle & Psychosocial Needs:        Socioeconomic History     Marital status:      Spouse name: Ramana Britton     Number of children: 4     Years of education: 14+     Highest education level: Not on file   Occupational History     Employer: RETIRED     Tobacco Use     Smoking status: Never Smoker     Smokeless tobacco: Never Used   Substance and Sexual Activity     Alcohol use: No     Alcohol/week: 0.0 standard drinks     Drug use: No     Sexual activity: Not Currently     Partners: Male       Functional Status:  Prior to admission patient needed assistance:   Dependent  ADLs:: Dressing, Ambulation-walker  Dependent IADLs:: Independent  Assesssment of Functional Status: Not at baseline with mobility    Mental Health Status:  Mental Health Status: No Current Concerns       Chemical Dependency Status:  Chemical Dependency Status: No Current Concerns             Values/Beliefs:  Spiritual, Cultural Beliefs, Orthodox Practices, Values that affect care: no               Care Management Discharge Note    Discharge Date: 04/02/21       Discharge Disposition: Home, Outpatient Rehab (PT, OT, SLP, Cardiac or Pulmonary)    Discharge Services: None    Discharge DME: None    Discharge Transportation: family or friend will provide    Private pay costs discussed: NA    Education Provided on the Discharge Plan:  yes  Persons Notified of Discharge Plans: patient  Patient/Family in Agreement with the Plan: yes    Handoff Referral Completed: No    Additional Information:  Spoke with patient via phone, introduced self and role.  Patient currently lives with her , Ramana in a home in [place].  She is independent with all ADLs, except most recently she has been needing some assistance with application of her socks, due to hip pain.  Ramana assists when needed.  She has access to a walker for ambulation assist when needed.  Patient has had previous two knee surgeries.  She had previous home care services post surgery.      Discussed discharge planning.  Patient feels she will be safe to manage at home with her 's assistance.  Discussed home care services.  Patient declines home care, stating she will follow up with OP PT at [place] [facility].  Ramana is able to transport to and from appointments.      PLAN:  Home with family, OP PT.  No discharge needs identified.      Rachelle MILNER RN  Inpatient Care Coordinator  [facility] [phone]  [facility] [phone]

## 2021-04-02 NOTE — PLAN OF CARE
"[facility] DISCHARGE NOTE    Patient discharged to home at 12:14 PM via wheel chair. Accompanied by spouse and staff. Discharge instructions reviewed with patient, opportunity offered to ask questions. Prescriptions filled and sent with patient upon discharge. All belongings sent with patient.    Patient declined noon blood sugar and lunch.  She reports she will check her numbers when she gets home, and is aware that her am sugar was 326.  Patients  at bedside and he reports he is a labile diabetic and they are very educated.  Noted home care referral for PT, but she reports she is going with \"Sherri\" at the [facility] PT office and she was provided the phone number to make her appointments.   Paola Montague RN  "

## 2021-04-03 ENCOUNTER — HEALTH MAINTENANCE LETTER (OUTPATIENT)
Age: 76
End: 2021-04-03

## 2021-04-05 ENCOUNTER — TRANSFERRED RECORDS (OUTPATIENT)
Dept: MULTI SPECIALTY CLINIC | Facility: CLINIC | Age: 76
End: 2021-04-05

## 2021-04-05 LAB — RETINOPATHY: NORMAL

## 2021-04-14 ENCOUNTER — HOSPITAL ENCOUNTER (OUTPATIENT)
Dept: PHYSICAL THERAPY | Facility: CLINIC | Age: 76
Setting detail: THERAPIES SERIES
End: 2021-04-14
Attending: ORTHOPAEDIC SURGERY
Payer: COMMERCIAL

## 2021-04-14 PROCEDURE — 97161 PT EVAL LOW COMPLEX 20 MIN: CPT | Mod: GP | Performed by: PHYSICAL THERAPIST

## 2021-04-14 PROCEDURE — 97110 THERAPEUTIC EXERCISES: CPT | Mod: GP | Performed by: PHYSICAL THERAPIST

## 2021-04-14 NOTE — PROGRESS NOTES
04/14/21 1100   General Information   Type of Visit Initial OP Ortho PT Evaluation   Start of Care Date 04/14/21   Referring Physician Dr. Zachary Arteaga   Patient/Family Goals Statement To be able to walk without a device   Orders Evaluate and Treat   Orders Comment gait training and ABD strengthening   Date of Order 02/25/21   Certification Required? Yes   Medical Diagnosis s/p  L DANAY   Body Part(s)   Body Part(s) Hip   Presentation and Etiology   Pertinent history of current problem (include personal factors and/or comorbidities that impact the POC) Pt is s/p L DANAY on 4/1/21.  Pt notes dull awareness w/ getting up to walk.   Meds:  oxycontin prn.   PMHX:  B TKA,  diabetes, HBP, A fib (had ablation), bladder control issues,  arthritis.      Impairments D. Decreased ROM;E. Decreased flexibility;F. Decreased strength and endurance;H. Impaired gait   Onset date of current episode/exacerbation 04/01/21   Pain quality B. Dull   Pain exacerbation comment sit to stand relying on UE.  Walking w/ FWW.      Pain/symptoms eased by C. Rest   Progression of symptoms since onset: Improved   Prior Level of Function   Functional Level Prior Comment Walked without a device short distances.  Sews--not doing currently.     Current Level of Function   Patient role/employment history F. Retired   Fall Risk Screen   Fall screen completed by PT   Have you fallen 2 or more times in the past year? No   Have you fallen and had an injury in the past year? No   Is patient a fall risk? No   Abuse Screen (yes response referral indicated)   Feels Unsafe at Home or Work/School no   Feels Threatened by Someone no   Does Anyone Try to Keep You From Having Contact with Others or Doing Things Outside Your Home? no   Hip Objective Findings   Side (if bilateral, select both right and left) Left   Observation sit to stand relies on UE.  Incision covered   Gait/Locomotion mild limp w/ FWW, slower pace   Left Hip Flexion PROM supine 65*   Planned  Therapy Interventions   Planned Therapy Interventions neuromuscular re-education;ROM;strengthening;stretching   Clinical Impression   Criteria for Skilled Therapeutic Interventions Met yes, treatment indicated   PT Diagnosis s/p L DANAY   Influenced by the following impairments stiffness, decreased mobility, dull pain   Functional limitations due to impairments walking, sit to stand   Clinical Presentation Stable/Uncomplicated   Clinical Presentation Rationale progressing as expected following DANAY   Clinical Decision Making (Complexity) Low complexity   Therapy Frequency 1 time/week   Predicted Duration of Therapy Intervention (days/wks) 6 weeks   Risk & Benefits of therapy have been explained Yes   Patient, Family & other staff in agreement with plan of care Yes   Education Assessment   Barriers to Learning No barriers   Ortho Goal 1   Goal Identifier 1   Goal Description Pt will be able to walk w/ SEC w/ slight limp   Target Date 05/05/21   Ortho Goal 2   Goal Identifier 2.   Goal Description Pt will be able to transition sit to stand w/ decreased reliance on UE's   Target Date 06/03/21   Ortho Goal 3   Goal Identifier 3.   Goal Description Pt will be able to walk w/o device w/ slight to no limp   Target Date 06/03/21   Ortho Goal 4   Goal Identifier 4.   Goal Description Pt will be independent and consistent w/ HEP   Target Date 06/13/21   Total Evaluation Time   PT Eval, Low Complexity Minutes (93379) 15   Therapy Certification   Certification date from 04/14/21   Certification date to 06/03/21   Medical Diagnosis s/p L DANAY     Thank you for this referral,    Sasha Hernandez, PT,   [id number]  [facility]  [phone]

## 2021-04-14 NOTE — PROGRESS NOTES
[facility]          OUTPATIENT PHYSICAL THERAPY ORTHOPEDIC EVALUATION  PLAN OF TREATMENT FOR OUTPATIENT REHABILITATION  (COMPLETE FOR INITIAL CLAIMS ONLY)  Patient's Last Name, First Name, M.I.  YOB: 1945  Suzette Britton    Provider s Name:  [facility]   Medical Record No.  [id number]   Start of Care Date:  04/14/21   Onset Date:  04/01/21   Type:     _X__PT   ___OT   ___SLP Medical Diagnosis:  s/p L DANAY     PT Diagnosis:  s/p L DANAY   Visits from SOC:  1      _________________________________________________________________________________  Plan of Treatment/Functional Goals:  neuromuscular re-education, ROM, strengthening, stretching        Goals  Goal Identifier: 1  Goal Description: Pt will be able to walk w/ SEC w/ slight limp  Target Date: 05/05/21    Goal Identifier: 2.  Goal Description: Pt will be able to transition sit to stand w/ decreased reliance on UE's  Target Date: 06/03/21    Goal Identifier: 3.  Goal Description: Pt will be able to walk w/o device w/ slight to no limp  Target Date: 06/03/21    Goal Identifier: 4.  Goal Description: Pt will be independent and consistent w/ HEP  Target Date: 06/13/21          Therapy Frequency:  1 time/week  Predicted Duration of Therapy Intervention:  6 weeks    Sasha Hernandez, PT                 I CERTIFY THE NEED FOR THESE SERVICES FURNISHED UNDER        THIS PLAN OF TREATMENT AND WHILE UNDER MY CARE .             Physician Signature               Date    X_____________________________________________________                             Certification Date From:  04/14/21   Certification Date To:  06/03/21    Referring Provider:  Dr. Zachary Arteaga    Initial Assessment        See Epic Evaluation Start of Care Date: 04/14/21

## 2021-04-15 ENCOUNTER — TELEPHONE (OUTPATIENT)
Dept: FAMILY MEDICINE | Facility: CLINIC | Age: 76
End: 2021-04-15

## 2021-04-15 ENCOUNTER — TRANSFERRED RECORDS (OUTPATIENT)
Dept: HEALTH INFORMATION MANAGEMENT | Facility: CLINIC | Age: 76
End: 2021-04-15

## 2021-04-15 NOTE — TELEPHONE ENCOUNTER
"Patient reports an itchy rash to inner legs/vaginal area. The area \"is sore\".  She denies abdominal pain, back pain or fever, as well as vaginal discharge.   Rash seems bumpy, possibly red, but she can't see it well.  She recently also had a L DANAY.   RN assisted with scheduling appt for hospital f/u and to address rash tomorrow with PCP.      Liberty German RN  [facility]    "

## 2021-04-15 NOTE — TELEPHONE ENCOUNTER
Reason for call:    Symptom or request:     Patient called stating that she had vaginal rash, started last week. Patient had hip replacement- 04/01.       Best Time:  any    Can we leave a detailed message on this number?  YES     Onelia COOPER  Station

## 2021-04-16 ENCOUNTER — OFFICE VISIT (OUTPATIENT)
Dept: FAMILY MEDICINE | Facility: CLINIC | Age: 76
End: 2021-04-16
Payer: COMMERCIAL

## 2021-04-16 VITALS
DIASTOLIC BLOOD PRESSURE: 62 MMHG | SYSTOLIC BLOOD PRESSURE: 116 MMHG | BODY MASS INDEX: 36.14 KG/M2 | RESPIRATION RATE: 16 BRPM | TEMPERATURE: 97.6 F | HEART RATE: 86 BPM | OXYGEN SATURATION: 98 % | WEIGHT: 204 LBS

## 2021-04-16 DIAGNOSIS — L30.9 DERMATITIS: ICD-10-CM

## 2021-04-16 DIAGNOSIS — L30.4 INTERTRIGO: Primary | ICD-10-CM

## 2021-04-16 PROCEDURE — 99213 OFFICE O/P EST LOW 20 MIN: CPT | Performed by: FAMILY MEDICINE

## 2021-04-16 RX ORDER — CLOTRIMAZOLE 1 %
CREAM (GRAM) TOPICAL 2 TIMES DAILY
Qty: 30 G | Refills: 1 | Status: SHIPPED | OUTPATIENT
Start: 2021-04-16 | End: 2021-08-25

## 2021-04-16 RX ORDER — TRIAMCINOLONE ACETONIDE 1 MG/G
CREAM TOPICAL 2 TIMES DAILY
Qty: 30 G | Refills: 0 | Status: SHIPPED | OUTPATIENT
Start: 2021-04-16 | End: 2021-04-28

## 2021-04-16 RX ORDER — ZINC OXIDE 20 %
OINTMENT (GRAM) TOPICAL PRN
Qty: 425 G | Refills: 0 | Status: SHIPPED | OUTPATIENT
Start: 2021-04-16 | End: 2021-08-25

## 2021-04-16 NOTE — PATIENT INSTRUCTIONS
Mix the triamcinolone with the clotrimazole creams and apply twice a day for 2-4 weeks, at least 2 days after the irritation is fully gone.    In between that like after using restroom apply the skin barrier either zinc oxide if covered by insurance or Auqafor another over the counter option. Apply these every time after using the restroom if they have been wiped off.    If the rash is not improving after 5-7 day please let me know.    Practices aimed at minimizing moisture and friction in the involved area and reducing susceptibility to intertrigo are the mainstays of treatment. Typical beneficial practices include:  ?Daily cleansing of intertriginous skin with a mild cleanser followed by drying of affected area with a hair dryer on a cool setting  ?Aeration of affected area when feasible  ?Use of absorbent material or clothing, such as cotton or landeros wool, to separate skin in folds  ?Application of barrier creams in areas that may come in contact with urine or feces

## 2021-04-16 NOTE — PROGRESS NOTES
Assessment & Plan     Intertrigo  Treat with clotrimazole mixed with triamcinolone  Skin barrier.  - clotrimazole (LOTRIMIN) 1 % external cream; Apply topically 2 times daily For 2-4 weeks.  - triamcinolone (KENALOG) 0.1 % external cream; Apply topically 2 times daily For 2-4 weeks.  - zinc oxide (DESITIN) 20 % external ointment; Apply topically as needed (irritated skin)    Dermatitis  - clotrimazole (LOTRIMIN) 1 % external cream; Apply topically 2 times daily For 2-4 weeks.  - triamcinolone (KENALOG) 0.1 % external cream; Apply topically 2 times daily For 2-4 weeks.  - zinc oxide (DESITIN) 20 % external ointment; Apply topically as needed (irritated skin)       Patient Instructions   Mix the triamcinolone with the clotrimazole creams and apply twice a day for 2-4 weeks, at least 2 days after the irritation is fully gone.    In between that like after using restroom apply the skin barrier either zinc oxide if covered by insurance or Auqafor another over the counter option. Apply these every time after using the restroom if they have been wiped off.    If the rash is not improving after 5-7 day please let me know.    Practices aimed at minimizing moisture and friction in the involved area and reducing susceptibility to intertrigo are the mainstays of treatment. Typical beneficial practices include:  ?Daily cleansing of intertriginous skin with a mild cleanser followed by drying of affected area with a hair dryer on a cool setting  ?Aeration of affected area when feasible  ?Use of absorbent material or clothing, such as cotton or landeros wool, to separate skin in folds  ?Application of barrier creams in areas that may come in contact with urine or feces              No follow-ups on file.    Dave Jarrett MD  [facility] LESTER Bradford is a 75 year old who presents for the following health issues     HPI       Hospital Follow-up Visit:    Hospital/Nursing Home/ Rehab Facility:  [facility]  Date of Admission: 4/1/2021  Date of Discharge: 4/2/2021  Reason(s) for Admission: Left Hip replacement. Patient states she a rash in her groin area that needs to be looked at.      Was your hospitalization related to COVID-19? No   Problems taking medications regularly:  None  Medication changes since discharge: None  Problems adhering to non-medication therapy:  None. Patient started PT 2 days ago for once a week to start.    Summary of hospitalization:  [facility] discharge summary reviewed  Diagnostic Tests/Treatments reviewed.  Follow up needed: none  Other Healthcare Providers Involved in Patient s Care:         None  Update since discharge: improved. Post Discharge Medication Reconciliation: discharge medications reconciled, continue medications without change.  Plan of care communicated with patient            Used a large diaper and rash started after this, using pad liner.  Sore area in groin that gets itchy.  Put lanacaine, helped to stop itchy and soothed.    Review of Systems   Constitutional, HEENT, cardiovascular, pulmonary, gi and gu systems are negative, except as otherwise noted.      Objective    /62   Pulse 86   Temp 97.6  F (36.4  C) (Tympanic)   Resp 16   Wt 92.5 kg (204 lb)   SpO2 98%   BMI 36.14 kg/m    Body mass index is 36.14 kg/m .  Physical Exam   GENERAL: healthy, alert and no distress  SKIN: in folds of groin, and labial folds and gluteal fold erythematous beefy red with irritation.

## 2021-04-20 ENCOUNTER — HOSPITAL ENCOUNTER (OUTPATIENT)
Dept: PHYSICAL THERAPY | Facility: CLINIC | Age: 76
Setting detail: THERAPIES SERIES
End: 2021-04-20
Attending: ORTHOPAEDIC SURGERY
Payer: COMMERCIAL

## 2021-04-20 PROCEDURE — 97110 THERAPEUTIC EXERCISES: CPT | Mod: GP | Performed by: PHYSICAL THERAPIST

## 2021-04-24 DIAGNOSIS — L30.4 INTERTRIGO: ICD-10-CM

## 2021-04-24 DIAGNOSIS — L30.9 DERMATITIS: ICD-10-CM

## 2021-04-27 ENCOUNTER — TELEPHONE (OUTPATIENT)
Dept: FAMILY MEDICINE | Facility: CLINIC | Age: 76
End: 2021-04-27

## 2021-04-27 NOTE — TELEPHONE ENCOUNTER
Patient's  called stating [facility] mail  pharmacy stated they did not have any losartan refills on file. He states Suzette has 3 weeks of medication left but wants to get it refilled as soon as possible so she does not run out. Please call patient and notify when done. [phone]  Laura ARNOLD RN

## 2021-04-27 NOTE — TELEPHONE ENCOUNTER
Spouse called to check if refill is available for losartan?  Advised to contact pharmacy.  It appears that one refill remains.    Luna Singer RN

## 2021-04-28 ENCOUNTER — HOSPITAL ENCOUNTER (OUTPATIENT)
Dept: PHYSICAL THERAPY | Facility: CLINIC | Age: 76
Setting detail: THERAPIES SERIES
End: 2021-04-28
Attending: ORTHOPAEDIC SURGERY
Payer: COMMERCIAL

## 2021-04-28 PROCEDURE — 97110 THERAPEUTIC EXERCISES: CPT | Mod: GP | Performed by: PHYSICAL THERAPIST

## 2021-04-28 RX ORDER — TRIAMCINOLONE ACETONIDE 1 MG/G
CREAM TOPICAL
Qty: 30 G | Refills: 0 | Status: SHIPPED | OUTPATIENT
Start: 2021-04-28 | End: 2021-08-25

## 2021-04-30 ENCOUNTER — IMMUNIZATION (OUTPATIENT)
Dept: FAMILY MEDICINE | Facility: CLINIC | Age: 76
End: 2021-04-30
Payer: COMMERCIAL

## 2021-04-30 PROCEDURE — 0011A PR COVID VAC MODERNA 100 MCG/0.5 ML IM: CPT

## 2021-04-30 PROCEDURE — 91301 PR COVID VAC MODERNA 100 MCG/0.5 ML IM: CPT

## 2021-05-04 ENCOUNTER — DOCUMENTATION ONLY (OUTPATIENT)
Dept: OTHER | Facility: CLINIC | Age: 76
End: 2021-05-04

## 2021-05-07 ENCOUNTER — HOSPITAL ENCOUNTER (OUTPATIENT)
Dept: PHYSICAL THERAPY | Facility: CLINIC | Age: 76
Setting detail: THERAPIES SERIES
End: 2021-05-07
Attending: ORTHOPAEDIC SURGERY
Payer: COMMERCIAL

## 2021-05-07 PROCEDURE — 97110 THERAPEUTIC EXERCISES: CPT | Mod: GP | Performed by: PHYSICAL THERAPIST

## 2021-05-11 ENCOUNTER — TRANSFERRED RECORDS (OUTPATIENT)
Dept: HEALTH INFORMATION MANAGEMENT | Facility: CLINIC | Age: 76
End: 2021-05-11

## 2021-05-18 ENCOUNTER — HOSPITAL ENCOUNTER (OUTPATIENT)
Dept: PHYSICAL THERAPY | Facility: CLINIC | Age: 76
Setting detail: THERAPIES SERIES
End: 2021-05-18
Attending: ORTHOPAEDIC SURGERY
Payer: COMMERCIAL

## 2021-05-18 PROCEDURE — 97110 THERAPEUTIC EXERCISES: CPT | Mod: GP | Performed by: PHYSICAL THERAPIST

## 2021-05-28 ENCOUNTER — IMMUNIZATION (OUTPATIENT)
Dept: FAMILY MEDICINE | Facility: CLINIC | Age: 76
End: 2021-05-28
Attending: FAMILY MEDICINE
Payer: COMMERCIAL

## 2021-05-28 PROCEDURE — 0012A PR COVID VAC MODERNA 100 MCG/0.5 ML IM: CPT

## 2021-05-28 PROCEDURE — 91301 PR COVID VAC MODERNA 100 MCG/0.5 ML IM: CPT

## 2021-06-22 DIAGNOSIS — I10 ESSENTIAL HYPERTENSION: Chronic | ICD-10-CM

## 2021-06-22 DIAGNOSIS — E11.21 TYPE 2 DIABETES MELLITUS WITH DIABETIC NEPHROPATHY, WITHOUT LONG-TERM CURRENT USE OF INSULIN (H): Chronic | ICD-10-CM

## 2021-06-22 LAB
ANION GAP SERPL CALCULATED.3IONS-SCNC: 4 MMOL/L (ref 3–14)
BUN SERPL-MCNC: 23 MG/DL (ref 7–30)
CALCIUM SERPL-MCNC: 8.9 MG/DL (ref 8.5–10.1)
CHLORIDE SERPL-SCNC: 106 MMOL/L (ref 94–109)
CHOLEST SERPL-MCNC: 145 MG/DL
CO2 SERPL-SCNC: 29 MMOL/L (ref 20–32)
CREAT SERPL-MCNC: 1.1 MG/DL (ref 0.52–1.04)
GFR SERPL CREATININE-BSD FRML MDRD: 48 ML/MIN/{1.73_M2}
GLUCOSE SERPL-MCNC: 173 MG/DL (ref 70–99)
HBA1C MFR BLD: 7.2 % (ref 0–5.6)
HDLC SERPL-MCNC: 63 MG/DL
LDLC SERPL CALC-MCNC: 39 MG/DL
NONHDLC SERPL-MCNC: 82 MG/DL
POTASSIUM SERPL-SCNC: 4.8 MMOL/L (ref 3.4–5.3)
SODIUM SERPL-SCNC: 139 MMOL/L (ref 133–144)
TRIGL SERPL-MCNC: 214 MG/DL

## 2021-06-22 PROCEDURE — 83036 HEMOGLOBIN GLYCOSYLATED A1C: CPT | Performed by: FAMILY MEDICINE

## 2021-06-22 PROCEDURE — 80048 BASIC METABOLIC PNL TOTAL CA: CPT | Performed by: FAMILY MEDICINE

## 2021-06-22 PROCEDURE — 80061 LIPID PANEL: CPT | Performed by: FAMILY MEDICINE

## 2021-06-22 PROCEDURE — 36415 COLL VENOUS BLD VENIPUNCTURE: CPT | Performed by: FAMILY MEDICINE

## 2021-06-25 NOTE — PROGRESS NOTES
"   OUTPATIENT PHYSICAL THERAPY DISCHARGE SUMMARY   Dr. Zachary Arteaga 4/14/21 to 05/18/21 1400   Signing Clinician's Name / Credentials   Signing clinician's name / credentials Sasha Hernandez, PT [id number]   Session Number   Session Number 5   Ortho Goal 1   Goal Identifier 1   Goal Description Pt will be able to walk w/ SEC w/ slight limp.  5/7/21 MET--practiced in clinic   Target Date 05/05/21   Ortho Goal 2   Goal Identifier 2.   Goal Description Pt will be able to transition sit to stand w/ decreased reliance on UE's  5/18/21: able to stand from chair w/o UE x2, minimal unilateral UE assist after  MET   Target Date 06/03/21   Ortho Goal 3   Goal Identifier 3.   Goal Description Pt will be able to walk w/o device w/ slight to no limp  5/18/21: cueing for increased step length and arm swing, slight limp but improved.  MET   Target Date 06/03/21   Ortho Goal 4   Goal Identifier 4.   Goal Description Pt will be independent and consistent w/ HEP  5/18/21: MET   Target Date 06/13/21   Subjective Report   Subjective Report Pt states hip has been doing well, minimal soreness.   Has been walking around home without cane, brings cane when going out.  Able to sleep on L side now.   Objective Measure    Objective Measure gait: slight limp w/o cane, improved with  cueing for increased step length/arm swing   Therapeutic Procedure/exercise   Treatment Detail Scifit seat 11 L 1 x 3 min.   side stepping 7ft x3 each direction.  Bridges X 20.  SLR  X 10.    Hip abd in SL X 12.  Marching1#  X 20 B alternating w/ BUE support.     Sit to stand to plinth at lower thigh (20\") X 20.  Ascend/descend 4 6\" steps w/ B railings and reciprocal pattern x4.  Practiced walking w/o cane, cueing for increased step length and arm swing.   Plan   Home program ex as above   Plan  Pt to cont on own w/ HEP.  Discharge from physical therapy   Comments   Comments Pt has met goals     "

## 2021-08-25 ENCOUNTER — OFFICE VISIT (OUTPATIENT)
Dept: FAMILY MEDICINE | Facility: CLINIC | Age: 76
End: 2021-08-25
Payer: COMMERCIAL

## 2021-08-25 VITALS
HEART RATE: 72 BPM | RESPIRATION RATE: 16 BRPM | WEIGHT: 206.6 LBS | DIASTOLIC BLOOD PRESSURE: 68 MMHG | OXYGEN SATURATION: 98 % | BODY MASS INDEX: 36.6 KG/M2 | SYSTOLIC BLOOD PRESSURE: 122 MMHG | TEMPERATURE: 97.9 F

## 2021-08-25 DIAGNOSIS — M25.561 RIGHT KNEE PAIN, UNSPECIFIED CHRONICITY: ICD-10-CM

## 2021-08-25 DIAGNOSIS — I48.92 ATRIAL FLUTTER WITH RAPID VENTRICULAR RESPONSE (H): ICD-10-CM

## 2021-08-25 DIAGNOSIS — E11.21 TYPE 2 DIABETES MELLITUS WITH DIABETIC NEPHROPATHY, WITHOUT LONG-TERM CURRENT USE OF INSULIN (H): Chronic | ICD-10-CM

## 2021-08-25 DIAGNOSIS — M25.552 HIP PAIN, LEFT: Primary | ICD-10-CM

## 2021-08-25 DIAGNOSIS — I10 ESSENTIAL HYPERTENSION: Chronic | ICD-10-CM

## 2021-08-25 LAB
CREAT UR-MCNC: 64 MG/DL
MICROALBUMIN UR-MCNC: 38 MG/L
MICROALBUMIN/CREAT UR: 59.38 MG/G CR (ref 0–25)

## 2021-08-25 PROCEDURE — 99214 OFFICE O/P EST MOD 30 MIN: CPT | Performed by: FAMILY MEDICINE

## 2021-08-25 PROCEDURE — 82043 UR ALBUMIN QUANTITATIVE: CPT | Performed by: FAMILY MEDICINE

## 2021-08-25 RX ORDER — ATORVASTATIN CALCIUM 10 MG/1
10 TABLET, FILM COATED ORAL DAILY
Qty: 90 TABLET | Refills: 3 | Status: SHIPPED | OUTPATIENT
Start: 2021-08-25 | End: 2022-07-25

## 2021-08-25 RX ORDER — CHLORTHALIDONE 25 MG/1
12.5 TABLET ORAL DAILY
Qty: 45 TABLET | Refills: 3 | Status: SHIPPED | OUTPATIENT
Start: 2021-08-25 | End: 2022-07-25

## 2021-08-25 RX ORDER — LOSARTAN POTASSIUM 25 MG/1
25 TABLET ORAL DAILY
Qty: 90 TABLET | Refills: 3 | Status: SHIPPED | OUTPATIENT
Start: 2021-08-25 | End: 2022-07-25

## 2021-08-25 RX ORDER — METOPROLOL SUCCINATE 100 MG/1
100 TABLET, EXTENDED RELEASE ORAL DAILY
Qty: 90 TABLET | Refills: 3 | Status: SHIPPED | OUTPATIENT
Start: 2021-08-25 | End: 2022-07-25

## 2021-08-25 RX ORDER — GLIPIZIDE 5 MG/1
5 TABLET, FILM COATED, EXTENDED RELEASE ORAL DAILY
Qty: 90 TABLET | Refills: 3 | Status: SHIPPED | OUTPATIENT
Start: 2021-08-25 | End: 2022-07-25

## 2021-08-25 NOTE — LETTER
August 25, 2021      Suzette Britton  [address] [address] [address]        Dear ,    We are writing to inform you of your test results.    Urine test shows stable kidney function.     Resulted Orders   Albumin Random Urine Quantitative with Creat Ratio   Result Value Ref Range    Creatinine Urine mg/dL 64 mg/dL    Albumin Urine mg/L 38 mg/L    Albumin Urine mg/g Cr 59.38 (H) 0.00 - 25.00 mg/g Cr       If you have any questions or concerns, please call the clinic at the number listed above.       Sincerely,      Dave Jarrett MD

## 2021-08-25 NOTE — PROGRESS NOTES
"    Assessment & Plan     Type 2 diabetes mellitus with diabetic nephropathy, without long-term current use of insulin (H)  Stable, refill  - atorvastatin (LIPITOR) 10 MG tablet; Take 1 tablet (10 mg) by mouth daily  - glipiZIDE (GLUCOTROL XL) 5 MG 24 hr tablet; Take 1 tablet (5 mg) by mouth daily  - metFORMIN (GLUCOPHAGE) 500 MG tablet; Take 2 tablets (1,000 mg) by mouth 2 times daily (with meals)    Essential hypertension  Stable, refill  - chlorthalidone (HYGROTON) 25 MG tablet; Take 0.5 tablets (12.5 mg) by mouth daily  - losartan (COZAAR) 25 MG tablet; Take 1 tablet (25 mg) by mouth daily    Atrial flutter with rapid ventricular response (H)  Stable, refill  - metoprolol succinate ER (TOPROL-XL) 100 MG 24 hr tablet; Take 1 tablet (100 mg) by mouth daily       BMI:   Estimated body mass index is 36.6 kg/m  as calculated from the following:    Height as of 4/1/21: 1.6 m (5' 3\").    Weight as of this encounter: 93.7 kg (206 lb 9.6 oz).   Weight management plan: Discussed healthy diet and exercise guidelines    Patient Instructions   Plan a Medicare check and labs in December.          Return in about 17 weeks (around 12/22/2021) for Routine preventive, with me.    Dave Jarrett MD  [facility]    Giancarlo Bradford is a 76 year old who presents for the following health issues     HPI     Diabetes Follow-up      How often are you checking your blood sugar? Not at all    What concerns do you have today about your diabetes? None     Do you have any of these symptoms? (Select all that apply)  No numbness or tingling in feet.  No redness, sores or blisters on feet.  No complaints of excessive thirst.  No reports of blurry vision.  No significant changes to weight.    Hyperlipidemia Follow-Up      Are you regularly taking any medication or supplement to lower your cholesterol?   Yes- Atorvastatin    Are you having muscle aches or other side effects that you think could be caused by your " cholesterol lowering medication?  No    Hypertension Follow-up      Do you check your blood pressure regularly outside of the clinic? Yes     Are you following a low salt diet? Yes    Are your blood pressures ever more than 140 on the top number (systolic) OR more   than 90 on the bottom number (diastolic), for example 140/90? No    BP Readings from Last 2 Encounters:   04/16/21 116/62   04/02/21 135/61     Hemoglobin A1C (%)   Date Value   06/22/2021 7.2 (H)   03/02/2021 7.5 (H)     LDL Cholesterol Calculated (mg/dL)   Date Value   06/22/2021 39   09/08/2020 41       Chronic Kidney Disease Follow-up      Do you take any over the counter pain medicine?: No      How many servings of fruits and vegetables do you eat daily?  4 or more    On average, how many sweetened beverages do you drink each day (Examples: soda, juice, sweet tea, etc.  Do NOT count diet or artificially sweetened beverages)?   0    How many days per week do you exercise enough to make your heart beat faster? 0    How many minutes a day do you exercise enough to make your heart beat faster? 0    How many days per week do you miss taking your medication? 0      Review of Systems   Constitutional, HEENT, cardiovascular, pulmonary, gi and gu systems are negative, except as otherwise noted.      Objective    /68   Pulse 72   Temp 97.9  F (36.6  C) (Tympanic)   Resp 16   Wt 93.7 kg (206 lb 9.6 oz)   SpO2 98%   BMI 36.60 kg/m    Body mass index is 36.6 kg/m .  Physical Exam   GENERAL: healthy, alert and no distress  NECK: no adenopathy, no asymmetry, masses, or scars and thyroid normal to palpation  RESP: lungs clear to auscultation - no rales, rhonchi or wheezes  CV: regular rate and rhythm, normal S1 S2, no S3 or S4, no murmur, click or rub, no peripheral edema and peripheral pulses strong  ABDOMEN: soft, nontender, no hepatosplenomegaly, no masses and bowel sounds normal  MS: no gross musculoskeletal defects noted, no edema  Diabetic foot  exam: normal DP and PT pulses, no trophic changes or ulcerative lesions, normal sensory exam and normal monofilament exam.  Yellowed toenails.

## 2021-09-12 ENCOUNTER — HEALTH MAINTENANCE LETTER (OUTPATIENT)
Age: 76
End: 2021-09-12

## 2021-09-24 ENCOUNTER — THERAPY VISIT (OUTPATIENT)
Dept: PHYSICAL THERAPY | Facility: CLINIC | Age: 76
End: 2021-09-24
Attending: FAMILY MEDICINE
Payer: COMMERCIAL

## 2021-09-24 DIAGNOSIS — M25.562 LEFT KNEE PAIN: ICD-10-CM

## 2021-09-24 DIAGNOSIS — M25.561 RIGHT KNEE PAIN, UNSPECIFIED CHRONICITY: ICD-10-CM

## 2021-09-24 DIAGNOSIS — M25.552 HIP PAIN, LEFT: ICD-10-CM

## 2021-09-24 PROCEDURE — 97161 PT EVAL LOW COMPLEX 20 MIN: CPT | Mod: GP | Performed by: PHYSICAL THERAPIST

## 2021-09-24 PROCEDURE — 97110 THERAPEUTIC EXERCISES: CPT | Mod: GP | Performed by: PHYSICAL THERAPIST

## 2021-09-24 NOTE — PROGRESS NOTES
Physical Therapy Initial Evaluation  Subjective:  The history is provided by the patient. No  was used.   Patient Health History  Suzette Britton being seen for L knee stiffness.          Pain is reported as 2/10 on pain scale.  General health as reported by patient is good.  Pertinent medical history includes: high blood pressure, osteoarthritis and diabetes.   Red flags:  None as reported by patient.  Medical allergies: none.   Surgeries include:  Orthopedic surgery. Other surgery history details: DANAY, TKA.    Current medications:  High blood pressure medication.                         Therapist Generated HPI Evaluation  Problem details: Pt has been having a hard time reaching forward to put her shoe and sock on. She feels like her L knee is really stiff and limits her ability to bend and do this. She has had both her hip and knee replaced in the past. She does have some fear of falling as well and finds it hard to go up a curb in a parking lot. She also has some difficulty getting in the car as it is hard for her to bend her knee and lift her leg enough to get in.         Type of problem:  Left knee.    This is a chronic condition.  Condition occurred with:  Other reason.  Where condition occurred: for unknown reasons.  Patient reports pain:  Anterior.  Pain is described as aching and cramping and is intermittent.  Pain radiates to:  Thigh. Pain is the same all the time.  Since onset symptoms are gradually worsening.  Associated symptoms:  Loss of motion/stiffness. Symptoms are exacerbated by bending/squatting, ascending stairs and certain positions      Previous treatment includes surgery and physical therapy. There was mild improvement following previous treatment.  Barriers include:  None as reported by patient.                        Objective:    Gait:    Assistive Devices:  None                                                   Hip Evaluation  Hip PROM:  : decreased hip flexion  B.                          Hip Strength:        Abduction:  Left: 5/5    -   Pain:Right: 5/5   -   Pain:  Adduction:  Right: 4+/5   -  Pain:                         Knee Evaluation:  ROM:      PROM        Flexion: Left: 90   Right:       Strength:     Extension:  Left: 5-/5    Pain:-      Right: 5-/5    Pain:-  Flexion:  Left: 5-/5    Pain:-      Right:/5   Pain:-            Palpation:  Palpation of knee: noted tenderness with deep palpation to L quadriceps.                    General Evaluation:                          Functional Assessment:  Functional assessment wnl: unable to perform sit to stand without use of UE.                                               ROS    Assessment/Plan:    Patient is a 76 year old female with left side knee complaints.    Patient has the following significant findings with corresponding treatment plan.                Diagnosis 1:  Left knee pain  Pain -  hot/cold therapy, manual therapy, self management, education and home program  Decreased ROM/flexibility - manual therapy, therapeutic exercise, therapeutic activity and home program  Impaired gait - gait training and home program    Therapy Evaluation Codes:   1) History comprised of:   Personal factors that impact the plan of care:      None.    Comorbidity factors that impact the plan of care are:      None.     Medications impacting care: High blood pressure.  2) Examination of Body Systems comprised of:   Body structures and functions that impact the plan of care:      Knee.   Activity limitations that impact the plan of care are:      Bending, Dressing, Squatting/kneeling and Stairs.  3) Clinical presentation characteristics are:   Stable/Uncomplicated.  4) Decision-Making    Low complexity using standardized patient assessment instrument and/or measureable assessment of functional outcome.  Cumulative Therapy Evaluation is: Low complexity.    Previous and current functional limitations:  (See Goal Flow Sheet for this  information)    Short term and Long term goals: (See Goal Flow Sheet for this information)     Communication ability:  Patient appears to be able to clearly communicate and understand verbal and written communication and follow directions correctly.  Treatment Explanation - The following has been discussed with the patient:   RX ordered/plan of care  Anticipated outcomes  Possible risks and side effects  This patient would benefit from PT intervention to resume normal activities.   Rehab potential is good.    Frequency:  1 X week, once daily  Duration:  for 6 weeks  Discharge Plan:  Achieve all LTG.  Independent in home treatment program.  Reach maximal therapeutic benefit.    Please refer to the daily flowsheet for treatment today, total treatment time and time spent performing 1:1 timed codes.

## 2021-10-01 ENCOUNTER — THERAPY VISIT (OUTPATIENT)
Dept: PHYSICAL THERAPY | Facility: CLINIC | Age: 76
End: 2021-10-01
Payer: COMMERCIAL

## 2021-10-01 DIAGNOSIS — M25.562 LEFT KNEE PAIN: ICD-10-CM

## 2021-10-01 PROCEDURE — 97110 THERAPEUTIC EXERCISES: CPT | Mod: GP | Performed by: PHYSICAL THERAPIST

## 2021-10-01 PROCEDURE — 97140 MANUAL THERAPY 1/> REGIONS: CPT | Mod: GP | Performed by: PHYSICAL THERAPIST

## 2021-10-11 ENCOUNTER — THERAPY VISIT (OUTPATIENT)
Dept: PHYSICAL THERAPY | Facility: CLINIC | Age: 76
End: 2021-10-11
Payer: COMMERCIAL

## 2021-10-11 DIAGNOSIS — M25.562 LEFT KNEE PAIN: ICD-10-CM

## 2021-10-11 PROCEDURE — 97140 MANUAL THERAPY 1/> REGIONS: CPT | Mod: GP | Performed by: PHYSICAL THERAPIST

## 2021-10-11 PROCEDURE — 97110 THERAPEUTIC EXERCISES: CPT | Mod: GP | Performed by: PHYSICAL THERAPIST

## 2021-11-01 ENCOUNTER — THERAPY VISIT (OUTPATIENT)
Dept: PHYSICAL THERAPY | Facility: CLINIC | Age: 76
End: 2021-11-01
Payer: COMMERCIAL

## 2021-11-01 DIAGNOSIS — M25.562 LEFT KNEE PAIN: ICD-10-CM

## 2021-11-01 PROCEDURE — 97140 MANUAL THERAPY 1/> REGIONS: CPT | Mod: GP | Performed by: PHYSICAL THERAPIST

## 2021-11-01 PROCEDURE — 97110 THERAPEUTIC EXERCISES: CPT | Mod: GP | Performed by: PHYSICAL THERAPIST

## 2021-11-08 ENCOUNTER — THERAPY VISIT (OUTPATIENT)
Dept: PHYSICAL THERAPY | Facility: CLINIC | Age: 76
End: 2021-11-08
Payer: COMMERCIAL

## 2021-11-08 DIAGNOSIS — M25.562 LEFT KNEE PAIN: ICD-10-CM

## 2021-11-08 PROCEDURE — 97110 THERAPEUTIC EXERCISES: CPT | Mod: GP | Performed by: PHYSICAL THERAPIST

## 2021-11-15 ENCOUNTER — THERAPY VISIT (OUTPATIENT)
Dept: PHYSICAL THERAPY | Facility: CLINIC | Age: 76
End: 2021-11-15
Payer: COMMERCIAL

## 2021-11-15 DIAGNOSIS — M25.561 RIGHT KNEE PAIN, UNSPECIFIED CHRONICITY: Primary | ICD-10-CM

## 2021-11-15 DIAGNOSIS — M25.552 HIP PAIN, LEFT: ICD-10-CM

## 2021-11-15 DIAGNOSIS — M25.562 LEFT KNEE PAIN: ICD-10-CM

## 2021-11-15 PROCEDURE — 97110 THERAPEUTIC EXERCISES: CPT | Mod: GP | Performed by: PHYSICAL THERAPIST

## 2021-11-15 PROCEDURE — 97140 MANUAL THERAPY 1/> REGIONS: CPT | Mod: GP | Performed by: PHYSICAL THERAPIST

## 2021-11-15 NOTE — PROGRESS NOTES
Subjective:  HPI  Physical Exam                    Objective:  System    Physical Exam    General     ROS    Assessment/Plan:    DISCHARGE REPORT    Progress reporting period is from 9/30/2021to 11/15/2021.       SUBJECTIVE  Subjective changes noted by patient:  .  Subjective: Suzette feels ready to continue on her own with her home exercises.  She feels PT has been very helpful and is able to get in and out of her car much easier.      Changes in function:  Yes (See Goal flowsheet attached for changes in current functional level)  Adverse reaction to treatment or activity: None    OBJECTIVE  Changes noted in objective findings:  Yes,   Objective: Left knee flexion 102, right 112, good strength, can squat nearly to chair without use of hands.  Knees are both lacking aobut 5-10 deg of extension ROM     ASSESSMENT/PLAN  Updated problem list and treatment plan: Diagnosis 1:  Left hip and right knee Decreased ROM/flexibility - home program  STG/LTGs have been met or progress has been made towards goals:  Yes (See Goal flow sheet completed today.)  Assessment of Progress: The patient's condition is improving.  Self Management Plans:  Patient is independent in a home treatment program.  I have re-evaluated this patient and find that the nature, scope, duration and intensity of the therapy is appropriate for the medical condition of the patient.  Suzette continues to require the following intervention to meet STG and LTG's:  PT intervention is no longer required to meet STG/LTG.    Recommendations:  This patient is ready to be discharged from therapy and continue their home treatment program.    Please refer to the daily flowsheet for treatment today, total treatment time and time spent performing 1:1 timed codes.

## 2021-12-01 NOTE — ANESTHESIA CARE TRANSFER NOTE
Patient: Suzette Britton    Procedure(s):  Right Total Knee Arthroplasty - Wound Class: I-Clean    Diagnosis: right knee arthritis  Diagnosis Additional Information: No value filed.    Anesthesia Type:   General, Spinal, Periph. Nerve Block for postop pain     Note:  Airway :Nasal Cannula  Patient transferred to:PACU        Vitals: (Last set prior to Anesthesia Care Transfer)    CRNA VITALS  5/4/2017 1526 - 5/4/2017 1617      5/4/2017             Pulse: 89    SpO2: 96 %                Electronically Signed By: SRAVAN Dunbar CRNA  May 4, 2017  4:17 PM  
no

## 2021-12-28 ENCOUNTER — LAB (OUTPATIENT)
Dept: LAB | Facility: CLINIC | Age: 76
End: 2021-12-28
Payer: COMMERCIAL

## 2021-12-28 DIAGNOSIS — E11.21 TYPE 2 DIABETES MELLITUS WITH DIABETIC NEPHROPATHY, WITHOUT LONG-TERM CURRENT USE OF INSULIN (H): Chronic | ICD-10-CM

## 2021-12-28 LAB — HBA1C MFR BLD: 7.8 % (ref 0–5.6)

## 2021-12-28 PROCEDURE — 36415 COLL VENOUS BLD VENIPUNCTURE: CPT

## 2021-12-28 PROCEDURE — 83036 HEMOGLOBIN GLYCOSYLATED A1C: CPT

## 2022-02-15 ENCOUNTER — VIRTUAL VISIT (OUTPATIENT)
Dept: FAMILY MEDICINE | Facility: CLINIC | Age: 77
End: 2022-02-15
Payer: COMMERCIAL

## 2022-02-15 DIAGNOSIS — I48.92 ATRIAL FLUTTER WITH RAPID VENTRICULAR RESPONSE (H): ICD-10-CM

## 2022-02-15 DIAGNOSIS — E66.01 MORBID OBESITY (H): ICD-10-CM

## 2022-02-15 DIAGNOSIS — E11.21 TYPE 2 DIABETES MELLITUS WITH DIABETIC NEPHROPATHY, WITHOUT LONG-TERM CURRENT USE OF INSULIN (H): Chronic | ICD-10-CM

## 2022-02-15 DIAGNOSIS — R49.9 CHANGE IN VOICE: Primary | ICD-10-CM

## 2022-02-15 DIAGNOSIS — R49.8 HYPOPHONIA: ICD-10-CM

## 2022-02-15 DIAGNOSIS — N18.31 STAGE 3A CHRONIC KIDNEY DISEASE (H): ICD-10-CM

## 2022-02-15 PROCEDURE — 99214 OFFICE O/P EST MOD 30 MIN: CPT | Mod: TEL | Performed by: FAMILY MEDICINE

## 2022-02-15 RX ORDER — ASPIRIN 81 MG/1
81 TABLET, CHEWABLE ORAL DAILY
COMMUNITY

## 2022-02-15 NOTE — PATIENT INSTRUCTIONS
Schedule a lab only appt for the antibodies for Myasthenia Gravis.    Neurology will call you to schedule for this concern with the soft voice and discuss myasthenia gravis.    In 6 months plan fasting labs and clinic appt for a Medicare Wellness and diabetes recheck.

## 2022-02-15 NOTE — PROGRESS NOTES
"Suzette is a 76 year old who is being evaluated via a billable telephone visit.      What phone number would you like to be contacted at? [phone]  How would you like to obtain your AVS? MyChart    Assessment & Plan     Change in voice  - Myasthenia Gravis (MG) Evaulation with MuSK Reflex; Future    Hypophonia usually around 3 pm worsens to only whisper then improves slightly with eating then worsens again an hour later.  Concern for MG, we discussed plan labs and then referral to neurology  - Myasthenia Gravis (MG) Evaulation with MuSK Reflex; Future    Type 2 diabetes mellitus with diabetic nephropathy, without long-term current use of insulin (H)  Stable, recheck in 6months  - **A1C FUTURE 6mo; Future    Atrial flutter with rapid ventricular response (H)  Stable, with metoprolol    Morbid obesity (H)  Continues to work on healthy eating and activity    Stage 3a chronic kidney disease (H)  Recheck in August         BMI:   Estimated body mass index is 36.6 kg/m  as calculated from the following:    Height as of 4/1/21: 1.6 m (5' 3\").    Weight as of 8/25/21: 93.7 kg (206 lb 9.6 oz).   Weight management plan: Discussed healthy diet and exercise guidelines    See Patient Instructions    Return in about 6 months (around 8/15/2022) for Routine preventive, with me, in person.    Dave Jarrett MD  [facility]    Subjective   Suzette is a 76 year old who presents for the following health issues     HPI     Diabetes Follow-up      How often are you checking your blood sugar? Not at all    What concerns do you have today about your diabetes? Other: Losing her voice every day. Should she continue to take a low dose aspirin     Do you have any of these symptoms? (Select all that apply)  Weight gain; stiff knees    After 3 pm voice gets really soft, then eats dinner then it comes back and then hour later gets soft again. Doesn't get throat pain or scratchy.   No heartburn.  No postnasal drip.   No " dysphagia.    BP Readings from Last 2 Encounters:   08/25/21 122/68   04/16/21 116/62     Hemoglobin A1C POCT (%)   Date Value   06/22/2021 7.2 (H)   03/02/2021 7.5 (H)     Hemoglobin A1C (%)   Date Value   12/28/2021 7.8 (H)     LDL Cholesterol Calculated (mg/dL)   Date Value   06/22/2021 39   09/08/2020 41           How many servings of fruits and vegetables do you eat daily?  2-3    On average, how many sweetened beverages do you drink each day (Examples: soda, juice, sweet tea, etc.  Do NOT count diet or artificially sweetened beverages)?   0    How many days per week do you exercise enough to make your heart beat faster? 3 or less    How many minutes a day do you exercise enough to make your heart beat faster? 10 - 19    How many days per week do you miss taking your medication? 0; very rarely she will forget to take it      Review of Systems   Constitutional, HEENT, cardiovascular, pulmonary, gi and gu systems are negative, except as otherwise noted.      Objective           Vitals:  No vitals were obtained today due to virtual visit.    Physical Exam   healthy, alert and no distress  PSYCH: Alert and oriented times 3; coherent speech, normal   rate and volume, able to articulate logical thoughts, able   to abstract reason, no tangential thoughts, no hallucinations   or delusions  Her affect is normal and pleasant  RESP: No cough, no audible wheezing, able to talk in full sentences  Remainder of exam unable to be completed due to telephone visits            Phone call duration: 20 minutes

## 2022-02-17 PROBLEM — Z71.89 ACP (ADVANCE CARE PLANNING): Chronic | Status: RESOLVED | Noted: 2017-02-06 | Resolved: 2021-05-04

## 2022-04-04 ENCOUNTER — TRANSFERRED RECORDS (OUTPATIENT)
Dept: HEALTH INFORMATION MANAGEMENT | Facility: CLINIC | Age: 77
End: 2022-04-04
Payer: COMMERCIAL

## 2022-04-24 ENCOUNTER — HEALTH MAINTENANCE LETTER (OUTPATIENT)
Age: 77
End: 2022-04-24

## 2022-07-21 DIAGNOSIS — E11.21 TYPE 2 DIABETES MELLITUS WITH DIABETIC NEPHROPATHY, WITHOUT LONG-TERM CURRENT USE OF INSULIN (H): Chronic | ICD-10-CM

## 2022-07-21 DIAGNOSIS — I48.92 ATRIAL FLUTTER WITH RAPID VENTRICULAR RESPONSE (H): ICD-10-CM

## 2022-07-21 DIAGNOSIS — N18.31 STAGE 3A CHRONIC KIDNEY DISEASE (H): ICD-10-CM

## 2022-07-21 DIAGNOSIS — I10 ESSENTIAL HYPERTENSION: Chronic | ICD-10-CM

## 2022-07-21 DIAGNOSIS — Z13.220 SCREENING CHOLESTEROL LEVEL: Primary | ICD-10-CM

## 2022-07-22 NOTE — TELEPHONE ENCOUNTER
"Requested Prescriptions   Pending Prescriptions Disp Refills    losartan (COZAAR) 25 MG tablet [Pharmacy Med Name: LOSARTAN TAB 25MG] 90 tablet 3     Sig: TAKE 1 TABLET DAILY        Angiotensin-II Receptors Failed - 7/21/2022  1:36 PM        Failed - Normal serum creatinine on file in past 12 months     Recent Labs   Lab Test 06/22/21  0915   CR 1.10*       Ok to refill medication if creatinine is low          Failed - Normal serum potassium on file in past 12 months       Recent Labs   Lab Test 06/22/21  0915   POTASSIUM 4.8                    Passed - Last blood pressure under 140/90 in past 12 months       BP Readings from Last 3 Encounters:   08/25/21 122/68   04/16/21 116/62   04/02/21 135/61                 Passed - Recent (12 mo) or future (30 days) visit within the authorizing provider's specialty     Patient has had an office visit with the authorizing provider or a provider within the authorizing providers department within the previous 12 mos or has a future within next 30 days. See \"Patient Info\" tab in inbasket, or \"Choose Columns\" in Meds & Orders section of the refill encounter.              Passed - Medication is active on med list        Passed - Patient is age 18 or older        Passed - No active pregnancy on record        Passed - No positive pregnancy test in past 12 months           atorvastatin (LIPITOR) 10 MG tablet [Pharmacy Med Name: ATORVASTATIN TAB 10MG] 90 tablet 3     Sig: TAKE 1 TABLET DAILY        Statins Protocol Failed - 7/21/2022  1:36 PM        Failed - LDL on file in past 12 months     Recent Labs   Lab Test 06/22/21  0915   LDL 39               Passed - No abnormal creatine kinase in past 12 months     No lab results found.             Passed - Recent (12 mo) or future (30 days) visit within the authorizing provider's specialty     Patient has had an office visit with the authorizing provider or a provider within the authorizing providers department within the previous 12 mos or " "has a future within next 30 days. See \"Patient Info\" tab in inbasket, or \"Choose Columns\" in Meds & Orders section of the refill encounter.              Passed - Medication is active on med list        Passed - Patient is age 18 or older        Passed - No active pregnancy on record        Passed - No positive pregnancy test in past 12 months           glipiZIDE (GLUCOTROL XL) 5 MG 24 hr tablet [Pharmacy Med Name: GLIPIZIDE XL TAB 5MG] 90 tablet 3     Sig: TAKE 1 TABLET DAILY        Sulfonylurea Agents Failed - 7/21/2022  1:36 PM        Failed - Patient has documented A1c within the specified period of time.     If HgbA1C is 8 or greater, it needs to be on file within the past 3 months.  If less than 8, must be on file within the past 6 months.     Recent Labs   Lab Test 12/28/21  1345   A1C 7.8*             Failed - Patient has a recent creatinine (normal) within the past 12 mos.     Recent Labs   Lab Test 06/22/21  0915   CR 1.10*       Ok to refill medication if creatinine is low          Passed - Medication is active on med list        Passed - Patient is age 18 or older        Passed - No active pregnancy on record        Passed - Patient has not had a positive pregnancy test within the past 12 mos.        Passed - Recent (6 mo) or future (30 days) visit within the authorizing provider's specialty     Patient had office visit in the last 6 months or has a visit in the next 30 days with authorizing provider or within the authorizing provider's specialty.  See \"Patient Info\" tab in inbasket, or \"Choose Columns\" in Meds & Orders section of the refill encounter.               metFORMIN (GLUCOPHAGE) 500 MG tablet [Pharmacy Med Name: METFORMIN TAB 500MG] 360 tablet 3     Sig: TAKE 2 TABLETS TWO TIMES A DAY WITH MEALS        Biguanide Agents Failed - 7/21/2022  1:36 PM        Failed - Patient has documented A1c within the specified period of time.     If HgbA1C is 8 or greater, it needs to be on file within the past 3 " "months.  If less than 8, must be on file within the past 6 months.     Recent Labs   Lab Test 12/28/21  1345   A1C 7.8*             Failed - Patient's CR is NOT>1.4 OR Patient's EGFR is NOT<45 within past 12 mos.       Recent Labs   Lab Test 06/22/21  0915   GFRESTIMATED 48*   GFRESTBLACK 56*       Recent Labs   Lab Test 06/22/21  0915   CR 1.10*             Passed - Patient is age 10 or older        Passed - Patient does NOT have a diagnosis of CHF.        Passed - Medication is active on med list        Passed - Patient is not pregnant        Passed - Patient has not had a positive pregnancy test within the past 12 mos.         Passed - Recent (6 mo) or future (30 days) visit within the authorizing provider's specialty     Patient had office visit in the last 6 months or has a visit in the next 30 days with authorizing provider or within the authorizing provider's specialty.  See \"Patient Info\" tab in inbasket, or \"Choose Columns\" in Meds & Orders section of the refill encounter.               chlorthalidone (HYGROTON) 25 MG tablet [Pharmacy Med Name: CHLORTHALID  TAB 25MG] 45 tablet 3     Sig: TAKE 1/2 TABLET DAILY        Diuretics (Including Combos) Protocol Failed - 7/21/2022  1:36 PM        Failed - Normal serum creatinine on file in past 12 months       Recent Labs   Lab Test 06/22/21  0915   CR 1.10*              Failed - Normal serum potassium on file in past 12 months       Recent Labs   Lab Test 06/22/21  0915   POTASSIUM 4.8                    Failed - Normal serum sodium on file in past 12 months       Recent Labs   Lab Test 06/22/21  0915                 Passed - Blood pressure under 140/90 in past 12 months       BP Readings from Last 3 Encounters:   08/25/21 122/68   04/16/21 116/62   04/02/21 135/61                 Passed - Recent (12 mo) or future (30 days) visit within the authorizing provider's specialty     Patient has had an office visit with the authorizing provider or a provider within " "the authorizing providers department within the previous 12 mos or has a future within next 30 days. See \"Patient Info\" tab in inbasket, or \"Choose Columns\" in Meds & Orders section of the refill encounter.              Passed - Medication is active on med list        Passed - Patient is age 18 or older        Passed - No active pregancy on record        Passed - No positive pregnancy test in past 12 months           metoprolol succinate ER (TOPROL XL) 100 MG 24 hr tablet [Pharmacy Med Name: METOPROL SUC TAB 100MG ER] 90 tablet 3     Sig: TAKE 1 TABLET DAILY        Beta-Blockers Protocol Passed - 7/21/2022  1:36 PM        Passed - Blood pressure under 140/90 in past 12 months       BP Readings from Last 3 Encounters:   08/25/21 122/68   04/16/21 116/62   04/02/21 135/61                 Passed - Patient is age 6 or older        Passed - Recent (12 mo) or future (30 days) visit within the authorizing provider's specialty     Patient has had an office visit with the authorizing provider or a provider within the authorizing providers department within the previous 12 mos or has a future within next 30 days. See \"Patient Info\" tab in inbasket, or \"Choose Columns\" in Meds & Orders section of the refill encounter.              Passed - Medication is active on med list              "

## 2022-07-25 RX ORDER — GLIPIZIDE 5 MG/1
TABLET, FILM COATED, EXTENDED RELEASE ORAL
Qty: 90 TABLET | Refills: 0 | Status: SHIPPED | OUTPATIENT
Start: 2022-07-25 | End: 2022-10-12

## 2022-07-25 RX ORDER — METOPROLOL SUCCINATE 100 MG/1
TABLET, EXTENDED RELEASE ORAL
Qty: 90 TABLET | Refills: 0 | Status: SHIPPED | OUTPATIENT
Start: 2022-07-25 | End: 2022-10-12

## 2022-07-25 RX ORDER — LOSARTAN POTASSIUM 25 MG/1
TABLET ORAL
Qty: 90 TABLET | Refills: 0 | Status: SHIPPED | OUTPATIENT
Start: 2022-07-25 | End: 2022-10-12

## 2022-07-25 RX ORDER — CHLORTHALIDONE 25 MG/1
TABLET ORAL
Qty: 45 TABLET | Refills: 0 | Status: SHIPPED | OUTPATIENT
Start: 2022-07-25 | End: 2022-10-12

## 2022-07-25 RX ORDER — ATORVASTATIN CALCIUM 10 MG/1
TABLET, FILM COATED ORAL
Qty: 90 TABLET | Refills: 0 | Status: SHIPPED | OUTPATIENT
Start: 2022-07-25 | End: 2022-10-12

## 2022-07-25 NOTE — TELEPHONE ENCOUNTER
LM on patient VM to call back Care Team for a message from Dr. Jarrett. Rosetta Edge on 7/25/2022 at 4:35 PM

## 2022-07-25 NOTE — TELEPHONE ENCOUNTER
Sent 90 days.  Notify patient will need appt for refills. Please schedule this soon as appts are booking that far out.  Schedule as a medicare annual wellness with diabetic check and either schedule fasting labs ahead of this appt or come ready to appt for labs.  Thank you,  Dave Jarrett MD

## 2022-08-14 ENCOUNTER — HEALTH MAINTENANCE LETTER (OUTPATIENT)
Age: 77
End: 2022-08-14

## 2022-10-03 ENCOUNTER — LAB (OUTPATIENT)
Dept: LAB | Facility: CLINIC | Age: 77
End: 2022-10-03
Payer: COMMERCIAL

## 2022-10-03 DIAGNOSIS — E11.21 TYPE 2 DIABETES MELLITUS WITH DIABETIC NEPHROPATHY, WITHOUT LONG-TERM CURRENT USE OF INSULIN (H): Chronic | ICD-10-CM

## 2022-10-03 DIAGNOSIS — I10 ESSENTIAL HYPERTENSION: Chronic | ICD-10-CM

## 2022-10-03 DIAGNOSIS — R49.9 CHANGE IN VOICE: ICD-10-CM

## 2022-10-03 DIAGNOSIS — N18.31 STAGE 3A CHRONIC KIDNEY DISEASE (H): ICD-10-CM

## 2022-10-03 DIAGNOSIS — R49.8 HYPOPHONIA: ICD-10-CM

## 2022-10-03 DIAGNOSIS — Z13.220 SCREENING CHOLESTEROL LEVEL: ICD-10-CM

## 2022-10-03 LAB
ANION GAP SERPL CALCULATED.3IONS-SCNC: 14 MMOL/L (ref 7–15)
BUN SERPL-MCNC: 25.8 MG/DL (ref 8–23)
CALCIUM SERPL-MCNC: 9.2 MG/DL (ref 8.8–10.2)
CHLORIDE SERPL-SCNC: 99 MMOL/L (ref 98–107)
CHOLEST SERPL-MCNC: 145 MG/DL
CREAT SERPL-MCNC: 1.13 MG/DL (ref 0.51–0.95)
CREAT UR-MCNC: 54.5 MG/DL
DEPRECATED HCO3 PLAS-SCNC: 24 MMOL/L (ref 22–29)
GFR SERPL CREATININE-BSD FRML MDRD: 49 ML/MIN/1.73M2
GLUCOSE SERPL-MCNC: 219 MG/DL (ref 70–99)
HBA1C MFR BLD: 8.6 % (ref 0–5.6)
HDLC SERPL-MCNC: 52 MG/DL
HGB BLD-MCNC: 13.7 G/DL (ref 11.7–15.7)
LDLC SERPL CALC-MCNC: 56 MG/DL
MICROALBUMIN UR-MCNC: 37.4 MG/L
MICROALBUMIN/CREAT UR: 68.62 MG/G CR (ref 0–25)
NONHDLC SERPL-MCNC: 93 MG/DL
POTASSIUM SERPL-SCNC: 4.3 MMOL/L (ref 3.4–5.3)
SODIUM SERPL-SCNC: 137 MMOL/L (ref 136–145)
TRIGL SERPL-MCNC: 187 MG/DL

## 2022-10-03 PROCEDURE — 99000 SPECIMEN HANDLING OFFICE-LAB: CPT

## 2022-10-03 PROCEDURE — 82043 UR ALBUMIN QUANTITATIVE: CPT

## 2022-10-03 PROCEDURE — 36415 COLL VENOUS BLD VENIPUNCTURE: CPT

## 2022-10-03 PROCEDURE — 83036 HEMOGLOBIN GLYCOSYLATED A1C: CPT

## 2022-10-03 PROCEDURE — 85018 HEMOGLOBIN: CPT

## 2022-10-03 PROCEDURE — 80048 BASIC METABOLIC PNL TOTAL CA: CPT

## 2022-10-03 PROCEDURE — 80061 LIPID PANEL: CPT

## 2022-10-03 PROCEDURE — 83519 RIA NONANTIBODY: CPT | Mod: 90

## 2022-10-12 ENCOUNTER — OFFICE VISIT (OUTPATIENT)
Dept: FAMILY MEDICINE | Facility: CLINIC | Age: 77
End: 2022-10-12
Payer: COMMERCIAL

## 2022-10-12 VITALS
RESPIRATION RATE: 20 BRPM | DIASTOLIC BLOOD PRESSURE: 58 MMHG | OXYGEN SATURATION: 96 % | SYSTOLIC BLOOD PRESSURE: 132 MMHG | HEART RATE: 79 BPM | HEIGHT: 63 IN | TEMPERATURE: 97.6 F | BODY MASS INDEX: 37.42 KG/M2 | WEIGHT: 211.2 LBS

## 2022-10-12 DIAGNOSIS — Z78.0 ASYMPTOMATIC MENOPAUSAL STATE: ICD-10-CM

## 2022-10-12 DIAGNOSIS — E11.21 TYPE 2 DIABETES MELLITUS WITH DIABETIC NEPHROPATHY, WITHOUT LONG-TERM CURRENT USE OF INSULIN (H): ICD-10-CM

## 2022-10-12 DIAGNOSIS — I10 ESSENTIAL HYPERTENSION: Chronic | ICD-10-CM

## 2022-10-12 DIAGNOSIS — Z00.00 WELLNESS EXAMINATION: Primary | ICD-10-CM

## 2022-10-12 DIAGNOSIS — R61 EXCESSIVE SWEATING: ICD-10-CM

## 2022-10-12 DIAGNOSIS — I48.92 ATRIAL FLUTTER WITH RAPID VENTRICULAR RESPONSE (H): ICD-10-CM

## 2022-10-12 PROCEDURE — G0439 PPPS, SUBSEQ VISIT: HCPCS | Performed by: FAMILY MEDICINE

## 2022-10-12 PROCEDURE — 99214 OFFICE O/P EST MOD 30 MIN: CPT | Mod: 25 | Performed by: FAMILY MEDICINE

## 2022-10-12 RX ORDER — METOPROLOL SUCCINATE 100 MG/1
100 TABLET, EXTENDED RELEASE ORAL DAILY
Qty: 90 TABLET | Refills: 3 | Status: SHIPPED | OUTPATIENT
Start: 2022-10-12 | End: 2023-04-04

## 2022-10-12 RX ORDER — LOSARTAN POTASSIUM 25 MG/1
25 TABLET ORAL DAILY
Qty: 90 TABLET | Refills: 3 | Status: SHIPPED | OUTPATIENT
Start: 2022-10-12 | End: 2023-04-04

## 2022-10-12 RX ORDER — GLIPIZIDE 5 MG/1
5 TABLET, FILM COATED, EXTENDED RELEASE ORAL DAILY
Qty: 90 TABLET | Refills: 0 | Status: CANCELLED | OUTPATIENT
Start: 2022-10-12

## 2022-10-12 RX ORDER — GLYCOPYRROLATE 1 MG/1
1-2 TABLET ORAL 2 TIMES DAILY PRN
Qty: 360 TABLET | Refills: 3 | Status: SHIPPED | OUTPATIENT
Start: 2022-10-12 | End: 2023-04-04

## 2022-10-12 RX ORDER — GLIPIZIDE 10 MG/1
10 TABLET, FILM COATED, EXTENDED RELEASE ORAL DAILY
Qty: 90 TABLET | Refills: 3 | Status: SHIPPED | OUTPATIENT
Start: 2022-10-12 | End: 2023-04-04

## 2022-10-12 RX ORDER — ATORVASTATIN CALCIUM 10 MG/1
10 TABLET, FILM COATED ORAL DAILY
Qty: 90 TABLET | Refills: 3 | Status: SHIPPED | OUTPATIENT
Start: 2022-10-12 | End: 2023-04-04

## 2022-10-12 RX ORDER — CHLORTHALIDONE 25 MG/1
12.5 TABLET ORAL DAILY
Qty: 45 TABLET | Refills: 3 | Status: SHIPPED | OUTPATIENT
Start: 2022-10-12 | End: 2023-04-04

## 2022-10-12 ASSESSMENT — PAIN SCALES - GENERAL: PAINLEVEL: NO PAIN (0)

## 2022-10-12 NOTE — PATIENT INSTRUCTIONS
Diabetes Plan  Hemoglobin A1c goal is between 6.5 % and 7.5%  Your Hemoglobin A1c  is not at goal  Lab Results   Component Value Date    A1C 8.6 10/03/2022    A1C 7.8 12/28/2021    A1C 7.2 06/22/2021    A1C 7.5 03/02/2021    A1C 7.0 12/30/2020    A1C 7.0 09/08/2020    A1C 8.0 12/31/2019      Plan is:Increase Glipizide to 10mg once a day, Next Hemoglobin A1c in  3 months schedule a lab only appt, and Diet and Exercise  Decrease the cake/sweets  LDL (bad cholesterol) goal is less than 100.  It is best for patient's with diabetes to be on a high intensity statin (cholesterol lowering medication, either Lipitor 40mg or Crestor 10mg)  Your LDL is at goal  Plan is: Continue medications at current doses  Blood pressure goal is less than 140/80.  Your blood pressure is at goal.  Plan is: Continue medications at current doses and Diet and exercise  Microalbumin checks for protein in your urine which is an indicator of kidney damage.  Your microalbumin is normal.  Plan is: Recheck in one year  Other: You are due for your annual eye exam. Do schedule this.  Please sign a release of information for us to obtain your eye exam records.  Start walking in the evening  Please schedule your next clinic visit and A1c check in 3 months lab only appt then follow up in clinic with me in 6 months.

## 2022-10-12 NOTE — Clinical Note
Please abstract the following data from this visit with this patient into the appropriate field in Epic:  Other Tests found in the patient's chart through Chart Review/Care Everywhere:  Eye exam with ophthalmology on this date: 4/5/2021. Good for 2 years.

## 2022-10-12 NOTE — PROGRESS NOTES
"  Assessment & Plan     Type 2 diabetes mellitus with diabetic nephropathy, without long-term current use of insulin (H)  Out of goal A1c over 8  Discussed, plan to start increasing activity with walking at night and increase glipizide to 10mg  - atorvastatin (LIPITOR) 10 MG tablet; Take 1 tablet (10 mg) by mouth daily  - metFORMIN (GLUCOPHAGE) 500 MG tablet; TAKE 2 TABLETS TWO TIMES A DAY WITH MEALS  - glipiZIDE (GLUCOTROL XL) 10 MG 24 hr tablet; Take 1 tablet (10 mg) by mouth daily  - Hemoglobin A1c; Future    Essential hypertension  Stable, refill  - chlorthalidone (HYGROTON) 25 MG tablet; Take 0.5 tablets (12.5 mg) by mouth daily  - losartan (COZAAR) 25 MG tablet; Take 1 tablet (25 mg) by mouth daily    Atrial flutter with rapid ventricular response (H)  Stable, rate controlled, refil  - metoprolol succinate ER (TOPROL XL) 100 MG 24 hr tablet; Take 1 tablet (100 mg) by mouth daily    Excessive sweating  Not as well controlled, hadn't need it for few months  - glycopyrrolate (ROBINUL) 1 MG tablet; Take 1-2 tablets (1-2 mg) by mouth 2 times daily as needed (sweating)    Asymptomatic menopausal state  - DEXA HIP/PELVIS/SPINE - Future; Future       BMI:   Estimated body mass index is 37.1 kg/m  as calculated from the following:    Height as of this encounter: 1.607 m (5' 3.27\").    Weight as of this encounter: 95.8 kg (211 lb 3.2 oz).   Weight management plan: Discussed healthy diet and exercise guidelines    See Patient Instructions    Return in about 6 months (around 4/12/2023) for Follow up, with me, in person.    Dave Jarrett MD  [facility] is a 77 year old, presenting for the following health issues:  Diabetes (Labs done 10/3/22)      HPI     Diabetes Follow-up      How often are you checking your blood sugar? Not at all    What concerns do you have today about your diabetes? None. Patient states she knows she high. Patient states if she is put on another med " "to make sure its smaller then the Metformin pill.     Do you have any of these symptoms? (Select all that apply)  Weight gain. Patient states she is eating too much. It is her own fault.    Have you had a diabetic eye exam in the last 12 months? No      Hyperlipidemia Follow-Up      Are you regularly taking any medication or supplement to lower your cholesterol?   Yes- Lipitor    Are you having muscle aches or other side effects that you think could be caused by your cholesterol lowering medication?  No    Hypertension Follow-up      Do you check your blood pressure regularly outside of the clinic? No     Are you following a low salt diet? Yes    Are your blood pressures ever more than 140 on the top number (systolic) OR more   than 90 on the bottom number (diastolic), for example 140/90? No    BP Readings from Last 2 Encounters:   10/12/22 132/58   08/25/21 122/68     Hemoglobin A1C (%)   Date Value   10/03/2022 8.6 (H)   12/28/2021 7.8 (H)   06/22/2021 7.2 (H)   03/02/2021 7.5 (H)     LDL Cholesterol Calculated (mg/dL)   Date Value   10/03/2022 56   06/22/2021 39   09/08/2020 41       Medication follow up for Glycopyrrolate  Taking as prescribed: Yes  Side effects: None  Helping sx: Yes    Annual Wellness Visit  Patient has been advised of split billing requirements and indicates understanding: Yes     Are you in the first 12 months of your Medicare Part B coverage?  No    Physical Health:    In general, how would you rate your overall physical health? fair    Outside of work, how many days during the week do you exercise?1 day/week    Outside of work, approximately how many minutes a day do you exercise?less than 15 minutes    If you drink alcohol do you typically have >3 drinks per day or >7 drinks per week? Not Applicable    Do you usually eat at least 4 servings of fruit and vegetables a day, include whole grains & fiber and avoid regularly eating high fat or \"junk\" foods? Yes    Do you have any problems " taking medications regularly? No    Do you have any side effects from medications? none    Needs assistance for the following daily activities: no assistance needed    Which of the following safety concerns are present in your home?  none identified     Hearing impairment: Yes, Need to ask people to speak up or repeat themselves.    Difficulty understanding soft or whispered speech.    In the past 6 months, have you been bothered by leaking of urine? Yes- wears a pad all the time. Patient states she has a weak bladder.    Mental Health:    In general, how would you rate your overall mental or emotional health? excellent  PHQ-2 Score:      Do you feel safe in your environment? Yes    Have you ever done Advance Care Planning? (For example, a Health Directive, POLST, or a discussion with a medical provider or your loved ones about your wishes)? Yes, advance care planning is on file.    Fall risk:  Fallen 2 or more times in the past year?: No  Any fall with injury in the past year?: No      Cognitive Screenin) Repeat 3 items (Leader, Season, Table)    2) Clock draw: NORMAL  3) 3 item recall: Recalls 3 objects  Results: 3 items recalled: COGNITIVE IMPAIRMENT LESS LIKELY    Mini-CogTM Copyright S Mason. Licensed by the author for use in [facility]; reprinted with permission ([email or web address]). All rights reserved.      Do you have sleep apnea, excessive snoring or daytime drowsiness?: no    Current providers sharing in care for this patient include:   Patient Care Team:  Dave Jarrett MD as PCP - General (Family Medicine)  Dave Jarrett MD as Assigned PCP    Patient has been advised of split billing requirements and indicates understanding: Yes    Review of Systems   Constitutional, HEENT, cardiovascular, pulmonary, gi and gu systems are negative, except as otherwise noted.      Objective    /58 (BP Location: Right arm, Patient Position: Sitting, Cuff Size: Adult Large)   Pulse 79    "Temp 97.6  F (36.4  C) (Tympanic)   Resp 20   Ht 1.607 m (5' 3.27\")   Wt 95.8 kg (211 lb 3.2 oz)   SpO2 96%   BMI 37.10 kg/m    Body mass index is 37.1 kg/m .  Physical Exam   GENERAL: healthy, alert and no distress  NECK: no adenopathy, no asymmetry, masses, or scars and thyroid normal to palpation  RESP: lungs clear to auscultation - no rales, rhonchi or wheezes  CV: regular rate and rhythm, normal S1 S2, no S3 or S4, no murmur, click or rub, no peripheral edema and peripheral pulses strong  ABDOMEN: soft, nontender, no hepatosplenomegaly, no masses and bowel sounds normal  MS: no gross musculoskeletal defects noted, no edema  Diabetic foot exam: normal DP and PT pulses, no trophic changes or ulcerative lesions, normal sensory exam and normal monofilament exam    No results found for any visits on 10/12/22.              "

## 2022-10-14 LAB
ACHR BIND IGG+IGM SER IA-SCNC: 0 NMOL/L
IMMUNOLOGIST REVIEW: NORMAL
MAYO MISC RESULT: NORMAL

## 2022-11-19 ENCOUNTER — HEALTH MAINTENANCE LETTER (OUTPATIENT)
Age: 77
End: 2022-11-19

## 2023-01-16 ENCOUNTER — LAB (OUTPATIENT)
Dept: LAB | Facility: CLINIC | Age: 78
End: 2023-01-16
Payer: COMMERCIAL

## 2023-01-16 DIAGNOSIS — E11.21 TYPE 2 DIABETES MELLITUS WITH DIABETIC NEPHROPATHY, WITHOUT LONG-TERM CURRENT USE OF INSULIN (H): Primary | ICD-10-CM

## 2023-01-16 DIAGNOSIS — E11.21 TYPE 2 DIABETES MELLITUS WITH DIABETIC NEPHROPATHY, WITHOUT LONG-TERM CURRENT USE OF INSULIN (H): ICD-10-CM

## 2023-01-16 LAB — HBA1C MFR BLD: 8.9 % (ref 0–5.6)

## 2023-01-16 PROCEDURE — 36415 COLL VENOUS BLD VENIPUNCTURE: CPT

## 2023-01-16 PROCEDURE — 83036 HEMOGLOBIN GLYCOSYLATED A1C: CPT

## 2023-01-17 ENCOUNTER — TELEPHONE (OUTPATIENT)
Dept: FAMILY MEDICINE | Facility: CLINIC | Age: 78
End: 2023-01-17
Payer: COMMERCIAL

## 2023-01-17 NOTE — TELEPHONE ENCOUNTER
Diabetes Education Scheduling Outreach #1:    Call to patient to schedule. Voicemail not setup.    Also sent [employer] message for second attempt. Requested patient to call to schedule.    Paola Umanzor On[employer]  Diabetes and Nutrition Scheduling

## 2023-02-06 ENCOUNTER — ALLIED HEALTH/NURSE VISIT (OUTPATIENT)
Dept: EDUCATION SERVICES | Facility: CLINIC | Age: 78
End: 2023-02-06
Payer: COMMERCIAL

## 2023-02-06 DIAGNOSIS — E11.21 TYPE 2 DIABETES MELLITUS WITH DIABETIC NEPHROPATHY, WITHOUT LONG-TERM CURRENT USE OF INSULIN (H): ICD-10-CM

## 2023-02-06 PROCEDURE — G0108 DIAB MANAGE TRN  PER INDIV: HCPCS | Performed by: DIETITIAN, REGISTERED

## 2023-02-06 PROCEDURE — 99207 PR DROP WITH A PROCEDURE: CPT | Performed by: DIETITIAN, REGISTERED

## 2023-02-06 NOTE — PROGRESS NOTES
Diabetes Self-Management Education & Support  Presents for: Individual review  Type of Service: In Person Visit     ASSESSMENT:  a1c increased over the past winter and Suzette attributes this to increased snacking and Es[place] candy.  Completed detail food logs and practiced carbohydrate counting on these.  Found that breakfast is the highest carbohydrate meal at 78-85gm and likely causing a bigger rise.  Lunch, dinner and snacks are more balanced with protein, fat and vegetables usually around 30-60gm carbohydrate.  Gave examples of simple changes at breakfast to reduce carbohydrates to 55-60gm and increase protein.  Discussed buying a higher fiber bread.  Adding protein and vegetables to meals when able.  Reviewed role of movement, even if just breaking sitting periods (knitting) with minutes of standing / walking in house.  Suzette wants to focus on diet and activity before considering medication adjustment and really wants to avoid injectables.   Continue with positive diet & lifestyle changes.  Is not checking blood sugars and will begin a few checks a week.     Patient's most recent   Lab Results   Component Value Date    A1C 8.9 01/16/2023    A1C 7.2 06/22/2021     is not meeting goal of <7.0    Diabetes knowledge and skills assessment:   Patient is knowledgeable in diabetes management concepts related to: Healthy Eating, Being Active, Monitoring, Taking Medication, Problem Solving, Reducing Risks and Healthy Coping  Continue education with the following diabetes management concepts: diet, medications, risk reduction as needed   Based on learning assessment above, most appropriate setting for further diabetes education would be: Individual setting.      PLAN  Medication consistency   Begin checking pre and post breakfast once in awhile (2 -3 times a week)  Breakfast adjustments - increase protein, reduce carbohydrates by 20-30gm at this meal.   Activity - standing / breaking up sitting / walking in house   Gave  "scheduling number for Diabetes ed / mychart, call with any questions   New A1c 4/16/ or after     See Care Plan for co-developed, patient-state behavior change goals.  AVS provided for patient today.    SUBJECTIVE/OBJECTIVE:  Presents for: Individual review  Accompanied by: Self  Focus of Visit: Monitoring  Diabetes type: Type 2  Disease course: Improving  Other concerns:: None  Cultural Influences/Ethnic Background:  Not  or     Diabetes Symptoms & Complications:   Not assessed at this visit     Patient Problem List and Family Medical History reviewed for relevant medical history, current medical status, and diabetes risk factors.    Vitals:  There were no vitals taken for this visit.  Estimated body mass index is 37.1 kg/m  as calculated from the following:    Height as of 10/12/22: 1.607 m (5' 3.27\").    Weight as of 10/12/22: 95.8 kg (211 lb 3.2 oz).   Last 3 BP:   BP Readings from Last 3 Encounters:   10/12/22 132/58   08/25/21 122/68   04/16/21 116/62       History   Smoking Status     Never   Smokeless Tobacco     Never       Labs:  Lab Results   Component Value Date    A1C 8.9 01/16/2023    A1C 7.2 06/22/2021     Lab Results   Component Value Date     10/03/2022     06/22/2021     Lab Results   Component Value Date    LDL 56 10/03/2022    LDL 39 06/22/2021     HDL Cholesterol   Date Value Ref Range Status   06/22/2021 63 >49 mg/dL Final     Direct Measure HDL   Date Value Ref Range Status   10/03/2022 52 >=50 mg/dL Final   ]  GFR Estimate   Date Value Ref Range Status   10/03/2022 49 (L) >60 mL/min/1.73m2 Final     Comment:     Effective December 21, 2021 eGFRcr in adults is calculated using the 2021 CKD-EPI creatinine equation which includes age and gender (Victorina heredia al., NEJM, DOI: 10.1056/PUMXsb3513713)   06/22/2021 48 (L) >60 mL/min/[1.73_m2] Final     Comment:     Non  GFR Calc  Starting 12/18/2018, serum creatinine based estimated GFR (eGFR) will be "   calculated using the Chronic Kidney Disease Epidemiology Collaboration   (CKD-EPI) equation.       GFR Estimate If Black   Date Value Ref Range Status   06/22/2021 56 (L) >60 mL/min/[1.73_m2] Final     Comment:      GFR Calc  Starting 12/18/2018, serum creatinine based estimated GFR (eGFR) will be   calculated using the Chronic Kidney Disease Epidemiology Collaboration   (CKD-EPI) equation.       Lab Results   Component Value Date    CR 1.13 10/03/2022    CR 1.10 06/22/2021     No results found for: MICROALBUMIN    Healthy Eating:  Healthy Eating Assessed Today: Yes    Being Active:  Being Active Assessed Today: Yes    Monitoring:  Monitoring Assessed Today: Yes      Taking Medications:  Diabetes Medication(s)     Biguanides       metFORMIN (GLUCOPHAGE) 500 MG tablet    TAKE 2 TABLETS TWO TIMES A DAY WITH MEALS    Sulfonylureas       glipiZIDE (GLUCOTROL XL) 10 MG 24 hr tablet    Take 1 tablet (10 mg) by mouth daily          Taking Medication Assessed Today: Yes    Problem Solving:    Reducing Risks:  Reducing Risks Assessed Today: No    Healthy Coping:  Emotional response to diabetes: Ready to learn  Stage of change: ACTION (Actively working towards change)  Patient Activation Measure Survey Score:  HECTOR Score (Last Two) 10/21/2014 3/17/2015   HECTOR Raw Score 45 51   Activation Score 73.1 91.6   HECTOR Level 4 4         Care Plan and Education Provided:  Care Plan: Diabetes   Updates made by Christie Suazo RD since 2/6/2023 12:00 AM      Problem: HbA1C Not In Goal       Goal: Establish Regular Follow-Ups with PCP       Task: Discuss with PCP the recommended timing for patient's next follow up visit(s) Completed 2/6/2023   Responsible User: Christie Suazo RD      Task: Discuss schedule for PCP visits with patient Completed 2/6/2023   Responsible User: Christie Suazo RD      Goal: Get HbA1C Level in Goal       Task: Educate patient on diabetes education self-management topics     Responsible User: Christie Suazo RD      Task: Educate patient on benefits of regular glucose monitoring Completed 2/6/2023   Responsible User: Christie Suazo RD      Task: Refer patient to appropriate extended care team member, as needed (Medication Therapy Management, Behavioral Health, Physical Therapy, etc.)    Responsible User: Christie Suazo RD      Task: Discuss diabetes treatment plan with patient Completed 2/6/2023   Responsible User: Christie Suazo RD      Problem: Diabetes Self-Management Education Needed to Optimize Self-Care Behaviors       Goal: Understand diabetes pathophysiology and disease progression       Task: Provide education on diabetes pathophysiology and disease progression specfic to patient's diabetes type Completed 2/6/2023   Responsible User: Christie Suazo RD      Goal: Healthy Eating - follow a healthy eating pattern for diabetes    Note:    Try breakfast at 50-60gm carbohydrate down from 70-90gm x 3 months      Task: Provide education on portion control and consistency in amount, composition and timing of food intake    Responsible User: Christie Suazo RD      Task: Provide education on managing carbohydrate intake (carbohydrate counting, plate planning method, etc.) Completed 2/6/2023   Responsible User: Christie Suazo RD      Task: Provide education on weight management    Responsible User: Christie Suazo RD      Task: Provide education on heart healthy eating    Responsible User: Christie Suazo RD      Task: Provide education on eating out    Responsible User: Christie Suazo RD      Task: Develop individualized healthy eating plan with patient    Responsible User: Christie Suazo RD      Goal: Being Active - get regular physical activity, working up to at least 150 minutes per week       Task: Provide education on relationship of activity to glucose and precautions to take if at risk for low glucose    Responsible  User: Christie Suazo RD      Task: Discuss barriers to physical activity with patient    Responsible User: Christie Suazo RD      Task: Develop physical activity plan with patient    Responsible User: Christie Suazo RD      Task: Explore community resources including walking groups, assistance programs, and home videos    Responsible User: Christie Suazo RD      Goal: Monitoring - monitor glucose and ketones as directed       Task: Provide education on blood glucose monitoring (purpose, proper technique, frequency, glucose targets, interpreting results, when to use glucose control solution, sharps disposal)    Responsible User: Christie Suazo RD      Task: Provide education on continuous glucose monitoring (sensor placement, use of felicia or /reader, understanding glucose trends, alerts and alarms, differences between sensor glucose and blood glucose)    Responsible User: Christie Suazo RD      Task: Provide education on ketone monitoring (when to monitor, frequency, etc.)    Responsible User: Christie Suazo RD      Goal: Taking Medication - patient is consistently taking medications as directed       Task: Provide education on action of prescribed medication, including when to take and possible side effects    Responsible User: Christie Suazo RD      Task: Provide education on insulin and injectable diabetes medications, including administration, storage, site selection and rotation for injection sites    Responsible User: Christie Suazo RD      Task: Discuss barriers to medication adherence with patient and provide management technique ideas as appropriate    Responsible User: Christie Suazo RD      Task: Provide education on frequency and refill details of medications    Responsible User: Christie Suazo RD      Goal: Problem Solving - know how to prevent and manage short-term diabetes complications       Task: Provide education on high blood  glucose - causes, signs/symptoms, prevention and treatment    Responsible User: Christie Suazo RD      Task: Provide education on low blood glucose - causes, signs/symptoms, prevention, treatment, carrying a carbohydrate source at all times, and medical identification    Responsible User: Christie Suazo RD      Task: Provide education on safe travel with diabetes    Responsible User: Christie Suazo RD      Task: Provide education on how to care for diabetes on sick days    Responsible User: Christie Suazo RD      Task: Provide education on when to call a health care provider    Responsible User: Christie Suazo RD      Goal: Reducing Risks - know how to prevent and treat long-term diabetes complications       Task: Provide education on major complications of diabetes, prevention, early diagnostic measures and treatment of complications    Responsible User: Christie Suazo RD      Task: Provide education on recommended care for dental, eye and foot health    Responsible User: Christie Suazo RD      Task: Provide education on Hemoglobin A1c - goals and relationship to blood glucose levels    Responsible User: Christie Suazo RD      Task: Provide education on recommendations for heart health - lipid levels and goals, blood pressure and goals, and aspirin therapy, if indicated    Responsible User: Christie Suazo RD      Task: Provide education on tobacco cessation    Responsible User: Christie Suazo RD      Goal: Healthy Coping - use available resources to cope with the challenges of managing diabetes       Task: Discuss recognizing feelings about having diabetes    Responsible User: Christie Suazo RD      Task: Provide education on the benefits of making appropriate lifestyle changes    Responsible User: Christie Suazo RD      Task: Provide education on benefits of utilizing support systems    Responsible User: Christie Suazo RD      Task:  Discuss methods for coping with stress    Responsible User: Christie Suazo RD      Task: Provide education on when to seek professional counseling    Responsible User: Christie Suazo RD Elizabeth Swartout RD, LD, [facility]  Diabetes Education      Time Spent: 40 minutes  Encounter Type: Individual    A copy of this encounter was shared with the provider.

## 2023-02-06 NOTE — LETTER
2/6/2023         RE: Suzette Britton  [address] [facility] [id number]        Dear Colleague,    Thank you for referring your patient, Suzette Britton, to the [facility] [place]. Please see a copy of my visit note below.    Diabetes Self-Management Education & Support  Presents for: Individual review  Type of Service: In Person Visit     ASSESSMENT:  a1c increased over the past winter and Suzette attributes this to increased snacking and [place] candy.  Completed detail food logs and practiced carbohydrate counting on these.  Found that breakfast is the highest carbohydrate meal at 78-85gm and likely causing a bigger rise.  Lunch, dinner and snacks are more balanced with protein, fat and vegetables usually around 30-60gm carbohydrate.  Gave examples of simple changes at breakfast to reduce carbohydrates to 55-60gm and increase protein.  Discussed buying a higher fiber bread.  Adding protein and vegetables to meals when able.  Reviewed role of movement, even if just breaking sitting periods (knitting) with minutes of standing / walking in house.  Suzette wants to focus on diet and activity before considering medication adjustment and really wants to avoid injectables.   Continue with positive diet & lifestyle changes.  Is not checking blood sugars and will begin a few checks a week.     Patient's most recent   Lab Results   Component Value Date    A1C 8.9 01/16/2023    A1C 7.2 06/22/2021     is not meeting goal of <7.0    Diabetes knowledge and skills assessment:   Patient is knowledgeable in diabetes management concepts related to: Healthy Eating, Being Active, Monitoring, Taking Medication, Problem Solving, Reducing Risks and Healthy Coping  Continue education with the following diabetes management concepts: diet, medications, risk reduction as needed   Based on learning assessment above, most appropriate setting for further diabetes education would be: Individual setting.   "    PLAN  Medication consistency   Begin checking pre and post breakfast once in awhile (2 -3 times a week)  Breakfast adjustments - increase protein, reduce carbohydrates by 20-30gm at this meal.   Activity - standing / breaking up sitting / walking in house   Gave scheduling number for Diabetes ed / mychart, call with any questions   New A1c 4/16/ or after     See Care Plan for co-developed, patient-state behavior change goals.  AVS provided for patient today.    SUBJECTIVE/OBJECTIVE:  Presents for: Individual review  Accompanied by: Self  Focus of Visit: Monitoring  Diabetes type: Type 2  Disease course: Improving  Other concerns:: None  Cultural Influences/Ethnic Background:  Not  or     Diabetes Symptoms & Complications:   Not assessed at this visit     Patient Problem List and Family Medical History reviewed for relevant medical history, current medical status, and diabetes risk factors.    Vitals:  There were no vitals taken for this visit.  Estimated body mass index is 37.1 kg/m  as calculated from the following:    Height as of 10/12/22: 1.607 m (5' 3.27\").    Weight as of 10/12/22: 95.8 kg (211 lb 3.2 oz).   Last 3 BP:   BP Readings from Last 3 Encounters:   10/12/22 132/58   08/25/21 122/68   04/16/21 116/62       History   Smoking Status     Never   Smokeless Tobacco     Never       Labs:  Lab Results   Component Value Date    A1C 8.9 01/16/2023    A1C 7.2 06/22/2021     Lab Results   Component Value Date     10/03/2022     06/22/2021     Lab Results   Component Value Date    LDL 56 10/03/2022    LDL 39 06/22/2021     HDL Cholesterol   Date Value Ref Range Status   06/22/2021 63 >49 mg/dL Final     Direct Measure HDL   Date Value Ref Range Status   10/03/2022 52 >=50 mg/dL Final   ]  GFR Estimate   Date Value Ref Range Status   10/03/2022 49 (L) >60 mL/min/1.73m2 Final     Comment:     Effective December 21, 2021 eGFRcr in adults is calculated using the 2021 CKD-EPI creatinine " equation which includes age and gender (Victorina heredia al., NEJ, DOI: 10.1056/ZQWTyd3794401)   06/22/2021 48 (L) >60 mL/min/[1.73_m2] Final     Comment:     Non  GFR Calc  Starting 12/18/2018, serum creatinine based estimated GFR (eGFR) will be   calculated using the Chronic Kidney Disease Epidemiology Collaboration   (CKD-EPI) equation.       GFR Estimate If Black   Date Value Ref Range Status   06/22/2021 56 (L) >60 mL/min/[1.73_m2] Final     Comment:      GFR Calc  Starting 12/18/2018, serum creatinine based estimated GFR (eGFR) will be   calculated using the Chronic Kidney Disease Epidemiology Collaboration   (CKD-EPI) equation.       Lab Results   Component Value Date    CR 1.13 10/03/2022    CR 1.10 06/22/2021     No results found for: MICROALBUMIN    Healthy Eating:  Healthy Eating Assessed Today: Yes    Being Active:  Being Active Assessed Today: Yes    Monitoring:  Monitoring Assessed Today: Yes      Taking Medications:  Diabetes Medication(s)     Biguanides       metFORMIN (GLUCOPHAGE) 500 MG tablet    TAKE 2 TABLETS TWO TIMES A DAY WITH MEALS    Sulfonylureas       glipiZIDE (GLUCOTROL XL) 10 MG 24 hr tablet    Take 1 tablet (10 mg) by mouth daily          Taking Medication Assessed Today: Yes    Problem Solving:    Reducing Risks:  Reducing Risks Assessed Today: No    Healthy Coping:  Emotional response to diabetes: Ready to learn  Stage of change: ACTION (Actively working towards change)  Patient Activation Measure Survey Score:  HECTOR Score (Last Two) 10/21/2014 3/17/2015   HECTOR Raw Score 45 51   Activation Score 73.1 91.6   HECTOR Level 4 4         Care Plan and Education Provided:  Care Plan: Diabetes   Updates made by Christie Suazo RD since 2/6/2023 12:00 AM      Problem: HbA1C Not In Goal       Goal: Establish Regular Follow-Ups with PCP       Task: Discuss with PCP the recommended timing for patient's next follow up visit(s) Completed 2/6/2023   Responsible User:  Christie Suazo RD      Task: Discuss schedule for PCP visits with patient Completed 2/6/2023   Responsible User: Christie Suazo RD      Goal: Get HbA1C Level in Goal       Task: Educate patient on diabetes education self-management topics    Responsible User: Christie Suazo RD      Task: Educate patient on benefits of regular glucose monitoring Completed 2/6/2023   Responsible User: Christie Suazo RD      Task: Refer patient to appropriate extended care team member, as needed (Medication Therapy Management, Behavioral Health, Physical Therapy, etc.)    Responsible User: Christie Suazo RD      Task: Discuss diabetes treatment plan with patient Completed 2/6/2023   Responsible User: Christie Suazo RD      Problem: Diabetes Self-Management Education Needed to Optimize Self-Care Behaviors       Goal: Understand diabetes pathophysiology and disease progression       Task: Provide education on diabetes pathophysiology and disease progression specfic to patient's diabetes type Completed 2/6/2023   Responsible User: Christie Suazo RD      Goal: Healthy Eating - follow a healthy eating pattern for diabetes    Note:    Try breakfast at 50-60gm carbohydrate down from 70-90gm x 3 months      Task: Provide education on portion control and consistency in amount, composition and timing of food intake    Responsible User: Christie Suazo RD      Task: Provide education on managing carbohydrate intake (carbohydrate counting, plate planning method, etc.) Completed 2/6/2023   Responsible User: Christie Suazo RD      Task: Provide education on weight management    Responsible User: Christie Suazo RD      Task: Provide education on heart healthy eating    Responsible User: Christie Suazo RD      Task: Provide education on eating out    Responsible User: Christie Suazo RD      Task: Develop individualized healthy eating plan with patient    Responsible User: Gabriele  JESSENIA Soriano      Goal: Being Active - get regular physical activity, working up to at least 150 minutes per week       Task: Provide education on relationship of activity to glucose and precautions to take if at risk for low glucose    Responsible User: Christie Suazo RD      Task: Discuss barriers to physical activity with patient    Responsible User: Christie Suazo RD      Task: Develop physical activity plan with patient    Responsible User: Christie Suazo RD      Task: Explore community resources including walking groups, assistance programs, and home videos    Responsible User: Christie Suazo RD      Goal: Monitoring - monitor glucose and ketones as directed       Task: Provide education on blood glucose monitoring (purpose, proper technique, frequency, glucose targets, interpreting results, when to use glucose control solution, sharps disposal)    Responsible User: Christie Suazo RD      Task: Provide education on continuous glucose monitoring (sensor placement, use of felicia or /reader, understanding glucose trends, alerts and alarms, differences between sensor glucose and blood glucose)    Responsible User: Christie Suazo RD      Task: Provide education on ketone monitoring (when to monitor, frequency, etc.)    Responsible User: Christie Suazo RD      Goal: Taking Medication - patient is consistently taking medications as directed       Task: Provide education on action of prescribed medication, including when to take and possible side effects    Responsible User: Christie Suazo RD      Task: Provide education on insulin and injectable diabetes medications, including administration, storage, site selection and rotation for injection sites    Responsible User: Christie Suazo RD      Task: Discuss barriers to medication adherence with patient and provide management technique ideas as appropriate    Responsible User: Christie Suazo RD      Task:  Provide education on frequency and refill details of medications    Responsible User: Christie Suazo RD      Goal: Problem Solving - know how to prevent and manage short-term diabetes complications       Task: Provide education on high blood glucose - causes, signs/symptoms, prevention and treatment    Responsible User: Christie Suazo RD      Task: Provide education on low blood glucose - causes, signs/symptoms, prevention, treatment, carrying a carbohydrate source at all times, and medical identification    Responsible User: Christie Suazo RD      Task: Provide education on safe travel with diabetes    Responsible User: Christie Suazo RD      Task: Provide education on how to care for diabetes on sick days    Responsible User: Christie Suazo RD      Task: Provide education on when to call a health care provider    Responsible User: Christie Suazo RD      Goal: Reducing Risks - know how to prevent and treat long-term diabetes complications       Task: Provide education on major complications of diabetes, prevention, early diagnostic measures and treatment of complications    Responsible User: Christie Suazo RD      Task: Provide education on recommended care for dental, eye and foot health    Responsible User: Christie Suazo RD      Task: Provide education on Hemoglobin A1c - goals and relationship to blood glucose levels    Responsible User: Christie Suazo RD      Task: Provide education on recommendations for heart health - lipid levels and goals, blood pressure and goals, and aspirin therapy, if indicated    Responsible User: Christie Suazo RD      Task: Provide education on tobacco cessation    Responsible User: Christie Suazo RD      Goal: Healthy Coping - use available resources to cope with the challenges of managing diabetes       Task: Discuss recognizing feelings about having diabetes    Responsible User: Christie Suazo RD      Task:  Provide education on the benefits of making appropriate lifestyle changes    Responsible User: Christie Suazo RD      Task: Provide education on benefits of utilizing support systems    Responsible User: Christie Suazo RD      Task: Discuss methods for coping with stress    Responsible User: Christie Suazo RD      Task: Provide education on when to seek professional counseling    Responsible User: Christie Suazo RD Elizabeth Swartout RD, LD, [facility]  Diabetes Education      Time Spent: 40 minutes  Encounter Type: Individual    A copy of this encounter was shared with the provider.

## 2023-02-06 NOTE — PATIENT INSTRUCTIONS
Be very consistent with your AM medications, trying to take consistently at noon instead of missing and taking at 2pm     Begin checking pre and post breakfast once in awhile (2 -3 times a week)  Clean hands when you check blood sugars     A1c goal of 8 or less  Fasting and pre meal   2 hours post meal: less than 200  (watch trends, if blood sugars are trending down closer to this number that is the goal)     See food records for carbohydrate totals - keep up the great work!!      Focus on breakfast changes - reducing carbohydrate by 20-30 grams and increasing protein   Try a whole wheat bread   More fiber is better.  Fiber doesn't increase blood sugar as much     Little bits of movement help - even if it's in your house / 2-5 minutes of moving around  Break up periods of sitting with  standing or moving around the house       Christie Suazo RD, LD, [facility]  For Diabetes Education appointments call: [phone]   For Diabetes Education Related Questions call: [phone]

## 2023-03-27 ENCOUNTER — LAB (OUTPATIENT)
Dept: LAB | Facility: CLINIC | Age: 78
End: 2023-03-27
Payer: COMMERCIAL

## 2023-03-27 DIAGNOSIS — E11.21 TYPE 2 DIABETES MELLITUS WITH DIABETIC NEPHROPATHY, WITHOUT LONG-TERM CURRENT USE OF INSULIN (H): ICD-10-CM

## 2023-03-27 LAB — HBA1C MFR BLD: 8.7 % (ref 0–5.6)

## 2023-03-27 PROCEDURE — 36415 COLL VENOUS BLD VENIPUNCTURE: CPT

## 2023-03-27 PROCEDURE — 83036 HEMOGLOBIN GLYCOSYLATED A1C: CPT

## 2023-04-04 ENCOUNTER — OFFICE VISIT (OUTPATIENT)
Dept: FAMILY MEDICINE | Facility: CLINIC | Age: 78
End: 2023-04-04
Payer: COMMERCIAL

## 2023-04-04 VITALS
DIASTOLIC BLOOD PRESSURE: 62 MMHG | WEIGHT: 204.4 LBS | HEART RATE: 79 BPM | SYSTOLIC BLOOD PRESSURE: 128 MMHG | HEIGHT: 63 IN | TEMPERATURE: 97.8 F | OXYGEN SATURATION: 96 % | RESPIRATION RATE: 20 BRPM | BODY MASS INDEX: 36.21 KG/M2

## 2023-04-04 DIAGNOSIS — M25.562 CHRONIC PAIN OF LEFT KNEE: Primary | ICD-10-CM

## 2023-04-04 DIAGNOSIS — E11.21 TYPE 2 DIABETES MELLITUS WITH DIABETIC NEPHROPATHY, WITHOUT LONG-TERM CURRENT USE OF INSULIN (H): ICD-10-CM

## 2023-04-04 DIAGNOSIS — Z23 NEED FOR SHINGLES VACCINE: ICD-10-CM

## 2023-04-04 DIAGNOSIS — N18.31 STAGE 3A CHRONIC KIDNEY DISEASE (H): ICD-10-CM

## 2023-04-04 DIAGNOSIS — R61 EXCESSIVE SWEATING: ICD-10-CM

## 2023-04-04 DIAGNOSIS — I48.92 ATRIAL FLUTTER WITH RAPID VENTRICULAR RESPONSE (H): ICD-10-CM

## 2023-04-04 DIAGNOSIS — I10 ESSENTIAL HYPERTENSION: Chronic | ICD-10-CM

## 2023-04-04 DIAGNOSIS — E66.01 MORBID OBESITY (H): ICD-10-CM

## 2023-04-04 DIAGNOSIS — G89.29 CHRONIC PAIN OF LEFT KNEE: Primary | ICD-10-CM

## 2023-04-04 PROCEDURE — 99214 OFFICE O/P EST MOD 30 MIN: CPT | Performed by: FAMILY MEDICINE

## 2023-04-04 RX ORDER — GLIPIZIDE 10 MG/1
10 TABLET, FILM COATED, EXTENDED RELEASE ORAL DAILY
Qty: 90 TABLET | Refills: 3 | Status: SHIPPED | OUTPATIENT
Start: 2023-04-04 | End: 2023-11-08

## 2023-04-04 RX ORDER — CHLORTHALIDONE 25 MG/1
12.5 TABLET ORAL DAILY
Qty: 45 TABLET | Refills: 3 | Status: SHIPPED | OUTPATIENT
Start: 2023-04-04 | End: 2024-05-01

## 2023-04-04 RX ORDER — METOPROLOL SUCCINATE 100 MG/1
100 TABLET, EXTENDED RELEASE ORAL DAILY
Qty: 90 TABLET | Refills: 3 | Status: SHIPPED | OUTPATIENT
Start: 2023-04-04 | End: 2024-05-01

## 2023-04-04 RX ORDER — ATORVASTATIN CALCIUM 10 MG/1
10 TABLET, FILM COATED ORAL DAILY
Qty: 90 TABLET | Refills: 3 | Status: SHIPPED | OUTPATIENT
Start: 2023-04-04 | End: 2024-05-01

## 2023-04-04 RX ORDER — GLYCOPYRROLATE 1 MG/1
1-2 TABLET ORAL 2 TIMES DAILY PRN
Qty: 360 TABLET | Refills: 3 | Status: SHIPPED | OUTPATIENT
Start: 2023-04-04

## 2023-04-04 RX ORDER — LOSARTAN POTASSIUM 25 MG/1
25 TABLET ORAL DAILY
Qty: 90 TABLET | Refills: 3 | Status: SHIPPED | OUTPATIENT
Start: 2023-04-04 | End: 2024-04-09

## 2023-04-04 ASSESSMENT — PAIN SCALES - GENERAL: PAINLEVEL: NO PAIN (0)

## 2023-04-04 NOTE — PATIENT INSTRUCTIONS
Diabetes Plan  Hemoglobin A1c goal is between 6.5 % and 7.5%  Your Hemoglobin A1c  is not at goal  Lab Results   Component Value Date    A1C 8.7 03/27/2023    A1C 8.9 01/16/2023    A1C 8.6 10/03/2022    A1C 7.8 12/28/2021    A1C 7.2 06/22/2021    A1C 7.5 03/02/2021    A1C 7.0 12/30/2020    A1C 7.0 09/08/2020    A1C 8.0 12/31/2019      Plan is:Add Jardiance (empagliflozin) 10mg once daily in addition to the metformin and glipizide.  Call in 2-3 weeks to send it to [facility] if all is going well.  Recheck in 3 months in clinic with Dr. Jarrett and A1c recheck  Check blood sugars once a day, alternate between fasting (before eating) and 2 hours after eating (to help know how that effects your blood sugar)  Ask to talk to Dr. Jarrett care team if questions or concerns.  LDL (bad cholesterol) goal is less than 100.  It is best for patient's with diabetes to be on a high intensity statin (cholesterol lowering medication, either Lipitor 40mg or Crestor 10mg)  Your LDL is at goal  Plan is: Continue medications at current doses  Blood pressure goal is less than 140/80.  Your blood pressure is at goal.  Plan is: Continue medications at current doses  Microalbumin checks for protein in your urine which is an indicator of kidney damage.  Your microalbumin is not normal.  Plan is: you have mild chronic kidney disease, so controlling diabetes will help prevent any worsening  Other: You are due for your annual eye exam.  Please sign a release of information for us to obtain your eye exam records.  Please schedule your next clinic visit and A1c check in 3 months.

## 2023-04-04 NOTE — PROGRESS NOTES
"  Assessment & Plan     Type 2 diabetes mellitus with diabetic nephropathy, without long-term current use of insulin (H)  Not controlled  Add jardiance 10mg daily  -discussed risks, benefits, and side effects of this new medication. Patient understands and is in agreement.  Recheck in 3months. A1c and in clinic  - metFORMIN (GLUCOPHAGE) 500 MG tablet; Take 2 tablets (1,000 mg) by mouth 2 times daily (with meals)  - empagliflozin (JARDIANCE) 10 MG TABS tablet; Take 1 tablet (10 mg) by mouth daily  - atorvastatin (LIPITOR) 10 MG tablet; Take 1 tablet (10 mg) by mouth daily  - glipiZIDE (GLUCOTROL XL) 10 MG 24 hr tablet; Take 1 tablet (10 mg) by mouth daily    Need for shingles vaccine      Chronic pain of left knee  Leg  needed  - Miscellaneous DME Order    Essential hypertension  Stable, refill  - chlorthalidone (HYGROTON) 25 MG tablet; Take 0.5 tablets (12.5 mg) by mouth daily  - losartan (COZAAR) 25 MG tablet; Take 1 tablet (25 mg) by mouth daily    Excessive sweating  Stable, refill for as needed  - glycopyrrolate (ROBINUL) 1 MG tablet; Take 1-2 tablets (1-2 mg) by mouth 2 times daily as needed (sweating)    Atrial flutter with rapid ventricular response (H)  Stable, rate controlled.  - metoprolol succinate ER (TOPROL XL) 100 MG 24 hr tablet; Take 1 tablet (100 mg) by mouth daily    Morbid obesity: BMI 35 with comorbidity  Weight loss will help improve diabetes and HTN    Stage 3A CKD: stable, continue losartan         BMI:   Estimated body mass index is 35.77 kg/m  as calculated from the following:    Height as of this encounter: 1.61 m (5' 3.39\").    Weight as of this encounter: 92.7 kg (204 lb 6.4 oz).   Weight management plan: Discussed healthy diet and exercise guidelines    See Patient Instructions    Dave Jarrett MD  [facility]    Giancarlo Bradford is a 77 year old, presenting for the following health issues:  Diabetes (Lab done 3/27/23)        4/4/2023     9:56 AM "   Additional Questions   Roomed by Denisse Valentine MA   Accompanied by Self     Forms 4/4/2023   Any forms needing to be completed Yes         4/4/2023     9:56 AM   Patient Reported Additional Medications   Patient reports taking the following new medications No new meds     HPI     Diabetes Follow-up      How often are you checking your blood sugar? Not at all    What concerns do you have today about your diabetes? None     Do you have any of these symptoms? (Select all that apply)  No numbness or tingling in feet.  No redness, sores or blisters on feet.  No complaints of excessive thirst.  No reports of blurry vision.  No significant changes to weight.    Have you had a diabetic eye exam in the last 12 months? No  - Has an appointment on 4/14/2023.      Hyperlipidemia Follow-Up      Are you regularly taking any medication or supplement to lower your cholesterol?   Yes- Atorvastatin    Are you having muscle aches or other side effects that you think could be caused by your cholesterol lowering medication?  No    Hypertension Follow-up      Do you check your blood pressure regularly outside of the clinic? No     Are you following a low salt diet? Yes    Are your blood pressures ever more than 140 on the top number (systolic) OR more   than 90 on the bottom number (diastolic), for example 140/90? No    BP Readings from Last 2 Encounters:   10/12/22 132/58   08/25/21 122/68     Hemoglobin A1C (%)   Date Value   03/27/2023 8.7 (H)   01/16/2023 8.9 (H)   06/22/2021 7.2 (H)   03/02/2021 7.5 (H)     LDL Cholesterol Calculated (mg/dL)   Date Value   10/03/2022 56   06/22/2021 39   09/08/2020 41       Medication recheck for Glycopyrrolate  Taking as prescribed: Yes  Side effects: None  Helping sx: Yes    Handicap placard, severe OA in left knee.  Unable to lift left leg into the car on passenger side.          Review of Systems         Objective    /62 (BP Location: Right arm, Patient Position: Sitting, Cuff Size:  "Adult Large)   Pulse 79   Temp 97.8  F (36.6  C) (Tympanic)   Resp 20   Ht 1.61 m (5' 3.39\")   Wt 92.7 kg (204 lb 6.4 oz)   SpO2 96%   BMI 35.77 kg/m    Body mass index is 35.77 kg/m .  Physical Exam   GENERAL: healthy, alert and no distress  NECK: no adenopathy, no asymmetry, masses, or scars and thyroid normal to palpation  RESP: lungs clear to auscultation - no rales, rhonchi or wheezes  CV: regular rate and rhythm, normal S1 S2, no S3 or S4, no murmur, click or rub, no peripheral edema and peripheral pulses strong  ABDOMEN: soft, nontender, no hepatosplenomegaly, no masses and bowel sounds normal  MS: no gross musculoskeletal defects noted, no edema                DME (Durable Medical Equipment) Orders and Documentation  Orders Placed This Encounter   Procedures     Miscellaneous DME Order      The patient was assessed and it was determined the patient is in need of the following listed DME Supplies/Equipment. Please complete supporting documentation below to demonstrate medical necessity.      DME All Other Item(s) Documentation    List reason for need and supporting documentation for medical necessity below for each DME item.     1. Left knee pain, left hip pain, osteoarthritis left          "

## 2023-04-04 NOTE — PROGRESS NOTES
{PROVIDER CHARTING PREFERENCE:918819}    Giancarlo Bradford is a 77 year old, presenting for the following health issues:  Diabetes (Lab done 3/27/23)        4/4/2023     9:50 AM   Additional Questions   Roomed by Denisse Valentine MA   Accompanied by Self         4/4/2023     9:50 AM   Patient Reported Additional Medications   Patient reports taking the following new medications no new meds     HPI     Diabetes Follow-up      How often are you checking your blood sugar? Not at all    What concerns do you have today about your diabetes? None     Do you have any of these symptoms? (Select all that apply)  No numbness or tingling in feet.  No redness, sores or blisters on feet.  No complaints of excessive thirst.  No reports of blurry vision.  No significant changes to weight.    Have you had a diabetic eye exam in the last 12 months? No  - Has an appointment on 4/14/2023.    {Reference  Diabetes Management Resources :060828}    {Reference  Diabetes Log - 7 days :304419}    Hyperlipidemia Follow-Up      Are you regularly taking any medication or supplement to lower your cholesterol?   Yes- Atorvastatin    Are you having muscle aches or other side effects that you think could be caused by your cholesterol lowering medication?  No    Hypertension Follow-up      Do you check your blood pressure regularly outside of the clinic? No     Are you following a low salt diet? Yes    Are your blood pressures ever more than 140 on the top number (systolic) OR more   than 90 on the bottom number (diastolic), for example 140/90? No    BP Readings from Last 2 Encounters:   10/12/22 132/58   08/25/21 122/68     Hemoglobin A1C (%)   Date Value   03/27/2023 8.7 (H)   01/16/2023 8.9 (H)   06/22/2021 7.2 (H)   03/02/2021 7.5 (H)     LDL Cholesterol Calculated (mg/dL)   Date Value   10/03/2022 56   06/22/2021 39   09/08/2020 41       Medication recheck for Glycopyrrolate  Taking as prescribed: Yes  Side effects: None  Helping sx:  Yes      {additonal problems for provider to add (Optional):428985}      Review of Systems   {ROS COMP (Optional):018676}      Objective    There were no vitals taken for this visit.  There is no height or weight on file to calculate BMI.  Physical Exam   {Exam List (Optional):609456}    {Diagnostic Test Results (Optional):444871}    {AMBULATORY ATTESTATION (Optional):210614}

## 2023-04-05 ENCOUNTER — TELEPHONE (OUTPATIENT)
Dept: FAMILY MEDICINE | Facility: CLINIC | Age: 78
End: 2023-04-05

## 2023-04-05 ENCOUNTER — ALLIED HEALTH/NURSE VISIT (OUTPATIENT)
Dept: FAMILY MEDICINE | Facility: CLINIC | Age: 78
End: 2023-04-05
Payer: COMMERCIAL

## 2023-04-05 DIAGNOSIS — E11.21 TYPE 2 DIABETES MELLITUS WITH DIABETIC NEPHROPATHY, WITHOUT LONG-TERM CURRENT USE OF INSULIN (H): Primary | ICD-10-CM

## 2023-04-05 DIAGNOSIS — I10 ESSENTIAL HYPERTENSION: Primary | ICD-10-CM

## 2023-04-05 PROCEDURE — 99207 PR NO CHARGE NURSE ONLY: CPT

## 2023-04-05 RX ORDER — PIOGLITAZONEHYDROCHLORIDE 15 MG/1
15 TABLET ORAL DAILY
Qty: 90 TABLET | Refills: 1 | Status: SHIPPED | OUTPATIENT
Start: 2023-04-05 | End: 2023-04-11

## 2023-04-05 NOTE — TELEPHONE ENCOUNTER
Suzette Britton is a 77 year old patient who comes in today for a Blood Pressure check because of ongoing blood pressure monitoring.  Vital Signs as repeated by /80, 170/76, p-86 and regular.  Patient is taking medication as prescribed  Patient is tolerating medications well.  Patient is not monitoring Blood Pressure at home.  Average readings if yes are NA  Current complaints: none  Disposition:  Pt was incouraged to take and record BP every day. She will avoid high sodium foods and increase her daily activity.    Dr Jarrett  The Jardiace is too expensive, per patient.

## 2023-04-05 NOTE — TELEPHONE ENCOUNTER
So did some checking for diabetes medications, the cheapest and best effective medication that is not expensive is Actos (pioglitazone) through [facility] pharmacy (mail order pharmacy that doesn't go through insurance the upfront cost is $10.20 for 90 pills.  So I did send it.    Thank you,  Dave Jarrett MD

## 2023-04-05 NOTE — PROGRESS NOTES
Suzette Britton is a 77 year old patient who comes in today for a Blood Pressure check because of ongoing blood pressure monitoring.  Vital Signs as repeated by /80, 170/76, p-86 and regular.  Patient is taking medication as prescribed  Patient is tolerating medications well.  Patient is not monitoring Blood Pressure at home.  Average readings if yes are NA  Current complaints: none  Disposition:  Pt was encouraged to take and record BP every day. She will avoid high sodium foods and increase her daily activity.    Dr Jarrett  The Jardiace is too expensive, per patient.

## 2023-04-07 NOTE — TELEPHONE ENCOUNTER
Pt called back and was given information about [facility] and the phone number to contact them. Basilia Antunez RN

## 2023-04-07 NOTE — TELEPHONE ENCOUNTER
2nd attempt.  Patient was instructed to return call to [facility] main line at [phone] to speak with an RN.    Liberty German RN  [facility]

## 2023-04-10 ENCOUNTER — TELEPHONE (OUTPATIENT)
Dept: FAMILY MEDICINE | Facility: CLINIC | Age: 78
End: 2023-04-10
Payer: COMMERCIAL

## 2023-04-10 DIAGNOSIS — E11.21 TYPE 2 DIABETES MELLITUS WITH DIABETIC NEPHROPATHY, WITHOUT LONG-TERM CURRENT USE OF INSULIN (H): Primary | ICD-10-CM

## 2023-04-10 NOTE — TELEPHONE ENCOUNTER
New Medication Request        What medication are you requesting?: JARDIANCE 10 MG    Reason for medication request: INSURANCE needs this script for 90 days to be sent to [facility] to be covered please.    Have you taken this medication before?: No    Controlled Substance Agreement on file:   CSA -- Patient Level:    CSA: None found at the patient level.         Patient offered an appointment? No  This med was suggested by Amol Jarrett at last visit. She just figured out which pharmacy works best for coverage    [facility] - PATEL Marcano - [facility] AT [address] [facility]  [place] [address]  Phone: [phone] Fax: [phone]    Could we send this information to you in [facility] or would you prefer to receive a phone call?:   Patient would prefer a phone call   Okay to leave a detailed message?: Yes at Home number on file [phone] (home)

## 2023-04-11 NOTE — TELEPHONE ENCOUNTER
Dr. Jarrett:      Please see note below, did you want to send the Jardiance to [facility]? The message below says it would be covered, thank you.      ELIAS Sainz

## 2023-04-12 NOTE — TELEPHONE ENCOUNTER
I will send it to [facility] for 90 days.  ASSESSMENT/PLAN  Suzette was seen today for medication request.    Diagnoses and all orders for this visit:    Type 2 diabetes mellitus with diabetic nephropathy, without long-term current use of insulin (H)  -     empagliflozin (JARDIANCE) 10 MG TABS tablet; Take 1 tablet (10 mg) by mouth daily        Dave Jarrett MD  [facility]

## 2023-04-14 ENCOUNTER — TRANSFERRED RECORDS (OUTPATIENT)
Dept: HEALTH INFORMATION MANAGEMENT | Facility: CLINIC | Age: 78
End: 2023-04-14
Payer: COMMERCIAL

## 2023-07-02 ENCOUNTER — HEALTH MAINTENANCE LETTER (OUTPATIENT)
Age: 78
End: 2023-07-02

## 2023-07-03 ENCOUNTER — LAB (OUTPATIENT)
Dept: LAB | Facility: CLINIC | Age: 78
End: 2023-07-03
Payer: COMMERCIAL

## 2023-07-03 DIAGNOSIS — E11.21 TYPE 2 DIABETES MELLITUS WITH DIABETIC NEPHROPATHY, WITHOUT LONG-TERM CURRENT USE OF INSULIN (H): ICD-10-CM

## 2023-07-03 LAB — HBA1C MFR BLD: 8.2 % (ref 0–5.6)

## 2023-07-03 PROCEDURE — 83036 HEMOGLOBIN GLYCOSYLATED A1C: CPT

## 2023-07-03 PROCEDURE — 36416 COLLJ CAPILLARY BLOOD SPEC: CPT

## 2023-07-05 ENCOUNTER — OFFICE VISIT (OUTPATIENT)
Dept: FAMILY MEDICINE | Facility: CLINIC | Age: 78
End: 2023-07-05
Payer: COMMERCIAL

## 2023-07-05 VITALS
WEIGHT: 197.4 LBS | SYSTOLIC BLOOD PRESSURE: 130 MMHG | TEMPERATURE: 97.9 F | HEIGHT: 63 IN | HEART RATE: 71 BPM | OXYGEN SATURATION: 97 % | BODY MASS INDEX: 34.98 KG/M2 | RESPIRATION RATE: 20 BRPM | DIASTOLIC BLOOD PRESSURE: 68 MMHG

## 2023-07-05 DIAGNOSIS — N18.31 STAGE 3A CHRONIC KIDNEY DISEASE (H): ICD-10-CM

## 2023-07-05 DIAGNOSIS — E11.21 TYPE 2 DIABETES MELLITUS WITH DIABETIC NEPHROPATHY, WITHOUT LONG-TERM CURRENT USE OF INSULIN (H): Primary | ICD-10-CM

## 2023-07-05 DIAGNOSIS — Z13.220 SCREENING FOR LIPID DISORDERS: ICD-10-CM

## 2023-07-05 DIAGNOSIS — Z23 NEED FOR SHINGLES VACCINE: ICD-10-CM

## 2023-07-05 DIAGNOSIS — L90.0 LICHEN SCLEROSUS ET ATROPHICUS: ICD-10-CM

## 2023-07-05 PROCEDURE — 99214 OFFICE O/P EST MOD 30 MIN: CPT | Performed by: FAMILY MEDICINE

## 2023-07-05 RX ORDER — CLOBETASOL PROPIONATE 0.5 MG/G
OINTMENT TOPICAL
Qty: 60 G | Refills: 1 | Status: SHIPPED | OUTPATIENT
Start: 2023-07-05 | End: 2023-10-18

## 2023-07-05 ASSESSMENT — PAIN SCALES - GENERAL: PAINLEVEL: NO PAIN (0)

## 2023-07-05 NOTE — PATIENT INSTRUCTIONS
Plan to increase jardiance up to 25mg daily.  Schedule a fasting lab only appt in 3months, with urine sample.  Schedule a Wellness appt with diabetes check a few days after the lab appt. About 10/12/23

## 2023-07-05 NOTE — PROGRESS NOTES
Assessment & Plan     Type 2 diabetes mellitus with diabetic nephropathy, without long-term current use of insulin (H)  Not controlled, but improved with jardiance 10mg, so increase to 25mg daily  Recheck in 3 months, if not improved into goal then plan to add lantus insulin.  - empagliflozin (JARDIANCE) 25 MG TABS tablet; Take 1 tablet (25 mg) by mouth daily  - **Hemoglobin A1c FUTURE 3mo; Future  - Basic metabolic panel  (Ca, Cl, CO2, Creat, Gluc, K, Na, BUN); Future  - Albumin Random Urine Quantitative with Creat Ratio; Future    Need for shingles vaccine      Stage 3a chronic kidney disease (H): stable  - Hemoglobin; Future    Screening for lipid disorders  - Lipid panel reflex to direct LDL Fasting; Future       See Patient Instructions    Dave Jarrett MD  [facility]    Giancarlo Bradford is a 78 year old, presenting for the following health issues:  Diabetes (Labs done 7/3/23.)        4/4/2023     9:56 AM   Additional Questions   Roomed by Denisse Valentine MA   Accompanied by Self     History of Present Illness       Diabetes:   She presents for follow up of diabetes.  She is checking home blood glucose a few times a week. She checks blood glucose before meals.  Blood glucose is sometimes over 200 and never under 70. When her blood glucose is low, the patient is asymptomatic for confusion, blurred vision, lethargy and reports not feeling dizzy, shaky, or weak.  She is concerned about blood sugar frequently over 200. She is not experiencing numbness or burning in feet, excessive thirst, blurry vision, weight changes or redness, sores or blisters on feet.         Hypertension: She presents for follow up of hypertension.  She does not check blood pressure  regularly outside of the clinic. Outpatient blood pressures have not been over 140/90. She does not follow a low salt diet.     She eats 2-3 servings of fruits and vegetables daily.She consumes 0 sweetened beverage(s) daily.She  "exercises with enough effort to increase her heart rate 9 or less minutes per day.  She exercises with enough effort to increase her heart rate 3 or less days per week. She is missing 1 dose(s) of medications per week.  She is not taking prescribed medications regularly due to remembering to take.         Review of Systems   Constitutional, HEENT, cardiovascular, pulmonary, gi and gu systems are negative, except as otherwise noted.      Objective    /68 (BP Location: Right arm, Patient Position: Sitting, Cuff Size: Adult Large)   Pulse 71   Temp 97.9  F (36.6  C) (Tympanic)   Resp 20   Ht 1.61 m (5' 3.39\")   Wt 89.5 kg (197 lb 6.4 oz)   SpO2 97%   BMI 34.54 kg/m    Body mass index is 34.54 kg/m .  Physical Exam   GENERAL: healthy, alert and no distress  NECK: no adenopathy, no asymmetry, masses, or scars and thyroid normal to palpation  RESP: lungs clear to auscultation - no rales, rhonchi or wheezes  CV: regular rate and rhythm, normal S1 S2, no S3 or S4, no murmur, click or rub, no peripheral edema and peripheral pulses strong  MS: no gross musculoskeletal defects noted, no edema  GYN: white thin cracking skin that is adhering at the clitoral garrido and the introitus                      "

## 2023-10-12 ENCOUNTER — MYC MEDICAL ADVICE (OUTPATIENT)
Dept: FAMILY MEDICINE | Facility: CLINIC | Age: 78
End: 2023-10-12

## 2023-10-12 ENCOUNTER — LAB (OUTPATIENT)
Dept: LAB | Facility: CLINIC | Age: 78
End: 2023-10-12
Payer: COMMERCIAL

## 2023-10-12 DIAGNOSIS — Z13.220 SCREENING FOR LIPID DISORDERS: ICD-10-CM

## 2023-10-12 DIAGNOSIS — N18.31 STAGE 3A CHRONIC KIDNEY DISEASE (H): ICD-10-CM

## 2023-10-12 DIAGNOSIS — E11.21 TYPE 2 DIABETES MELLITUS WITH DIABETIC NEPHROPATHY, WITHOUT LONG-TERM CURRENT USE OF INSULIN (H): ICD-10-CM

## 2023-10-12 DIAGNOSIS — E11.21 TYPE 2 DIABETES MELLITUS WITH DIABETIC NEPHROPATHY, WITHOUT LONG-TERM CURRENT USE OF INSULIN (H): Primary | Chronic | ICD-10-CM

## 2023-10-12 LAB
ANION GAP SERPL CALCULATED.3IONS-SCNC: 13 MMOL/L (ref 7–15)
BUN SERPL-MCNC: 27.3 MG/DL (ref 8–23)
CALCIUM SERPL-MCNC: 9.9 MG/DL (ref 8.8–10.2)
CHLORIDE SERPL-SCNC: 98 MMOL/L (ref 98–107)
CHOLEST SERPL-MCNC: 126 MG/DL
CREAT SERPL-MCNC: 1.3 MG/DL (ref 0.51–0.95)
DEPRECATED HCO3 PLAS-SCNC: 26 MMOL/L (ref 22–29)
EGFRCR SERPLBLD CKD-EPI 2021: 41 ML/MIN/1.73M2
GLUCOSE SERPL-MCNC: 185 MG/DL (ref 70–99)
HDLC SERPL-MCNC: 47 MG/DL
HGB BLD-MCNC: 14.6 G/DL (ref 11.7–15.7)
LDLC SERPL CALC-MCNC: 40 MG/DL
NONHDLC SERPL-MCNC: 79 MG/DL
POTASSIUM SERPL-SCNC: 4.2 MMOL/L (ref 3.4–5.3)
SODIUM SERPL-SCNC: 137 MMOL/L (ref 135–145)
TRIGL SERPL-MCNC: 193 MG/DL

## 2023-10-12 PROCEDURE — 80061 LIPID PANEL: CPT

## 2023-10-12 PROCEDURE — 85018 HEMOGLOBIN: CPT

## 2023-10-12 PROCEDURE — 80048 BASIC METABOLIC PNL TOTAL CA: CPT

## 2023-10-12 PROCEDURE — 83036 HEMOGLOBIN GLYCOSYLATED A1C: CPT

## 2023-10-12 PROCEDURE — 36415 COLL VENOUS BLD VENIPUNCTURE: CPT

## 2023-10-13 LAB — HBA1C MFR BLD: 8.4 % (ref 0–5.6)

## 2023-10-16 LAB
CREAT UR-MCNC: 56.3 MG/DL
MICROALBUMIN UR-MCNC: 14 MG/L
MICROALBUMIN/CREAT UR: 24.87 MG/G CR (ref 0–25)

## 2023-10-16 PROCEDURE — 82043 UR ALBUMIN QUANTITATIVE: CPT

## 2023-10-16 PROCEDURE — 82570 ASSAY OF URINE CREATININE: CPT

## 2023-10-18 ENCOUNTER — OFFICE VISIT (OUTPATIENT)
Dept: FAMILY MEDICINE | Facility: CLINIC | Age: 78
End: 2023-10-18
Attending: FAMILY MEDICINE
Payer: COMMERCIAL

## 2023-10-18 VITALS
SYSTOLIC BLOOD PRESSURE: 136 MMHG | OXYGEN SATURATION: 97 % | HEART RATE: 82 BPM | BODY MASS INDEX: 32.44 KG/M2 | WEIGHT: 190 LBS | HEIGHT: 64 IN | RESPIRATION RATE: 20 BRPM | DIASTOLIC BLOOD PRESSURE: 70 MMHG | TEMPERATURE: 98.3 F

## 2023-10-18 DIAGNOSIS — E11.21 TYPE 2 DIABETES MELLITUS WITH DIABETIC NEPHROPATHY, WITHOUT LONG-TERM CURRENT USE OF INSULIN (H): ICD-10-CM

## 2023-10-18 DIAGNOSIS — B37.31 CANDIDIASIS OF VULVA AND VAGINA: ICD-10-CM

## 2023-10-18 DIAGNOSIS — L90.0 LICHEN SCLEROSUS ET ATROPHICUS: ICD-10-CM

## 2023-10-18 DIAGNOSIS — Z23 NEED FOR SHINGLES VACCINE: ICD-10-CM

## 2023-10-18 DIAGNOSIS — M16.11 PRIMARY OSTEOARTHRITIS OF RIGHT HIP: ICD-10-CM

## 2023-10-18 DIAGNOSIS — Z00.00 ENCOUNTER FOR MEDICARE ANNUAL WELLNESS EXAM: Primary | ICD-10-CM

## 2023-10-18 PROCEDURE — G0439 PPPS, SUBSEQ VISIT: HCPCS | Performed by: FAMILY MEDICINE

## 2023-10-18 PROCEDURE — 99214 OFFICE O/P EST MOD 30 MIN: CPT | Mod: 25 | Performed by: FAMILY MEDICINE

## 2023-10-18 RX ORDER — MICONAZOLE NITRATE 200 MG-2 %
1 KIT VAGINAL DAILY
Qty: 1 KIT | Refills: 0 | Status: SHIPPED | OUTPATIENT
Start: 2023-10-18 | End: 2023-10-21

## 2023-10-18 RX ORDER — RESPIRATORY SYNCYTIAL VIRUS VACCINE 120MCG/0.5
0.5 KIT INTRAMUSCULAR ONCE
Qty: 1 EACH | Refills: 0 | Status: CANCELLED | OUTPATIENT
Start: 2023-10-18 | End: 2023-10-18

## 2023-10-18 RX ORDER — CLOBETASOL PROPIONATE 0.5 MG/G
OINTMENT TOPICAL
Qty: 60 G | Refills: 1 | Status: SHIPPED | OUTPATIENT
Start: 2023-10-18 | End: 2024-02-21

## 2023-10-18 ASSESSMENT — ENCOUNTER SYMPTOMS
WEAKNESS: 0
SORE THROAT: 0
FEVER: 0
CHILLS: 0
NERVOUS/ANXIOUS: 0
HEADACHES: 0
FREQUENCY: 0
HEMATURIA: 0
ARTHRALGIAS: 0
HEMATOCHEZIA: 0
MYALGIAS: 0
PALPITATIONS: 0
DYSURIA: 0
PARESTHESIAS: 0
EYE PAIN: 0
DIZZINESS: 0
COUGH: 0
JOINT SWELLING: 0
DIARRHEA: 0
HEARTBURN: 0
BREAST MASS: 0
CONSTIPATION: 0
ABDOMINAL PAIN: 0
SHORTNESS OF BREATH: 0
NAUSEA: 0

## 2023-10-18 ASSESSMENT — ACTIVITIES OF DAILY LIVING (ADL): CURRENT_FUNCTION: NO ASSISTANCE NEEDED

## 2023-10-18 ASSESSMENT — PAIN SCALES - GENERAL: PAINLEVEL: NO PAIN (0)

## 2023-10-18 NOTE — PROGRESS NOTES
"SUBJECTIVE:   Suzette is a 78 year old who presents for Preventive Visit.      10/18/2023     2:20 PM   Additional Questions   Roomed by Denisse Valentine   Accompanied by self         10/18/2023     2:20 PM   Patient Reported Additional Medications   Patient reports taking the following new medications none       Are you in the first 12 months of your Medicare coverage?  No    Healthy Habits:     In general, how would you rate your overall health?  Good    Frequency of exercise:  None    Do you usually eat at least 4 servings of fruit and vegetables a day, include whole grains    & fiber and avoid regularly eating high fat or \"junk\" foods?  Yes    Taking medications regularly:  Yes    Medication side effects:  None    Ability to successfully perform activities of daily living:  No assistance needed    Home Safety:  No safety concerns identified    Hearing Impairment:  Difficulty following a conversation in a noisy restaurant or crowded room, feel that people are mumbling or not speaking clearly, difficult to understand a speaker at a public meeting or Confucianist service, need to ask people to speak up or repeat themselves and difficulty understanding soft or whispered speech    In the past 6 months, have you been bothered by leaking of urine? Yes    In general, how would you rate your overall mental or emotional health?  Excellent    Additional concerns today:  Yes      Today's PHQ-2 Score:       10/18/2023     2:30 PM   PHQ-2 ( 1999 [employer])   Q1: Little interest or pleasure in doing things 0   Q2: Feeling down, depressed or hopeless 0   PHQ-2 Score 0   Q1: Little interest or pleasure in doing things Not at all   Q2: Feeling down, depressed or hopeless Not at all   PHQ-2 Score 0           Have you ever done Advance Care Planning? (For example, a Health Directive, POLST, or a discussion with a medical provider or your loved ones about your wishes): Yes, advance care planning is on file.       Fall risk  Fallen 2 or more " times in the past year?: No  Any fall with injury in the past year?: No    Cognitive Screening   1) Repeat 3 items (Leader, Season, Table)    2) Clock draw: NORMAL  3) 3 item recall: Recalls 3 objects  Results: 3 items recalled: COGNITIVE IMPAIRMENT LESS LIKELY    Mini-CogTM Copyright RAMSES Cuevas. Licensed by the author for use in [facility]; reprinted with permission (cherri@[facility]). All rights reserved.      Do you have sleep apnea, excessive snoring or daytime drowsiness? : no    Reviewed and updated as needed this visit by clinical staff   Tobacco  Allergies  Meds              Reviewed and updated as needed this visit by Provider                 Social History     Tobacco Use    Smoking status: Never    Smokeless tobacco: Never   Substance Use Topics    Alcohol use: No     Alcohol/week: 0.0 standard drinks of alcohol           10/18/2023     2:29 PM   Alcohol Use   Prescreen: >3 drinks/day or >7 drinks/week? Not Applicable          No data to display              Do you have a current opioid prescription? No  Do you use any other controlled substances or medications that are not prescribed by a provider? None      Diabetes Follow-up    How often are you checking your blood sugar? Not at all  What concerns do you have today about your diabetes? Other: Question on Jardiance?   Do you have any of these symptoms? (Select all that apply)  No numbness or tingling in feet.  No redness, sores or blisters on feet.  No complaints of excessive thirst.  No reports of blurry vision.  No significant changes to weight.  Was on jardiance 10mg daily, never did increase to 25mg per patient she kept on the same dose.    Vulvar itching nightly again.  Stopped the clobetasol after the cream was gone, she took it nightly which helped stop the itching, then stopped when the cream was gone and itching returned.      Hyperlipidemia Follow-Up    Are you regularly taking any medication or supplement to lower your cholesterol?    Yes- Atorvastatin  Are you having muscle aches or other side effects that you think could be caused by your cholesterol lowering medication?  No    Hypertension Follow-up    Do you check your blood pressure regularly outside of the clinic? No, Once in a while.  Are you following a low salt diet? Yes  Are your blood pressures ever more than 140 on the top number (systolic) OR more   than 90 on the bottom number (diastolic), for example 140/90? Yes    BP Readings from Last 2 Encounters:   07/05/23 130/68   04/04/23 128/62     Hemoglobin A1C (%)   Date Value   10/12/2023 8.4 (H)   07/03/2023 8.2 (H)   06/22/2021 7.2 (H)   03/02/2021 7.5 (H)     LDL Cholesterol Calculated (mg/dL)   Date Value   10/12/2023 40   10/03/2022 56   06/22/2021 39   09/08/2020 41         Vascular Disease Follow-up    How often do you take nitroglycerin? Never  Do you take an aspirin every day? Yes    Current providers sharing in care for this patient include:   Patient Care Team:  Dave Jarrett MD as PCP - General (Family Medicine)  Dave Jarrett MD as Assigned PCP  Christie Suazo RD as Diabetes Educator (Dietitian, Registered)    The following health maintenance items are reviewed in Epic and correct as of today:  Health Maintenance   Topic Date Due    ZOSTER IMMUNIZATION (1 of 2) Never done    HEPATITIS B IMMUNIZATION (1 of 3 - Risk 3-dose series) Never done    RSV VACCINE 60+ (1 - 1-dose 60+ series) Never done    DEXA  08/06/2018    INFLUENZA VACCINE (1) 09/01/2023    COVID-19 Vaccine (6 - 2023-24 season) 09/01/2023    DIABETIC FOOT EXAM  10/12/2023    MEDICARE ANNUAL WELLNESS VISIT  10/12/2023    A1C  01/12/2024    ANNUAL REVIEW OF HM ORDERS  04/04/2024    EYE EXAM  04/14/2024    BMP  10/12/2024    LIPID  10/12/2024    HEMOGLOBIN  10/12/2024    MICROALBUMIN  10/16/2024    FALL RISK ASSESSMENT  10/18/2024    ADVANCE CARE PLANNING  10/12/2027    DTAP/TDAP/TD IMMUNIZATION (3 - Td or Tdap) 02/13/2033    PHQ-2 (once per  "calendar year)  Completed    Pneumococcal Vaccine: 65+ Years  Completed    URINALYSIS  Completed    IPV IMMUNIZATION  Aged Out    HPV IMMUNIZATION  Aged Out    MENINGITIS IMMUNIZATION  Aged Out    COLORECTAL CANCER SCREENING  Discontinued     BP Readings from Last 3 Encounters:   10/18/23 (!) 152/70   07/05/23 130/68   04/04/23 128/62    Wt Readings from Last 3 Encounters:   10/18/23 86.2 kg (190 lb)   07/05/23 89.5 kg (197 lb 6.4 oz)   04/04/23 92.7 kg (204 lb 6.4 oz)                Mammogram Screening - Patient over age 75, has elected to discontinue screenings.  Pertinent mammograms are reviewed under the imaging tab.    Review of Systems   Constitutional:  Negative for chills and fever.   HENT:  Positive for hearing loss. Negative for congestion, ear pain and sore throat.    Eyes:  Negative for pain and visual disturbance.   Respiratory:  Negative for cough and shortness of breath.    Cardiovascular:  Negative for chest pain, palpitations and peripheral edema.   Gastrointestinal:  Negative for abdominal pain, constipation, diarrhea, heartburn, hematochezia and nausea.   Breasts:  Negative for tenderness, breast mass and discharge.   Genitourinary:  Negative for dysuria, frequency, genital sores, hematuria, pelvic pain, urgency, vaginal bleeding and vaginal discharge.   Musculoskeletal:  Negative for arthralgias, joint swelling and myalgias.   Skin:  Negative for rash.   Neurological:  Negative for dizziness, weakness, headaches and paresthesias.   Psychiatric/Behavioral:  Negative for mood changes. The patient is not nervous/anxious.        OBJECTIVE:   /70   Pulse 82   Temp 98.3  F (36.8  C) (Tympanic)   Resp 20   Ht 1.626 m (5' 4\")   Wt 86.2 kg (190 lb)   SpO2 97%   BMI 32.61 kg/m   Estimated body mass index is 34.54 kg/m  as calculated from the following:    Height as of 7/5/23: 1.61 m (5' 3.39\").    Weight as of 7/5/23: 89.5 kg (197 lb 6.4 oz).  Physical Exam  GENERAL APPEARANCE: healthy, alert " and no distress  EYES: Eyes grossly normal to inspection, PERRL and conjunctivae and sclerae normal  HENT: ear canals and TM's normal, nose and mouth without ulcers or lesions, oropharynx clear and oral mucous membranes moist  NECK: no adenopathy, no asymmetry, masses, or scars and thyroid normal to palpation  RESP: lungs clear to auscultation - no rales, rhonchi or wheezes  BREAST: normal without masses, tenderness or nipple discharge and no palpable axillary masses or adenopathy  CV: regular rate and rhythm, normal S1 S2, no S3 or S4, no murmur, click or rub, no peripheral edema and peripheral pulses strong  ABDOMEN: soft, nontender, no hepatosplenomegaly, no masses and bowel sounds normal  MS: no musculoskeletal defects are noted, gait is age appropriate without ataxia, RLE exam reveals decreased hip internal rotation  SKIN: no suspicious lesions or rashes  NEURO: Normal strength and tone, sensory exam grossly normal, mentation intact and speech normal  PSYCH: mentation appears normal and affect normal/bright    Diagnostic Test Results:  Labs reviewed in Epic    ASSESSMENT / PLAN:   Suzette was seen today for physical and diabetes.    Diagnoses and all orders for this visit:    Encounter for Medicare annual wellness exam    Type 2 diabetes mellitus with diabetic nephropathy, without long-term current use of insulin (H)  Not controlled  Increase jardiance to 25mg daily  -     empagliflozin (JARDIANCE) 25 MG TABS tablet; Take 1 tablet (25 mg) by mouth daily    Need for shingles vaccine    Candidiasis of vulva and vagina  -     miconazole (MICONAZOLE 3 APPLICATOR) 200 & 2 MG-% (9GM) KIT; Place 1 applicator vaginally daily for 3 days And apply the miconazole 2% cream topically to vulva as needed for itching.    Lichen sclerosus et atrophicus  -     clobetasol (TEMOVATE) 0.05 % external ointment; Apply topically at bedtime as needed, may repeat once (as needed for vulvar irritation) Apply  Pea sized amount to vulva  "every night for 4 weeks then decrease to every other night from then on.    Primary osteoarthritis of right hip: plan to see ortho again  Handicap info for DMV completed.  -     Orthopedic  Referral; Future    Other orders  -     PRIMARY CARE FOLLOW-UP SCHEDULING  -     PRIMARY CARE FOLLOW-UP SCHEDULING; Future  -     Cancel: PRIMARY CARE FOLLOW-UP SCHEDULING; Future  -     PRIMARY CARE FOLLOW-UP SCHEDULING; Future        Patient has been advised of split billing requirements and indicates understanding: Yes      COUNSELING:  Reviewed preventive health counseling, as reflected in patient instructions       Regular exercise       Healthy diet/nutrition       Vision screening       Fall risk prevention       Osteoporosis prevention/bone health      BMI:   Estimated body mass index is 34.54 kg/m  as calculated from the following:    Height as of 7/5/23: 1.61 m (5' 3.39\").    Weight as of 7/5/23: 89.5 kg (197 lb 6.4 oz).   Weight management plan: Discussed healthy diet and exercise guidelines      She reports that she has never smoked. She has never used smokeless tobacco.      Appropriate preventive services were discussed with this patient, including applicable screening as appropriate for fall prevention, nutrition, physical activity, Tobacco-use cessation, weight loss and cognition.  Checklist reviewing preventive services available has been given to the patient.    Reviewed patients plan of care and provided an AVS. The Basic Care Plan (routine screening as documented in Health Maintenance) for Suzette meets the Care Plan requirement. This Care Plan has been established and reviewed with the Patient.          Dave Jarrett MD  [facility]    Identified Health Risks:  I have reviewed Opioid Use Disorder and Substance Use Disorder risk factors and made any needed referrals. She is at risk for lack of exercise and has been provided with information to increase physical activity for " the benefit of her well-being.  The patient was provided with written information regarding signs of hearing loss.  Information on urinary incontinence and treatment options given to patient.

## 2023-10-18 NOTE — PATIENT INSTRUCTIONS
"Switch Jardiance to 25mg once a day    Use the miconazole for 3 nights.  After completing that miconazole then start the clobetasol again nightly for 8 weeks then decrease to every other night.    You will get a call for orthopedics to schedule the appt with Dr. Arteaga.    Patient Education   Personalized Prevention Plan  You are due for the preventive services outlined below.  Your care team is available to assist you in scheduling these services.  If you have already completed any of these items, please share that information with your care team to update in your medical record.  Health Maintenance Due   Topic Date Due    Zoster (Shingles) Vaccine (1 of 2) Never done    Hepatitis B Vaccine (1 of 3 - Risk 3-dose series) Never done    RSV VACCINE 60+ (1 - 1-dose 60+ series) Never done    Osteoporosis Screening  08/06/2018    Flu Vaccine (1) 09/01/2023    COVID-19 Vaccine (6 - 2023-24 season) 09/01/2023    Diabetic Foot Exam  10/12/2023     Learning About Being Physically Active  What is physical activity?     Being physically active means doing any kind of activity that gets your body moving.  The types of physical activity that can help you get fit and stay healthy include:  Aerobic or \"cardio\" activities. These make your heart beat faster and make you breathe harder, such as brisk walking, riding a bike, or running. They strengthen your heart and lungs and build up your endurance.  Strength training activities. These make your muscles work against, or \"resist,\" something. Examples include lifting weights or doing push-ups. These activities help tone and strengthen your muscles and bones.  Stretches. These let you move your joints and muscles through their full range of motion. Stretching helps you be more flexible.  Reaching a balance between these three types of physical activity is important because each one contributes to your overall fitness.  What are the benefits of being active?  Being active is one of " "the best things you can do for your health. It helps you to:  Feel stronger and have more energy to do all the things you like to do.  Focus better at school or work.  Feel, think, and sleep better.  Reach and stay at a healthy weight.  Lose fat and build lean muscle.  Lower your risk for serious health problems, including diabetes, heart attack, high blood pressure, and some cancers.  Keep your heart, lungs, bones, muscles, and joints strong and healthy.  How can you make being active part of your life?  Start slowly. Make it your long-term goal to get at least 30 minutes of exercise on most days of the week. Walking is a good choice. You also may want to do other activities, such as running, swimming, cycling, or playing tennis or team sports.  Pick activities that you like--ones that make your heart beat faster, your muscles stronger, and your muscles and joints more flexible. If you find more than one thing you like doing, do them all. You don't have to do the same thing every day.  Get your heart pumping every day. Any activity that makes your heart beat faster and keeps it at that rate for a while counts.  Here are some great ways to get your heart beating faster:  Go for a brisk walk, run, or bike ride.  Go for a hike or swim.  Go in-line skating.  Play a game of touch football, basketball, or soccer.  Ride a bike.  Play tennis or racquetball.  Climb stairs.  Even some household chores can be aerobic--just do them at a faster pace. Vacuuming, raking or mowing the lawn, sweeping the garage, and washing and waxing the car all can help get your heart rate up.  Strengthen your muscles during the week. You don't have to lift heavy weights or grow big, bulky muscles to get stronger. Doing a few simple activities that make your muscles work against, or \"resist,\" something can help you get stronger.  For example, you can:  Do push-ups or sit-ups, which use your own body weight as resistance.  Lift weights or " "dumbbells or use stretch bands at home or in a gym or community center.  Stretch your muscles often. Stretching will help you as you become more active. It can help you stay flexible, loosen tight muscles, and avoid injury. It can also help improve your balance and posture and can be a great way to relax.  Be sure to stretch the muscles you'll be using when you work out. It's best to warm your muscles slightly before you stretch them. Walk or do some other light aerobic activity for a few minutes, and then start stretching.  When you stretch your muscles:  Do it slowly. Stretching is not about going fast or making sudden movements.  Don't push or bounce during a stretch.  Hold each stretch for at least 15 to 30 seconds, if you can. You should feel a stretch in the muscle, but not pain.  Breathe out as you do the stretch. Then breathe in as you hold the stretch. Don't hold your breath.  If you're worried about how more activity might affect your health, have a checkup before you start. Follow any special advice your doctor gives you for getting a smart start.  Where can you learn more?  Go to https://www.[employer].net/patiented  Enter W332 in the search box to learn more about \"Learning About Being Physically Active.\"  Current as of: October 10, 2022               Content Version: 13.7    6708-9646 [employer].   Care instructions adapted under license by your healthcare professional. If you have questions about a medical condition or this instruction, always ask your healthcare professional. [employer] disclaims any warranty or liability for your use of this information.      Hearing Loss: Care Instructions  Overview     Hearing loss is a sudden or slow decrease in how well you hear. It can range from slight to profound. Permanent hearing loss can occur with aging. It also can happen when you are exposed long-term to loud noise. Examples include listening to loud music, riding " motorcycles, or being around other loud machines.  Hearing loss can affect your work and home life. It can make you feel lonely or depressed. You may feel that you have lost your independence. But hearing aids and other devices can help you hear better and feel connected to others.  Follow-up care is a key part of your treatment and safety. Be sure to make and go to all appointments, and call your doctor if you are having problems. It's also a good idea to know your test results and keep a list of the medicines you take.  How can you care for yourself at home?  Avoid loud noises whenever possible. This helps keep your hearing from getting worse.  Always wear hearing protection around loud noises.  Wear a hearing aid as directed.  A professional can help you pick a hearing aid that will work best for you.  You can also get hearing aids over the counter for mild to moderate hearing loss.  Have hearing tests as your doctor suggests. They can show whether your hearing has changed. Your hearing aid may need to be adjusted.  Use other devices as needed. These may include:  Telephone amplifiers and hearing aids that can connect to a television, stereo, radio, or microphone.  Devices that use lights or vibrations. These alert you to the doorbell, a ringing telephone, or a baby monitor.  Television closed-captioning. This shows the words at the bottom of the screen. Most new TVs can do this.  TTY (text telephone). This lets you type messages back and forth on the telephone instead of talking or listening. These devices are also called TDD. When messages are typed on the keyboard, they are sent over the phone line to a receiving TTY. The message is shown on a monitor.  Use text messaging, social media, and email if it is hard for you to communicate by telephone.  Try to learn a listening technique called speechreading. It is not lipreading. You pay attention to people's gestures, expressions, posture, and tone of voice. These  "clues can help you understand what a person is saying. Face the person you are talking to, and have them face you. Make sure the lighting is good. You need to see the other person's face clearly.  Think about counseling if you need help to adjust to your hearing loss.  When should you call for help?  Watch closely for changes in your health, and be sure to contact your doctor if:    You think your hearing is getting worse.     You have new symptoms, such as dizziness or nausea.   Where can you learn more?  Go to https://www.[employer].net/patiented  Enter R798 in the search box to learn more about \"Hearing Loss: Care Instructions.\"  Current as of: March 1, 2023               Content Version: 13.7    [id number] [employer].   Care instructions adapted under license by your healthcare professional. If you have questions about a medical condition or this instruction, always ask your healthcare professional. [employer] disclaims any warranty or liability for your use of this information.      Bladder Training: Care Instructions  Your Care Instructions     Bladder training is used to treat urge incontinence and stress incontinence. Urge incontinence means that the need to urinate comes on so fast that you can't get to a toilet in time. Stress incontinence means that you leak urine because of pressure on your bladder. For example, it may happen when you laugh, cough, or lift something heavy.  Bladder training can increase how long you can wait before you have to urinate. It can also help your bladder hold more urine. And it can give you better control over the urge to urinate.  It is important to remember that bladder training takes a few weeks to a few months to make a difference. You may not see results right away, but don't give up.  Follow-up care is a key part of your treatment and safety. Be sure to make and go to all appointments, and call your doctor if you are having problems. It's also " a good idea to know your test results and keep a list of the medicines you take.  How can you care for yourself at home?  Work with your doctor to come up with a bladder training program that is right for you. You may use one or more of the following methods.  Delayed urination  In the beginning, try to keep from urinating for 5 minutes after you first feel the need to go.  While you wait, take deep, slow breaths to relax. Kegel exercises can also help you delay the need to go to the bathroom.  After some practice, when you can easily wait 5 minutes to urinate, try to wait 10 minutes before you urinate.  Slowly increase the waiting period until you are able to control when you have to urinate.  Scheduled urination  Empty your bladder when you first wake up in the morning.  Schedule times throughout the day when you will urinate.  Start by going to the bathroom every hour, even if you don't need to go.  Slowly increase the time between trips to the bathroom.  When you have found a schedule that works well for you, keep doing it.  If you wake up during the night and have to urinate, do it. Apply your schedule to waking hours only.  Kegel exercises  These tighten and strengthen pelvic muscles, which can help you control the flow of urine. (If doing these exercises causes pain, stop doing them and talk with your doctor.) To do Kegel exercises:  Squeeze your muscles as if you were trying not to pass gas. Or squeeze your muscles as if you were stopping the flow of urine. Your belly, legs, and buttocks shouldn't move.  Hold the squeeze for 3 seconds, then relax for 5 to 10 seconds.  Start with 3 seconds, then add 1 second each week until you are able to squeeze for 10 seconds.  Repeat the exercise 10 times a session. Do 3 to 8 sessions a day.  When should you call for help?  Watch closely for changes in your health, and be sure to contact your doctor if:    Your incontinence is getting worse.     You do not get better as  "expected.   Where can you learn more?  Go to https://www.[employer].net/patiented  Enter V684 in the search box to learn more about \"Bladder Training: Care Instructions.\"  Current as of: March 1, 2023               Content Version: 13.7    [id number] [employer].   Care instructions adapted under license by your healthcare professional. If you have questions about a medical condition or this instruction, always ask your healthcare professional. [employer], [employer] disclaims any warranty or liability for your use of this information.         "

## 2023-11-08 ENCOUNTER — OFFICE VISIT (OUTPATIENT)
Dept: FAMILY MEDICINE | Facility: CLINIC | Age: 78
End: 2023-11-08
Payer: COMMERCIAL

## 2023-11-08 VITALS
DIASTOLIC BLOOD PRESSURE: 78 MMHG | OXYGEN SATURATION: 98 % | RESPIRATION RATE: 16 BRPM | WEIGHT: 201 LBS | HEART RATE: 81 BPM | SYSTOLIC BLOOD PRESSURE: 150 MMHG | HEIGHT: 63 IN | TEMPERATURE: 98.1 F | BODY MASS INDEX: 35.61 KG/M2

## 2023-11-08 DIAGNOSIS — E66.01 MORBID OBESITY (H): ICD-10-CM

## 2023-11-08 DIAGNOSIS — E11.21 TYPE 2 DIABETES MELLITUS WITH DIABETIC NEPHROPATHY, WITHOUT LONG-TERM CURRENT USE OF INSULIN (H): Chronic | ICD-10-CM

## 2023-11-08 DIAGNOSIS — N18.32 STAGE 3B CHRONIC KIDNEY DISEASE (H): Chronic | ICD-10-CM

## 2023-11-08 DIAGNOSIS — E11.21 TYPE 2 DIABETES MELLITUS WITH DIABETIC NEPHROPATHY, WITHOUT LONG-TERM CURRENT USE OF INSULIN (H): Primary | Chronic | ICD-10-CM

## 2023-11-08 DIAGNOSIS — N89.8 VAGINAL ITCHING: ICD-10-CM

## 2023-11-08 PROBLEM — Z20.822 COVID-19 RULED OUT: Status: RESOLVED | Noted: 2021-04-01 | Resolved: 2023-11-08

## 2023-11-08 PROCEDURE — 99214 OFFICE O/P EST MOD 30 MIN: CPT | Performed by: INTERNAL MEDICINE

## 2023-11-08 ASSESSMENT — PAIN SCALES - GENERAL: PAINLEVEL: NO PAIN (0)

## 2023-11-08 NOTE — PROGRESS NOTES
Assessment & Plan     Type 2 diabetes mellitus with diabetic nephropathy, without long-term current use of insulin (H)  Uncontrolled.  At last visit with PCP patient told her that she was only taking the Jardiance 10 mg, but today patient states she has been taking the 25 mg all along.  She is also having ongoing vaginal symptoms.  We will decrease the Jardiance from 25 to 10 mg due to vaginal symptoms.  The Jardiance does have benefits for her CKD so we will continue it.  Start Ozempic due to uncontrolled blood sugars.  Stop glipizide to avoid hypoglycemia.  GFR is borderline to continue metformin.  Follow-up with PCP in 3 months for diabetes check  - semaglutide (OZEMPIC) 2 MG/3ML pen; Inject 0.25 mg Subcutaneous every 7 days for 28 days, THEN 0.5 mg every 7 days for 28 days.  - Semaglutide, 1 MG/DOSE, (OZEMPIC) 4 MG/3ML pen; Inject 1 mg Subcutaneous every 7 days  - empagliflozin (JARDIANCE) 10 MG TABS tablet; Take 1 tablet (10 mg) by mouth daily  - insulin pen needle (32G X 4 MM) 32G X 4 MM miscellaneous; Use 1 pen needles weekly - to go with Ozempic    Vaginal itching  Patient was unsure how to use the Monistat vaginal cream and we reviewed this today    Morbid obesity (H)  Contributes to primary problem.  Weight loss would help blood sugars    Stage 3b chronic kidney disease (H)  She is using NSAIDs on a regular basis.  I recommended to avoid NSAIDs due to her CKD.    Patient Instructions   Diabetes  Recommend to start checking blood sugar as suggested by Georgina - some days fasting in AM, some days 2 hrs after lunch  Continue metformin  Stop Glipizide  Decrease Empaglifozin (Jardiance) from 25 mg to 10 mg daily due to vaginal itch (a possible side effects of medication)  Start Semaglutide (Ozepmic) 0.25 mg weekly x 4 weeks, then 0.5 mg weekly x 4 weeks, then 1 mg week  You can make an appointment with RN in clinic if you need help using medication; we discussed how to administer  Watch for nausea or  constipation  If you have side effects, let Dr. Jarrett know  Follow-up with PCP Dr. Jarrett in 3 months, non-fasting lab (A1c) prior to visit.    Vaginal symptoms  Use the Monistat cream at bedtime x 3 days to help with vaginal itch    Arthritis  Due to reduced kidney function, recommend to avoid Ibuprofen/Advil, Naproxen/Aleve  Recommend to use Acetaminophen/ Tylenol 500-1000 mg 3 x day  Follow-up with Dr. Jarrett for further discussion of arthritis pain.    [facility] 78 year [facility], presenting for the following health issues:  Hypertension, Diabetes, Lipids, Health Maintenance (Due for shingles  rsv has to get at pharmacy, flu covid /No shots), and Blood Draw (A1c in 3 months )        11/8/2023     6:35 AM   Additional Questions   Roomed by alejo neff   Accompanied by self         11/8/2023     6:35 AM   Patient Reported Additional Medications   Patient reports taking the following new medications Monistat 3 have not started   dont know if need this        HPI     Chief Complaint   Patient presents with    Hypertension    Diabetes    Lipids    Health Maintenance     Due for shingles  rsv has to get at pharmacy, flu covid   No shots    Blood Draw     A1c in 3 months      She just saw PCP 3 weeks ago for medicare visit, it is unclear why she is seeing me today for chronic disease follow-up         Diabetes Follow-up    How often are you checking your blood sugar? Not at all; she has a meter; she forgets to check blood sugar   What concerns do you have today about your diabetes? None   Do you have any of these symptoms? (Select all that apply)  No numbness or tingling in feet.  No redness, sores or blisters on feet.  No complaints of excessive thirst.  No reports of blurry vision.  No significant changes to weight.  Metformin 1000 mg BID, glipizide 10 mg daily, jardiance 25 mg daily (increased from 10 mg at last visit in mid-Oct)  TodaySuzette  reports she has been taking the 25 mg of jardiance for many months - since March since patient report  PCP wanted to add lantus if blood sugar was not controlled at follow-up visit.  She reports needing to have an orthopedic surgery - right hip replacement; but surgeon wants blood sugar lower; she is unsure if she wants to proceed with surgery  Denies any hypoglycemia  Has not been on other medications for blood sugar   She reports cost has not been a barrier for medications          Hyperlipidemia Follow-Up    Are you regularly taking any medication or supplement to lower your cholesterol?   Yes- AM  Are you having muscle aches or other side effects that you think could be caused by your cholesterol lowering medication?  No    Hypertension Follow-up    Do you check your blood pressure regularly outside of the clinic? No   Are you following a low salt diet? Yes  Are your blood pressures ever more than 140 on the top number (systolic) OR more than 90 on the bottom number (diastolic), for example 140/90? No  Blood pressure has been reasonably well controlled    BP Readings from Last 2 Encounters:   11/08/23 (!) 152/80   10/18/23 136/70     Hemoglobin A1C (%)   Date Value   10/12/2023 8.4 (H)   07/03/2023 8.2 (H)   06/22/2021 7.2 (H)   03/02/2021 7.5 (H)     LDL Cholesterol Calculated (mg/dL)   Date Value   10/12/2023 40   10/03/2022 56   06/22/2021 39   09/08/2020 41     How many servings of fruits and vegetables do you eat daily?  4 or more  On average, how many sweetened beverages do you drink each day (Examples: soda, juice, sweet tea, etc.  Do NOT count diet or artificially sweetened beverages)?   0  How many days per week do you exercise enough to make your heart beat faster? 0  How many minutes a day do you exercise enough to make your heart beat faster? 0  How many days per week do you miss taking your medication? 0    Vaginal itch  --she brings the monistat cream and she is unsure how to use it  --vaginal itch  has improved but still present    Arthritis  --using advil regularly despite CKD    Current Outpatient Medications   Medication Sig Dispense Refill    aspirin (ASA) 81 MG chewable tablet Take 81 mg by mouth daily      atorvastatin (LIPITOR) 10 MG tablet Take 1 tablet (10 mg) by mouth daily 90 tablet 3    blood glucose monitoring (NO BRAND SPECIFIED) test strip Use to test blood sugars one time daily or as directed  One touch meter - one touch strips 3 Box 3    calcium-vitamin D-vitamin K (VIACTIV) 500-500-40 MG-UNT-MCG CHEW Take 1 tablet by mouth daily      chlorthalidone (HYGROTON) 25 MG tablet Take 0.5 tablets (12.5 mg) by mouth daily 45 tablet 3    Cholecalciferol (VITAMIN D PO) Take 1,000 Units by mouth every morning       clobetasol (TEMOVATE) 0.05 % external ointment Apply topically at bedtime as needed, may repeat once (as needed for vulvar irritation) Apply  Pea sized amount to vulva every night for 4 weeks then decrease to every other night from then on. 60 g 1    empagliflozin (JARDIANCE) 25 MG TABS tablet Take 1 tablet (25 mg) by mouth daily 90 tablet 1    glipiZIDE (GLUCOTROL XL) 10 MG 24 hr tablet Take 1 tablet (10 mg) by mouth daily 90 tablet 3    GLUCOMETER KIT for testing bid, One Touch or please fill with formulary glucometer 1 Kit 0    glycopyrrolate (ROBINUL) 1 MG tablet Take 1-2 tablets (1-2 mg) by mouth 2 times daily as needed (sweating) 360 tablet 3    losartan (COZAAR) 25 MG tablet Take 1 tablet (25 mg) by mouth daily 90 tablet 3    metFORMIN (GLUCOPHAGE) 500 MG tablet Take 2 tablets (1,000 mg) by mouth 2 times daily (with meals) 360 tablet 3    metoprolol succinate ER (TOPROL XL) 100 MG 24 hr tablet Take 1 tablet (100 mg) by mouth daily 90 tablet 3           Review of Systems   Constitutional, HEENT, cardiovascular, pulmonary, gi and gu systems are negative, except as otherwise noted.      Objective    BP (!) 152/80 (BP Location: Right arm, Patient Position: Sitting, Cuff Size: Adult Regular)  "  Pulse 81   Temp 98.1  F (36.7  C) (Tympanic)   Resp 16   Ht 1.61 m (5' 3.39\")   Wt 91.2 kg (201 lb)   SpO2 98%   BMI 35.17 kg/m    Body mass index is 35.17 kg/m .  Physical Exam   GENERAL APPEARANCE: alert, no distress, and over weight                      "

## 2023-11-09 RX ORDER — PEN NEEDLE, DIABETIC 30 GX3/16"
NEEDLE, DISPOSABLE MISCELLANEOUS
Refills: 11 | OUTPATIENT
Start: 2023-11-09

## 2023-11-10 DIAGNOSIS — E11.21 TYPE 2 DIABETES MELLITUS WITH DIABETIC NEPHROPATHY, WITHOUT LONG-TERM CURRENT USE OF INSULIN (H): Chronic | ICD-10-CM

## 2023-11-10 NOTE — TELEPHONE ENCOUNTER
FYI - Status Update    Who is Calling: family member, pt     Update: [facility] pharmacy is telling pt that these were not approved by provider. Unsure if resend is necessary.     Does caller want a call/response back: Yes     Could we send this information to you in [place] or would you prefer to receive a phone call?:   Patient would prefer a phone call   Okay to leave a detailed message?: Yes at Home number on file [phone] (home)

## 2023-11-20 ENCOUNTER — ALLIED HEALTH/NURSE VISIT (OUTPATIENT)
Dept: FAMILY MEDICINE | Facility: CLINIC | Age: 78
End: 2023-11-20
Payer: COMMERCIAL

## 2023-11-20 DIAGNOSIS — E11.21 TYPE 2 DIABETES MELLITUS WITH DIABETIC NEPHROPATHY, WITHOUT LONG-TERM CURRENT USE OF INSULIN (H): Chronic | ICD-10-CM

## 2023-11-20 PROCEDURE — 99207 PR NO CHARGE NURSE ONLY: CPT

## 2023-12-12 NOTE — PATIENT INSTRUCTIONS
Diabetes  Recommend to start checking blood sugar as suggested by Georgina - some days fasting in AM, some days 2 hrs after lunch  Continue metformin  Stop Glipizide  Decrease Empaglifozin (Jardiance) from 25 mg to 10 mg daily due to vaginal itch (a possible side effects of medication)  Start Semaglutide (Ozepmic) 0.25 mg weekly x 4 weeks, then 0.5 mg weekly x 4 weeks, then 1 mg week  You can make an appointment with RN in clinic if you need help using medication; we discussed how to administer  Watch for nausea or constipation  If you have side effects, let Dr. Jarrett know  Follow-up with PCP Dr. Jarrett in 3 months, non-fasting lab (A1c) prior to visit.    Vaginal symptoms  Use the Monistat cream at bedtime x 3 days to help with vaginal itch    Arthritis  Due to reduced kidney function, recommend to avoid Ibuprofen/Advil, Naproxen/Aleve  Recommend to use Acetaminophen/ Tylenol 500-1000 mg 3 x day  Follow-up with Dr. Jarrett for further discussion of arthritis pain.   Detail Level: Detailed Biopsy Type: H and E Bill As?: Malignant Destruction Size Of Lesion In Cm (Optional): 1.2 Size Of Lesion After Curettage: 1.4 Anesthesia Type: 1% lidocaine with epinephrine Anesthesia Volume In Cc: 0.5 Hemostasis: Drysol Destruction Type: electrodesiccation Number Of Curettages: 3 Wound Care: Petrolatum Lab: [id number] Lab Facility: [id number] Notes In Note?: No Consent: Verbal consent was obtained and risks were reviewed including but not limited to scarring, infection, bleeding, scabbing, incomplete removal, nerve damage and allergy to anesthesia. Post-Care Instructions: I reviewed with the patient in detail post-care instructions. Patient is to keep the biopsy site dry overnight, and then apply petrolatum daily until healed. Patient may apply hydrogen peroxide soaks to remove any crusting. Notification Instructions: Patient will be notified of biopsy results. However, patient instructed to call the office if not contacted within 2 weeks. Billing Type: Third-Party Bill

## 2024-01-28 ENCOUNTER — HEALTH MAINTENANCE LETTER (OUTPATIENT)
Age: 79
End: 2024-01-28

## 2024-01-31 ENCOUNTER — TELEPHONE (OUTPATIENT)
Dept: FAMILY MEDICINE | Facility: CLINIC | Age: 79
End: 2024-01-31
Payer: COMMERCIAL

## 2024-01-31 DIAGNOSIS — E11.21 TYPE 2 DIABETES MELLITUS WITH DIABETIC NEPHROPATHY, WITHOUT LONG-TERM CURRENT USE OF INSULIN (H): Primary | ICD-10-CM

## 2024-01-31 NOTE — TELEPHONE ENCOUNTER
Dr Jarrett,  Please advise.  Pt called and said she has vomited several times since taking the Ozempic increased dose of 1 mg.  She is keeping fluids down.  She tolerated the other doses fine.    Next dose of Ozempic 1mg will be on Mon 2/5.

## 2024-02-01 NOTE — TELEPHONE ENCOUNTER
Plan to decrease back to 0.5mg weekly dose. I sent this to pharmacy.  Could try to alternate 0.5mg and 1mg every other week to try to increase more slowly is another option if she wants to try.  But I don't want ongoing side effects of vomiting.  Thank you,  Dave Jarrett MD

## 2024-02-01 NOTE — TELEPHONE ENCOUNTER
Patient calling back  States she already has the 0.5 mg doses and did not want this to the pharmacy  Advised her not to pick it up  Wants to change providers, questioning why more medication was ordered when her A1C was not done  Requests to keep lab appointment for A1C  Transferred to scheduling to change appointment to a provider with an open practice    Laura Cameron RN on 2/1/2024 at 1:20 PM

## 2024-02-05 ENCOUNTER — LAB (OUTPATIENT)
Dept: LAB | Facility: CLINIC | Age: 79
End: 2024-02-05
Payer: COMMERCIAL

## 2024-02-05 DIAGNOSIS — E11.21 TYPE 2 DIABETES MELLITUS WITH DIABETIC NEPHROPATHY, WITHOUT LONG-TERM CURRENT USE OF INSULIN (H): Primary | ICD-10-CM

## 2024-02-05 LAB — HBA1C MFR BLD: 8.2 % (ref 0–5.6)

## 2024-02-05 PROCEDURE — 83036 HEMOGLOBIN GLYCOSYLATED A1C: CPT

## 2024-02-05 PROCEDURE — 36415 COLL VENOUS BLD VENIPUNCTURE: CPT

## 2024-02-21 ENCOUNTER — OFFICE VISIT (OUTPATIENT)
Dept: FAMILY MEDICINE | Facility: CLINIC | Age: 79
End: 2024-02-21
Payer: COMMERCIAL

## 2024-02-21 VITALS
TEMPERATURE: 97.7 F | HEART RATE: 89 BPM | DIASTOLIC BLOOD PRESSURE: 68 MMHG | SYSTOLIC BLOOD PRESSURE: 132 MMHG | WEIGHT: 187.2 LBS | RESPIRATION RATE: 20 BRPM | HEIGHT: 65 IN | BODY MASS INDEX: 31.19 KG/M2 | OXYGEN SATURATION: 97 %

## 2024-02-21 DIAGNOSIS — N90.4 LICHEN SCLEROSUS OF VULVA: ICD-10-CM

## 2024-02-21 DIAGNOSIS — E11.21 TYPE 2 DIABETES MELLITUS WITH DIABETIC NEPHROPATHY, WITHOUT LONG-TERM CURRENT USE OF INSULIN (H): Primary | ICD-10-CM

## 2024-02-21 DIAGNOSIS — H61.21 IMPACTED CERUMEN OF RIGHT EAR: ICD-10-CM

## 2024-02-21 DIAGNOSIS — N18.32 STAGE 3B CHRONIC KIDNEY DISEASE (H): ICD-10-CM

## 2024-02-21 PROCEDURE — 99214 OFFICE O/P EST MOD 30 MIN: CPT | Mod: 25 | Performed by: NURSE PRACTITIONER

## 2024-02-21 PROCEDURE — 69210 REMOVE IMPACTED EAR WAX UNI: CPT | Mod: RT | Performed by: NURSE PRACTITIONER

## 2024-02-21 RX ORDER — CLOBETASOL PROPIONATE 0.5 MG/G
OINTMENT TOPICAL
Qty: 60 G | Refills: 1 | Status: SHIPPED | OUTPATIENT
Start: 2024-02-21

## 2024-02-21 RX ORDER — GLIPIZIDE 5 MG/1
5 TABLET, FILM COATED, EXTENDED RELEASE ORAL
Qty: 90 TABLET | Refills: 1 | Status: SHIPPED | OUTPATIENT
Start: 2024-02-21 | End: 2024-05-01

## 2024-02-21 ASSESSMENT — PAIN SCALES - GENERAL: PAINLEVEL: NO PAIN (0)

## 2024-02-21 NOTE — PATIENT INSTRUCTIONS
Continue Ozempic 0.5 mg weekly, this can also help with weight loss    Continue Jardiance 10 mg daily this helps with blood sugars and also helped to improve proteinuria (reduced protein in urine)    Continue Metformin 1000 mg twice daily with meals     Start Glipizide 5 mg daily with meal     Recheck A1c and kidney function in 3 months    Ear is clear, you had just film of wax, small amount

## 2024-02-21 NOTE — PROGRESS NOTES
Assessment & Plan     Type 2 diabetes mellitus with diabetic nephropathy, without long-term current use of insulin (H)  -uncontrolled  -patient did not tolerate Ozempic at 1 mg dose and had to reduce back to 0.5 mg weekly  -recommended to add Glipizide 5 mg daily with meal   - glipiZIDE (GLUCOTROL XL) 5 MG 24 hr tablet; Take 1 tablet (5 mg) by mouth daily with food  - Basic metabolic panel  (Ca, Cl, CO2, Creat, Gluc, K, Na, BUN); Future  - insulin pen needle (32G X 4 MM) 32G X 4 MM miscellaneous; Use 1 pen needles weekly - to go with Ozempic  - Hemoglobin A1c; Future  -repeat labs in 2-3 months     Stage 3b chronic kidney disease (H)  -stable   - Basic metabolic panel  (Ca, Cl, CO2, Creat, Gluc, K, Na, BUN); Future    Lichen sclerosus of vulva  -recommended OB GYN consult, possible biopsy   - clobetasol (TEMOVATE) 0.05 % external ointment; Apply topically at bedtime as needed, may repeat once (as needed for vulvar irritation) Apply  Pea sized amount to vulva every night for 4 weeks then decrease to every other night from then on.  - Ob/Gyn  Referral; Future    Impacted cerumen of right ear  Cerumenosis is noted.  Wax is removed by manual debridement. Instructions for home care to prevent wax buildup are given.   - REMOVE IMPACTED CERUMEN    Gaincarlo Bradford is a 78 year old, presenting for the following health issues:  Diabetes and Ear Lavage (Requesting ears to be cleaned out)      2/21/2024     3:13 PM   Additional Questions   Roomed by Sury RAMIREZ   Accompanied by self       Chief Complaint   Patient presents with    Diabetes    Ear Lavage     Requesting ears to be cleaned out       History of Present Illness       Diabetes:   She presents for follow up of diabetes.    She is not checking blood glucose.        She is concerned about other.    She is not experiencing numbness or burning in feet, excessive thirst, blurry vision, weight changes or redness, sores or blisters on feet.    "        Hyperlipidemia:  She presents for follow up of hyperlipidemia.   She is taking medication to lower cholesterol. She is not having myalgia or other side effects to statin medications.    Hypertension: She presents for follow up of hypertension.  She does not check blood pressure  regularly outside of the clinic. Outpatient blood pressures have not been over 140/90. She follows a low salt diet.     She eats 4 or more servings of fruits and vegetables daily.She consumes 0 sweetened beverage(s) daily.She exercises with enough effort to increase her heart rate 9 or less minutes per day.  She exercises with enough effort to increase her heart rate 3 or less days per week.   She is taking medications regularly.         Review of Systems  Constitutional, HEENT, cardiovascular, pulmonary, gi and gu systems are negative, except as otherwise noted.      Objective    /68 (BP Location: Left arm, Patient Position: Chair, Cuff Size: Adult Regular)   Pulse 89   Temp 97.7  F (36.5  C) (Tympanic)   Resp 20   Ht 1.638 m (5' 4.5\")   Wt 84.9 kg (187 lb 3.2 oz)   SpO2 97%   BMI 31.64 kg/m    Body mass index is 31.64 kg/m .  Physical Exam   GENERAL: alert and no distress  EYES: Eyes grossly normal to inspection, PERRL and conjunctivae and sclerae normal  HENT: normal cephalic/atraumatic, right ear: occluded with wax, and left ear: normal: no effusions, no erythema, normal landmarks  NECK: no adenopathy, no asymmetry, masses, or scars  RESP: lungs clear to auscultation - no rales, rhonchi or wheezes  CV: regular rate and rhythm, normal S1 S2, no S3 or S4, no murmur, click or rub, no peripheral edema   MS: no gross musculoskeletal defects noted, no edema  SKIN: no suspicious lesions or rashes  NEURO: Normal strength and tone, mentation intact and speech normal  PSYCH: mentation appears normal, affect normal/bright    Lab Results   Component Value Date    A1C 8.2 02/05/2024    A1C 8.4 10/12/2023    A1C 8.2 07/03/2023    " A1C 8.7 03/27/2023    A1C 8.9 01/16/2023    A1C 7.2 06/22/2021    A1C 7.5 03/02/2021    A1C 7.0 12/30/2020    A1C 7.0 09/08/2020    A1C 8.0 12/31/2019            Signed Electronically by: SRAVAN Christianson CNP

## 2024-02-22 PROBLEM — E66.01 MORBID OBESITY (H): Status: RESOLVED | Noted: 2020-09-08 | Resolved: 2024-02-22

## 2024-03-22 ENCOUNTER — HOSPITAL ENCOUNTER (EMERGENCY)
Facility: CLINIC | Age: 79
Discharge: HOME | End: 2024-03-22
Payer: COMMERCIAL

## 2024-03-22 VITALS
DIASTOLIC BLOOD PRESSURE: 80 MMHG | HEART RATE: 82 BPM | TEMPERATURE: 96.9 F | OXYGEN SATURATION: 96 % | RESPIRATION RATE: 16 BRPM | SYSTOLIC BLOOD PRESSURE: 168 MMHG

## 2024-03-22 DIAGNOSIS — B30.9 VIRAL CONJUNCTIVITIS: Primary | ICD-10-CM

## 2024-03-22 PROCEDURE — 99213 OFFICE O/P EST LOW 20 MIN: CPT

## 2024-03-22 PROCEDURE — G0463 HOSPITAL OUTPT CLINIC VISIT: HCPCS

## 2024-03-22 RX ORDER — KETOTIFEN FUMARATE 0.35 MG/ML
1 SOLUTION/ DROPS OPHTHALMIC 2 TIMES DAILY
Qty: 5 ML | Refills: 0 | Status: SHIPPED | OUTPATIENT
Start: 2024-03-22 | End: 2024-04-05

## 2024-03-22 ASSESSMENT — COLUMBIA-SUICIDE SEVERITY RATING SCALE - C-SSRS
2. HAVE YOU ACTUALLY HAD ANY THOUGHTS OF KILLING YOURSELF IN THE PAST MONTH?: NO
1. IN THE PAST MONTH, HAVE YOU WISHED YOU WERE DEAD OR WISHED YOU COULD GO TO SLEEP AND NOT WAKE UP?: NO
6. HAVE YOU EVER DONE ANYTHING, STARTED TO DO ANYTHING, OR PREPARED TO DO ANYTHING TO END YOUR LIFE?: NO

## 2024-03-22 ASSESSMENT — ACTIVITIES OF DAILY LIVING (ADL): ADLS_ACUITY_SCORE: 38

## 2024-03-22 ASSESSMENT — VISUAL ACUITY: OU: 1

## 2024-03-22 NOTE — ED PROVIDER NOTES
History       HPI  Suzette Britton is a 78 year old female PMH significant for hypertension, type 2 diabetes, who presents to the urgent care with chief complaint of pinkeye.  Patient reports she was getting her hearing aids checked when the person helping her told her she had pinkeye in her left eye.  Patient then came into urgent care.  Patient does endorse recent viral infection.  Patient denies any vision changes, pruritus, eye pain, drainage from the eye, crusting, fevers, chills chest pain shortness of breath, denies black vision/curtain coming down.     Allergies:  Allergies   Allergen Reactions    Egg Yolk Diarrhea     Able to eat baked egg       Problem List:    Patient Active Problem List    Diagnosis Date Noted    Left knee pain 09/24/2021     Priority: Medium    S/P hip replacement, left 04/01/2021     Priority: Medium    Status post total replacement of hip 04/01/2021     Priority: Medium    S/P ablation of atrial fibrillation 03/15/2018     Priority: Medium     spring 2017       Chronic vasomotor rhinitis 09/05/2017     Priority: Medium    NICK (acute kidney injury) (H24) 05/07/2017     Priority: Medium    Elevated lactic acid level 05/07/2017     Priority: Medium    Elevated LFTs 05/07/2017     Priority: Medium    History of atrial fibrillation 05/07/2017     Priority: Medium     Resolved after ablation 2017      Type 2 diabetes mellitus with diabetic nephropathy, without long-term current use of insulin (H) 04/13/2017     Priority: Medium    Essential hypertension 02/09/2017     Priority: Medium    S/P total knee arthroplasty 12/01/2016     Priority: Medium    Obesity (BMI 30.0-34.9) 10/23/2015     Priority: Medium    Cataract 05/30/2014     Priority: Medium    Stage 3b chronic kidney disease (H) 03/19/2013     Priority: Medium    Hyperlipidemia LDL goal <100 10/20/2011     Priority: Medium     Recent Labs   Lab Test 9/28/10 0731 8/10/09 0713    CHOL 196 183    HDL 52 52    LDL 98 83    TRIG 228*  242*    CHOLHDLRATIO 4.0 4.0           Generalized hyperhidrosis 06/27/2010     Priority: Medium     On robinul Rx by Derm.      Osteoarthrosis, hand 10/06/2005     Priority: Medium     Problem list name updated by automated process. Provider to review      Health Care Home 08/05/2013     Priority: Low     *See Letters for [facility] Care Plan: My Access Plan              Past Medical History:    Past Medical History:   Diagnosis Date    Heart disease     Osteoarthrosis, unspecified whether generalized or localized, hand     S/P ablation of atrial flutter 05/18/2017    Type II or unspecified type diabetes mellitus without mention of complication, not stated as uncontrolled     Unspecified essential hypertension        Past Surgical History:    Past Surgical History:   Procedure Laterality Date    ARTHROPLASTY HIP Left 4/1/2021    Procedure: Left Total Hip Arthroplasty;  Surgeon: Zachary Arteaga MD;  Location: [facility]    ARTHROPLASTY KNEE Left 12/1/2016    Procedure: ARTHROPLASTY KNEE;  Surgeon: Zachary Arteaga MD;  Location: [facility]    ARTHROPLASTY KNEE Right 5/4/2017    Procedure: ARTHROPLASTY KNEE;  Right Total Knee Arthroplasty;  Surgeon: Zachary Arteaga MD;  Location: [facility] OR    Colonoscopy, normal  09/13/2002    PHACOEMULSIFICATION WITH STANDARD INTRAOCULAR LENS IMPLANT  6/26/2014    Procedure: PHACOEMULSIFICATION WITH STANDARD INTRAOCULAR LENS IMPLANT;  Surgeon: Dario Mcghee MD;  Location: [facility]    PHACOEMULSIFICATION WITH STANDARD INTRAOCULAR LENS IMPLANT Left 9/4/2014    Procedure: PHACOEMULSIFICATION WITH STANDARD INTRAOCULAR LENS IMPLANT;  Surgeon: Dario Mcghee MD;  Location: [facility] OR       Family History:    Family History   Problem Relation Age of Onset    Hypertension Mother     Diabetes Mother     Cancer Mother         uterine    Arthritis Father     Genitourinary Problems Father         kidney disease    Cancer Father         bladder cancer    Diabetes Brother     Diabetes Paternal  Grandmother     Diabetes Paternal Aunt        Social History:  Marital Status:   [2]  Social History     Tobacco Use    Smoking status: Never    Smokeless tobacco: Never   Vaping Use    Vaping Use: Never used   Substance Use Topics    Alcohol use: No     Alcohol/week: 0.0 standard drinks of alcohol    Drug use: No        Medications:    ketotifen fumarate 0.035% 0.035 % SOLN ophthalmic solution  aspirin (ASA) 81 MG chewable tablet  atorvastatin (LIPITOR) 10 MG tablet  blood glucose monitoring (NO BRAND SPECIFIED) test strip  calcium-vitamin D-vitamin K (VIACTIV) 500-500-40 MG-UNT-MCG CHEW  chlorthalidone (HYGROTON) 25 MG tablet  Cholecalciferol (VITAMIN D PO)  clobetasol (TEMOVATE) 0.05 % external ointment  empagliflozin (JARDIANCE) 10 MG TABS tablet  glipiZIDE (GLUCOTROL XL) 5 MG 24 hr tablet  GLUCOMETER KIT  glycopyrrolate (ROBINUL) 1 MG tablet  insulin pen needle (32G X 4 MM) 32G X 4 MM miscellaneous  losartan (COZAAR) 25 MG tablet  metFORMIN (GLUCOPHAGE) 500 MG tablet  metoprolol succinate ER (TOPROL XL) 100 MG 24 hr tablet  semaglutide (OZEMPIC) 2 MG/3ML pen          Review of Systems  All pertinent ROS in HPI.  All other systems reviewed and are negative.    Physical Exam   BP: (!) 168/80  Pulse: 82  Temp: 96.9  F (36.1  C)  Resp: 16  SpO2: 96 %      Physical Exam  Vitals and nursing note reviewed.   Constitutional:       General: She is not in acute distress.     Appearance: Normal appearance. She is not ill-appearing, toxic-appearing or diaphoretic.   HENT:      Head: Normocephalic.      Right Ear: Tympanic membrane, ear canal and external ear normal.      Left Ear: Tympanic membrane, ear canal and external ear normal.      Nose: Rhinorrhea present.      Mouth/Throat:      Mouth: Mucous membranes are moist.   Eyes:      General: Lids are normal. Lids are everted, no foreign bodies appreciated. Vision grossly intact. Gaze aligned appropriately. No allergic shiner, visual field deficit or scleral  icterus.        Right eye: No discharge.         Left eye: No discharge.      Extraocular Movements: Extraocular movements intact.      Conjunctiva/sclera:      Right eye: Right conjunctiva is not injected.      Left eye: Left conjunctiva is injected. No exudate or hemorrhage.     Pupils: Pupils are equal, round, and reactive to light.   Cardiovascular:      Rate and Rhythm: Normal rate and regular rhythm.      Pulses: Normal pulses.      Heart sounds: Normal heart sounds.   Pulmonary:      Effort: Pulmonary effort is normal.      Breath sounds: Normal breath sounds.   Musculoskeletal:      Cervical back: Normal range of motion and neck supple.   Skin:     General: Skin is warm.   Neurological:      Mental Status: She is alert.         ED Course     Visual Acuity:     [place] visual acuity test perform.    Left Eye: 20/25  Right Eye:  20/25  Both Eyes: 20/20      Assessments & Plan (with Medical Decision Making)     I have reviewed the nursing notes.    I have reviewed the findings, diagnosis, plan and need for follow up with the patient.  Medical Decision Making    Patient is 78-year-old female with PMHx significant for type 2 diabetes and hypertension presenting to urgent care with chief complaints of pinkeye.  Patient reports that she was getting her hearing aids checked this morning when the person helping her told her that she had pinkeye in her left eye.  Patient denies any visual changes and pain to the left eye.  Denies any discharge or crusting of the eyes.  Patient does note recent viral illness.     On exam, patient is alert and oriented in no acute distress.  Vitals reveal hypertension.  Left eye conjunctiva is injected without discharge or tearing.  Pupils are equal round and reactive to light.  Visual acuity test performed revealing normal vision.  Extraocular movements intact, no visual field deficits.     Based on injected conjunctiva without trauma or vision loss and recent viral illness, most  likely diagnosis is viral conjunctivitis of left eye.  Discussed diagnosis with patient.  Educated patient on red flags including any form of vision loss, which patient should seek medical attention immediately.    Prescribed ketotifen fumarate otitic solution as needed for viral conjunctivitis.  Patient should follow-up with primary care for management of hypertension.    Patient should follow-up if concerns develop or vision changes occur.  Patient is agreeable with plan and all patient's questions were answered patient discharged to home.          Discharge Medication List as of 3/22/2024  2:13 PM        START taking these medications    Details   ketotifen fumarate 0.035% 0.035 % SOLN ophthalmic solution Place 1 drop into both eyes 2 times daily for 14 days, Disp-5 mL, R-0, Local Print             Final diagnoses:   Viral conjunctivitis       3/22/2024   [facility] EMERGENCY DEPT       Nneka Ornelas PA-C  03/25/24 [id number]

## 2024-04-09 DIAGNOSIS — I10 ESSENTIAL HYPERTENSION: Chronic | ICD-10-CM

## 2024-04-09 RX ORDER — LOSARTAN POTASSIUM 25 MG/1
25 TABLET ORAL DAILY
Qty: 90 TABLET | Refills: 0 | Status: SHIPPED | OUTPATIENT
Start: 2024-04-09 | End: 2024-05-01

## 2024-04-09 NOTE — TELEPHONE ENCOUNTER
Pending Prescriptions:                       Disp   Refills    losartan (COZAAR) 25 MG tablet            90 tab*3            Sig: Take 1 tablet (25 mg) by mouth daily    Next 5 appointments (look out 90 days)      May 01, 2024  3:00 PM  (Arrive by 2:40 PM)  Provider Visit with SRAVAN Salter CNP  [facility] ([facility] [place] )  Arrive at: Clinic A [address] [place] [id number] [phone]

## 2024-04-22 ENCOUNTER — OFFICE VISIT (OUTPATIENT)
Dept: URGENT CARE | Facility: URGENT CARE | Age: 79
End: 2024-04-22
Payer: COMMERCIAL

## 2024-04-22 VITALS
SYSTOLIC BLOOD PRESSURE: 143 MMHG | HEART RATE: 86 BPM | BODY MASS INDEX: 30.93 KG/M2 | DIASTOLIC BLOOD PRESSURE: 90 MMHG | WEIGHT: 183 LBS | OXYGEN SATURATION: 96 % | TEMPERATURE: 98.4 F | RESPIRATION RATE: 16 BRPM

## 2024-04-22 DIAGNOSIS — N30.00 ACUTE CYSTITIS WITHOUT HEMATURIA: Primary | ICD-10-CM

## 2024-04-22 DIAGNOSIS — R30.0 DYSURIA: ICD-10-CM

## 2024-04-22 LAB
ALBUMIN UR-MCNC: 30 MG/DL
APPEARANCE UR: ABNORMAL
BACTERIA #/AREA URNS HPF: ABNORMAL /HPF
BILIRUB UR QL STRIP: NEGATIVE
CLUE CELLS: ABNORMAL
COLOR UR AUTO: YELLOW
GLUCOSE UR STRIP-MCNC: >=1000 MG/DL
HGB UR QL STRIP: ABNORMAL
KETONES UR STRIP-MCNC: NEGATIVE MG/DL
LEUKOCYTE ESTERASE UR QL STRIP: ABNORMAL
NITRATE UR QL: NEGATIVE
PH UR STRIP: 5.5 [PH] (ref 5–7)
RBC #/AREA URNS AUTO: ABNORMAL /HPF
SP GR UR STRIP: 1.01 (ref 1–1.03)
TRICHOMONAS, WET PREP: ABNORMAL
UROBILINOGEN UR STRIP-ACNC: 0.2 E.U./DL
WBC #/AREA URNS AUTO: ABNORMAL /HPF
WBC CLUMPS #/AREA URNS HPF: PRESENT /HPF
WBC'S/HIGH POWER FIELD, WET PREP: ABNORMAL
YEAST, WET PREP: ABNORMAL

## 2024-04-22 PROCEDURE — 87086 URINE CULTURE/COLONY COUNT: CPT | Performed by: PHYSICIAN ASSISTANT

## 2024-04-22 PROCEDURE — 87088 URINE BACTERIA CULTURE: CPT | Performed by: PHYSICIAN ASSISTANT

## 2024-04-22 PROCEDURE — 81001 URINALYSIS AUTO W/SCOPE: CPT | Performed by: PHYSICIAN ASSISTANT

## 2024-04-22 PROCEDURE — 87210 SMEAR WET MOUNT SALINE/INK: CPT | Performed by: PHYSICIAN ASSISTANT

## 2024-04-22 PROCEDURE — 87186 SC STD MICRODIL/AGAR DIL: CPT | Mod: 59 | Performed by: PHYSICIAN ASSISTANT

## 2024-04-22 PROCEDURE — 99214 OFFICE O/P EST MOD 30 MIN: CPT | Performed by: PHYSICIAN ASSISTANT

## 2024-04-22 RX ORDER — CEPHALEXIN 500 MG/1
500 CAPSULE ORAL 2 TIMES DAILY
Qty: 14 CAPSULE | Refills: 0 | Status: SHIPPED | OUTPATIENT
Start: 2024-04-22 | End: 2024-04-29

## 2024-04-22 NOTE — PROGRESS NOTES
"  Assessment & Plan     Acute cystitis without hematuria  Culture pending. Will treat with keflex x 7 days. Push fluids. Return to clinic if symptoms worsen or do not improve; otherwise follow up as needed      - cephALEXin (KEFLEX) 500 MG capsule; Take 1 capsule (500 mg) by mouth 2 times daily for 7 days    Dysuria    - UA Macroscopic with reflex to Microscopic and Culture - Clinic Collect  - Wet prep - Clinic Collect  - UA Microscopic with Reflex to Culture  - Urine Culture            BMI  Estimated body mass index is 30.93 kg/m  as calculated from the following:    Height as of 2/21/24: 1.638 m (5' 4.5\").    Weight as of this encounter: 83 kg (183 lb).             No follow-ups on file.              Subjective   Chief Complaint   Patient presents with    Urinary Problem     Burning with urination. Pt wears incontinence pad and notices color discoloration, not normal for her.       HPI       UTI    Onset of symptoms was 1week(s).  Course of illness is same  Severity moderate  Current and associated symptoms dysuria and frequency  Treatment and measures tried Increase fluid intake  Predisposing factors include diabetes  Patient denies rigors, flank pain, and temperature > 101 degrees F.                        Objective    BP (!) 143/90   Pulse 86   Temp 98.4  F (36.9  C) (Tympanic)   Resp 16   Wt 83 kg (183 lb)   SpO2 96%   BMI 30.93 kg/m    Body mass index is 30.93 kg/m .  Physical Exam  Constitutional:       General: She is not in acute distress.     Appearance: She is well-developed.   Psychiatric:         Behavior: Behavior normal.                    Signed Electronically by: Natalia Melendrez PA-C    "

## 2024-04-22 NOTE — PATIENT INSTRUCTIONS
Start antibiotic as prescribed   Push fluids   Call if symptoms worsen or do not improve in 48 hours

## 2024-04-24 LAB
BACTERIA UR CULT: ABNORMAL
BACTERIA UR CULT: ABNORMAL

## 2024-04-29 ENCOUNTER — LAB (OUTPATIENT)
Dept: LAB | Facility: CLINIC | Age: 79
End: 2024-04-29
Payer: COMMERCIAL

## 2024-04-29 DIAGNOSIS — E11.21 TYPE 2 DIABETES MELLITUS WITH DIABETIC NEPHROPATHY, WITHOUT LONG-TERM CURRENT USE OF INSULIN (H): ICD-10-CM

## 2024-04-29 DIAGNOSIS — N18.32 STAGE 3B CHRONIC KIDNEY DISEASE (H): ICD-10-CM

## 2024-04-29 LAB
ANION GAP SERPL CALCULATED.3IONS-SCNC: 12 MMOL/L (ref 7–15)
BUN SERPL-MCNC: 21 MG/DL (ref 8–23)
CALCIUM SERPL-MCNC: 9.6 MG/DL (ref 8.8–10.2)
CHLORIDE SERPL-SCNC: 99 MMOL/L (ref 98–107)
CREAT SERPL-MCNC: 1.38 MG/DL (ref 0.51–0.95)
DEPRECATED HCO3 PLAS-SCNC: 27 MMOL/L (ref 22–29)
EGFRCR SERPLBLD CKD-EPI 2021: 39 ML/MIN/1.73M2
GLUCOSE SERPL-MCNC: 284 MG/DL (ref 70–99)
HBA1C MFR BLD: 7.3 % (ref 0–5.6)
POTASSIUM SERPL-SCNC: 4.6 MMOL/L (ref 3.4–5.3)
SODIUM SERPL-SCNC: 138 MMOL/L (ref 135–145)

## 2024-04-29 PROCEDURE — 36415 COLL VENOUS BLD VENIPUNCTURE: CPT

## 2024-04-29 PROCEDURE — 80048 BASIC METABOLIC PNL TOTAL CA: CPT

## 2024-04-29 PROCEDURE — 83036 HEMOGLOBIN GLYCOSYLATED A1C: CPT

## 2024-05-01 ENCOUNTER — OFFICE VISIT (OUTPATIENT)
Dept: FAMILY MEDICINE | Facility: CLINIC | Age: 79
End: 2024-05-01
Payer: COMMERCIAL

## 2024-05-01 VITALS
OXYGEN SATURATION: 97 % | HEIGHT: 65 IN | TEMPERATURE: 96.1 F | RESPIRATION RATE: 16 BRPM | WEIGHT: 183.9 LBS | SYSTOLIC BLOOD PRESSURE: 120 MMHG | DIASTOLIC BLOOD PRESSURE: 58 MMHG | BODY MASS INDEX: 30.64 KG/M2 | HEART RATE: 92 BPM

## 2024-05-01 DIAGNOSIS — Z87.440 PERSONAL HISTORY OF URINARY TRACT INFECTION: ICD-10-CM

## 2024-05-01 DIAGNOSIS — I10 ESSENTIAL HYPERTENSION: Chronic | ICD-10-CM

## 2024-05-01 DIAGNOSIS — I48.92 ATRIAL FLUTTER WITH RAPID VENTRICULAR RESPONSE (H): ICD-10-CM

## 2024-05-01 DIAGNOSIS — E11.21 TYPE 2 DIABETES MELLITUS WITH DIABETIC NEPHROPATHY, WITHOUT LONG-TERM CURRENT USE OF INSULIN (H): Primary | ICD-10-CM

## 2024-05-01 DIAGNOSIS — B37.2 YEAST INFECTION OF THE SKIN: ICD-10-CM

## 2024-05-01 DIAGNOSIS — N18.32 STAGE 3B CHRONIC KIDNEY DISEASE (H): ICD-10-CM

## 2024-05-01 LAB
ALBUMIN UR-MCNC: NEGATIVE MG/DL
APPEARANCE UR: CLEAR
BACTERIA #/AREA URNS HPF: ABNORMAL /HPF
BILIRUB UR QL STRIP: NEGATIVE
COLOR UR AUTO: YELLOW
GLUCOSE UR STRIP-MCNC: >=1000 MG/DL
HGB UR QL STRIP: ABNORMAL
KETONES UR STRIP-MCNC: NEGATIVE MG/DL
LEUKOCYTE ESTERASE UR QL STRIP: NEGATIVE
NITRATE UR QL: NEGATIVE
PH UR STRIP: 6 [PH] (ref 5–7)
RBC #/AREA URNS AUTO: ABNORMAL /HPF
SP GR UR STRIP: 1.01 (ref 1–1.03)
SQUAMOUS #/AREA URNS AUTO: ABNORMAL /LPF
UROBILINOGEN UR STRIP-ACNC: 0.2 E.U./DL
WBC #/AREA URNS AUTO: ABNORMAL /HPF

## 2024-05-01 PROCEDURE — 81001 URINALYSIS AUTO W/SCOPE: CPT | Performed by: NURSE PRACTITIONER

## 2024-05-01 PROCEDURE — 99214 OFFICE O/P EST MOD 30 MIN: CPT | Performed by: NURSE PRACTITIONER

## 2024-05-01 RX ORDER — NYSTATIN 100000 U/G
CREAM TOPICAL 2 TIMES DAILY
Qty: 60 G | Refills: 1 | Status: SHIPPED | OUTPATIENT
Start: 2024-05-01

## 2024-05-01 RX ORDER — GLYCOPYRROLATE 1 MG/1
1-2 TABLET ORAL 2 TIMES DAILY PRN
Qty: 360 TABLET | Refills: 3 | Status: CANCELLED | OUTPATIENT
Start: 2024-05-01

## 2024-05-01 RX ORDER — FLUCONAZOLE 150 MG/1
150 TABLET ORAL
Qty: 3 TABLET | Refills: 0 | Status: SHIPPED | OUTPATIENT
Start: 2024-05-01 | End: 2024-05-08

## 2024-05-01 RX ORDER — ATORVASTATIN CALCIUM 10 MG/1
10 TABLET, FILM COATED ORAL DAILY
Qty: 90 TABLET | Refills: 3 | Status: SHIPPED | OUTPATIENT
Start: 2024-05-01 | End: 2024-05-03

## 2024-05-01 RX ORDER — LOSARTAN POTASSIUM 25 MG/1
25 TABLET ORAL DAILY
Qty: 90 TABLET | Refills: 3 | Status: SHIPPED | OUTPATIENT
Start: 2024-05-01 | End: 2024-05-03

## 2024-05-01 RX ORDER — RESPIRATORY SYNCYTIAL VIRUS VACCINE 120MCG/0.5
0.5 KIT INTRAMUSCULAR ONCE
Qty: 1 EACH | Refills: 0 | Status: CANCELLED | OUTPATIENT
Start: 2024-05-01 | End: 2024-05-01

## 2024-05-01 RX ORDER — METOPROLOL SUCCINATE 100 MG/1
100 TABLET, EXTENDED RELEASE ORAL DAILY
Qty: 90 TABLET | Refills: 3 | Status: SHIPPED | OUTPATIENT
Start: 2024-05-01 | End: 2024-05-03

## 2024-05-01 RX ORDER — GLIPIZIDE 5 MG/1
5 TABLET, FILM COATED, EXTENDED RELEASE ORAL
Qty: 90 TABLET | Refills: 1 | Status: SHIPPED | OUTPATIENT
Start: 2024-05-01 | End: 2024-05-03

## 2024-05-01 RX ORDER — CHLORTHALIDONE 25 MG/1
12.5 TABLET ORAL DAILY
Qty: 45 TABLET | Refills: 3 | Status: SHIPPED | OUTPATIENT
Start: 2024-05-01 | End: 2024-05-03

## 2024-05-01 ASSESSMENT — PAIN SCALES - GENERAL: PAINLEVEL: NO PAIN (0)

## 2024-05-01 NOTE — PATIENT INSTRUCTIONS
Apply Nystatin cream twice daily to groin area    Start Diflucan 150 mg every 3 days for 3 doses     Decrease Metformin to 500 mg twice daily (due to your kidneys, adjusted dose)    Recheck kidney function (blood test) in about 2-3 weeks    Urine test today to recheck UTI

## 2024-05-01 NOTE — PROGRESS NOTES
Assessment & Plan     Type 2 diabetes mellitus with diabetic nephropathy, without long-term current use of insulin (H)  -A1C improved  -continue Glipizide, Jardiance and Ozempic as prescribed  -reduced Metformin, renally adjusted to 500 mg twice daily   -recheck A1C in 3 months   - atorvastatin (LIPITOR) 10 MG tablet; Take 1 tablet (10 mg) by mouth daily  - metFORMIN (GLUCOPHAGE) 500 MG tablet; Take 1 tablet (500 mg) by mouth 2 times daily (with meals)  - empagliflozin (JARDIANCE) 10 MG TABS tablet; Take 1 tablet (10 mg) by mouth daily  - glipiZIDE (GLUCOTROL XL) 5 MG 24 hr tablet; Take 1 tablet (5 mg) by mouth daily with food  - semaglutide (OZEMPIC) 2 MG/3ML pen; Inject 0.5 mg Subcutaneous every 7 days  - Basic metabolic panel  (Ca, Cl, CO2, Creat, Gluc, K, Na, BUN); Future  - **Hemoglobin A1c FUTURE 3mo; Future    Stage 3b chronic kidney disease (H)  -worsened, can be due to history of UTI, UA is negative for UTI today, recomemnded to recheck BMP panel in about 2 weeks   -recheck BMP panel in 2 weeks   - Parathyroid Hormone Intact; Future  - Albumin Random Urine Quantitative with Creat Ratio; Future  - Basic metabolic panel  (Ca, Cl, CO2, Creat, Gluc, K, Na, BUN); Future    Essential hypertension  -well controlled   - chlorthalidone (HYGROTON) 25 MG tablet; Take 0.5 tablets (12.5 mg) by mouth daily  - losartan (COZAAR) 25 MG tablet; Take 1 tablet (25 mg) by mouth daily    Atrial flutter with rapid ventricular response (H)  -stable, asymptomatic, exam normal   - metoprolol succinate ER (TOPROL XL) 100 MG 24 hr tablet; Take 1 tablet (100 mg) by mouth daily    Personal history of urinary tract infection    - UA with Microscopic reflex to Culture - lab collect  - UA Microscopic with Reflex to Culture    Yeast infection of the skin  -bilateral groin area   - nystatin (MYCOSTATIN) 025271 UNIT/GM external cream; Apply topically 2 times daily  - fluconazole (DIFLUCAN) 150 MG tablet; Take 1 tablet (150 mg) by mouth  every 3 days for 3 doses    Giancarlo Bradford is a 78 year old, presenting for the following health issues:  Diabetes and Derm Problem (RASH )        5/1/2024     1:06 PM   Additional Questions   Roomed by justo   Accompanied by self         5/1/2024     1:06 PM   Patient Reported Additional Medications   Patient reports taking the following new medications none     HPI     Rash  Onset/Duration: 2 to 3 days   Description  Location: in between in her legs   Character: worsening   Itching: no  Intensity:  mild- painful when she is sitting   Progression of Symptoms:  same  Accompanying signs and symptoms:   Fever: No  Body aches or joint pain: No  Sore throat symptoms: No  Recent cold symptoms: No  History:           Previous episodes of similar rash: None  New exposures:  None  Recent travel: No  Exposure to similar rash: No  Precipitating or alleviating factors: none   Therapies tried and outcome: baby powder   Diabetes Follow-up    How often are you checking your blood sugar? Not at all  What concerns do you have today about your diabetes? None   Do you have any of these symptoms? (Select all that apply)  No numbness or tingling in feet.  No redness, sores or blisters on feet.  No complaints of excessive thirst.  No reports of blurry vision.  No significant changes to weight.  Have you had a diabetic eye exam in the last 12 months? No    BP Readings from Last 2 Encounters:   05/01/24 120/58   04/22/24 (!) 143/90     Hemoglobin A1C (%)   Date Value   04/29/2024 7.3 (H)   02/05/2024 8.2 (H)   06/22/2021 7.2 (H)   03/02/2021 7.5 (H)     LDL Cholesterol Calculated (mg/dL)   Date Value   10/12/2023 40   10/03/2022 56   06/22/2021 39   09/08/2020 41       Hypertension Follow-up    Do you check your blood pressure regularly outside of the clinic? No   Are you following a low salt diet? Yes  Are your blood pressures ever more than 140 on the top number (systolic) OR more   than 90 on the bottom number (diastolic), for  "example 140/90? Yes      Review of Systems  Constitutional, HEENT, cardiovascular, pulmonary, gi and gu systems are negative, except as otherwise noted.      Objective    /58   Pulse 92   Temp (!) 96.1  F (35.6  C) (Tympanic)   Resp 16   Ht 1.638 m (5' 4.5\")   Wt 83.4 kg (183 lb 14.4 oz)   SpO2 97%   BMI 31.08 kg/m    Body mass index is 31.08 kg/m .  Physical Exam   GENERAL: alert and no distress  EYES: Eyes grossly normal to inspection, PERRL and conjunctivae and sclerae normal  RESP: lungs clear to auscultation - no rales, rhonchi or wheezes  CV: regular rate and rhythm, normal S1 S2, no S3 or S4, no murmur, click or rub, no peripheral edema   SKIN: erythematous rash in bilateral groin and perianal area   NEURO: Normal strength and tone, mentation intact and speech normal  PSYCH: mentation appears normal, affect normal/bright    Results for orders placed or performed in visit on 05/01/24   UA with Microscopic reflex to Culture - lab collect     Status: Abnormal    Specimen: Urine, Clean Catch   Result Value Ref Range    Color Urine Yellow Colorless, Straw, Light Yellow, Yellow    Appearance Urine Clear Clear    Glucose Urine >=1000 (A) Negative mg/dL    Bilirubin Urine Negative Negative    Ketones Urine Negative Negative mg/dL    Specific Gravity Urine 1.010 1.003 - 1.035    Blood Urine Trace (A) Negative    pH Urine 6.0 5.0 - 7.0    Protein Albumin Urine Negative Negative mg/dL    Urobilinogen Urine 0.2 0.2, 1.0 E.U./dL    Nitrite Urine Negative Negative    Leukocyte Esterase Urine Negative Negative   UA Microscopic with Reflex to Culture     Status: Abnormal   Result Value Ref Range    Bacteria Urine Few (A) None Seen /HPF    RBC Urine 0-2 0-2 /HPF /HPF    WBC Urine 0-5 0-5 /HPF /HPF    Squamous Epithelials Urine Few (A) None Seen /LPF    Narrative    Urine Culture not indicated           Signed Electronically by: SRAVAN Christianson CNP    "

## 2024-05-02 ENCOUNTER — TELEPHONE (OUTPATIENT)
Dept: FAMILY MEDICINE | Facility: CLINIC | Age: 79
End: 2024-05-02
Payer: COMMERCIAL

## 2024-05-02 DIAGNOSIS — I48.92 ATRIAL FLUTTER WITH RAPID VENTRICULAR RESPONSE (H): ICD-10-CM

## 2024-05-02 DIAGNOSIS — I10 ESSENTIAL HYPERTENSION: Chronic | ICD-10-CM

## 2024-05-02 DIAGNOSIS — E11.21 TYPE 2 DIABETES MELLITUS WITH DIABETIC NEPHROPATHY, WITHOUT LONG-TERM CURRENT USE OF INSULIN (H): ICD-10-CM

## 2024-05-02 NOTE — TELEPHONE ENCOUNTER
Medication Question or Refill        What medication are you calling about (include dose and sig)?: Pt was seen yesterday.  Received refills on meds.  All but 2 were sent to the wrong pharmacy.  The Atorvastatin, Chlorthalidone, Metformin, Metoprolol, Losartan, Empagliflozin, Glipizide, and  Semaglutide all need to be sent to [facility] for a 90 day supply.  Please let patient know when this has been done.    Preferred Pharmacy:     [facility] - [facility]  [facility]  [place] [address]  Phone: [phone] Fax: [phone]      Controlled Substance Agreement on file:   CSA -- Patient Level:    CSA: None found at the patient level.       Who prescribed the medication?: Kori    Do you need a refill? Yes      Patient offered an appointment? Yes: seen [phone]      [facility] or would you prefer to receive a phone call?:   Patient would prefer a phone call   Okay to leave a detailed message?: Yes at Cell number on file:    Telephone Information:   Mobile [phone]   Spouse Ramana Jeffery ARPIT DowneySuraj on 5/2/2024 at 10:11 AM

## 2024-05-03 RX ORDER — METOPROLOL SUCCINATE 100 MG/1
100 TABLET, EXTENDED RELEASE ORAL DAILY
Qty: 90 TABLET | Refills: 3 | Status: SHIPPED | OUTPATIENT
Start: 2024-05-03

## 2024-05-03 RX ORDER — GLIPIZIDE 5 MG/1
5 TABLET, FILM COATED, EXTENDED RELEASE ORAL
Qty: 90 TABLET | Refills: 1 | Status: SHIPPED | OUTPATIENT
Start: 2024-05-03

## 2024-05-03 RX ORDER — ATORVASTATIN CALCIUM 10 MG/1
10 TABLET, FILM COATED ORAL DAILY
Qty: 90 TABLET | Refills: 3 | Status: SHIPPED | OUTPATIENT
Start: 2024-05-03

## 2024-05-03 RX ORDER — CHLORTHALIDONE 25 MG/1
12.5 TABLET ORAL DAILY
Qty: 45 TABLET | Refills: 3 | Status: SHIPPED | OUTPATIENT
Start: 2024-05-03

## 2024-05-03 RX ORDER — LOSARTAN POTASSIUM 25 MG/1
25 TABLET ORAL DAILY
Qty: 90 TABLET | Refills: 3 | Status: SHIPPED | OUTPATIENT
Start: 2024-05-03

## 2024-05-03 NOTE — TELEPHONE ENCOUNTER
Called and spoke to Ramana patient's  (consent on file) and confirmed the pended 8 scripts are needing to be changed to [facility]. All scripts sent today.      ELIAS Sainz

## 2024-05-10 ENCOUNTER — TRANSFERRED RECORDS (OUTPATIENT)
Dept: HEALTH INFORMATION MANAGEMENT | Facility: CLINIC | Age: 79
End: 2024-05-10
Payer: COMMERCIAL

## 2024-05-15 ENCOUNTER — LAB (OUTPATIENT)
Dept: LAB | Facility: CLINIC | Age: 79
End: 2024-05-15
Payer: COMMERCIAL

## 2024-05-15 DIAGNOSIS — E11.21 TYPE 2 DIABETES MELLITUS WITH DIABETIC NEPHROPATHY, WITHOUT LONG-TERM CURRENT USE OF INSULIN (H): ICD-10-CM

## 2024-05-15 DIAGNOSIS — N18.32 STAGE 3B CHRONIC KIDNEY DISEASE (H): ICD-10-CM

## 2024-05-15 LAB
ANION GAP SERPL CALCULATED.3IONS-SCNC: 13 MMOL/L (ref 7–15)
BUN SERPL-MCNC: 24.2 MG/DL (ref 8–23)
CALCIUM SERPL-MCNC: 10.1 MG/DL (ref 8.8–10.2)
CHLORIDE SERPL-SCNC: 103 MMOL/L (ref 98–107)
CREAT SERPL-MCNC: 1.28 MG/DL (ref 0.51–0.95)
DEPRECATED HCO3 PLAS-SCNC: 24 MMOL/L (ref 22–29)
EGFRCR SERPLBLD CKD-EPI 2021: 42 ML/MIN/1.73M2
GLUCOSE SERPL-MCNC: 151 MG/DL (ref 70–99)
POTASSIUM SERPL-SCNC: 3.8 MMOL/L (ref 3.4–5.3)
SODIUM SERPL-SCNC: 140 MMOL/L (ref 135–145)

## 2024-05-15 PROCEDURE — 80048 BASIC METABOLIC PNL TOTAL CA: CPT

## 2024-05-15 PROCEDURE — 36415 COLL VENOUS BLD VENIPUNCTURE: CPT

## 2024-05-24 ENCOUNTER — OFFICE VISIT (OUTPATIENT)
Dept: FAMILY MEDICINE | Facility: CLINIC | Age: 79
End: 2024-05-24
Payer: COMMERCIAL

## 2024-05-24 VITALS
DIASTOLIC BLOOD PRESSURE: 60 MMHG | OXYGEN SATURATION: 97 % | RESPIRATION RATE: 16 BRPM | WEIGHT: 183.6 LBS | SYSTOLIC BLOOD PRESSURE: 122 MMHG | BODY MASS INDEX: 31.03 KG/M2 | TEMPERATURE: 97.9 F | HEART RATE: 83 BPM

## 2024-05-24 DIAGNOSIS — H61.23 BILATERAL IMPACTED CERUMEN: Primary | ICD-10-CM

## 2024-05-24 PROCEDURE — 69210 REMOVE IMPACTED EAR WAX UNI: CPT | Performed by: NURSE PRACTITIONER

## 2024-05-24 RX ORDER — RESPIRATORY SYNCYTIAL VIRUS VACCINE 120MCG/0.5
0.5 KIT INTRAMUSCULAR ONCE
Qty: 1 EACH | Refills: 0 | Status: CANCELLED | OUTPATIENT
Start: 2024-05-24 | End: 2024-05-24

## 2024-05-24 ASSESSMENT — PAIN SCALES - GENERAL: PAINLEVEL: NO PAIN (0)

## 2024-05-24 NOTE — PROGRESS NOTES
Assessment & Plan     Bilateral impacted cerumen  Cerumenosis is noted.  Wax is removed by syringing and manual debridement.  Patient tolerated procedure well.Instructions for home care to prevent wax buildup are given.   - REMOVE IMPACTED CERUMEN        Giancarlo Bradford is a 79 year old, presenting for the following health issues:  Ear Problem        5/24/2024     3:12 PM   Additional Questions   Roomed by Esthela STRICKLAND CMA   Accompanied by Self         5/24/2024     3:12 PM   Patient Reported Additional Medications   Patient reports taking the following new medications None     HPI     Check ears and possibly have the wax removed from the ears.          Review of Systems  Constitutional, HEENT, cardiovascular, pulmonary, gi and gu systems are negative, except as otherwise noted.      Objective    /60 (BP Location: Left arm, Patient Position: Sitting, Cuff Size: Adult Regular)   Pulse 83   Temp 97.9  F (36.6  C) (Tympanic)   Resp 16   Wt 83.3 kg (183 lb 9.6 oz)   SpO2 97%   BMI 31.03 kg/m    Body mass index is 31.03 kg/m .  Physical Exam   GENERAL: alert and no distress  EYES: Eyes grossly normal to inspection, PERRL and conjunctivae and sclerae normal  HENT: both ears: occluded with wax. After cerumen was removed ear canal and TMs were normal and intact  NEURO: Normal strength and tone, mentation intact and speech normal  PSYCH: mentation appears normal, affect normal/bright            Signed Electronically by: SRAVAN Christianson CNP

## 2024-07-01 DIAGNOSIS — I10 ESSENTIAL HYPERTENSION: Chronic | ICD-10-CM

## 2024-07-01 RX ORDER — CHLORTHALIDONE 25 MG/1
12.5 TABLET ORAL DAILY
Qty: 45 TABLET | Refills: 3 | OUTPATIENT
Start: 2024-07-01

## 2024-07-01 NOTE — TELEPHONE ENCOUNTER
Medication Question or Refill    Contacts       Contact Date/Time Type Contact Phone/Fax    07/01/2024 10:46 AM CDT Phone (Incoming) Ramana Britton (Emergency Contact) [phone] (M)            What medication are you calling about (include dose and sig)?: Pending Prescriptions:                       Disp   Refills    chlorthalidone (HYGROTON) 25 MG tablet    45 tab*3            Sig: Take 0.5 tablets (12.5 mg) by mouth daily      Preferred Pharmacy:     [facility] [address] - [facility] AT [address] [facility]  [place] [address]  Phone: [phone] Fax: [phone]      Controlled Substance Agreement on file:   CSA -- Patient Level:    CSA: None found at the patient level.       Who prescribed the medication?: Dr. Kori Hylton    Do you need a refill? Yes      Could we send this information to you in [facility] or would you prefer to receive a phone call?:   Patient would prefer a phone call   Okay to leave a detailed message?: Yes at Cell number on file:    Telephone Information:   Mobile [phone]

## 2024-07-23 ENCOUNTER — LAB (OUTPATIENT)
Dept: LAB | Facility: CLINIC | Age: 79
End: 2024-07-23
Payer: COMMERCIAL

## 2024-07-23 DIAGNOSIS — E11.21 TYPE 2 DIABETES MELLITUS WITH DIABETIC NEPHROPATHY, WITHOUT LONG-TERM CURRENT USE OF INSULIN (H): ICD-10-CM

## 2024-07-23 DIAGNOSIS — N18.32 STAGE 3B CHRONIC KIDNEY DISEASE (H): Primary | ICD-10-CM

## 2024-07-23 LAB
HBA1C MFR BLD: 7 % (ref 0–5.6)
HGB BLD-MCNC: 14.7 G/DL (ref 11.7–15.7)

## 2024-07-23 PROCEDURE — 36415 COLL VENOUS BLD VENIPUNCTURE: CPT

## 2024-07-23 PROCEDURE — 83036 HEMOGLOBIN GLYCOSYLATED A1C: CPT

## 2024-07-23 PROCEDURE — 85018 HEMOGLOBIN: CPT

## 2024-09-27 ENCOUNTER — TELEPHONE (OUTPATIENT)
Dept: FAMILY MEDICINE | Facility: CLINIC | Age: 79
End: 2024-09-27
Payer: COMMERCIAL

## 2024-09-27 NOTE — TELEPHONE ENCOUNTER
General Call    Contacts       Contact Date/Time Type Contact Phone/Fax    09/27/2024 10:20 AM CDT Phone (Incoming) Suzette Britton (Self) [phone] (H)          Reason for Call:  Patient called stating that she is itchy all over and was wondering if PCP can cut back her Jardianc 10ml. Patient stated that's what is causing her to itch.     What are your questions or concerns:  please contact patient    Date of last appointment with provider: 5/24/24    Could we send this information to you in [employer] or would you prefer to receive a phone call?:   Patient would prefer a phone call   Okay to leave a detailed message?: Yes at Cell number on file:    Telephone Information:   [phone]

## 2024-10-01 NOTE — TELEPHONE ENCOUNTER
2nd attempt.  Patient was instructed to return call to [facility] main line at [phone] to speak with an RN.  RN sees that patient has appt scheduled with PCP for 10/9/24.     Liberty German RN  [facility]

## 2024-10-09 ENCOUNTER — OFFICE VISIT (OUTPATIENT)
Dept: FAMILY MEDICINE | Facility: CLINIC | Age: 79
End: 2024-10-09
Payer: COMMERCIAL

## 2024-10-09 VITALS
HEIGHT: 65 IN | WEIGHT: 186.9 LBS | OXYGEN SATURATION: 98 % | SYSTOLIC BLOOD PRESSURE: 128 MMHG | DIASTOLIC BLOOD PRESSURE: 78 MMHG | TEMPERATURE: 97.7 F | HEART RATE: 84 BPM | BODY MASS INDEX: 31.14 KG/M2 | RESPIRATION RATE: 16 BRPM

## 2024-10-09 DIAGNOSIS — N18.32 STAGE 3B CHRONIC KIDNEY DISEASE (H): ICD-10-CM

## 2024-10-09 DIAGNOSIS — E11.21 TYPE 2 DIABETES MELLITUS WITH DIABETIC NEPHROPATHY, WITHOUT LONG-TERM CURRENT USE OF INSULIN (H): Primary | ICD-10-CM

## 2024-10-09 DIAGNOSIS — L29.9 ITCHING: ICD-10-CM

## 2024-10-09 DIAGNOSIS — M25.562 CHRONIC PAIN OF LEFT KNEE: ICD-10-CM

## 2024-10-09 DIAGNOSIS — N90.4 LICHEN SCLEROSUS OF VULVA: ICD-10-CM

## 2024-10-09 DIAGNOSIS — G89.29 CHRONIC PAIN OF LEFT KNEE: ICD-10-CM

## 2024-10-09 LAB
ALBUMIN SERPL BCG-MCNC: 4 G/DL (ref 3.5–5.2)
ALP SERPL-CCNC: 94 U/L (ref 40–150)
ALT SERPL W P-5'-P-CCNC: 39 U/L (ref 0–50)
ANION GAP SERPL CALCULATED.3IONS-SCNC: 14 MMOL/L (ref 7–15)
AST SERPL W P-5'-P-CCNC: 32 U/L (ref 0–45)
BILIRUB SERPL-MCNC: 0.5 MG/DL
BUN SERPL-MCNC: 22.7 MG/DL (ref 8–23)
CALCIUM SERPL-MCNC: 9.5 MG/DL (ref 8.8–10.4)
CHLORIDE SERPL-SCNC: 101 MMOL/L (ref 98–107)
CHOLEST SERPL-MCNC: 143 MG/DL
CREAT SERPL-MCNC: 1.19 MG/DL (ref 0.51–0.95)
EGFRCR SERPLBLD CKD-EPI 2021: 46 ML/MIN/1.73M2
EST. AVERAGE GLUCOSE BLD GHB EST-MCNC: 154 MG/DL
FASTING STATUS PATIENT QL REPORTED: NO
FASTING STATUS PATIENT QL REPORTED: NO
GLUCOSE SERPL-MCNC: 166 MG/DL (ref 70–99)
HBA1C MFR BLD: 7 % (ref 0–5.6)
HCO3 SERPL-SCNC: 24 MMOL/L (ref 22–29)
HDLC SERPL-MCNC: 48 MG/DL
LDLC SERPL CALC-MCNC: 38 MG/DL
NONHDLC SERPL-MCNC: 95 MG/DL
POTASSIUM SERPL-SCNC: 3.9 MMOL/L (ref 3.4–5.3)
PROT SERPL-MCNC: 7 G/DL (ref 6.4–8.3)
PTH-INTACT SERPL-MCNC: 34 PG/ML (ref 15–65)
SODIUM SERPL-SCNC: 139 MMOL/L (ref 135–145)
TRIGL SERPL-MCNC: 283 MG/DL

## 2024-10-09 PROCEDURE — 36415 COLL VENOUS BLD VENIPUNCTURE: CPT | Performed by: NURSE PRACTITIONER

## 2024-10-09 PROCEDURE — 83036 HEMOGLOBIN GLYCOSYLATED A1C: CPT | Performed by: NURSE PRACTITIONER

## 2024-10-09 PROCEDURE — 80053 COMPREHEN METABOLIC PANEL: CPT | Performed by: NURSE PRACTITIONER

## 2024-10-09 PROCEDURE — G2211 COMPLEX E/M VISIT ADD ON: HCPCS | Performed by: NURSE PRACTITIONER

## 2024-10-09 PROCEDURE — 83970 ASSAY OF PARATHORMONE: CPT | Performed by: NURSE PRACTITIONER

## 2024-10-09 PROCEDURE — 80061 LIPID PANEL: CPT | Performed by: NURSE PRACTITIONER

## 2024-10-09 PROCEDURE — 99214 OFFICE O/P EST MOD 30 MIN: CPT | Performed by: NURSE PRACTITIONER

## 2024-10-09 RX ORDER — HYDROXYZINE PAMOATE 25 MG/1
25 CAPSULE ORAL 2 TIMES DAILY PRN
Qty: 60 CAPSULE | Refills: 1 | Status: SHIPPED | OUTPATIENT
Start: 2024-10-09

## 2024-10-09 RX ORDER — CLOBETASOL PROPIONATE 0.5 MG/G
OINTMENT TOPICAL
Qty: 60 G | Refills: 1 | Status: SHIPPED | OUTPATIENT
Start: 2024-10-09

## 2024-10-09 ASSESSMENT — PAIN SCALES - GENERAL: PAINLEVEL: NO PAIN (0)

## 2024-10-09 NOTE — PROGRESS NOTES
Assessment & Plan     Type 2 diabetes mellitus with diabetic nephropathy, without long-term current use of insulin (H)  -well controlled  -continue current treatment plan   - Lipid panel reflex to direct LDL Non-fasting; Future  - HEMOGLOBIN A1C; Future  - Lipid panel reflex to direct LDL Non-fasting  - HEMOGLOBIN A1C    Lichen sclerosus of vulva    - clobetasol (TEMOVATE) 0.05 % external ointment; Apply topically at bedtime as needed, may repeat once (as needed for vulvar irritation). Apply  Pea sized amount to vulva every night for 4 weeks then decrease to every other night from then on.    Itching  -advised patient that generalized body pruritus can be due to dry skin, she does not have any concerning lesions, or rashes. Recommended her to start using over the counter CeraVe lotion, or cream   - Comprehensive metabolic panel (BMP + Alb, Alk Phos, ALT, AST, Total. Bili, TP); Future  - hydrOXYzine miladis (VISTARIL) 25 MG capsule; Take 1 capsule (25 mg) by mouth 2 times daily as needed for itching.  - Comprehensive metabolic panel (BMP + Alb, Alk Phos, ALT, AST, Total. Bili, TP)    Stage 3b chronic kidney disease (H)  -stable   - Parathyroid Hormone Intact    Chronic pain of left knee  -patient would like to try physical therapy since it helped in the past  - Physical Therapy  Referral; Future      Giancarlo Bradford is a 79 year old, presenting for the following health issues:  Derm Problem        10/9/2024    11:17 AM   Additional Questions   Roomed by justo   Accompanied by self         10/9/2024    11:17 AM   Patient Reported Additional Medications   Patient reports taking the following new medications none     HPI     Concern - DERM CONCERN   Onset: 3 weeks   Description: She states she itches all over her body   Progression of Symptoms:  same  Accompanying Signs & Symptoms: none   Previous history of similar problem: none   Precipitating factors:        Worsened by: none   Alleviating factors:        " Improved by: none   Therapies tried and outcome:  none       Review of Systems  Constitutional, HEENT, cardiovascular, pulmonary, gi and gu systems are negative, except as otherwise noted.      Objective    /78   Pulse 84   Temp 97.7  F (36.5  C) (Tympanic)   Resp 16   Ht 1.638 m (5' 4.5\")   Wt 84.8 kg (186 lb 14.4 oz)   SpO2 98%   BMI 31.59 kg/m    Body mass index is 31.59 kg/m .  Physical Exam   GENERAL: alert and no distress  EYES: Eyes grossly normal to inspection, PERRL and conjunctivae and sclerae normal  CV: regular rate and rhythm, normal S1 S2, no S3 or S4, no murmur, click or rub, no peripheral edema   MS: no gross musculoskeletal defects noted, no edema  SKIN: no suspicious lesions or rashes  NEURO: Normal strength and tone, mentation intact and speech normal  PSYCH: mentation appears normal, affect normal/bright    Results for orders placed or performed in visit on 10/09/24 (from the past 24 hour(s))   Parathyroid Hormone Intact   Result Value Ref Range    Parathyroid Hormone Intact 34 15 - 65 pg/mL    Narrative    This result was obtained with the Roche Elecsys PTH STAT assay.   This reference range differs from PTH assays used in other [facility] laboratories.   Lipid panel reflex to direct LDL Non-fasting   Result Value Ref Range    Cholesterol 143 <200 mg/dL    Triglycerides 283 (H) <150 mg/dL    Direct Measure HDL 48 (L) >=50 mg/dL    LDL Cholesterol Calculated 38 <100 mg/dL    Non HDL Cholesterol 95 <130 mg/dL    Patient Fasting > 8hrs? No     Narrative    Cholesterol  Desirable: < 200 mg/dL  Borderline High: 200 - 239 mg/dL  High: >= 240 mg/dL    Triglycerides  Normal: < 150 mg/dL  Borderline High: 150 - 199 mg/dL  High: 200-499 mg/dL  Very High: >= 500 mg/dL    Direct Measure HDL  Female: >= 50 mg/dL   Male: >= 40 mg/dL    LDL Cholesterol  Desirable: < 100 mg/dL  Above Desirable: 100 - 129 mg/dL   Borderline High: 130 - 159 mg/dL   High:  160 - 189 mg/dL   Very High: >= 190 " mg/dL    Non HDL Cholesterol  Desirable: < 130 mg/dL  Above Desirable: 130 - 159 mg/dL  Borderline High: 160 - 189 mg/dL  High: 190 - 219 mg/dL  Very High: >= 220 mg/dL   HEMOGLOBIN A1C   Result Value Ref Range    Estimated Average Glucose 154 (H) <117 mg/dL    Hemoglobin A1C 7.0 (H) 0.0 - 5.6 %   Comprehensive metabolic panel (BMP + Alb, Alk Phos, ALT, AST, Total. Bili, TP)   Result Value Ref Range    Sodium 139 135 - 145 mmol/L    Potassium 3.9 3.4 - 5.3 mmol/L    Carbon Dioxide (CO2) 24 22 - 29 mmol/L    Anion Gap 14 7 - 15 mmol/L    Urea Nitrogen 22.7 8.0 - 23.0 mg/dL    Creatinine 1.19 (H) 0.51 - 0.95 mg/dL    GFR Estimate 46 (L) >60 mL/min/1.73m2    Calcium 9.5 8.8 - 10.4 mg/dL    Chloride 101 98 - 107 mmol/L    Glucose 166 (H) 70 - 99 mg/dL    Alkaline Phosphatase 94 40 - 150 U/L    AST 32 0 - 45 U/L    ALT 39 0 - 50 U/L    Protein Total 7.0 6.4 - 8.3 g/dL    Albumin 4.0 3.5 - 5.2 g/dL    Bilirubin Total 0.5 <=1.2 mg/dL    Patient Fasting > 8hrs? No        The longitudinal plan of care for the diagnosis(es)/condition(s) as documented were addressed during this visit. Due to the added complexity in care, I will continue to support Suzette in the subsequent management and with ongoing continuity of care.     Signed Electronically by: SRAVAN Christianson CNP

## 2024-10-09 NOTE — PATIENT INSTRUCTIONS
Try gold bond cream, or CeraVe cream over the counter  Try Vistaril 25 mg twice daily as needed     Labs today

## 2024-10-11 ENCOUNTER — TELEPHONE (OUTPATIENT)
Dept: FAMILY MEDICINE | Facility: CLINIC | Age: 79
End: 2024-10-11
Payer: COMMERCIAL

## 2024-10-11 ENCOUNTER — MYC MEDICAL ADVICE (OUTPATIENT)
Dept: FAMILY MEDICINE | Facility: CLINIC | Age: 79
End: 2024-10-11
Payer: COMMERCIAL

## 2024-10-11 NOTE — TELEPHONE ENCOUNTER
Prior Authorization Retail Medication Request    Medication/Dose: Hydroxyzine pamoate 25 mg cap  Diagnosis and ICD code (if different than what is on RX):    New/renewal/insurance change PA/secondary ins. PA:  Previously Tried and Failed:    Rationale:      Insurance   Primary: not provided  Insurance ID:  not provided  CMM Key: [id number]    Secondary (if applicable):  Insurance ID:      Pharmacy Information (if different than what is on RX)  Name:    Phone:    Fax:    Clinic Information  Preferred routing pool for dept communication: p [id number]

## 2024-10-15 NOTE — TELEPHONE ENCOUNTER
PA Initiation    Medication: HYDROXYZINE PAMOATE 25 MG PO CAPS  Insurance Company: [employer] - Phone [phone] Fax [phone]  Pharmacy Filling the Rx: [facility] - [place], [place] - [address]  Filling Pharmacy Phone: [phone]    Start Date: 10/15/2024

## 2024-10-15 NOTE — TELEPHONE ENCOUNTER
Prior Authorization Approval    Medication: HYDROXYZINE PAMOATE 25 MG PO CAPS  Authorization Effective Date: 1/1/2024  Authorization Expiration Date: 10/15/2025  Approved Dose/Quantity: 60/30  Reference #: [id number]   Insurance Company: [employer] - Phone [phone] Fax [phone]  Expected CoPay: $    CoPay Card Available:      Financial Assistance Needed: NA  Which Pharmacy is filling the prescription: [facility] [address] [place] [address] [address]  Pharmacy Notified: Yes  Patient Notified: Yes, via [employer]

## 2024-10-16 DIAGNOSIS — E11.21 TYPE 2 DIABETES MELLITUS WITH DIABETIC NEPHROPATHY, WITHOUT LONG-TERM CURRENT USE OF INSULIN (H): ICD-10-CM

## 2024-10-17 RX ORDER — SEMAGLUTIDE 0.68 MG/ML
INJECTION, SOLUTION SUBCUTANEOUS
Qty: 9 ML | Refills: 1 | OUTPATIENT
Start: 2024-10-17

## 2024-10-17 RX ORDER — GLIPIZIDE 5 MG/1
TABLET, FILM COATED, EXTENDED RELEASE ORAL
Qty: 90 TABLET | Refills: 1 | OUTPATIENT
Start: 2024-10-17

## 2024-10-18 NOTE — TELEPHONE ENCOUNTER
Patient's  called stating the pharmacy does not have prescription. Patient would like medications.    Basilia Benitez on 10/18/2024 at 9:10 AM

## 2024-10-21 RX ORDER — GLIPIZIDE 5 MG/1
5 TABLET, FILM COATED, EXTENDED RELEASE ORAL
Qty: 90 TABLET | Refills: 1 | Status: SHIPPED | OUTPATIENT
Start: 2024-10-21

## 2024-10-21 NOTE — TELEPHONE ENCOUNTER
Received call from Patient's ..  [facility], has received ozempic.  Will refill medication as patient was seen on 10/7/24 for diabetes check.  Ashish Thomas RN

## 2024-10-22 DIAGNOSIS — E11.21 TYPE 2 DIABETES MELLITUS WITH DIABETIC NEPHROPATHY, WITHOUT LONG-TERM CURRENT USE OF INSULIN (H): ICD-10-CM

## 2024-10-22 RX ORDER — GLIPIZIDE 5 MG/1
5 TABLET, FILM COATED, EXTENDED RELEASE ORAL
Qty: 90 TABLET | Refills: 1 | OUTPATIENT
Start: 2024-10-22

## 2024-10-22 NOTE — TELEPHONE ENCOUNTER
Pending Prescriptions:                       Disp   Refills    glipiZIDE (GLUCOTROL XL) 5 MG 24 hr [id number] tab*1            Sig: Take 1 tablet (5 mg) by mouth daily with food.

## 2024-11-26 ENCOUNTER — THERAPY VISIT (OUTPATIENT)
Dept: PHYSICAL THERAPY | Facility: CLINIC | Age: 79
End: 2024-11-26
Attending: NURSE PRACTITIONER
Payer: COMMERCIAL

## 2024-11-26 DIAGNOSIS — G89.29 CHRONIC PAIN OF LEFT KNEE: ICD-10-CM

## 2024-11-26 DIAGNOSIS — M25.562 CHRONIC PAIN OF LEFT KNEE: ICD-10-CM

## 2024-11-26 PROCEDURE — 97161 PT EVAL LOW COMPLEX 20 MIN: CPT | Mod: GP | Performed by: PHYSICAL THERAPIST

## 2024-11-26 PROCEDURE — 97110 THERAPEUTIC EXERCISES: CPT | Mod: GP | Performed by: PHYSICAL THERAPIST

## 2024-11-26 ASSESSMENT — ACTIVITIES OF DAILY LIVING (ADL)
PLEASE_INDICATE_YOR_PRIMARY_REASON_FOR_REFERRAL_TO_THERAPY:: KNEE
STIFFNESS: THE SYMPTOM AFFECTS MY ACTIVITY MODERATELY
KNEE_ACTIVITY_OF_DAILY_LIVING_SUM: 2
HOW_WOULD_YOU_RATE_THE_OVERALL_FUNCTION_OF_YOUR_KNEE_DURING_YOUR_USUAL_DAILY_ACTIVITIES?: NEARLY NORMAL
STIFFNESS: THE SYMPTOM AFFECTS MY ACTIVITY MODERATELY
HOW_WOULD_YOU_RATE_THE_OVERALL_FUNCTION_OF_YOUR_KNEE_DURING_YOUR_USUAL_DAILY_ACTIVITIES?: NEARLY NORMAL

## 2024-11-26 NOTE — PROGRESS NOTES
"PHYSICAL THERAPY EVALUATION  Type of Visit: Evaluation       Fall Risk Screen:  Fall screen completed by: PT  Have you fallen 2 or more times in the past year?: (Patient-Rptd) No  Have you fallen and had an injury in the past year?: (Patient-Rptd) No  Is patient a fall risk?: No    Subjective         Presenting condition or subjective complaint: (Patient-Rptd) walking betterPt reports she is not having knee pain but L > R knee stiffness has increased. Pt reports she feels the stiffness increased after having tentatus shot 9 months ago.  The stiffness is affecting her walking.  Pt notes she relies on UE assist for sit to stand.   Pt does stairs marked time.  Pt states she avoids walking on uneven surfaces.    Date of onset: 10/09/24    Relevant medical history: (Patient-Rptd) Bladder or bowel problems; Diabetes; Hearing problems; Heart problems .  Arthritis in LB.  Dates & types of surgery:  L DANAY, B TKA, cardiac ablation    Prior diagnostic imaging/testing results: (Patient-Rptd) X-ray     Prior therapy history for the same diagnosis, illness or injury:        Prior Level of Function  Transfers: Independent  Ambulation: Independent  ADL: Independent  IADL: Housekeeping    Living Environment  Social support: (Patient-Rptd) With a significant other or spouse   Type of home: (Patient-Rptd) House; 2-story   Stairs to enter the home:         Ramp: (Patient-Rptd) No   Stairs inside the home: (Patient-Rptd) Yes (Patient-Rptd) 13 Is there a railing: (Patient-Rptd) Yes     Help at home:    Equipment owned: (Patient-Rptd) Straight Cane; Four-point cane; Walker; Raised toilet seat     Employment: (Patient-Rptd) No    Hobbies/Interests: (Patient-Rptd) knitting sewing    Patient goals for therapy:  To improve my walking stride.     Objective   KNEE EVALUATION    GAIT:  Walking w/ stiff knees, short stride slower pace.  TUG 18.7\".  Pt lacks arm swing.    ROM: KE supine R lacks 10*, L lacks 12*,    KF w/ heelslide R 108*,  L " "93*  STRENGTH: B hip flex 4+/5,  B quad/ HS 5/5    PALPATION: mild tenderness L medial joint line and medial patella      Assessment & Plan   CLINICAL IMPRESSIONS  Medical Diagnosis: Chronic pain of left knee    Treatment Diagnosis: B knee stiffness   Impression/Assessment: Patient is a 79 year old female with B knee stiffness complaints.  The following significant findings have been identified: Decreased ROM/flexibility, Decreased strength, Impaired gait, Impaired muscle performance, and Decreased activity tolerance. These impairments interfere with their ability to perform self care tasks, household chores, household mobility, and community mobility as compared to previous level of function.     Clinical Decision Making (Complexity):  Clinical Presentation: Stable/Uncomplicated  Clinical Presentation Rationale: based on medical and personal factors listed in PT evaluation  Clinical Decision Making (Complexity): Low complexity    PLAN OF CARE  Treatment Interventions:  Interventions: Neuromuscular Re-education, Therapeutic Exercise    Long Term Goals     PT Goal 1  Goal Identifier: 1.  Goal Description: Pt will have improved leg strength in order to transition sit to stand w/ decreased UE assist  Target Date: 12/31/24  PT Goal 2  Goal Identifier: 2.  Goal Description: Pt will walk w/ improved stride length and pace w/ Tug improved to 13.4\"  Target Date: 12/31/24  PT Goal 3  Goal Identifier: 3.  Goal Description: Pt will be independent and consistent w/ HEP to manage symptoms  Target Date: 12/31/24      Frequency of Treatment: 1X/ week  Duration of Treatment: 5 weeks    Recommended Referrals to Other Professionals:  none  Education Assessment:   Learner/Method: Patient;Listening;Reading;Demonstration;Pictures/Video;No Barriers to Learning    Risks and benefits of evaluation/treatment have been explained.   Patient/Family/caregiver agrees with Plan of Care.     Evaluation Time:     RETIRED PT Peyton, Low Complexity " Minutes (35631): 20       Signing Clinician: Sasha Hernandez PT        [facility]                                                                                   OUTPATIENT PHYSICAL THERAPY      PLAN OF TREATMENT FOR OUTPATIENT REHABILITATION   Patient's Last Name, First Name, Suzette Todd YOB: 1945   Provider's Name   [facility]   Medical Record No.  [id number]     Onset Date: 10/09/24  Start of Care Date: 11/26/24     Medical Diagnosis:  Chronic pain of left knee      PT Treatment Diagnosis:  B knee stiffness Plan of Treatment  Frequency/Duration: 1X/ week/ 5 weeks    Certification date from 11/26/24 to 12/31/24         See note for plan of treatment details and functional goals     Sasha Hernandez PT                         I CERTIFY THE NEED FOR THESE SERVICES FURNISHED UNDER        THIS PLAN OF TREATMENT AND WHILE UNDER MY CARE     (Physician attestation of this document indicates review and certification of the therapy plan).              Referring Provider:  Kori Hylton    Initial Assessment  See Epic Evaluation- Start of Care Date: 11/26/24

## 2024-12-10 ENCOUNTER — THERAPY VISIT (OUTPATIENT)
Dept: PHYSICAL THERAPY | Facility: CLINIC | Age: 79
End: 2024-12-10
Attending: NURSE PRACTITIONER
Payer: COMMERCIAL

## 2024-12-10 DIAGNOSIS — M25.562 CHRONIC PAIN OF LEFT KNEE: Primary | ICD-10-CM

## 2024-12-10 DIAGNOSIS — G89.29 CHRONIC PAIN OF LEFT KNEE: Primary | ICD-10-CM

## 2024-12-10 PROCEDURE — 97110 THERAPEUTIC EXERCISES: CPT | Mod: GP | Performed by: PHYSICAL THERAPIST

## 2024-12-17 ENCOUNTER — THERAPY VISIT (OUTPATIENT)
Dept: PHYSICAL THERAPY | Facility: CLINIC | Age: 79
End: 2024-12-17
Attending: NURSE PRACTITIONER
Payer: COMMERCIAL

## 2024-12-17 DIAGNOSIS — M25.562 CHRONIC PAIN OF LEFT KNEE: Primary | ICD-10-CM

## 2024-12-17 DIAGNOSIS — G89.29 CHRONIC PAIN OF LEFT KNEE: Primary | ICD-10-CM

## 2024-12-17 PROCEDURE — 97110 THERAPEUTIC EXERCISES: CPT | Mod: GP | Performed by: PHYSICAL THERAPIST

## 2024-12-29 ENCOUNTER — HEALTH MAINTENANCE LETTER (OUTPATIENT)
Age: 79
End: 2024-12-29

## 2025-02-01 ENCOUNTER — HEALTH MAINTENANCE LETTER (OUTPATIENT)
Age: 80
End: 2025-02-01

## 2025-03-16 NOTE — ANESTHESIA PREPROCEDURE EVALUATION
Anesthesia Evaluation     . Pt has had prior anesthetic. Type: MAC and General    No history of anesthetic complications          ROS/MED HX    ENT/Pulmonary:  - neg pulmonary ROS     Neurologic:  - neg neurologic ROS     Cardiovascular:     (+) Dyslipidemia, hypertension----. : . . . :. . Previous cardiac testing date:results:date: results:ECG reviewed date:4/13/17 results:Sinus Rhythm   Low voltage -possible pulmonary disease.     ABNORMAL    date: results:          METS/Exercise Tolerance:  >4 METS   Hematologic:         Musculoskeletal:   (+) arthritis, , , other musculoskeletal- s/p left TKA      GI/Hepatic:  - neg GI/hepatic ROS       Renal/Genitourinary:     (+) chronic renal disease, type: CRI,       Endo:     (+) type I DM, type II DM Not using insulin - not using insulin pump Normal glucose range: 151 Obesity, .      Psychiatric:  - neg psychiatric ROS       Infectious Disease:  - neg infectious disease ROS       Malignancy:      - no malignancy   Other: Comment: hyperhidrosis                    Physical Exam  Normal systems: cardiovascular, pulmonary and dental    Airway   Mallampati: I  TM distance: >3 FB  Neck ROM: full    Dental     Cardiovascular       Pulmonary                     Anesthesia Plan      History & Physical Review  History and physical reviewed and following examination; no interval change.    ASA Status:  3 .    NPO Status:  > 8 hours    Plan for General, Spinal and Periph. Nerve Block for postop pain   PONV prophylaxis:  Ondansetron (or other 5HT-3) and Other (See comment) (vistaril)       Postoperative Care  Postoperative pain management:  IV analgesics and Oral pain medications.      Consents  Anesthetic plan, risks, benefits and alternatives discussed with:  Patient and Daughter/Son..                          .  
no...

## 2025-05-10 ENCOUNTER — HEALTH MAINTENANCE LETTER (OUTPATIENT)
Age: 80
End: 2025-05-10

## 2025-07-22 ENCOUNTER — TELEPHONE (OUTPATIENT)
Dept: FAMILY MEDICINE | Facility: CLINIC | Age: 80
End: 2025-07-22
Payer: COMMERCIAL

## 2025-07-22 NOTE — TELEPHONE ENCOUNTER
Patient Quality Outreach    Patient is due for the following:   Physical Annual Wellness Visit    Action(s) Taken:   Schedule a Annual Wellness Visit    Type of outreach:    Sent Finalta message.    Questions for provider review:    None         Malina Roman MA  Chart routed to Care Team.

## 2025-08-23 ENCOUNTER — HEALTH MAINTENANCE LETTER (OUTPATIENT)
Age: 80
End: 2025-08-23

## (undated) DEVICE — GLOVE PROTEXIS W/NEU-THERA 6.5  2D73TE65

## (undated) DEVICE — BLADE SAW SAGITTAL STRK 18X90X1.37MM HD SYS 6 6118-137-090

## (undated) DEVICE — GLOVE PROTEXIS W/NEU-THERA 8.0  2D73TE80

## (undated) DEVICE — GOWN IMPERVIOUS SPECIALTY XLG/XLONG 32474

## (undated) DEVICE — BONE CLEANING TIP INTERPULSE  0210-010-000

## (undated) DEVICE — SUCTION IRR SYSTEM W/TIP INTERPULSE

## (undated) DEVICE — GOWN XLG DISP 9545

## (undated) DEVICE — STOCKING SLEEVE COMPRESSION CALF LG

## (undated) DEVICE — SU ETHIBOND 5 V-37 4X30" MB66G

## (undated) DEVICE — SU PDO 1 STRATAFIX 36X36CM CTX TAPERPOINT SXPD2B405

## (undated) DEVICE — SU MONOCRYL 3-0 PS-2 18" UND Y497G

## (undated) DEVICE — SYR BULB IRRIG 50ML LATEX FREE 0035280

## (undated) DEVICE — PACK HIP LAKES WITH POUCH

## (undated) DEVICE — BONE CEMENT MIXEVAC III HI VAC KIT  0206-015-000

## (undated) DEVICE — PREP CHLORAPREP 26ML TINTED ORANGE  260815

## (undated) DEVICE — BNDG FLEXIGRIP F 11YD ROLL UNSTERILE LATEX FREE 0814-9095

## (undated) DEVICE — SOL NACL 0.9% IRRIG 3000ML BAG 07972-08

## (undated) DEVICE — SOL WATER IRRIG 1000ML BOTTLE 07139-09

## (undated) DEVICE — IMM PILLOW ABDUCT HIP MED 0814-8033

## (undated) DEVICE — SU VICRYL 1 CT-1 36" UND J947H

## (undated) DEVICE — SU VICRYL 2-0 CT-1 36" UND J945H

## (undated) DEVICE — DRSG AQUACEL AG 3.5X9.75" HYDROFIBER 412011

## (undated) DEVICE — GLOVE PROTEXIS BLUE W/NEU-THERA 6.5  2D73EB65

## (undated) DEVICE — GOWN LG DISP 9515

## (undated) DEVICE — SOL NACL 0.9% IRRIG 1000ML BOTTLE 07138-09

## (undated) DEVICE — ESU ELEC BLADE 4" COATED

## (undated) DEVICE — CAST PADDING 6" UNSTERILE 9046

## (undated) DEVICE — SU MONOCRYL 2-0 CT-1 36" UND Y945H

## (undated) DEVICE — Device

## (undated) DEVICE — SU STRATAFIX 3-0 MH 12" PS-2 SXMD1B103

## (undated) DEVICE — DRAPE SHEET REV FOLD 3/4 9349

## (undated) DEVICE — ESU PENCIL SMOKE EVAC W/ROCKER SWITCH 0703-047-000

## (undated) DEVICE — DEVICE RETRIEVER HEWSON 71111579

## (undated) DEVICE — BLADE SAW SAGITTAL STRK 21X90X1.19MM HD SYS 6 6221-119-090

## (undated) RX ORDER — NALOXONE HYDROCHLORIDE 0.4 MG/ML
INJECTION, SOLUTION INTRAMUSCULAR; INTRAVENOUS; SUBCUTANEOUS
Status: DISPENSED
Start: 2017-05-18

## (undated) RX ORDER — FENTANYL CITRATE 50 UG/ML
INJECTION, SOLUTION INTRAMUSCULAR; INTRAVENOUS
Status: DISPENSED
Start: 2017-05-04

## (undated) RX ORDER — PROPOFOL 10 MG/ML
INJECTION, EMULSION INTRAVENOUS
Status: DISPENSED
Start: 2017-05-04

## (undated) RX ORDER — FENTANYL CITRATE 50 UG/ML
INJECTION, SOLUTION INTRAMUSCULAR; INTRAVENOUS
Status: DISPENSED
Start: 2021-04-01

## (undated) RX ORDER — GLYCOPYRROLATE 0.2 MG/ML
INJECTION, SOLUTION INTRAMUSCULAR; INTRAVENOUS
Status: DISPENSED
Start: 2021-04-01

## (undated) RX ORDER — LIDOCAINE HYDROCHLORIDE 10 MG/ML
INJECTION, SOLUTION EPIDURAL; INFILTRATION; INTRACAUDAL; PERINEURAL
Status: DISPENSED
Start: 2021-04-01

## (undated) RX ORDER — ONDANSETRON 2 MG/ML
INJECTION INTRAMUSCULAR; INTRAVENOUS
Status: DISPENSED
Start: 2021-04-01

## (undated) RX ORDER — EPHEDRINE SULFATE 50 MG/ML
INJECTION, SOLUTION INTRAMUSCULAR; INTRAVENOUS; SUBCUTANEOUS
Status: DISPENSED
Start: 2021-04-01

## (undated) RX ORDER — FENTANYL CITRATE-0.9 % NACL/PF 10 MCG/ML
PLASTIC BAG, INJECTION (ML) INTRAVENOUS
Status: DISPENSED
Start: 2021-04-01

## (undated) RX ORDER — FENTANYL CITRATE 50 UG/ML
INJECTION, SOLUTION INTRAMUSCULAR; INTRAVENOUS
Status: DISPENSED
Start: 2017-05-18

## (undated) RX ORDER — ROPIVACAINE HYDROCHLORIDE 7.5 MG/ML
INJECTION, SOLUTION EPIDURAL; PERINEURAL
Status: DISPENSED
Start: 2017-05-04

## (undated) RX ORDER — ACETAMINOPHEN 325 MG/1
TABLET ORAL
Status: DISPENSED
Start: 2021-04-01

## (undated) RX ORDER — DEXAMETHASONE SODIUM PHOSPHATE 10 MG/ML
INJECTION, SOLUTION INTRAMUSCULAR; INTRAVENOUS
Status: DISPENSED
Start: 2021-04-01

## (undated) RX ORDER — CEFAZOLIN SODIUM 2 G/100ML
INJECTION, SOLUTION INTRAVENOUS
Status: DISPENSED
Start: 2021-04-01

## (undated) RX ORDER — LIDOCAINE HYDROCHLORIDE 10 MG/ML
INJECTION, SOLUTION EPIDURAL; INFILTRATION; INTRACAUDAL; PERINEURAL
Status: DISPENSED
Start: 2017-05-18

## (undated) RX ORDER — MAGNESIUM SULFATE HEPTAHYDRATE 40 MG/ML
INJECTION, SOLUTION INTRAVENOUS
Status: DISPENSED
Start: 2021-04-01

## (undated) RX ORDER — LIDOCAINE HYDROCHLORIDE 10 MG/ML
INJECTION, SOLUTION EPIDURAL; INFILTRATION; INTRACAUDAL; PERINEURAL
Status: DISPENSED
Start: 2017-05-04

## (undated) RX ORDER — DEXTROSE MONOHYDRATE 25 G/50ML
INJECTION, SOLUTION INTRAVENOUS
Status: DISPENSED
Start: 2017-05-18

## (undated) RX ORDER — EPHEDRINE SULFATE 50 MG/ML
INJECTION, SOLUTION INTRAVENOUS
Status: DISPENSED
Start: 2017-05-04

## (undated) RX ORDER — FLUMAZENIL 0.1 MG/ML
INJECTION, SOLUTION INTRAVENOUS
Status: DISPENSED
Start: 2017-05-18

## (undated) RX ORDER — GABAPENTIN 300 MG/1
CAPSULE ORAL
Status: DISPENSED
Start: 2021-04-01

## (undated) RX ORDER — PROPOFOL 10 MG/ML
INJECTION, EMULSION INTRAVENOUS
Status: DISPENSED
Start: 2021-04-01

## (undated) RX ORDER — HEPARIN SODIUM 1000 [USP'U]/ML
INJECTION, SOLUTION INTRAVENOUS; SUBCUTANEOUS
Status: DISPENSED
Start: 2017-05-18

## (undated) RX ORDER — TRANEXAMIC ACID 650 MG/1
TABLET ORAL
Status: DISPENSED
Start: 2021-04-01

## (undated) RX ORDER — GLYCOPYRROLATE 0.2 MG/ML
INJECTION, SOLUTION INTRAMUSCULAR; INTRAVENOUS
Status: DISPENSED
Start: 2017-05-18

## (undated) RX ORDER — ONDANSETRON 2 MG/ML
INJECTION INTRAMUSCULAR; INTRAVENOUS
Status: DISPENSED
Start: 2017-05-04